# Patient Record
Sex: FEMALE | Race: WHITE | Employment: OTHER
[De-identification: names, ages, dates, MRNs, and addresses within clinical notes are randomized per-mention and may not be internally consistent; named-entity substitution may affect disease eponyms.]

---

## 2017-01-02 ENCOUNTER — HOME CARE VISIT (OUTPATIENT)
Dept: SCHEDULING | Facility: HOME HEALTH | Age: 72
End: 2017-01-02
Payer: MEDICARE

## 2017-01-02 VITALS
HEART RATE: 71 BPM | TEMPERATURE: 97.3 F | RESPIRATION RATE: 17 BRPM | SYSTOLIC BLOOD PRESSURE: 101 MMHG | DIASTOLIC BLOOD PRESSURE: 62 MMHG

## 2017-01-02 PROCEDURE — G0157 HHC PT ASSISTANT EA 15: HCPCS

## 2017-01-03 ENCOUNTER — HOME CARE VISIT (OUTPATIENT)
Dept: SCHEDULING | Facility: HOME HEALTH | Age: 72
End: 2017-01-03
Payer: MEDICARE

## 2017-01-03 VITALS
SYSTOLIC BLOOD PRESSURE: 130 MMHG | DIASTOLIC BLOOD PRESSURE: 58 MMHG | TEMPERATURE: 97.8 F | RESPIRATION RATE: 16 BRPM | OXYGEN SATURATION: 99 % | HEART RATE: 65 BPM

## 2017-01-03 PROCEDURE — G0299 HHS/HOSPICE OF RN EA 15 MIN: HCPCS

## 2017-01-04 ENCOUNTER — HOME CARE VISIT (OUTPATIENT)
Dept: HOME HEALTH SERVICES | Facility: HOME HEALTH | Age: 72
End: 2017-01-04
Payer: MEDICARE

## 2017-01-05 ENCOUNTER — HOME CARE VISIT (OUTPATIENT)
Dept: SCHEDULING | Facility: HOME HEALTH | Age: 72
End: 2017-01-05
Payer: MEDICARE

## 2017-01-05 VITALS
HEART RATE: 57 BPM | RESPIRATION RATE: 16 BRPM | SYSTOLIC BLOOD PRESSURE: 100 MMHG | DIASTOLIC BLOOD PRESSURE: 50 MMHG | OXYGEN SATURATION: 97 % | TEMPERATURE: 97.6 F

## 2017-01-05 PROCEDURE — G0299 HHS/HOSPICE OF RN EA 15 MIN: HCPCS

## 2017-01-06 ENCOUNTER — HOME CARE VISIT (OUTPATIENT)
Dept: SCHEDULING | Facility: HOME HEALTH | Age: 72
End: 2017-01-06
Payer: MEDICARE

## 2017-01-06 VITALS
OXYGEN SATURATION: 96 % | RESPIRATION RATE: 16 BRPM | SYSTOLIC BLOOD PRESSURE: 102 MMHG | HEART RATE: 76 BPM | DIASTOLIC BLOOD PRESSURE: 60 MMHG

## 2017-01-06 VITALS
TEMPERATURE: 97.1 F | HEART RATE: 66 BPM | DIASTOLIC BLOOD PRESSURE: 60 MMHG | RESPIRATION RATE: 16 BRPM | SYSTOLIC BLOOD PRESSURE: 98 MMHG

## 2017-01-06 PROCEDURE — G0157 HHC PT ASSISTANT EA 15: HCPCS

## 2017-01-06 PROCEDURE — G0152 HHCP-SERV OF OT,EA 15 MIN: HCPCS

## 2017-01-09 ENCOUNTER — HOME CARE VISIT (OUTPATIENT)
Dept: SCHEDULING | Facility: HOME HEALTH | Age: 72
End: 2017-01-09
Payer: MEDICARE

## 2017-01-09 PROCEDURE — G0157 HHC PT ASSISTANT EA 15: HCPCS

## 2017-01-10 VITALS
DIASTOLIC BLOOD PRESSURE: 60 MMHG | HEART RATE: 66 BPM | SYSTOLIC BLOOD PRESSURE: 102 MMHG | RESPIRATION RATE: 16 BRPM | TEMPERATURE: 97.2 F

## 2017-01-11 ENCOUNTER — HOME CARE VISIT (OUTPATIENT)
Dept: SCHEDULING | Facility: HOME HEALTH | Age: 72
End: 2017-01-11
Payer: MEDICARE

## 2017-01-11 VITALS
HEART RATE: 66 BPM | TEMPERATURE: 97.6 F | SYSTOLIC BLOOD PRESSURE: 100 MMHG | RESPIRATION RATE: 17 BRPM | DIASTOLIC BLOOD PRESSURE: 58 MMHG

## 2017-01-11 PROCEDURE — G0157 HHC PT ASSISTANT EA 15: HCPCS

## 2017-01-12 ENCOUNTER — HOME CARE VISIT (OUTPATIENT)
Dept: SCHEDULING | Facility: HOME HEALTH | Age: 72
End: 2017-01-12
Payer: MEDICARE

## 2017-01-12 VITALS
TEMPERATURE: 98.1 F | DIASTOLIC BLOOD PRESSURE: 57 MMHG | RESPIRATION RATE: 16 BRPM | SYSTOLIC BLOOD PRESSURE: 115 MMHG | HEART RATE: 59 BPM | HEIGHT: 64 IN

## 2017-01-12 PROCEDURE — G0151 HHCP-SERV OF PT,EA 15 MIN: HCPCS

## 2018-05-15 PROBLEM — I25.5 ISCHEMIC CARDIOMYOPATHY: Status: ACTIVE | Noted: 2018-05-15

## 2019-01-16 ENCOUNTER — HOSPITAL ENCOUNTER (EMERGENCY)
Age: 74
Discharge: HOME | End: 2019-01-16
Attending: EMERGENCY MEDICINE
Payer: MEDICARE

## 2019-01-16 ENCOUNTER — APPOINTMENT (OUTPATIENT)
Dept: CT IMAGING | Age: 74
End: 2019-01-16
Attending: EMERGENCY MEDICINE
Payer: MEDICARE

## 2019-01-16 VITALS
RESPIRATION RATE: 20 BRPM | DIASTOLIC BLOOD PRESSURE: 79 MMHG | SYSTOLIC BLOOD PRESSURE: 178 MMHG | HEART RATE: 79 BPM | OXYGEN SATURATION: 79 % | TEMPERATURE: 98.3 F | WEIGHT: 115 LBS | BODY MASS INDEX: 19.63 KG/M2 | HEIGHT: 64 IN

## 2019-01-16 DIAGNOSIS — R11.2 NON-INTRACTABLE VOMITING WITH NAUSEA, UNSPECIFIED VOMITING TYPE: ICD-10-CM

## 2019-01-16 DIAGNOSIS — S22.000A CLOSED COMPRESSION FRACTURE OF THORACIC VERTEBRA, INITIAL ENCOUNTER (HCC): ICD-10-CM
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
## 2019-01-16 DIAGNOSIS — S20.229A CONTUSION OF BACK, UNSPECIFIED LATERALITY, INITIAL ENCOUNTER: ICD-10-CM

## 2019-01-16 LAB
ALBUMIN SERPL-MCNC: 3.8 G/DL (ref 3.2–4.6)
ALBUMIN/GLOB SERPL: 0.9 {RATIO}
ALP SERPL-CCNC: 88 U/L (ref 50–136)
ALT SERPL-CCNC: 23 U/L (ref 12–65)
ANION GAP SERPL CALC-SCNC: 11 MMOL/L
AST SERPL-CCNC: 25 U/L (ref 15–37)
BASOPHILS # BLD: 0.1 K/UL (ref 0–0.2)
BASOPHILS NFR BLD: 0 % (ref 0–2)
BILIRUB SERPL-MCNC: 0.4 MG/DL (ref 0.2–1.1)
BUN SERPL-MCNC: 16 MG/DL (ref 8–23)
CALCIUM SERPL-MCNC: 8.9 MG/DL (ref 8.3–10.4)
CHLORIDE SERPL-SCNC: 113 MMOL/L (ref 98–107)
CO2 SERPL-SCNC: 19 MMOL/L (ref 21–32)
CREAT SERPL-MCNC: 1.22 MG/DL (ref 0.6–1)
DIFFERENTIAL METHOD BLD: ABNORMAL
EOSINOPHIL # BLD: 0.1 K/UL (ref 0–0.8)
EOSINOPHIL NFR BLD: 0 % (ref 0.5–7.8)
ERYTHROCYTE [DISTWIDTH] IN BLOOD BY AUTOMATED COUNT: 17 % (ref 11.9–14.6)
GLOBULIN SER CALC-MCNC: 4.2 G/DL (ref 2.3–3.5)
GLUCOSE SERPL-MCNC: 146 MG/DL (ref 65–100)
HCT VFR BLD AUTO: 33.4 % (ref 35.8–46.3)
HGB BLD-MCNC: 9.8 G/DL (ref 11.7–15.4)
IMM GRANULOCYTES # BLD AUTO: 0.1 K/UL (ref 0–0.5)
IMM GRANULOCYTES NFR BLD AUTO: 1 % (ref 0–5)
LIPASE SERPL-CCNC: 160 U/L (ref 73–393)
LYMPHOCYTES # BLD: 1.2 K/UL (ref 0.5–4.6)
LYMPHOCYTES NFR BLD: 8 % (ref 13–44)
MCH RBC QN AUTO: 24.6 PG (ref 26.1–32.9)
MCHC RBC AUTO-ENTMCNC: 29.3 G/DL (ref 31.4–35)
MCV RBC AUTO: 83.7 FL (ref 79.6–97.8)
MONOCYTES # BLD: 1.2 K/UL (ref 0.1–1.3)
MONOCYTES NFR BLD: 7 % (ref 4–12)
NEUTS SEG # BLD: 13.3 K/UL (ref 1.7–8.2)
NEUTS SEG NFR BLD: 83 % (ref 43–78)
NRBC # BLD: 0 K/UL (ref 0–0.2)
PLATELET # BLD AUTO: 231 K/UL (ref 150–450)
PMV BLD AUTO: 10.7 FL (ref 9.4–12.3)
POTASSIUM SERPL-SCNC: 3.4 MMOL/L (ref 3.5–5.1)
PROT SERPL-MCNC: 8 G/DL
RBC # BLD AUTO: 3.99 M/UL (ref 4.05–5.2)
SODIUM SERPL-SCNC: 143 MMOL/L (ref 136–145)
WBC # BLD AUTO: 16 K/UL (ref 4.3–11.1)

## 2019-01-16 PROCEDURE — 71250 CT THORAX DX C-: CPT

## 2019-01-16 PROCEDURE — 96375 TX/PRO/DX INJ NEW DRUG ADDON: CPT | Performed by: EMERGENCY MEDICINE

## 2019-01-16 PROCEDURE — 70450 CT HEAD/BRAIN W/O DYE: CPT

## 2019-01-16 PROCEDURE — 96376 TX/PRO/DX INJ SAME DRUG ADON: CPT | Performed by: EMERGENCY MEDICINE

## 2019-01-16 PROCEDURE — 83690 ASSAY OF LIPASE: CPT

## 2019-01-16 PROCEDURE — 96374 THER/PROPH/DIAG INJ IV PUSH: CPT | Performed by: EMERGENCY MEDICINE

## 2019-01-16 PROCEDURE — 99284 EMERGENCY DEPT VISIT MOD MDM: CPT | Performed by: EMERGENCY MEDICINE

## 2019-01-16 PROCEDURE — 80053 COMPREHEN METABOLIC PANEL: CPT

## 2019-01-16 PROCEDURE — 81003 URINALYSIS AUTO W/O SCOPE: CPT | Performed by: EMERGENCY MEDICINE

## 2019-01-16 PROCEDURE — 74011250636 HC RX REV CODE- 250/636: Performed by: EMERGENCY MEDICINE

## 2019-01-16 PROCEDURE — 85025 COMPLETE CBC W/AUTO DIFF WBC: CPT

## 2019-01-16 PROCEDURE — 74011250637 HC RX REV CODE- 250/637: Performed by: EMERGENCY MEDICINE

## 2019-01-16 RX ORDER — ONDANSETRON 2 MG/ML
4 INJECTION INTRAMUSCULAR; INTRAVENOUS
Status: COMPLETED | OUTPATIENT
Start: 2019-01-16 | End: 2019-01-16

## 2019-01-16 RX ORDER — HYDROCODONE BITARTRATE AND ACETAMINOPHEN 5; 325 MG/1; MG/1
1 TABLET ORAL
Status: COMPLETED | OUTPATIENT
Start: 2019-01-16 | End: 2019-01-16

## 2019-01-16 RX ORDER — HYDROCODONE BITARTRATE AND ACETAMINOPHEN 7.5; 325 MG/1; MG/1
1 TABLET ORAL
Qty: 17 TAB | Refills: 0 | Status: ON HOLD | OUTPATIENT
Start: 2019-01-16 | End: 2021-01-01

## 2019-01-16 RX ORDER — ONDANSETRON 8 MG/1
8 TABLET, ORALLY DISINTEGRATING ORAL
Qty: 12 TAB | Refills: 0 | Status: SHIPPED | OUTPATIENT
Start: 2019-01-16

## 2019-01-16 RX ORDER — MORPHINE SULFATE 2 MG/ML
2 INJECTION, SOLUTION INTRAMUSCULAR; INTRAVENOUS
Status: COMPLETED | OUTPATIENT
Start: 2019-01-16 | End: 2019-01-16

## 2019-01-16 RX ORDER — MORPHINE SULFATE 4 MG/ML
4 INJECTION INTRAVENOUS
Status: DISCONTINUED | OUTPATIENT
Start: 2019-01-16 | End: 2019-01-16

## 2019-01-16 RX ADMIN — MORPHINE SULFATE 2 MG: 2 INJECTION, SOLUTION INTRAMUSCULAR; INTRAVENOUS at 12:34

## 2019-01-16 RX ADMIN — HYDROCODONE BITARTRATE AND ACETAMINOPHEN 1 TABLET: 5; 325 TABLET ORAL at 15:57

## 2019-01-16 RX ADMIN — ONDANSETRON 4 MG: 2 INJECTION INTRAMUSCULAR; INTRAVENOUS at 12:33

## 2019-01-16 RX ADMIN — SODIUM CHLORIDE 1000 ML: 900 INJECTION, SOLUTION INTRAVENOUS at 12:32

## 2019-01-16 RX ADMIN — MORPHINE SULFATE 2 MG: 2 INJECTION, SOLUTION INTRAMUSCULAR; INTRAVENOUS at 13:34

## 2019-01-16 NOTE — ED NOTES
I have reviewed discharge instructions with the patient. The patient verbalized understanding. Patient left ED via Discharge Method: wheelchair to Home with family. Opportunity for questions and clarification provided. Patient given 2 scripts. To continue your aftercare when you leave the hospital, you may receive an automated call from our care team to check in on how you are doing. This is a free service and part of our promise to provide the best care and service to meet your aftercare needs.  If you have questions, or wish to unsubscribe from this service please call [phone]. Thank you for Choosing our [facility] Emergency Department.

## 2019-01-16 NOTE — ED PROVIDER NOTES
Patient presents to the ER after having a fall. Reports she fell hitting the edge of a door in the middle part of her back and striking her head. Denies any headache. Reports development of pain in her upper back as well as pain radiating to her upper abdomen. Reports some nausea and vomiting. She has been ambulatory. Denies any numbness, tingling or weakness The history is provided by the patient. Fall The accident occurred 3 to 5 hours ago. The fall occurred while walking. She fell from a height of ground level. The point of impact was the head (upper back). The pain is present in the head. The pain is at a severity of 4/10. The pain is mild. She was ambulatory at the scene. There was no drug use involved in the accident. Associated symptoms include abdominal pain, nausea, vomiting, loss of consciousness and laceration. Pertinent negatives include no numbness, no headaches and no extremity weakness. Past Medical History:  
Diagnosis Date  Abnormal EKG 6/17/2016  Acute systolic (congestive) heart failure ([address] 6/17/2016  Anterior myocardial infarction ([address] 11/20/1997  Avascular necrosis ([address] R hip  CAD (coronary artery disease) 1990  
 s/p CABG  
 Cellulitis of right lower extremity 6/17/2016  Chest pain, precordial 3/28/2016  COPD (chronic obstructive pulmonary disease) ([address]   
 pt denies  Coronary atherosclerosis of native coronary vessel 03/28/2016  
 per heart cath (6/2016)- \"pt with diffusely diseased LAD and small caliber vessels. At present. .. best option would be medical management. .. do not see any obvious good options for redo CABG. ..  Fracture of femoral neck, right ([address] 02/27/2016  
 s/p repair with hardware- Dr Ebony Melton  GERD (gastroesophageal reflux disease) 02/27/2016  
 managed with medication  HLD (hyperlipidemia) 03/28/2016  
 managed with medication  Hx of echocardiogram 02/27/2016 EF 45-50%, hypokinesis of the apical anterior and basal-mid anteroseptal wall. Mild aortic valve regurgitation. Mild tricuspid regurgitation.  Hypertension   
 managed with medication  Hypokalemia 2016  
 hx of- WNL since 2016- pt on K+ supplement  IBS (irritable bowel syndrome)  MI (myocardial infarction) [address]  and spring 2016  
 x2  Osteoarthritis  Osteoporosis  Rectal prolapse  S/P CABG (coronary artery bypass graft)   Scleroderma ([facility] 3/28/2016  Shortness of breath dyspnea 3/28/2016  Tracheal stenosis   
 s/p repair  Unstable angina ([facility] [address] 1997 Past Surgical History:  
Procedure Laterality Date  HX ANKLE FRACTURE TX Right   
 hardware placed  HX CORONARY ARTERY BYPASS GRAFT    
 cabg x 3  
 HX FRACTURE TX    
 cervical spine- C2  
 HX HEART CATHETERIZATION    
 HX HEENT    
 tracheal stricture/stenosis Shantel Baston HIP FRACTURE TX  2016  
 repaired with hardware- Dr Paty Momin  HX HYSTERECTOMY  1970s  HX OTHER SURGICAL Peg tube placement  HX PTCA  HX TRACHEOSTOMY Family History:  
Problem Relation Age of Onset  Breast Cancer Sister Social History Socioeconomic History  Marital status:  Spouse name: Not on file  Number of children: Not on file  Years of education: Not on file  Highest education level: Not on file Social Needs  Financial resource strain: Not on file  Food insecurity - worry: Not on file  Food insecurity - inability: Not on file  Transportation needs - medical: Not on file  Transportation needs - non-medical: Not on file Occupational History  Not on file Tobacco Use  Smoking status: Former Smoker Packs/day: 1.00 Years: 20.00 Pack years: 20.00 Last attempt to quit: 1995 Years since quittin.0  Smokeless tobacco: Never Used Substance and Sexual Activity  Alcohol use:  No  
  Drug use: No  
 Sexual activity: Not on file Other Topics Concern  Not on file Social History Narrative  Not on file ALLERGIES: Corticosteroids (glucocorticoids) and Iodine Review of Systems Constitutional: Positive for fatigue. Negative for unexpected weight change. HENT: Negative for congestion and dental problem. Eyes: Negative for photophobia, redness and visual disturbance. Respiratory: Negative for chest tightness, shortness of breath and stridor. Cardiovascular: Negative for leg swelling. Gastrointestinal: Positive for abdominal pain, nausea and vomiting. Endocrine: Negative for polydipsia, polyphagia and polyuria. Genitourinary: Negative for frequency and urgency. Musculoskeletal: Positive for back pain and myalgias. Negative for extremity weakness. Skin: Negative for pallor and rash. Neurological: Positive for loss of consciousness. Negative for syncope, speech difficulty, numbness and headaches. Hematological: Negative for adenopathy. Does not bruise/bleed easily. Psychiatric/Behavioral: Negative for behavioral problems and confusion. All other systems reviewed and are negative. Vitals:  
 01/16/19 1206 BP: 172/71 Pulse: 62 Resp: 20 Temp: 98.2 °F (36.8 °C) Weight: 52.2 kg (115 lb) Height: 5' 4\" (1.626 m) Physical Exam  
Constitutional: She is oriented to person, place, and time. She appears well-developed and well-nourished. HENT:  
Head: Normocephalic. Eyes: EOM are normal. Pupils are equal, round, and reactive to light. Neck: Normal range of motion. Neck supple. Cardiovascular: Normal rate and regular rhythm. Pulmonary/Chest: Effort normal and breath sounds normal.  
Abdominal: Soft. There is tenderness. Musculoskeletal:  
     Back: 
 
Neurological: She is alert and oriented to person, place, and time. Skin: Skin is warm. Capillary refill takes less than 2 seconds.  Laceration noted. No rash noted. No erythema. No pallor. Nursing note and vitals reviewed. MDM Number of Diagnoses or Management Options Diagnosis management comments: Some minimal upper abdominal tenderness on exam, large bruise and thoracic region. Lungs are clear however patient reports shortness of breath. She is not hypoxic or hypotensive here. Given age, we'll obtain CT scan of head as well as chest abdomen and pelvis. 3:38 PM 
Labs show stable hemoglobin, white blood cell count of 16,000. Chemistry panel is stable CT scan of the head shows no acute abnormalities CT scan of the chest abdomen pelvis shows no gross signs of any serious traumatic injuries. Possible old T11 compression fracture I did go reassess patient, she is sitting up, drinking water without any vomiting. Abdomen is soft and nontender and nondistended. Her vital signs remain stable. We will discharge home, Amount and/or Complexity of Data Reviewed Clinical lab tests: ordered and reviewed Tests in the radiology section of CPT®: ordered and reviewed Risk of Complications, Morbidity, and/or Mortality Presenting problems: moderate Diagnostic procedures: low Management options: moderate Procedures Results Include: 
 
Recent Results (from the past 24 hour(s)) CBC WITH AUTOMATED DIFF Collection Time: 01/16/19 12:28 PM  
Result Value Ref Range WBC 16.0 (H) 4.3 - 11.1 K/uL  
 RBC 3.99 (L) 4.05 - 5.2 M/uL HGB 9.8 (L) 11.7 - 15.4 g/dL HCT 33.4 (L) 35.8 - 46.3 % MCV 83.7 79.6 - 97.8 FL  
 MCH 24.6 (L) 26.1 - 32.9 PG  
 MCHC 29.3 (L) 31.4 - 35.0 g/dL  
 RDW 17.0 (H) 11.9 - 14.6 % PLATELET 015 502 - 480 K/uL MPV 10.7 9.4 - 12.3 FL ABSOLUTE NRBC 0.00 0.0 - 0.2 K/uL  
 DF AUTOMATED NEUTROPHILS 83 (H) 43 - 78 % LYMPHOCYTES 8 (L) 13 - 44 % MONOCYTES 7 4.0 - 12.0 % EOSINOPHILS 0 (L) 0.5 - 7.8 % BASOPHILS 0 0.0 - 2.0 % IMMATURE GRANULOCYTES 1 0.0 - 5.0 % ABS. NEUTROPHILS 13.3 (H) 1.7 - 8.2 K/UL  
 ABS. LYMPHOCYTES 1.2 0.5 - 4.6 K/UL  
 ABS. MONOCYTES 1.2 0.1 - 1.3 K/UL  
 ABS. EOSINOPHILS 0.1 0.0 - 0.8 K/UL  
 ABS. BASOPHILS 0.1 0.0 - 0.2 K/UL  
 ABS. IMM. GRANS. 0.1 0.0 - 0.5 K/UL METABOLIC PANEL, COMPREHENSIVE Collection Time: 01/16/19 12:28 PM  
Result Value Ref Range Sodium 143 136 - 145 mmol/L Potassium 3.4 (L) 3.5 - 5.1 mmol/L Chloride 113 (H) 98 - 107 mmol/L  
 CO2 19 (L) 21 - 32 mmol/L Anion gap 11 mmol/L Glucose 146 (H) 65 - 100 mg/dL BUN 16 8 - 23 MG/DL Creatinine 1.22 (H) 0.6 - 1.0 MG/DL  
 GFR est AA 55 (L) >60 ml/min/1.73m2 GFR est non-AA 46 ml/min/1.73m2 Calcium 8.9 8.3 - 10.4 MG/DL Bilirubin, total 0.4 0.2 - 1.1 MG/DL  
 ALT (SGPT) 23 12 - 65 U/L  
 AST (SGOT) 25 15 - 37 U/L Alk. phosphatase 88 50 - 136 U/L Protein, total 8.0 g/dL Albumin 3.8 3.2 - 4.6 g/dL Globulin 4.2 (H) 2.3 - 3.5 g/dL A-G Ratio 0.9 LIPASE Collection Time: 01/16/19 12:28 PM  
Result Value Ref Range Lipase 160 73 - 393 U/L Voice dictation software was used during the making of this note. This software is not perfect and grammatical and other typographical errors may be present. This note has been proofread, but may still contain errors.  
Anum Bolton MD; 1/16/2019 @3:39 PM  
===================================================================

## 2019-01-16 NOTE — ED TRIAGE NOTES
Pt states she was using the door handle to walk this morning and it moved causing her to fall. States she fell against the wall on the left side and then to the floor. States she is having pain in left side and around to stomach and is feeling SOB since the fall. States the pain has been so bad it has caused her to vomit multiple times since the fall. Pt states she thinks she did hit her head and states she takes ASA 81mg daily.

## 2019-01-16 NOTE — DISCHARGE INSTRUCTIONS
Take medications as prescribed  Follow-up with your primary care physician  Follow-up with ortho spine if back pain worsens  Return to the ER for any new or worsening symptoms    Preventing Falls: Care Instructions  Your Care Instructions    Getting around your home safely can be a challenge if you have injuries or health problems that make it easy for you to fall. Loose rugs and furniture in walkways are among the dangers for many older people who have problems walking or who have poor eyesight. People who have conditions such as arthritis, osteoporosis, or dementia also have to be careful not to fall. You can make your home safer with a few simple measures. Follow-up care is a key part of your treatment and safety. Be sure to make and go to all appointments, and call your doctor if you are having problems. It's also a good idea to know your test results and keep a list of the medicines you take. How can you care for yourself at home? Taking care of yourself  · You may get dizzy if you do not drink enough water. To prevent dehydration, drink plenty of fluids, enough so that your urine is light yellow or clear like water. Choose water and other caffeine-free clear liquids. If you have kidney, heart, or liver disease and have to limit fluids, talk with your doctor before you increase the amount of fluids you drink. · Exercise regularly to improve your strength, muscle tone, and balance. Walk if you can. Swimming may be a good choice if you cannot walk easily. · Have your vision and hearing checked each year or any time you notice a change. If you have trouble seeing and hearing, you might not be able to avoid objects and could lose your balance. · Know the side effects of the medicines you take. Ask your doctor or pharmacist whether the medicines you take can affect your balance. Sleeping pills or sedatives can affect your balance. · Limit the amount of alcohol you drink.  Alcohol can impair your balance and other senses. · Ask your doctor whether calluses or corns on your feet need to be removed. If you wear loose-fitting shoes because of calluses or corns, you can lose your balance and fall. · Talk to your doctor if you have numbness in your feet. Preventing falls at home  · Remove raised doorway thresholds, throw rugs, and clutter. Repair loose carpet or raised areas in the floor. · Move furniture and electrical cords to keep them out of walking paths. · Use nonskid floor wax, and wipe up spills right away, especially on ceramic tile floors. · If you use a walker or cane, put rubber tips on it. If you use crutches, clean the bottoms of them regularly with an abrasive pad, such as steel wool. · Keep your house well lit, especially Ton[address], and outside walkways. Use night-lights in areas such as hallways and bathrooms. Add extra light switches or use remote switches (such as switches that go on or off when you clap your hands) to make it easier to turn lights on if you have to get up during the night. · Install sturdy handrails on stairways. · Move items in your cabinets so that the things you use a lot are on the lower shelves (about waist level). · Keep a cordless phone and a flashlight with new batteries by your bed. If possible, put a phone in each of the main rooms of your house, or carry a cell phone in case you fall and cannot reach a phone. Or, you can wear a device around your neck or wrist. You push a button that sends a signal for help. · Wear low-heeled shoes that fit well and give your feet good support. Use footwear with nonskid soles. Check the heels and soles of your shoes for wear. Repair or replace worn heels or soles. · Do not wear socks without shoes on wood floors. · Walk on the grass when the sidewalks are slippery. If you live in an area that gets snow and ice in the winter, sprinkle salt on slippery steps and sidewalks.   Preventing falls in the bath  · Install grab bars and nonskid mats inside and outside your shower or tub and near the toilet and sinks. · Use shower chairs and bath benches. · Use a hand-held shower head that will allow you to sit while showering. · Get into a tub or shower by putting the weaker leg in first. Get out of a tub or shower with your strong side first.  · Repair loose toilet seats and consider installing a raised toilet seat to make getting on and off the toilet easier. · Keep your bathroom door unlocked while you are in the shower. Where can you learn more? Go to http://barbaraProject Airplanecharu.info/. Enter 0476 [id number] in the search box to learn more about \"Preventing Falls: Care Instructions. \"  Current as of: March 16, 2018  Content Version: 11.8  © 4342-3682 [employer]. Care instructions adapted under license by [employer] (which disclaims liability or warranty for this information). If you have questions about a medical condition or this instruction, always ask your healthcare professional. [place] [address] any warranty or liability for your use of this information. Patient Education        Nausea and Vomiting: Care Instructions  Your Care Instructions    When you are nauseated, you may feel weak and sweaty and notice a lot of saliva in your mouth. Nausea often leads to vomiting. Most of the time you do not need to worry about nausea and vomiting, but they can be signs of other illnesses. Two common causes of nausea and vomiting are stomach flu and food poisoning. Nausea and vomiting from viral stomach flu will usually start to improve within 24 hours. Nausea and vomiting from food poisoning may last from 12 to 48 hours. The doctor has checked you carefully, but problems can develop later. If you notice any problems or new symptoms, get medical treatment right away. Follow-up care is a key part of your treatment and safety.  Be sure to make and go to all appointments, and call your doctor if you are having problems. It's also a good idea to know your test results and keep a list of the medicines you take. How can you care for yourself at home? · To prevent dehydration, drink plenty of fluids, enough so that your urine is light yellow or clear like water. Choose water and other caffeine-free clear liquids until you feel better. If you have kidney, heart, or liver disease and have to limit fluids, talk with your doctor before you increase the amount of fluids you drink. · Rest in bed until you feel better. · When you are able to eat, try clear soups, mild foods, and liquids until all symptoms are gone for 12 to 48 hours. Other good choices include dry toast, crackers, cooked cereal, and gelatin dessert, such as Jell-O. When should you call for help? Call 911 anytime you think you may need emergency care. For example, call if:    · You passed out (lost consciousness).    Call your doctor now or seek immediate medical care if:    · You have symptoms of dehydration, such as:  ? Dry eyes and a dry mouth. ? Passing only a little dark urine. ? Feeling thirstier than usual.     · You have new or worsening belly pain.     · You have a new or higher fever.     · You vomit blood or what looks like coffee grounds.    Watch closely for changes in your health, and be sure to contact your doctor if:    · You have ongoing nausea and vomiting.     · Your vomiting is getting worse.     · Your vomiting lasts longer than 2 days.     · You are not getting better as expected. Where can you learn more? Go to http://barbara-charu.info/. Enter 25 [id number] in the search box to learn more about \"Nausea and Vomiting: Care Instructions. \"  Current as of: November 20, 2017  Content Version: 11.8  © 0164-0425 [employer]. Care instructions adapted under license by [employer] (which disclaims liability or warranty for this information).  If you have questions about a medical condition or this instruction, always ask your healthcare professional. [place] [address] any warranty or liability for your use of this information. Patient Education   Patient Education        Compression Fracture of the Spine: Care Instructions  Your Care Instructions    A compression fracture happens when the front part of a spinal bone breaks and collapses. A fall or other accident can cause it. A minor injury or moving the wrong way can cause a break if you have thin or brittle bones (osteoporosis). These types of breaks will heal in 8 to 10 weeks. You will need rest and pain medicines. Your doctor may recommend physical therapy. Some doctors recommend that certain people with compression fractures wear braces. Your doctor also may treat thin or brittle bones. You may need surgery if you have lasting pain or if the bone presses on the spinal cord or nerves. You heal best when you take good care of yourself. Eat a variety of healthy foods, and don't smoke. Follow-up care is a key part of your treatment and safety. Be sure to make and go to all appointments, and call your doctor if you are having problems. It's also a good idea to know your test results and keep a list of the medicines you take. How can you care for yourself at home? · Be safe with medicines. Read and follow all instructions on the label. ? If the doctor gave you a prescription medicine for pain, take it as prescribed. ? If you are not taking a prescription pain medicine, ask your doctor if you can take an over-the-counter medicine. · Talk to your doctor about how to make your bones stronger. You may need medicines or a change in what you eat. · Be active only as directed by your doctor. When should you call for help? Call 911 anytime you think you may need emergency care.  For example, call if:    · You are unable to move an arm or a leg at all.   [facility] your doctor now or seek immediate medical care if:    · You have new or worse symptoms in your arms, legs, belly, or buttocks. Symptoms may include:  ? Numbness or tingling. ? Weakness. ? Pain.     · You lose bladder or bowel control.     · You have belly pain, bloating, vomiting, or nausea.    Watch closely for changes in your health, and be sure to contact your doctor if:    · You do not get better as expected. Where can you learn more? Go to http://barbara-charu.info/. Enter P445 in the search box to learn more about \"Compression Fracture of the Spine: Care Instructions. \"  Current as of: November 29, 2017  Content Version: 11.8  © 9704-4978 [employer]. Care instructions adapted under license by [employer] (which disclaims liability or warranty for this information). If you have questions about a medical condition or this instruction, always ask your healthcare professional. [place] [address] any warranty or liability for your use of this information. Learning About How to Have a Healthy Back  What causes back pain? Back pain is often caused by overuse, strain, or injury. For example, people often hurt their backs playing sports or working in the yard, being jolted in a car accident, or lifting something too heavy. Aging plays a part too. Your bones and muscles tend to lose strength as you age, which makes injury more likely. The spongy discs between the bones of the spine (vertebrae) may suffer from wear and tear and no longer provide enough cushion between the bones. A disc that bulges or breaks open (herniated disc) can press on nerves, causing back pain. In some people, back pain is the result of arthritis, broken vertebrae caused by bone loss (osteoporosis), illness, or a spine problem. Although most people have back pain at one time or another, there are steps you can take to make it less likely. How can you have a healthy back?   Reduce stress on your back through good posture  Slumping or slouching alone may not cause low back pain. But after the back has been strained or injured, bad posture can make pain worse. · Sleep in a position that maintains your back's normal curves and on a mattress that feels comfortable. Sleep on your side with a pillow between your knees, or sleep on your back with a pillow under your knees. These positions can reduce strain on your back. · Stand and sit up straight. \"Good posture\" generally means your ears, shoulders, and hips are in a straight line. · If you must stand for a long time, put one foot on a stool, ledge, or box. Switch feet every now and then. · Sit in a chair that is low enough to let you place both feet flat on the floor with both knees nearly level with your hips. If your chair or desk is too high, use a footrest to raise your knees. Place a small pillow, a rolled-up towel, or a lumbar roll in the curve of your back if you need extra support. · Try a kneeling chair, which helps tilt your hips forward. This takes pressure off your lower back. · Try sitting on an exercise ball. It can rock from side to side, which helps keep your back loose. · When driving, keep your knees nearly level with your hips. Sit straight, and drive with both hands on the steering wheel. Your arms should be in a slightly bent position. Reduce stress on your back through careful lifting  · Squat down, bending at the hips and knees only. If you need to, put one knee to the floor and extend your other knee in front of you, bent at a right angle (half kneeling). · Press your chest straight forward. This helps keep your upper back straight while keeping a slight arch in your low back. · Hold the load as close to your body as possible, at the level of your belly button (navel). · Use your feet to change direction, taking small steps. · Lead with your hips as you change direction. Keep your shoulders in line with your hips as you move.   · Set down your load carefully, squatting with your knees and hips only. Exercise and stretch your back  · Do some exercise on most days of the week, if your doctor says it is okay. You can walk, run, swim, or cycle. · Stretch your back muscles. Here are a few exercises to try:  ? Lie on your back, and gently pull one bent knee to your chest. Put that foot back on the floor, and then pull the other knee to your chest.  ? Do pelvic tilts. Lie on your back with your knees bent. Tighten your stomach muscles. Pull your belly button (navel) in and up toward your ribs. You should feel like your back is pressing to the floor and your hips and pelvis are slightly lifting off the floor. Hold for 6 seconds while breathing smoothly. ? Sit with your back flat against a wall. · Keep your core muscles strong. The muscles of your back, belly (abdomen), and buttocks support your spine. ? Pull in your belly and imagine pulling your navel toward your spine. Hold this for 6 seconds, then relax. Remember to keep breathing normally as you tense your muscles. ? Do curl-ups. Always do them with your knees bent. Keep your low back on the floor, and curl your shoulders toward your knees using a smooth, slow motion. Keep your arms folded across your chest. If this bothers your neck, try putting your hands behind your neck (not your head), with your elbows spread apart. ? Lie on your back with your knees bent and your feet flat on the floor. Tighten your belly muscles, and then push with your feet and raise your buttocks up a few inches. Hold this position 6 seconds as you continue to breathe normally, then lower yourself slowly to the floor. Repeat 8 to 12 times. ? If you like group exercise, try Pilates or yoga. These classes have poses that strengthen the core muscles. Lead a healthy lifestyle  · Stay at a healthy weight to avoid strain on your back. · Do not smoke. Smoking increases the risk of osteoporosis, which weakens the spine.  If you need help quitting, talk to your doctor about stop-smoking programs and medicines. These can increase your chances of quitting for good. Where can you learn more? Go to http://barbara-charu.info/. Enter L315 in the search box to learn more about \"Learning About How to Have a Healthy Back. \"  Current as of: November 29, 2017  Content Version: 11.8  © 6101-4925 [employer]. Care instructions adapted under license by [employer] (which disclaims liability or warranty for this information). If you have questions about a medical condition or this instruction, always ask your healthcare professional. [place] [address] any warranty or liability for your use of this information.

## 2019-05-14 ENCOUNTER — HOSPITAL ENCOUNTER (EMERGENCY)
Age: 74
Discharge: HOME | DRG: 287 | End: 2019-05-14
Attending: EMERGENCY MEDICINE
Payer: MEDICARE

## 2019-05-14 ENCOUNTER — APPOINTMENT (OUTPATIENT)
Dept: GENERAL RADIOLOGY | Age: 74
DRG: 287 | End: 2019-05-14
Attending: EMERGENCY MEDICINE
Payer: MEDICARE

## 2019-05-14 ENCOUNTER — HOSPITAL ENCOUNTER (INPATIENT)
Age: 74
LOS: 2 days | Discharge: HOME | DRG: 287 | End: 2019-05-16
Attending: INTERNAL MEDICINE | Admitting: INTERNAL MEDICINE
Payer: MEDICARE

## 2019-05-14 VITALS
BODY MASS INDEX: 19.63 KG/M2 | DIASTOLIC BLOOD PRESSURE: 73 MMHG | OXYGEN SATURATION: 100 % | RESPIRATION RATE: 16 BRPM | HEART RATE: 59 BPM | WEIGHT: 115 LBS | SYSTOLIC BLOOD PRESSURE: 163 MMHG | TEMPERATURE: 98.6 F | HEIGHT: 64 IN

## 2019-05-14 DIAGNOSIS — I20.0 UNSTABLE ANGINA (HCC): Primary | ICD-10-CM

## 2019-05-14 PROBLEM — E87.5 HYPERKALEMIA: Status: ACTIVE | Noted: 2019-05-14

## 2019-05-14 PROBLEM — R07.9 CHEST PAIN: Status: ACTIVE | Noted: 2019-05-14

## 2019-05-14 PROBLEM — I50.22 SYSTOLIC CHF, CHRONIC (HCC): Status: ACTIVE | Noted: 2019-05-14

## 2019-05-14 LAB
ALBUMIN SERPL-MCNC: 4 G/DL (ref 3.2–4.6)
ALBUMIN/GLOB SERPL: 0.9 {RATIO} (ref 1.2–3.5)
ALP SERPL-CCNC: 96 U/L (ref 50–130)
ALT SERPL-CCNC: 21 U/L (ref 12–65)
ANION GAP SERPL CALC-SCNC: 10 MMOL/L (ref 7–16)
AST SERPL-CCNC: 23 U/L (ref 15–37)
ATRIAL RATE: 53 BPM
ATRIAL RATE: 66 BPM
BASOPHILS # BLD: 0.1 K/UL (ref 0–0.2)
BASOPHILS NFR BLD: 1 % (ref 0–2)
BILIRUB SERPL-MCNC: 0.3 MG/DL (ref 0.2–1.1)
BUN SERPL-MCNC: 24 MG/DL (ref 8–23)
CALCIUM SERPL-MCNC: 9.1 MG/DL (ref 8.3–10.4)
CALCULATED P AXIS, ECG09: 36 DEGREES
CALCULATED P AXIS, ECG09: 72 DEGREES
CALCULATED R AXIS, ECG10: -56 DEGREES
CALCULATED R AXIS, ECG10: -7 DEGREES
CALCULATED T AXIS, ECG11: 130 DEGREES
CALCULATED T AXIS, ECG11: 67 DEGREES
CHLORIDE SERPL-SCNC: 112 MMOL/L (ref 98–107)
CO2 SERPL-SCNC: 18 MMOL/L (ref 21–32)
CREAT SERPL-MCNC: 1.46 MG/DL (ref 0.6–1)
DIAGNOSIS, 93000: NORMAL
DIAGNOSIS, 93000: NORMAL
DIFFERENTIAL METHOD BLD: ABNORMAL
EOSINOPHIL # BLD: 0.1 K/UL (ref 0–0.8)
EOSINOPHIL NFR BLD: 1 % (ref 0.5–7.8)
ERYTHROCYTE [DISTWIDTH] IN BLOOD BY AUTOMATED COUNT: 18.3 % (ref 11.9–14.6)
GLOBULIN SER CALC-MCNC: 4.7 G/DL (ref 2.3–3.5)
GLUCOSE SERPL-MCNC: 103 MG/DL (ref 65–100)
HCT VFR BLD AUTO: 44.6 % (ref 35.8–46.3)
HGB BLD-MCNC: 13.5 G/DL (ref 11.7–15.4)
IMM GRANULOCYTES # BLD AUTO: 0.1 K/UL (ref 0–0.5)
IMM GRANULOCYTES NFR BLD AUTO: 1 % (ref 0–5)
INR PPP: 1.1
LYMPHOCYTES # BLD: 2.9 K/UL (ref 0.5–4.6)
LYMPHOCYTES NFR BLD: 25 % (ref 13–44)
MCH RBC QN AUTO: 26.1 PG (ref 26.1–32.9)
MCHC RBC AUTO-ENTMCNC: 30.3 G/DL (ref 31.4–35)
MCV RBC AUTO: 86.1 FL (ref 79.6–97.8)
MONOCYTES # BLD: 0.9 K/UL (ref 0.1–1.3)
MONOCYTES NFR BLD: 8 % (ref 4–12)
NEUTS SEG # BLD: 7.4 K/UL (ref 1.7–8.2)
NEUTS SEG NFR BLD: 65 % (ref 43–78)
NRBC # BLD: 0 K/UL (ref 0–0.2)
P-R INTERVAL, ECG05: 206 MS
P-R INTERVAL, ECG05: 218 MS
PLATELET # BLD AUTO: 255 K/UL (ref 150–450)
PMV BLD AUTO: 11 FL (ref 9.4–12.3)
POTASSIUM SERPL-SCNC: 5.4 MMOL/L (ref 3.5–5.1)
PROT SERPL-MCNC: 8.7 G/DL (ref 6.3–8.2)
PROTHROMBIN TIME: 14.1 SEC (ref 11.7–14.5)
Q-T INTERVAL, ECG07: 424 MS
Q-T INTERVAL, ECG07: 460 MS
QRS DURATION, ECG06: 112 MS
QRS DURATION, ECG06: 86 MS
QTC CALCULATION (BEZET), ECG08: 431 MS
QTC CALCULATION (BEZET), ECG08: 444 MS
RBC # BLD AUTO: 5.18 M/UL (ref 4.05–5.2)
SODIUM SERPL-SCNC: 140 MMOL/L (ref 136–145)
TROPONIN I BLD-MCNC: 0.01 NG/ML (ref 0.02–0.05)
TROPONIN I BLD-MCNC: 0.03 NG/ML (ref 0.02–0.05)
TROPONIN I SERPL-MCNC: 0.19 NG/ML (ref 0.02–0.05)
VENTRICULAR RATE, ECG03: 53 BPM
VENTRICULAR RATE, ECG03: 66 BPM
WBC # BLD AUTO: 11.4 K/UL (ref 4.3–11.1)

## 2019-05-14 PROCEDURE — 80053 COMPREHEN METABOLIC PANEL: CPT

## 2019-05-14 PROCEDURE — 74011250637 HC RX REV CODE- 250/637: Performed by: EMERGENCY MEDICINE

## 2019-05-14 PROCEDURE — 85025 COMPLETE CBC W/AUTO DIFF WBC: CPT

## 2019-05-14 PROCEDURE — 84484 ASSAY OF TROPONIN QUANT: CPT

## 2019-05-14 PROCEDURE — 36415 COLL VENOUS BLD VENIPUNCTURE: CPT

## 2019-05-14 PROCEDURE — 85610 PROTHROMBIN TIME: CPT

## 2019-05-14 PROCEDURE — 71045 X-RAY EXAM CHEST 1 VIEW: CPT

## 2019-05-14 PROCEDURE — 99285 EMERGENCY DEPT VISIT HI MDM: CPT | Performed by: EMERGENCY MEDICINE

## 2019-05-14 PROCEDURE — 93005 ELECTROCARDIOGRAM TRACING: CPT | Performed by: EMERGENCY MEDICINE

## 2019-05-14 PROCEDURE — 96365 THER/PROPH/DIAG IV INF INIT: CPT | Performed by: EMERGENCY MEDICINE

## 2019-05-14 PROCEDURE — 74011250636 HC RX REV CODE- 250/636: Performed by: EMERGENCY MEDICINE

## 2019-05-14 PROCEDURE — 65660000000 HC RM CCU STEPDOWN

## 2019-05-14 PROCEDURE — 74011250637 HC RX REV CODE- 250/637: Performed by: PHYSICIAN ASSISTANT

## 2019-05-14 PROCEDURE — 74011250636 HC RX REV CODE- 250/636: Performed by: PHYSICIAN ASSISTANT

## 2019-05-14 PROCEDURE — 96361 HYDRATE IV INFUSION ADD-ON: CPT | Performed by: EMERGENCY MEDICINE

## 2019-05-14 RX ORDER — LISINOPRIL 5 MG/1
5 TABLET ORAL DAILY
Status: DISCONTINUED | OUTPATIENT
Start: 2019-05-15 | End: 2019-05-16 | Stop reason: HOSPADM

## 2019-05-14 RX ORDER — ATORVASTATIN CALCIUM 40 MG/1
40 TABLET, FILM COATED ORAL
Status: DISCONTINUED | OUTPATIENT
Start: 2019-05-14 | End: 2019-05-16 | Stop reason: HOSPADM

## 2019-05-14 RX ORDER — ASPIRIN 81 MG/1
81 TABLET ORAL
Status: DISCONTINUED | OUTPATIENT
Start: 2019-05-14 | End: 2019-05-16 | Stop reason: HOSPADM

## 2019-05-14 RX ORDER — SODIUM CHLORIDE 9 MG/ML
75 INJECTION, SOLUTION INTRAVENOUS CONTINUOUS
Status: DISCONTINUED | OUTPATIENT
Start: 2019-05-15 | End: 2019-05-16 | Stop reason: HOSPADM

## 2019-05-14 RX ORDER — NITROGLYCERIN 0.4 MG/1
0.4 TABLET SUBLINGUAL
Status: DISCONTINUED | OUTPATIENT
Start: 2019-05-14 | End: 2019-05-16 | Stop reason: HOSPADM

## 2019-05-14 RX ORDER — HEPARIN SODIUM 5000 [USP'U]/ML
60 INJECTION, SOLUTION INTRAVENOUS; SUBCUTANEOUS ONCE
Status: COMPLETED | OUTPATIENT
Start: 2019-05-14 | End: 2019-05-14

## 2019-05-14 RX ORDER — LOPERAMIDE HYDROCHLORIDE 2 MG/1
2 CAPSULE ORAL AS NEEDED
Status: DISCONTINUED | OUTPATIENT
Start: 2019-05-14 | End: 2019-05-16 | Stop reason: HOSPADM

## 2019-05-14 RX ORDER — SODIUM CHLORIDE, SODIUM LACTATE, POTASSIUM CHLORIDE, CALCIUM CHLORIDE 600; 310; 30; 20 MG/100ML; MG/100ML; MG/100ML; MG/100ML
100 INJECTION, SOLUTION INTRAVENOUS CONTINUOUS
Status: DISCONTINUED | OUTPATIENT
Start: 2019-05-14 | End: 2019-05-14 | Stop reason: HOSPADM

## 2019-05-14 RX ORDER — LEVOTHYROXINE SODIUM 50 UG/1
50 TABLET ORAL
Status: DISCONTINUED | OUTPATIENT
Start: 2019-05-15 | End: 2019-05-16 | Stop reason: HOSPADM

## 2019-05-14 RX ORDER — HEPARIN SODIUM 5000 [USP'U]/100ML
12-25 INJECTION, SOLUTION INTRAVENOUS
Status: DISCONTINUED | OUTPATIENT
Start: 2019-05-14 | End: 2019-05-16 | Stop reason: HOSPADM

## 2019-05-14 RX ORDER — HEPARIN SODIUM 5000 [USP'U]/100ML
12-25 INJECTION, SOLUTION INTRAVENOUS
Status: DISCONTINUED | OUTPATIENT
Start: 2019-05-14 | End: 2019-05-14 | Stop reason: HOSPADM

## 2019-05-14 RX ORDER — HYDROCODONE BITARTRATE AND ACETAMINOPHEN 7.5; 325 MG/1; MG/1
1 TABLET ORAL
Status: DISCONTINUED | OUTPATIENT
Start: 2019-05-14 | End: 2019-05-16 | Stop reason: HOSPADM

## 2019-05-14 RX ORDER — LANOLIN ALCOHOL/MO/W.PET/CERES
400 CREAM (GRAM) TOPICAL DAILY
Status: DISCONTINUED | OUTPATIENT
Start: 2019-05-15 | End: 2019-05-16 | Stop reason: HOSPADM

## 2019-05-14 RX ORDER — SODIUM CHLORIDE 0.9 % (FLUSH) 0.9 %
5-40 SYRINGE (ML) INJECTION AS NEEDED
Status: DISCONTINUED | OUTPATIENT
Start: 2019-05-14 | End: 2019-05-16 | Stop reason: HOSPADM

## 2019-05-14 RX ORDER — CITALOPRAM 10 MG/1
20 TABLET ORAL
Status: DISCONTINUED | OUTPATIENT
Start: 2019-05-14 | End: 2019-05-16 | Stop reason: HOSPADM

## 2019-05-14 RX ORDER — ACETAMINOPHEN 325 MG/1
650 TABLET ORAL
Status: DISCONTINUED | OUTPATIENT
Start: 2019-05-14 | End: 2019-05-16 | Stop reason: HOSPADM

## 2019-05-14 RX ADMIN — NITROGLYCERIN 0.5 INCH: 20 OINTMENT TOPICAL at 14:11

## 2019-05-14 RX ADMIN — NITROGLYCERIN 1 INCH: 20 OINTMENT TOPICAL at 20:41

## 2019-05-14 RX ADMIN — HEPARIN SODIUM 12 UNITS/KG/HR: 5000 INJECTION, SOLUTION INTRAVENOUS at 16:46

## 2019-05-14 RX ADMIN — SODIUM CHLORIDE, SODIUM LACTATE, POTASSIUM CHLORIDE, AND CALCIUM CHLORIDE 100 ML/HR: 600; 310; 30; 20 INJECTION, SOLUTION INTRAVENOUS at 14:10

## 2019-05-14 RX ADMIN — HEPARIN SODIUM 3150 UNITS: 5000 INJECTION INTRAVENOUS; SUBCUTANEOUS at 16:20

## 2019-05-14 RX ADMIN — Medication 10 ML: at 21:53

## 2019-05-14 RX ADMIN — ATORVASTATIN CALCIUM 40 MG: 40 TABLET, FILM COATED ORAL at 21:51

## 2019-05-14 RX ADMIN — ASPIRIN 81 MG: 81 TABLET, COATED ORAL at 21:51

## 2019-05-14 RX ADMIN — CITALOPRAM HYDROBROMIDE 20 MG: 10 TABLET ORAL at 21:51

## 2019-05-14 RX ADMIN — HEPARIN SODIUM 12 UNITS/KG/HR: 5000 INJECTION, SOLUTION INTRAVENOUS at 19:39

## 2019-05-14 NOTE — PROGRESS NOTES
Bedside and Verbal shift change report given to Jose Iniguez RN (oncoming nurse) by self Abron Abhijit nurse). Report included the following information SBAR, Kardex, MAR and Recent Results.

## 2019-05-14 NOTE — H&P
[facility] CARDIOLOGY History &Physical 
            
 
Primary Cardiologist: Dr Zion Aguilar Primary Care Physician: Dr Shahid Evans Admitting Physician: Dr Pollo Pimentel Subjective:  
 
Patient is a 66 y.o. female who presented to [facility] with chest pain. She has a h/o CAD s/p CABG w [facility] 6-2016 w occluded SVG to LAD and diagonal, patent SVG to RCA, small vessels with extensive collaterals. She has htn, h/o tobacco use 1 ppd x 30 years quit in 1991, no f/h of CAD. Echo 8-2018 w EF 45-50%. She had done well until a week ago when she started feeling fatigued and bad, was noted to have a low K and started on replacement, she went to her PCP today for routine f/u and had sudden onset of 5/10 tight pain to the L of her L breast associated with pain in her L arm with mild nausea but no dyspnea or no diaphoresis. She felt mildly dizzy, no syncope, LE edema. She was given nitro and the pain resolved after about 30 minutes and has not recurred. At [facility] trop .03 and .01, WBC 11.4, hgb 13.5, , K 5.4, cr 1.46, CXR no acute process, EKG SB w rate 53 with NSST/T wave changes. She was transferred to [facility] for further management. Past Medical History:  
Diagnosis Date  Abnormal EKG 6/17/2016  Acute systolic (congestive) heart failure ([address] 6/17/2016  Anterior myocardial infarction ([address] 11/20/1997  Avascular necrosis ([address] R hip  CAD (coronary artery disease) 1990  
 s/p CABG  
 Cellulitis of right lower extremity 6/17/2016  Chest pain, precordial 3/28/2016  COPD (chronic obstructive pulmonary disease) ([address]   
 pt denies  Coronary atherosclerosis of native coronary vessel 03/28/2016  
 per heart cath (6/2016)- \"pt with diffusely diseased LAD and small caliber vessels. At present. .. best option would be medical management. .. do not see any obvious good options for redo CABG. ..  Fracture of femoral neck, right ([address] 02/27/2016 s/p repair with hardware- Dr Becki Landry  GERD (gastroesophageal reflux disease) 2016  
 managed with medication  HLD (hyperlipidemia) 2016  
 managed with medication  Hx of echocardiogram 2016 EF 45-50%, hypokinesis of the apical anterior and basal-mid anteroseptal wall. Mild aortic valve regurgitation. Mild tricuspid regurgitation.  Hypertension   
 managed with medication  Hypokalemia 2016  
 hx of- WNL since 2016- pt on K+ supplement  IBS (irritable bowel syndrome)  MI (myocardial infarction) ([address]  and spring 2016  
 x2  Osteoarthritis  Osteoporosis  Rectal prolapse  S/P CABG (coronary artery bypass graft)   Scleroderma ([facility] 3/28/2016  Shortness of breath dyspnea 3/28/2016  Tracheal stenosis   
 s/p repair  Unstable angina ([address] 1997 Past Surgical History:  
Procedure Laterality Date  HX ANKLE FRACTURE TX Right 1992  
 hardware placed  HX CORONARY ARTERY BYPASS GRAFT    
 cabg x 3  
 HX FRACTURE TX    
 cervical spine- C2  
 HX HEART CATHETERIZATION    
 HX HEENT    
 tracheal stricture/stenosis Hermina Gouge HIP FRACTURE TX  2016  
 repaired with hardware- Dr Becki Landry  HX HYSTERECTOMY  1970s  HX OTHER SURGICAL Peg tube placement  HX PTCA  HX TRACHEOSTOMY Allergies Allergen Reactions  Corticosteroids (Glucocorticoids) Anaphylaxis  Iodine Hives and Other (comments)  
  hypertension Social History Tobacco Use  Smoking status: Former Smoker Packs/day: 1.00 Years: 20.00 Pack years: 20.00 Last attempt to quit: 1995 Years since quittin.3  Smokeless tobacco: Never Used Substance Use Topics  Alcohol use: No  
  
FH:  
Family History Problem Relation Age of Onset  Breast Cancer Sister Review of Systems General: no weight change, + weakness, fever or chills Skin: no rashes, lumps, or other skin changes HEENT: no headache, dizziness, lightheadedness, vision changes, hearing changes, tinnitus, vertigo, sinus pressure/pain, bleeding gums, sore throat, or hoarseness Neck: no swollen glands, goiter, pain or stiffness Respiratory: no cough, sputum, hemoptysis, no dyspnea, no wheezing Cardiovascular: + as per HPI Gastrointestinal: no reflux, constipation, diarrhea, liver problems, GI bleeding Urinary: no frequency, urgency , hematuria, burning/pain with urination, recent flank pain, polyuria, nocturia, or difficulty urinating Peripheral Vascular: no claudication, leg cramps, prior DVTs, swelling of calves, legs, or feet, color change, or swelling with redness or tenderness Musculoskeletal: + DJD Psychiatric: no depression or excessive stress Neurological: no sensory or motor loss, seizures, syncope, tremors, numbness, tingling, no changes in mood, attention, or speech, no changes in orientation, memory, insight, or judgment. Hematologic: no anemia, easy bruising or bleeding Endocrine: + thyroid problems, no heat or cold intolerance, excessive sweating, polyuria, polydipsia, no diabetes. Objective: There were no vitals taken for this visit. No intake/output data recorded. No intake/output data recorded. Physical Exam: 
General: Well Developed, Well Nourished, No Acute Distress HEENT: pupils equal and round, no abnormalities noted Neck: supple, no JVD, no carotid bruits Heart: S1S2 with RRR without murmurs or gallops Lungs: Clear throughout auscultation bilaterally without adventitious sounds Abd: soft, nontender, nondistended, with good bowel sounds Ext: warm, no edema, calves supple/nontender, pulses 2+ bilaterally Skin: warm and dry Psychiatric: Normal mood and affect Neurologic: Alert and oriented X 3 ECG: SB w rate 53 with NSST/T wave changes Data Review:  
  
Recent Results (from the past 24 hour(s)) EKG, 12 LEAD, INITIAL  Collection Time: 05/14/19 12:15 PM  
 Result Value Ref Range Ventricular Rate 66 BPM  
 Atrial Rate 66 BPM  
 P-R Interval 206 ms QRS Duration 112 ms  
 Q-T Interval 424 ms QTC Calculation (Bezet) 444 ms Calculated P Axis 72 degrees Calculated R Axis -56 degrees Calculated T Axis 130 degrees Diagnosis Normal sinus rhythm Incomplete right bundle branch block Left anterior fascicular block Left ventricular hypertrophy with repolarization abnormality Cannot rule out Septal infarct (cited on or before 17-JUN-2016) Abnormal ECG When compared with ECG of 28-NOV-2016 14:18, No significant change was found Confirmed by YANETH DUTTON (), [place] Monday ([id number]) on 5/14/2019 2:25:53 PM 
  
EKG, 12 LEAD, SUBSEQUENT Collection Time: 05/14/19 12:23 PM  
Result Value Ref Range Ventricular Rate 53 BPM  
 Atrial Rate 53 BPM  
 P-R Interval 218 ms QRS Duration 86 ms  
 Q-T Interval 460 ms QTC Calculation (Bezet) 431 ms Calculated P Axis 36 degrees Calculated R Axis -7 degrees Calculated T Axis 67 degrees Diagnosis Sinus bradycardia with sinus arrhythmia with 1st degree A-V block Minimal voltage criteria for LVH, may be normal variant Borderline ECG When compared with ECG of 14-MAY-2019 12:15, Left anterior fascicular block is no longer Present Incomplete right bundle branch block is no longer Present Minimal criteria for Septal infarct are no longer Present Confirmed by YANETH DUTTON (), [place] Monday ([id number]) on 5/14/2019 2:26:43 PM 
  
CBC WITH AUTOMATED DIFF Collection Time: 05/14/19 12:24 PM  
Result Value Ref Range WBC 11.4 (H) 4.3 - 11.1 K/uL  
 RBC 5.18 4.05 - 5.2 M/uL  
 HGB 13.5 11.7 - 15.4 g/dL HCT 44.6 35.8 - 46.3 % MCV 86.1 79.6 - 97.8 FL  
 MCH 26.1 26.1 - 32.9 PG  
 MCHC 30.3 (L) 31.4 - 35.0 g/dL  
 RDW 18.3 (H) 11.9 - 14.6 % PLATELET 532 248 - 276 K/uL MPV 11.0 9.4 - 12.3 FL ABSOLUTE NRBC 0.00 0.0 - 0.2 K/uL  
 DF AUTOMATED NEUTROPHILS 65 43 - 78 % LYMPHOCYTES 25 13 - 44 % MONOCYTES 8 4.0 - 12.0 % EOSINOPHILS 1 0.5 - 7.8 % BASOPHILS 1 0.0 - 2.0 % IMMATURE GRANULOCYTES 1 0.0 - 5.0 %  
 ABS. NEUTROPHILS 7.4 1.7 - 8.2 K/UL  
 ABS. LYMPHOCYTES 2.9 0.5 - 4.6 K/UL  
 ABS. MONOCYTES 0.9 0.1 - 1.3 K/UL  
 ABS. EOSINOPHILS 0.1 0.0 - 0.8 K/UL  
 ABS. BASOPHILS 0.1 0.0 - 0.2 K/UL  
 ABS. IMM. GRANS. 0.1 0.0 - 0.5 K/UL METABOLIC PANEL, COMPREHENSIVE Collection Time: 05/14/19 12:24 PM  
Result Value Ref Range Sodium 140 136 - 145 mmol/L Potassium 5.4 (H) 3.5 - 5.1 mmol/L Chloride 112 (H) 98 - 107 mmol/L  
 CO2 18 (L) 21 - 32 mmol/L Anion gap 10 7 - 16 mmol/L Glucose 103 (H) 65 - 100 mg/dL BUN 24 (H) 8 - 23 MG/DL Creatinine 1.46 (H) 0.6 - 1.0 MG/DL  
 GFR est AA 45 (L) >60 ml/min/1.73m2 GFR est non-AA 37 (L) >60 ml/min/1.73m2 Calcium 9.1 8.3 - 10.4 MG/DL Bilirubin, total 0.3 0.2 - 1.1 MG/DL  
 ALT (SGPT) 21 12 - 65 U/L  
 AST (SGOT) 23 15 - 37 U/L Alk. phosphatase 96 50 - 130 U/L Protein, total 8.7 (H) 6.3 - 8.2 g/dL Albumin 4.0 3.2 - 4.6 g/dL Globulin 4.7 (H) 2.3 - 3.5 g/dL A-G Ratio 0.9 (L) 1.2 - 3.5 PROTHROMBIN TIME + INR Collection Time: 05/14/19 12:24 PM  
Result Value Ref Range Prothrombin time 14.1 11.7 - 14.5 sec INR 1.1 POC TROPONIN-I Collection Time: 05/14/19 12:26 PM  
Result Value Ref Range Troponin-I (POC) 0.03 0.02 - 0.05 ng/ml POC TROPONIN-I Collection Time: 05/14/19  2:17 PM  
Result Value Ref Range Troponin-I (POC) 0.01 (L) 0.02 - 0.05 ng/ml CXR: no acute process Assessment/Plan:  
Chest pain (5/14/2019)- New CP x 30 minutes today w nausea improved with nitro in pt with bad CAD- admit, trend troponin, cont ASA, heparin, ACE-I, statin, no BB due to bradycardia, add nitro paste, check VICKI, NPO for possible LHC in AM  
 
Hypertension (2/27/2016)- ACE-I 
 
HLD (hyperlipidemia) (3/28/2016)- statin CAD (coronary artery disease) (6/16/2016)-  s/p CABG w LHC 6-2016 w occluded SVG to LAD and diagonal, patent SVG to RCA, small vessels with extensive collaterals- tx as above Systolic CHF, chronic ([address] (5/14/2019)- EF 45-50%, check echo, cont ACE-I, no BB due to bradycardia Hyperkalemia (5/14/2019)- stop supplement, recheck in AM  
 
Donna Dodson PA-C 
5/14/2019 
6:12 PM

## 2019-05-14 NOTE — ED PROVIDER NOTES
Patient sent from primary care doctor's office for chest pain relieved with nitroglycerin. She was air in follow-up for a visit for routine follow-up of hypokalemia as well as routine appointment. The history is provided by the patient. Chest Pain This is a new problem. The current episode started less than 1 hour ago. The problem has been resolved. Duration of episode(s) is 15 minutes. Episode frequency: 1. Associated with: sitting while at her doctor's office. The pain is present in the substernal region. The patient is experiencing no pain. The quality of the pain is described as pressure-like. The pain does not radiate. Exacerbated by: denies aggravating or alleviating factors other than alleviated with nitroglycerin. Pertinent negatives include no abdominal pain, no back pain, no cough, no diaphoresis, no fever, no leg pain, no lower extremity edema, no nausea, no numbness, no shortness of breath and no weakness. She has tried aspirin and nitroglycerin for the symptoms. The treatment provided significant relief. Risk factors include cardiac disease, hypertension and dyslipidemia. Her past medical history does not include DVT or PE. Procedural history includes cardiac catheterization and CABG. Pertinent negatives include no cardiac stents. Past Medical History:  
Diagnosis Date  Abnormal EKG 6/17/2016  Acute systolic (congestive) heart failure ([address] 6/17/2016  Anterior myocardial infarction ([address] 11/20/1997  Avascular necrosis ([address] R hip  CAD (coronary artery disease) 1990  
 s/p CABG  
 Cellulitis of right lower extremity 6/17/2016  Chest pain, precordial 3/28/2016  COPD (chronic obstructive pulmonary disease) ([address]   
 pt denies  Coronary atherosclerosis of native coronary vessel 03/28/2016  
 per heart cath (6/2016)- \"pt with diffusely diseased LAD and small caliber vessels. At present. .. best option would be medical management. .. do not see any obvious good options for redo CABG. ..  Fracture of femoral neck, right ([facility] 02/27/2016  
 s/p repair with hardware- Dr Rhoad[facility]  GERD (gastroesophageal reflux disease) 02/27/2016  
 managed with medication  HLD (hyperlipidemia) 03/28/2016  
 managed with medication  Hx of echocardiogram 02/27/2016 EF 45-50%, hypokinesis of the apical anterior and basal-mid anteroseptal wall. Mild aortic valve regurgitation. Mild tricuspid regurgitation.  Hypertension   
 managed with medication  Hypokalemia 02/27/2016  
 hx of- WNL since 6/2016- pt on K+ supplement  IBS (irritable bowel syndrome)  MI (myocardial infarction) ([facility] 1990 and spring 2016  
 x2  Osteoarthritis  Osteoporosis  Rectal prolapse  S/P CABG (coronary artery bypass graft) 1990  Scleroderma ([facility] 3/28/2016  Shortness of breath dyspnea 3/28/2016  Tracheal stenosis 1995  
 s/p repair  Unstable angina ([facility] 11/20/1997 Past Surgical History:  
Procedure Laterality Date  HX ANKLE FRACTURE TX Right 1992  
 hardware placed  HX CORONARY ARTERY BYPASS GRAFT  1990  
 cabg x 3  
 HX FRACTURE TX  1995  
 cervical spine- C2  
 HX HEART CATHETERIZATION    
 HX HEENT  1995  
 tracheal stricture/stenosis Partha Madonna HIP FRACTURE TX  02/2016  
 repaired with hardware- Dr Rhoad[facility]  HX HYSTERECTOMY  1970s  HX OTHER SURGICAL Peg tube placement  HX PTCA  HX TRACHEOSTOMY Family History:  
Problem Relation Age of Onset  Breast Cancer Sister Social History Socioeconomic History  Marital status:  Spouse name: Not on file  Number of children: Not on file  Years of education: Not on file  Highest education level: Not on file Occupational History  Not on file Social Needs  Financial resource strain: Not on file  Food insecurity:  
  Worry: Not on file Inability: Not on file  Transportation needs:  
  Medical: Not on file Non-medical: Not on file Tobacco Use  Smoking status: Former Smoker Packs/day: 1.00 Years: 20.00 Pack years: 20.00 Last attempt to quit: 1995 Years since quittin.3  Smokeless tobacco: Never Used Substance and Sexual Activity  Alcohol use: No  
 Drug use: No  
 Sexual activity: Not on file Lifestyle  Physical activity:  
  Days per week: Not on file Minutes per session: Not on file  Stress: Not on file Relationships  Social connections:  
  Talks on phone: Not on file Gets together: Not on file Attends [employer] service: Not on file Active member of club or organization: Not on file Attends meetings of clubs or organizations: Not on file Relationship status: Not on file  Intimate partner violence:  
  Fear of current or ex partner: Not on file Emotionally abused: Not on file Physically abused: Not on file Forced sexual activity: Not on file Other Topics Concern  Not on file Social History Narrative  Not on file ALLERGIES: Corticosteroids (glucocorticoids) and Iodine Review of Systems Constitutional: Negative for diaphoresis and fever. HENT: Negative for congestion and rhinorrhea. Respiratory: Negative for cough and shortness of breath. Cardiovascular: Positive for chest pain. Gastrointestinal: Negative for abdominal pain and nausea. Endocrine: Negative for polydipsia and polyuria. Genitourinary: Negative for dysuria. Musculoskeletal: Negative for back pain. Neurological: Negative for weakness and numbness. Vitals:  
 19 1229 19 1410 BP: 152/67 144/67 Pulse: (!) 55 61 Resp: 19 Temp: 98.6 °F (37 °C) SpO2: 98% Weight: 52.2 kg (115 lb) Height: 5' 4\" (1.626 m) Physical Exam  
Constitutional: She appears well-developed and well-nourished. No distress. HENT:  
Head: Normocephalic. Eyes: Pupils are equal, round, and reactive to light. Cardiovascular: Normal rate, regular rhythm and normal heart sounds. No murmur heard. Pulses: 
     Carotid pulses are 2+ on the right side, and 2+ on the left side. Radial pulses are 2+ on the right side, and 2+ on the left side. Pulmonary/Chest: Effort normal and breath sounds normal.  
Abdominal: Soft. She exhibits no distension and no mass. There is no tenderness. There is no rebound and no guarding. Musculoskeletal: Normal range of motion. Lymphadenopathy:  
  She has no cervical adenopathy. Neurological: She is alert. Skin: Skin is warm and dry. Nursing note and vitals reviewed. MDM Number of Diagnoses or Management Options Diagnosis management comments: Unstable angina. Pain-free in the emergency department. Nitroglycerin ointment applied. Heparin bolus later started. Multiple EKGs obtained with no significant change other than perhaps a little bit of ST depression laterally compared to old EKGs. Anterior ST elevation less than 1 mm unchanged. Patient has remained chest pain-free. Discussed with Dr. Rochelle García without [facility] cardiology and he has accepted the patient in transfer to the [facility] for further evaluation and probable heart catheterization. Pain was not pleuritic to suggest PE. Pain was not ripping or tearing and did not radiate to the back to suggest aortic dissection and pulses are equal. 
 
  
Amount and/or Complexity of Data Reviewed Clinical lab tests: ordered and reviewed (Results for orders placed or performed during the hospital encounter of 05/14/19 
-CBC WITH AUTOMATED DIFF Result                      Value             Ref Range WBC                         11.4 (H)          4.3 - 11.1 K* 
     RBC                         5.18              4.05 - 5.2 M* HGB                         13.5              11.7 - 15.4 * HCT                         44.6              35.8 - 46.3 % MCV                         86.1              79.6 - 97.8 * MCH                         26.1              26.1 - 32.9 * MCHC                        30.3 (L)          31.4 - 35.0 * RDW                         18.3 (H)          11.9 - 14.6 % PLATELET                    255               150 - 450 K/* MPV                         11.0              9.4 - 12.3 FL ABSOLUTE NRBC               0.00              0.0 - 0.2 K/* DF                          AUTOMATED NEUTROPHILS                 65                43 - 78 % LYMPHOCYTES                 25                13 - 44 % MONOCYTES                   8                 4.0 - 12.0 % EOSINOPHILS                 1                 0.5 - 7.8 % BASOPHILS                   1                 0.0 - 2.0 % IMMATURE GRANULOCYTES       1                 0.0 - 5.0 %   
     ABS. NEUTROPHILS            7.4               1.7 - 8.2 K/* ABS. LYMPHOCYTES            2.9               0.5 - 4.6 K/* ABS. MONOCYTES              0.9               0.1 - 1.3 K/* ABS. EOSINOPHILS            0.1               0.0 - 0.8 K/* ABS. BASOPHILS              0.1               0.0 - 0.2 K/* ABS. IMM. GRANS.            0.1               0.0 - 0.5 K/* 
-METABOLIC PANEL, COMPREHENSIVE Result                      Value             Ref Range Sodium                      140               136 - 145 mm* Potassium                   5.4 (H)           3.5 - 5.1 mm* Chloride                    112 (H)           98 - 107 mmo* CO2                         18 (L)            21 - 32 mmol* Anion gap                   10                7 - 16 mmol/L Glucose                     103 (H)           65 - 100 mg/* BUN                         24 (H)            8 - 23 MG/DL      Creatinine                  1.46 (H)          0.6 - 1.0 MG* 
 GFR est AA                  45 (L)            >60 ml/min/1* GFR est non-AA              37 (L)            >60 ml/min/1* Calcium                     9.1               8.3 - 10.4 M* Bilirubin, total            0.3               0.2 - 1.1 MG* ALT (SGPT)                  21                12 - 65 U/L   
     AST (SGOT)                  23                15 - 37 U/L Alk. phosphatase            96                50 - 130 U/L Protein, total              8.7 (H)           6.3 - 8.2 g/* Albumin                     4.0               3.2 - 4.6 g/* Globulin                    4.7 (H)           2.3 - 3.5 g/* A-G Ratio                   0.9 (L)           1.2 - 3.5     
-PROTHROMBIN TIME + INR Result                      Value             Ref Range Prothrombin time            14.1              11.7 - 14.5 * INR                         1.1 -POC TROPONIN-I Result                      Value             Ref Range Troponin-I (POC)            0.03              0.02 - 0.05 * 
-POC TROPONIN-I Result                      Value             Ref Range Troponin-I (POC)            0.01 (L)          0.02 - 0.05 * 
-EKG, 12 LEAD, INITIAL Result                      Value             Ref Range Ventricular Rate            66                BPM           
     Atrial Rate                 66                BPM           
     P-R Interval                206               ms            
     QRS Duration                112               ms Q-T Interval                424               ms            
     QTC Calculation (Bezet)     444               ms            
     Calculated P Axis           72                degrees Calculated R Axis           -56               degrees Calculated T Axis           130               degrees Diagnosis Normal sinus rhythm Incomplete right bundle branch block Left anterior fascicular block Left ventricular hypertrophy with repolarization abnormality Cannot rule out Septal infarct (cited on or before 17-JUN-2016) Abnormal ECG When compared with ECG of 28-NOV-2016 14:18, No significant change was found Confirmed by YANETH DUTTON ()[place] ([id number]) on 5/14/2019 2:25:53 PM 
  
-EKG, 12 LEAD, SUBSEQUENT Result                      Value             Ref Range Ventricular Rate            53                BPM           
     Atrial Rate                 53                BPM           
     P-R Interval                218               ms            
     QRS Duration                86                ms Q-T Interval                460               ms            
     QTC Calculation (Bezet)     431               ms            
     Calculated P Axis           36                degrees Calculated R Axis           -7                degrees Calculated T Axis           67                degrees Diagnosis Sinus bradycardia with sinus arrhythmia with 1st degree A-V block Minimal voltage criteria for LVH, may be normal variant Borderline ECG When compared with ECG of 14-MAY-2019 12:15, Left anterior fascicular block is no longer Present Incomplete right bundle branch block is no longer Present Minimal criteria for Septal infarct are no longer Present Confirmed by YANETH DUTTON ()[place]a ([id number]) on 5/14/2019 2:26:43 PM 
  
) Tests in the radiology section of CPT®: ordered and reviewed (Xr Chest [facility] Result Date: 5/14/2019 Chest X-ray INDICATION: Left chest pain for several hours A portable AP view of the chest was obtained. FINDINGS: The lungs are clear.  There are no infiltrates or effusions. The heart size is normal.  There are sternotomy changes. IMPRESSION: No acute findings in the chest  
 
) Decide to obtain previous medical records or to obtain history from someone other than the patient: yes Review and summarize past medical records: yes Discuss the patient with other providers: yes Procedures

## 2019-05-14 NOTE — PROGRESS NOTES
TRANSFER - IN REPORT: 
 
Verbal report received from Gregory Gaines RN(name) on Karo Feliciano  being received from ED(unit) for routine progression of care Report consisted of patients Situation, Background, Assessment and  
Recommendations(SBAR). Information from the following report(s) SBAR and ED Summary was reviewed with the receiving nurse. Opportunity for questions and clarification was provided. Assessment completed upon patients arrival to unit and care assumed. Dual skin assessment completed with second RN reveals pink/peeling forehead, sacrum pink but blanchable, heels visualized intact.

## 2019-05-14 NOTE — PROGRESS NOTES
Bedside and Verbal shift change report given to self (oncoming nurse) by Stefan Luke RN (offgoing nurse). Report included the following information SBAR, Kardex, MAR and Recent Results. Heparin gtt verified at bedside

## 2019-05-14 NOTE — ROUTINE PROCESS
TRANSFER - OUT REPORT: 
 
Verbal report given to Christine Savage RN(name) on Kamryn Le  being transferred to 3rd Floor Tele SFD(unit) for routine progression of care Report consisted of patients Situation, Background, Assessment and  
Recommendations(SBAR). Information from the following report(s) SBAR was reviewed with the receiving nurse. Lines:  
Peripheral IV 05/14/19 Left Antecubital (Active) Site Assessment Clean, dry, & intact 5/14/2019 12:31 PM  
Phlebitis Assessment 0 5/14/2019 12:31 PM  
Infiltration Assessment 0 5/14/2019 12:31 PM  
Dressing Status Clean, dry, & intact 5/14/2019 12:31 PM  
Hub Color/Line Status Pink 5/14/2019 12:31 PM  
  
 
Opportunity for questions and clarification was provided. Patient transported with: 
 Monitor O2 @ 2 liters Tech  
EMS transport

## 2019-05-15 LAB
ANION GAP SERPL CALC-SCNC: 13 MMOL/L (ref 7–16)
APTT PPP: 162.4 SEC (ref 24.7–39.8)
APTT PPP: 59 SEC (ref 24.7–39.8)
ATRIAL RATE: 60 BPM
BUN SERPL-MCNC: 25 MG/DL (ref 8–23)
CALCIUM SERPL-MCNC: 9 MG/DL (ref 8.3–10.4)
CALCULATED P AXIS, ECG09: 76 DEGREES
CALCULATED R AXIS, ECG10: -56 DEGREES
CALCULATED T AXIS, ECG11: 159 DEGREES
CHLORIDE SERPL-SCNC: 112 MMOL/L (ref 98–107)
CHOLEST SERPL-MCNC: 94 MG/DL
CO2 SERPL-SCNC: 16 MMOL/L (ref 21–32)
CREAT SERPL-MCNC: 1.26 MG/DL (ref 0.6–1)
DIAGNOSIS, 93000: NORMAL
GLUCOSE SERPL-MCNC: 118 MG/DL (ref 65–100)
HDLC SERPL-MCNC: 38 MG/DL (ref 40–60)
HDLC SERPL: 2.5 {RATIO}
LDLC SERPL CALC-MCNC: 30.6 MG/DL
LIPID PROFILE,FLP: ABNORMAL
MAGNESIUM SERPL-MCNC: 1.3 MG/DL (ref 1.8–2.4)
P-R INTERVAL, ECG05: 214 MS
POTASSIUM SERPL-SCNC: 4.5 MMOL/L (ref 3.5–5.1)
Q-T INTERVAL, ECG07: 438 MS
QRS DURATION, ECG06: 116 MS
QTC CALCULATION (BEZET), ECG08: 438 MS
SODIUM SERPL-SCNC: 141 MMOL/L (ref 136–145)
TRIGL SERPL-MCNC: 127 MG/DL (ref 35–150)
TROPONIN I SERPL-MCNC: 0.18 NG/ML (ref 0.02–0.05)
TROPONIN I SERPL-MCNC: 0.18 NG/ML (ref 0.02–0.05)
VENTRICULAR RATE, ECG03: 60 BPM
VLDLC SERPL CALC-MCNC: 25.4 MG/DL (ref 6–23)

## 2019-05-15 PROCEDURE — 74011000250 HC RX REV CODE- 250: Performed by: INTERNAL MEDICINE

## 2019-05-15 PROCEDURE — 4A023N7 MEASUREMENT OF CARDIAC SAMPLING AND PRESSURE, LEFT HEART, PERCUTANEOUS APPROACH: ICD-10-PCS | Performed by: INTERNAL MEDICINE

## 2019-05-15 PROCEDURE — 74011636320 HC RX REV CODE- 636/320: Performed by: INTERNAL MEDICINE

## 2019-05-15 PROCEDURE — 80061 LIPID PANEL: CPT

## 2019-05-15 PROCEDURE — 74011250636 HC RX REV CODE- 250/636: Performed by: PHYSICIAN ASSISTANT

## 2019-05-15 PROCEDURE — 85730 THROMBOPLASTIN TIME PARTIAL: CPT

## 2019-05-15 PROCEDURE — 74011250636 HC RX REV CODE- 250/636

## 2019-05-15 PROCEDURE — 74011000250 HC RX REV CODE- 250: Performed by: PHYSICIAN ASSISTANT

## 2019-05-15 PROCEDURE — B2121ZZ FLUOROSCOPY OF SINGLE CORONARY ARTERY BYPASS GRAFT USING LOW OSMOLAR CONTRAST: ICD-10-PCS | Performed by: INTERNAL MEDICINE

## 2019-05-15 PROCEDURE — 74011250636 HC RX REV CODE- 250/636: Performed by: NURSE PRACTITIONER

## 2019-05-15 PROCEDURE — 80048 BASIC METABOLIC PNL TOTAL CA: CPT

## 2019-05-15 PROCEDURE — 74011250637 HC RX REV CODE- 250/637: Performed by: PHYSICIAN ASSISTANT

## 2019-05-15 PROCEDURE — C8929 TTE W OR WO FOL WCON,DOPPLER: HCPCS

## 2019-05-15 PROCEDURE — 36415 COLL VENOUS BLD VENIPUNCTURE: CPT

## 2019-05-15 PROCEDURE — 93459 L HRT ART/GRFT ANGIO: CPT

## 2019-05-15 PROCEDURE — 74011250636 HC RX REV CODE- 250/636: Performed by: INTERNAL MEDICINE

## 2019-05-15 PROCEDURE — 99153 MOD SED SAME PHYS/QHP EA: CPT

## 2019-05-15 PROCEDURE — B2111ZZ FLUOROSCOPY OF MULTIPLE CORONARY ARTERIES USING LOW OSMOLAR CONTRAST: ICD-10-PCS | Performed by: INTERNAL MEDICINE

## 2019-05-15 PROCEDURE — 84484 ASSAY OF TROPONIN QUANT: CPT

## 2019-05-15 PROCEDURE — B2151ZZ FLUOROSCOPY OF LEFT HEART USING LOW OSMOLAR CONTRAST: ICD-10-PCS | Performed by: INTERNAL MEDICINE

## 2019-05-15 PROCEDURE — 65660000000 HC RM CCU STEPDOWN

## 2019-05-15 PROCEDURE — 83735 ASSAY OF MAGNESIUM: CPT

## 2019-05-15 PROCEDURE — 99152 MOD SED SAME PHYS/QHP 5/>YRS: CPT

## 2019-05-15 PROCEDURE — 77030020263 HC SOL INJ SOD CL0.9% LFCR 1000ML

## 2019-05-15 RX ORDER — MAGNESIUM SULFATE HEPTAHYDRATE 40 MG/ML
4 INJECTION, SOLUTION INTRAVENOUS ONCE
Status: COMPLETED | OUTPATIENT
Start: 2019-05-15 | End: 2019-05-15

## 2019-05-15 RX ORDER — LIDOCAINE HYDROCHLORIDE 10 MG/ML
6 INJECTION INFILTRATION; PERINEURAL ONCE
Status: COMPLETED | OUTPATIENT
Start: 2019-05-15 | End: 2019-05-15

## 2019-05-15 RX ORDER — DIPHENHYDRAMINE HYDROCHLORIDE 50 MG/ML
50 INJECTION, SOLUTION INTRAMUSCULAR; INTRAVENOUS
Status: COMPLETED | OUTPATIENT
Start: 2019-05-15 | End: 2019-05-15

## 2019-05-15 RX ORDER — FENTANYL CITRATE 50 UG/ML
25-50 INJECTION, SOLUTION INTRAMUSCULAR; INTRAVENOUS
Status: DISCONTINUED | OUTPATIENT
Start: 2019-05-15 | End: 2019-05-16 | Stop reason: ALTCHOICE

## 2019-05-15 RX ORDER — HEPARIN SODIUM 200 [USP'U]/100ML
3 INJECTION, SOLUTION INTRAVENOUS CONTINUOUS
Status: DISCONTINUED | OUTPATIENT
Start: 2019-05-15 | End: 2019-05-16 | Stop reason: HOSPADM

## 2019-05-15 RX ORDER — HEPARIN SODIUM 5000 [USP'U]/ML
35 INJECTION, SOLUTION INTRAVENOUS; SUBCUTANEOUS ONCE
Status: COMPLETED | OUTPATIENT
Start: 2019-05-15 | End: 2019-05-15

## 2019-05-15 RX ORDER — MIDAZOLAM HYDROCHLORIDE 1 MG/ML
.5-2 INJECTION, SOLUTION INTRAMUSCULAR; INTRAVENOUS
Status: DISCONTINUED | OUTPATIENT
Start: 2019-05-15 | End: 2019-05-16 | Stop reason: ALTCHOICE

## 2019-05-15 RX ORDER — HEPARIN SODIUM 5000 [USP'U]/ML
INJECTION, SOLUTION INTRAVENOUS; SUBCUTANEOUS
Status: ACTIVE
Start: 2019-05-15 | End: 2019-05-15

## 2019-05-15 RX ORDER — OMEPRAZOLE 40 MG/1
40 CAPSULE, DELAYED RELEASE ORAL DAILY
Status: ON HOLD | COMMUNITY
End: 2021-01-01

## 2019-05-15 RX ADMIN — HEPARIN SODIUM 2 ML: 10000 INJECTION INTRAVENOUS; SUBCUTANEOUS at 15:26

## 2019-05-15 RX ADMIN — DIPHENHYDRAMINE HYDROCHLORIDE 50 MG: 50 INJECTION, SOLUTION INTRAMUSCULAR; INTRAVENOUS at 15:19

## 2019-05-15 RX ADMIN — MIDAZOLAM HYDROCHLORIDE 1 MG: 1 INJECTION, SOLUTION INTRAMUSCULAR; INTRAVENOUS at 15:19

## 2019-05-15 RX ADMIN — IOPAMIDOL 95 ML: 755 INJECTION, SOLUTION INTRAVENOUS at 15:38

## 2019-05-15 RX ADMIN — ASPIRIN 81 MG: 81 TABLET, COATED ORAL at 14:33

## 2019-05-15 RX ADMIN — NITROGLYCERIN 1 INCH: 20 OINTMENT TOPICAL at 18:07

## 2019-05-15 RX ADMIN — ASPIRIN 81 MG: 81 TABLET, COATED ORAL at 23:07

## 2019-05-15 RX ADMIN — NITROGLYCERIN 1 INCH: 20 OINTMENT TOPICAL at 06:34

## 2019-05-15 RX ADMIN — MAGNESIUM OXIDE TAB 400 MG (241.3 MG ELEMENTAL MG) 400 MG: 400 (241.3 MG) TAB at 08:44

## 2019-05-15 RX ADMIN — ACETAMINOPHEN 650 MG: 325 TABLET, FILM COATED ORAL at 04:02

## 2019-05-15 RX ADMIN — MAGNESIUM SULFATE IN WATER 4 G: 40 INJECTION, SOLUTION INTRAVENOUS at 04:49

## 2019-05-15 RX ADMIN — PERFLUTREN 1 ML: 6.52 INJECTION, SUSPENSION INTRAVENOUS at 10:23

## 2019-05-15 RX ADMIN — NITROGLYCERIN 1 INCH: 20 OINTMENT TOPICAL at 11:43

## 2019-05-15 RX ADMIN — CITALOPRAM HYDROBROMIDE 20 MG: 10 TABLET ORAL at 23:07

## 2019-05-15 RX ADMIN — HEPARIN SODIUM 1650 UNITS: 5000 INJECTION INTRAVENOUS; SUBCUTANEOUS at 04:24

## 2019-05-15 RX ADMIN — PROMETHAZINE HYDROCHLORIDE 12.5 MG: 25 INJECTION INTRAMUSCULAR; INTRAVENOUS at 02:04

## 2019-05-15 RX ADMIN — LEVOTHYROXINE SODIUM 50 MCG: 50 TABLET ORAL at 08:45

## 2019-05-15 RX ADMIN — SODIUM CHLORIDE 75 ML/HR: 900 INJECTION, SOLUTION INTRAVENOUS at 05:50

## 2019-05-15 RX ADMIN — LIDOCAINE HYDROCHLORIDE 6 ML: 10 INJECTION, SOLUTION INFILTRATION; PERINEURAL at 15:19

## 2019-05-15 RX ADMIN — NITROGLYCERIN 1 INCH: 20 OINTMENT TOPICAL at 23:10

## 2019-05-15 RX ADMIN — FENTANYL CITRATE 25 MCG: 50 INJECTION, SOLUTION INTRAMUSCULAR; INTRAVENOUS at 15:19

## 2019-05-15 RX ADMIN — NITROGLYCERIN 1 INCH: 20 OINTMENT TOPICAL at 01:28

## 2019-05-15 RX ADMIN — HEPARIN SODIUM 3 ML/HR: 200 INJECTION, SOLUTION INTRAVENOUS at 15:20

## 2019-05-15 RX ADMIN — ATORVASTATIN CALCIUM 40 MG: 40 TABLET, FILM COATED ORAL at 23:07

## 2019-05-15 RX ADMIN — LISINOPRIL 5 MG: 5 TABLET ORAL at 08:44

## 2019-05-15 NOTE — PROGRESS NOTES
[facility] CARDIOLOGY PROGRESS NOTE 
      
 
5/15/2019 8:07 AM 
 
Admit Date: 5/14/2019 Subjective:  
Patient with mild CP. Troponins are low level abnormal.  Review of [facility] in 2016 with severe diffuse pattern CAD and only patent graft is to RCA. Has intact LCx with moderate disease at bifurcation. ROS: 
Cardiovascular:  As noted above Objective:  
  
Vitals:  
 05/14/19 1826 05/14/19 2040 05/15/19 0116 05/15/19 5503 BP: 155/69 134/68 123/62 118/72 Pulse: 73 71 65 63 Resp: 20 18 18 18 Temp: 97.7 °F (36.5 °C) 98 °F (36.7 °C) 98 °F (36.7 °C) 97.6 °F (36.4 °C) SpO2: 100% 100% 97% 100% Weight:    47.6 kg (104 lb 14.4 oz) Height:      
 
 
Physical Exam: 
General-No Acute Distress Neck- supple, no JVD 
CV- regular rate and rhythm no MRG Lung- clear bilaterally Abd- soft, nontender, nondistended Ext- no edema bilaterally. Skin- warm and dry Data Review:  
Recent Labs 05/15/19 
9983 05/15/19 
0234 05/15/19 
0041  05/14/19 
1224 NA  --  141  --   --  140 K  --  4.5  --   --  5.4* MG  --  1.3*  --   --   --   
BUN  --  25*  --   --  24* CREA  --  1.26*  --   --  1.46* GLU  --  118*  --   --  103* WBC  --   --   --   --  11.4* HGB  --   --   --   --  13.5 HCT  --   --   --   --  44.6 PLT  --   --   --   --  255 INR  --   --   --   --  1.1  
TROIQ 0.18*  --  0.18*   < >  --   
CHOL  --  94  --   --   --   
LDLC  --  30.6  --   --   --   
HDL  --  38*  --   --   --   
 < > = values in this interval not displayed. Assessment/Plan:  
 
Principal Problem: 
  Chest pain (5/14/2019) Complex CAD with abnormal troponins. Plan repeat [facility] today. Active Problems: Hypertension (2/27/2016) This is stable HLD (hyperlipidemia) (3/28/2016) This is well controlled CAD (coronary artery disease) (6/16/2016) As above Systolic CHF, chronic ([address] (5/14/2019) EF 45-50% and echo pending. No volume issues Hyperkalemia (5/14/2019) Resolved Drake Alvarez MD 
5/15/2019 8:07 AM

## 2019-05-15 NOTE — PROGRESS NOTES
Monitor room called. Pt had 9 beats of nonsustained VTACH. Pt asymptomatic. Pt resting, states no chest pain or felt heart racing. Will monitor.

## 2019-05-15 NOTE — PROGRESS NOTES
Bedside and Verbal shift change report given to self (oncoming nurse) by Kathy Guerrero RN (offgoing nurse). Report included the following information SBAR, Kardex, MAR and Recent Results.

## 2019-05-15 NOTE — PROGRESS NOTES
Bedside and Verbal shift change report given to Milan Ahn RN (oncoming nurse) by self Liban Ping nurse). Report included the following information SBAR, Kardex, MAR and Recent Results. Heparin gtt verified at bedside

## 2019-05-15 NOTE — PROGRESS NOTES
TRANSFER - OUT REPORT: 
 
Verbal report given to Gissell(name) on Tu De Oliveira  being transferred to cpru(unit) for routine progression of care Report consisted of patients Situation, Background, Assessment and  
Recommendations(SBAR). Information from the following report(s) SBAR was reviewed with the receiving nurse. Opportunity for questions and clarification was provided. Procedure: [facility]   Finding Summary: no interventions(cath/pci/pacer settings) Location: lwrist    Closure Device: [id number](yes/no/description) Post Site Assessment: no bleeding no hematoma Intra Procedure Meds: 
 
Versed: 1mg Fentanyl: 25mcg Benadry: 50mg Peripheral IV 05/15/19 Anterior;Left;Proximal Forearm (Active) Site Assessment Clean, dry, & intact 5/15/2019  8:48 AM  
Phlebitis Assessment 0 5/15/2019  8:48 AM  
Infiltration Assessment 0 5/15/2019  8:48 AM  
Dressing Status Clean, dry, & intact 5/15/2019  8:48 AM  
Dressing Type Transparent;Tape 5/15/2019  8:48 AM  
Hub Color/Line Status Pink;Flushed; Infusing 5/15/2019  8:48 AM  
   
Peripheral IV 05/15/19 Anterior;Right Forearm (Active) Post-Procedure Site Assessment (1) Wound Type: Catheter entry/exit Location: Wrist 
Orientation : Left Hemostasis : TR Band(13ML) Site Assessment: No bleeding, No hematoma 
  
  
  
  
  
  
  
is allergic to corticosteroids (glucocorticoids) and iodine. Past Medical History:  
Diagnosis Date  Abnormal EKG 6/17/2016  Acute systolic (congestive) heart failure ([address] 6/17/2016  Anterior myocardial infarction ([address] 11/20/1997  Avascular necrosis ([address] R hip  CAD (coronary artery disease) 1990  
 s/p CABG  
 Cellulitis of right lower extremity 6/17/2016  Chest pain, precordial 3/28/2016  COPD (chronic obstructive pulmonary disease) ([address]   
 pt denies  Coronary atherosclerosis of native coronary vessel 03/28/2016 per heart cath (6/2016)- \"pt with diffusely diseased LAD and small caliber vessels. At present. .. best option would be medical management. .. do not see any obvious good options for redo CABG. ..  Fracture of femoral neck, right ([facility] 02/27/2016  
 s/p repair with hardware- Dr Matilde Sanchez  GERD (gastroesophageal reflux disease) 02/27/2016  
 managed with medication  HLD (hyperlipidemia) 03/28/2016  
 managed with medication  Hx of echocardiogram 02/27/2016 EF 45-50%, hypokinesis of the apical anterior and basal-mid anteroseptal wall. Mild aortic valve regurgitation. Mild tricuspid regurgitation.  Hypertension   
 managed with medication  Hypokalemia 02/27/2016  
 hx of- WNL since 6/2016- pt on K+ supplement  IBS (irritable bowel syndrome)  MI (myocardial infarction) ([facility] 1990 and spring 2016  
 x2  Osteoarthritis  Osteoporosis  Rectal prolapse  S/P CABG (coronary artery bypass graft) 1990  Scleroderma ([facility] 3/28/2016  Shortness of breath dyspnea 3/28/2016  Tracheal stenosis 1995  
 s/p repair  Unstable angina ([address] 11/20/1997 Visit Vitals /67 (BP Patient Position: Supine) Pulse 64 Temp 97.9 °F (36.6 °C) Resp 16 Ht 5' 5\" (1.651 m) Wt 47.6 kg (104 lb 14.4 oz) SpO2 100% BMI 17.46 kg/m²

## 2019-05-15 NOTE — PROCEDURES
[facility] 
CARDIAC CATH Name:  Chevy Zhou 
MR#:  [id number] :  1945 ACCOUNT #:  [de-identified] DATE OF SERVICE:  05/15/2019 PROCEDURES PERFORMED: 1. Left heart cath, selective coronary arteriography, and left ventriculogram via the left radial approach. 2.  Saphenous vein graft arteriography. PREOPERATIVE DIAGNOSES:  Chest pain concerning for unstable angina. Patient with known coronary artery disease. Patient admitted by Dr. Nuno Velazco and scheduled for cardiac catheterization. POSTOPERATIVE DIAGNOSIS:  Stable coronary artery disease. SURGEON:  Nallely Peoples. MD Yessenia 
 
ASSISTANT:  None. ESTIMATED BLOOD LOSS:  Less than 5 mL. SPECIMENS REMOVED:  None. COMPLICATIONS:  None. FINDINGS:  As below. IMPLANTS:  None. ANESTHESIA:  Patient received moderate supervised conscious sedation with 1 mg of Versed and 25 mcg of fentanyl. Sedation start time was 1512 with a procedure completion time of 1540. INDICATION:  Chest pain concerning for unstable angina. Patient with known coronary artery disease. Cardiac catheterization arranged by Dr. Wendy Lui. TECHNICAL FACTORS:  After informed consent was obtained, patient was brought to the cardiac catheterization lab. Left radial artery was prepped and draped in the usual sterile fashion. Utilizing modified Seldinger technique and micropuncture needle, left radial artery was entered. A 6-Argentine Terumo slender sheath was placed without difficulty. A radial cocktail consisting of 2000 units of heparin, 2 mg of verapamil, and 200 mcg of nitroglycerin was administered. A 5-Argentine JR4 catheter was used to select and engage the ostium of the right coronary artery and a multipurpose catheter was used to select and engage the ostium of saphenous vein graft to PDA and saphenous vein graft to LAD. Selective injection verification was performed.   A JL3.5 catheter was used to select and engage the ostium of the left main coronary artery. Selective injection verification was performed. A multipurpose catheter was used to cross the aortic valve into the left ventricle. Hemodynamic measurements and left ventriculogram were obtained. Left ventricular aortic pressure gradient was obtained by pullback technique. At the conclusion of diagnostic procedure, radial sheath was removed and a pneumatic band was placed with good hemostasis. No complications were encountered. CONTRAST:  [address] Hemodynamics: 1. Aortic pressure was 122/60 with a mean of 84. 
2.  Left ventricular end-diastolic pressure is 9. 
3.  There was no significant gradient across the aortic valve. ANGIOGRAPHIC RESULTS 1. Left main coronary artery:  Large caliber vessel. There is an eccentric calcified distal stenosis of 20-30%. 2.  LAD:  This is a medium caliber vessel. 20% proximal stenosis. The mid vessel is occluded. There is left-to-left collateralization of a diffusely diseased distal LAD. 3.  First diagonal artery:  Small caliber vessel. 1.5 mm. 60% ostial stenosis. 4.  Second diagonal artery:  Small caliber vessel. Diffusely diseased. 80% proximal stenosis. This is similar to prior catheterization films. The vessel is approximately 1.5-2 mm in size. 5.  Left circumflex is a medium caliber nondominant vessel. 30-40% mid stenosis after the origin of the first obtuse marginal. 
6.  First obtuse marginal artery:  Medium caliber vessel. 40% ostial stenosis. It then bifurcates into superior and inferior branch which both contain 30% ostial stenosis. 7.  Right coronary artery:  100% proximally occluded. BYPASS GRAFT ANATOMY: 
1. Saphenous vein graft to distal PDA. Widely patent with 20% mid stenosis. It appears attached to the distal right coronary artery at the bifurcation of the posterolateral branch and PDA.   The ongoing posterolateral branch is medium caliber and patent. The PDA is a small caliber vessel with 40% ostial stenosis. There appears to be a superior branch which is occluded and fills by right-to-right collateralization. 2.  Left ventriculogram performed in LEDESMA projection shows normal LV systolic function with EF 55%. There is moderate inferior hypokinesis with apical akinesis. No evidence of thrombus is noted. CONCLUSIONS: 
1.  Multivessel coronary artery disease. 2.  Patent saphenous vein graft to RCA. 3.  Known occlusion of saphenous vein graft to LAD. 4.  Occluded LAD and high-grade diagonal stenosis. It is consistent with prior catheterization findings. Medical management advised. PLAN:  Medical management. Patient will be followed closely in the postprocedure setting. MD ANDRESSA [id number] 
D:  05/15/2019 15:56 
T:  05/15/2019 16:03 
JOB #:  [id number]

## 2019-05-15 NOTE — PROGRESS NOTES
TRANSFER - IN REPORT: 
 
Verbal report received from Ct Shoemaker RN (name) on Brooke Grant  being received from Cath lab (unit) for routine post - op Report consisted of patients Situation, Background, Assessment and  
Recommendations(SBAR). Information from the following report(s) SBAR, Kardex, [facility] and Recent Results was reviewed with the receiving nurse. Opportunity for questions and clarification was provided. Assessment completed upon patients arrival to unit and care assumed.

## 2019-05-15 NOTE — PROGRESS NOTES
PTA medication list unable to complete, pt will call daughter to bring medication list in the morning

## 2019-05-15 NOTE — PROGRESS NOTES
Rasheed, , called critical troponin 0.19. Pt states no chest pain. Currently on heparin gtt and nitropaste [id number] Ricardo NP notified. No new orders. Will continue to monitor.

## 2019-05-15 NOTE — PROGRESS NOTES
TRANSFER - OUT REPORT: 
 
Verbal report given to Jada Banegas RN(name) on Iona Willett  being transferred to telemetry(unit) for routine progression of care Report consisted of patients Situation, Background, Assessment and  
Recommendations(SBAR). Information from the following report(s) Procedure Summary was reviewed with the receiving nurse. Lines:  
Peripheral IV 05/15/19 Anterior;Left;Proximal Forearm (Active) Site Assessment Clean, dry, & intact 5/15/2019  8:48 AM  
Phlebitis Assessment 0 5/15/2019  8:48 AM  
Infiltration Assessment 0 5/15/2019  8:48 AM  
Dressing Status Clean, dry, & intact 5/15/2019  8:48 AM  
Dressing Type Transparent;Tape 5/15/2019  8:48 AM  
Hub Color/Line Status Pink;Flushed; Infusing 5/15/2019  8:48 AM  
   
Peripheral IV 05/15/19 Anterior;Right Forearm (Active) Opportunity for questions and clarification was provided. Patient transported with: 
 O2 @ 0 liters Accompanied by patient transport

## 2019-05-15 NOTE — PROGRESS NOTES
Care Management Interventions PCP Verified by CM: Yes(saw 5/14/19) Palliative Care Criteria Met (RRAT>21 & CHF Dx)?: No(RRAT 16 Dx Chest pain ) Transition of Care Consult (CM Consult): Discharge Planning Discharge Durable Medical Equipment: No(Walker, cane and shower chair) Physical Therapy Consult: No 
Occupational Therapy Consult: No 
Speech Therapy Consult: No 
Current Support Network: Lives with Spouse Confirm Follow Up Transport: Family Plan discussed with Pt/Family/Caregiver: Yes Freedom of Choice Offered: Yes Discharge Location Discharge Placement: Home Met with patient for d/c planning. Patient alert and oriented x 3, independent of ADL for the most part states requires some assistance with bathing and cooking. She lives in one story home with 3-4 stairs in back and 10-12 stairs in front. DME includes walker, cane, and shower chair. Her family provides transportation for her. She has [facility] and is able to obtain his medications without difficulty at [facility] in [place] [phone]. Current d/c plan is home with spouse and possible home health if needed. CM following.

## 2019-05-16 VITALS
HEIGHT: 65 IN | OXYGEN SATURATION: 95 % | WEIGHT: 105.2 LBS | RESPIRATION RATE: 18 BRPM | HEART RATE: 78 BPM | BODY MASS INDEX: 17.53 KG/M2 | TEMPERATURE: 98.8 F | DIASTOLIC BLOOD PRESSURE: 62 MMHG | SYSTOLIC BLOOD PRESSURE: 129 MMHG

## 2019-05-16 LAB
ANION GAP SERPL CALC-SCNC: 10 MMOL/L (ref 7–16)
BUN SERPL-MCNC: 23 MG/DL (ref 8–23)
CALCIUM SERPL-MCNC: 8.6 MG/DL (ref 8.3–10.4)
CHLORIDE SERPL-SCNC: 110 MMOL/L (ref 98–107)
CO2 SERPL-SCNC: 18 MMOL/L (ref 21–32)
CREAT SERPL-MCNC: 1.14 MG/DL (ref 0.6–1)
GLUCOSE SERPL-MCNC: 90 MG/DL (ref 65–100)
POTASSIUM SERPL-SCNC: 4.9 MMOL/L (ref 3.5–5.1)
SODIUM SERPL-SCNC: 138 MMOL/L (ref 136–145)

## 2019-05-16 PROCEDURE — 36415 COLL VENOUS BLD VENIPUNCTURE: CPT

## 2019-05-16 PROCEDURE — 74011250637 HC RX REV CODE- 250/637: Performed by: PHYSICIAN ASSISTANT

## 2019-05-16 PROCEDURE — 80048 BASIC METABOLIC PNL TOTAL CA: CPT

## 2019-05-16 RX ORDER — NITROGLYCERIN 0.4 MG/1
0.4 TABLET SUBLINGUAL
Qty: 1 BOTTLE | Refills: 3 | Status: SHIPPED | OUTPATIENT
Start: 2019-05-16

## 2019-05-16 RX ADMIN — LEVOTHYROXINE SODIUM 50 MCG: 50 TABLET ORAL at 08:16

## 2019-05-16 RX ADMIN — LISINOPRIL 5 MG: 5 TABLET ORAL at 08:16

## 2019-05-16 RX ADMIN — MAGNESIUM OXIDE TAB 400 MG (241.3 MG ELEMENTAL MG) 400 MG: 400 (241.3 MG) TAB at 08:16

## 2019-05-16 NOTE — PROGRESS NOTES
Radial compression band removed at 2010 after slowly reducing air from 13 cc to zero as per hospital protocol. No bleeding or hematoma noted. 2 x 2 gauze with tegaderm placed over puncture site. The affected extremity is warm and dry to the touch. Frequent vital signs documented per flowsheet. Patient instructed to call if any bleeding noted on gauze. Patient verbalized understanding the nursing instructions.

## 2019-05-16 NOTE — DISCHARGE INSTRUCTIONS
Cardiac Catheterization/Angiography Discharge Instructions    *Check the puncture site frequently for swelling or bleeding. If you see any bleeding, lie down and apply pressure over the area with a clean town or washcloth. Notify your doctor for any redness, swelling, drainage or oozing from the puncture site. Notify your doctor for any fever or chills. *If the leg or arm with the puncture becomes cold, numb or painful, call [facility] Cardiology at  [phone]. *Activity should be limited for the next 48 hours. Climb stairs as little as possible and avoid any stooping, bending or strenuous activity for 48 hours. No heavy lifting (anything over 10 pounds) for three days. *Do not drive for 48 hours. *You may resume your usual diet. Drink more fluids than usual.    *Have a responsible person drive you home and stay with you for at least 24 hours after your heart catheterization/angiography. *You may remove the bandage from your Right and Arm in 24 hours. You may shower in 24 hours. No tub baths, hot tubs or swimming for one week. Do not place any lotions, creams, powders, ointments over the puncture site for one week. You may place a clean band-aid over the puncture site each day for 5 days. Change this daily. Patient Education        Left Heart Catheterization: About This Test  What is it? Cardiac catheterization is a test to check the left side of your heart. Your doctor might look at the shape of your heart, the motion of your heart, or the blood pressure inside the chambers. Why is this test done? This test gives information about how your heart is working. It can:  · Check blood flow and blood pressure in the chambers of the heart. · Check the pumping action of the heart. · Find out if a heart defect is present and how severe it is. · Find out how well the heart valves work. What happens during the test?  · You will get medicine to help you relax.   · A thin tube called a catheter is put into a blood vessel in the groin or the arm. The doctor moves the catheter through the blood vessel into your heart. · You will get a shot to numb the skin where the catheter goes in. You may feel pressure when the doctor moves the catheter through your blood vessel into your heart. · Dye may be injected into your heart. Your doctor can watch on special monitors as the dye moves in your heart. The dye helps your doctor see blood flow in your heart. · You may feel hot or flushed for several seconds when the dye is put in.  · If a heart defect is found, cardiac catheterization sometimes is used to correct it during the test.  How long does it take? · The test will take about 30 minutes. If a problem is found and the doctor treats it, it can take a few hours longer. What happens after the test?  · You will stay in a room for at least a few hours to make sure the catheter site starts to heal. You may have a bandage or a compression device on your groin or arm to prevent bleeding. · If the catheter was placed in your groin, you may lie in bed for a few hours. If the catheter was put in your arm, you will need to keep your arm still for at least 1 hour. · You may or may not need to stay in the hospital overnight. You will get more instructions for what to do at home. · Drink plenty of fluids for several hours after the test.  Follow-up care is a key part of your treatment and safety. Be sure to make and go to all appointments, and call your doctor if you are having problems. It's also a good idea to know your test results and keep a list of the medicines you take. Where can you learn more? Go to http://barbara-charu.info/. Enter W306 in the search box to learn more about \"Left Heart Catheterization: About This Test.\"  Current as of: July 22, 2018  Content Version: 11.9  © 6965-5193 [employer].  Care instructions adapted under license by [employer] (which disclaims liability or warranty for this information). If you have questions about a medical condition or this instruction, always ask your healthcare professional. [address] any warranty or liability for your use of this information.

## 2019-05-16 NOTE — PROGRESS NOTES
Bedside and Verbal shift change report given to John Healy RN (oncoming nurse) by self Hermelindo alvarado). Report included the following information SBAR, Kardex, MAR and Recent Results.

## 2019-05-16 NOTE — PROGRESS NOTES
Problem: Cath Lab Procedures: Post-Cath Day of Procedure (Initiate SCIP Measures for Post-Op Care) Goal: *Procedure site is without bleeding and signs of infection six hours post sheath removal 
Outcome: Resolved/Met Note: S/p left radial cath site- dressing c/d/I, no bleeding or hematoma noted Goal: *Hemodynamically stable Outcome: Resolved/Met Note:  
VSS Goal: *Optimal pain control at patient's stated goal 
Outcome: Resolved/Met Note: No complaints of pain 
  
Problem: Falls - Risk of 
Goal: *Absence of Falls Description Document Colin Martin Fall Risk and appropriate interventions in the flowsheet. Outcome: Progressing Towards Goal 
Note:  
Fall Risk Interventions: 
Mobility Interventions: Bed/chair exit alarm, Communicate number of staff needed for ambulation/transfer, Patient to call before getting OOB Medication Interventions: Bed/chair exit alarm, Evaluate medications/consider consulting pharmacy, Patient to call before getting OOB, Teach patient to arise slowly Elimination Interventions: Bed/chair exit alarm, Call light in reach, Patient to call for help with toileting needs History of Falls Interventions: Bed/chair exit alarm, Door open when patient unattended, Investigate reason for fall

## 2019-05-16 NOTE — PROGRESS NOTES
Verbal bedside report received from Ermias Ridley, [address] [place] [address]. Patient's situation, background, assessment and recommendations were provided. Kardex, Mar, and recent results also reviewed. Opportunity for questions provided. Assumed care of patient.

## 2019-05-16 NOTE — ADT AUTH CERT NOTES
Per message from nurse: 
 
Nothing to add. Facility Name: [facility]           
  
  
  
  
  
   
Patient Demographics Patient Name Flower Reina Sex Female  
1945 Address [address] [place] [address] Phone [phone] (Home) [phone] (Mobile) Hospital Account Name Acct ID Class Status Primary Coverage Flower Reina [id number] INPATIENT Open HU[employer]NA MEDICARE - [place] HU[employer]NA MEDICARE CHOICE PPO/PFFS  
  
   
Guarantor Account (for Hospital Account [de-identified]) Name Relation to Pt Service Area Active? Acct Type Albino Quick Yes Personal/Family Address Phone [place], [place] [address] [phone](A)    
  
   
Coverage Information (for Hospital Account [de-identified]) F/O Payor/Plan Subscriber  Subscriber Sex Precert # HUMANA MEDICARE/BSHSI HUMANA MEDICARE CHOICE PPO/PFFS 41 [id number] Subscriber Subscriber # Flower Reina [id number] Grp # Group Name [id number] Address Phone [address] Policy Number Status Effective Date Benefits Phone [id number] -  - Auth/Cert TLQ#[id number]  
  
   
Admission Information Arrival Date/Time:  Admit Date/Time: 2019 1802 IP Adm. Date/Time: 2019 5476 Admission Type: Elective Point of Origin: Other  [address] Category:   
Means of Arrival:  Primary Service: Medicine Secondary Service: N/A Transfer Source:  Service Area: [address] [place] [address] Unit: [facility] 3 TELEMETRY Admit Provider: Delroy Castano MD Attending Provider: Sarah Aragon MD Referring Provider:   
Admission Information Attending Provider Admission Dx Admitted On  
 Chest pain 19 Service Isolation Code Status MEDICINE  Prior Allergies Advance Care Planning Corticosteroids (Glucocorticoids), Iodine Jump to the Activity    
Admission Information Unit/Bed: [facility] 3 TELEMETRY/01 Service: MEDICINE  
 Admitting provider: Gayb Macedo MD Phone: [phone] Attending provider:  Phone: PCP: Ashish Staley MD Phone: [phone] Admission dx: chest pain Patient class: I Admission type: EL

## 2019-05-16 NOTE — DISCHARGE SUMMARY
[facility] Cardiology Discharge Summary Patient ID: Lis Boothe 
[id number] 
14 y.o. 
1945 Admit date: 2019 Discharge date:  2019 Admitting Physician: Jin Napier MD  
 
Discharge Physician: Dr. Thais Bragg Admission Diagnoses: Chest pain [R07.9] Discharge Diagnoses:  
 Diagnosis  Chest pain  Systolic CHF, chronic ([address]  S/P CABG (coronary artery bypass graft)  CAD (coronary artery disease)  HLD (hyperlipidemia)  Hypertension Cardiology Procedures this admission:  Diagnostic left heart catheterization, echocardiogram 
Consults: None Hospital Course: Patient presented to the ER at [facility] with complaints of chest pain. Serial cardiac enzymes were negative. Patient was transferred to downw facility and admitted to telemetry for continued care. Patient underwent cardiac catheterization by Dr. Kp Healy. [facility] showed: 1. Multivessel coronary artery disease. 2.  Patent saphenous vein graft to RCA. 3.  Known occlusion of saphenous vein graft to LAD. 4.  Occluded LAD and high-grade diagonal stenosis. It is consistent with prior catheterization findings. Medical management advised. 
  
Echocardiogram was done and showed:  
-  Left ventricle: Systolic function was mildly reduced. Ejection fraction was estimated in the range of 45 % to 50 %. There was akinesis of the apical inferior, apical septal, and apical wall(s). Wall thickness was mildly increased. Doppler parameters were consistent with grade 1 diastolic dysfunction. E/e' av.82. 
-  Right ventricle: Systolic function was reduced. -  Tricuspid valve: There was mild regurgitation. Patient tolerated the procedure well and was taken to the telemetry floor for recovery. The morning of discharge, patient was up feeling well without any complaints of chest pain or shortness of breath. Patient's left radial cath site was clean, dry and intact without hematoma or bruit. Patient's labs were WNL. Patient was seen and examined by Dr. Vaishnavi Obando and determined stable and ready for discharge. Patient was instructed on the importance of medication compliance including taking aspirin everyday without missing a dose. After receiving drug eluting stents, the patient will remain on dual anti-platelet therapy for 1 year. For maximized medical therapy for CAD, patient will continue ACE-I and statin as well. No BB due to documented bradycardia. The patient will follow up with [facility] Cardiology -- Dr. Ronit Lino in 2-4 weeks. Patient has been referred to cardiac rehab. DISPOSITION: The patient is being discharged home in stable condition on a low saturated fat, low cholesterol and low salt diet. The patient is instructed to advance activities as tolerated to the limit of fatigue or shortness of breath. The patient is instructed to avoid all heavy lifting for 3-5 days. The patient is instructed to watch the cath site for bleeding/oozing; if seen, the patient is instructed to apply firm pressure with a clean cloth and call [facility] Cardiology at [phone]. The patient is instructed to watch for signs of infection which include: increasing area of redness, fever/hot to touch or purulent drainage at the catheterization site. The patient is instructed not to soak in a bathtub for 7-10 days, but is cleared to shower. The patient is instructed to call the office or return to the ER for immediate evaluation for any shortness of breath or chest pain not relieved by NTG. Discharge Exam: Patient has been seen by Dr. Vaishnavi Obando: see his progress note for exam details. Visit Vitals /65 Pulse 60 Temp 98.4 °F (36.9 °C) Resp 20 Ht 5' 5\" (1.651 m) Wt 47.6 kg (104 lb 14.4 oz) SpO2 97% BMI 17.46 kg/m² Recent Results (from the past 24 hour(s)) TROPONIN I Collection Time: 05/15/19 12:41 AM  
Result Value Ref Range  Troponin-I, Qt. 0.18 (HH) 0.02 - 0.05 NG/ML  
 LIPID PANEL Collection Time: 05/15/19  2:34 AM  
Result Value Ref Range LIPID PROFILE Cholesterol, total 94 <200 MG/DL Triglyceride 127 35 - 150 MG/DL  
 HDL Cholesterol 38 (L) 40 - 60 MG/DL  
 LDL, calculated 30.6 <100 MG/DL VLDL, calculated 25.4 (H) 6.0 - 23.0 MG/DL  
 CHOL/HDL Ratio 2.5 METABOLIC PANEL, BASIC Collection Time: 05/15/19  2:34 AM  
Result Value Ref Range Sodium 141 136 - 145 mmol/L Potassium 4.5 3.5 - 5.1 mmol/L Chloride 112 (H) 98 - 107 mmol/L  
 CO2 16 (L) 21 - 32 mmol/L Anion gap 13 7 - 16 mmol/L Glucose 118 (H) 65 - 100 mg/dL BUN 25 (H) 8 - 23 MG/DL Creatinine 1.26 (H) 0.6 - 1.0 MG/DL  
 GFR est AA 53 (L) >60 ml/min/1.73m2 GFR est non-AA 44 (L) >60 ml/min/1.73m2 Calcium 9.0 8.3 - 10.4 MG/DL MAGNESIUM Collection Time: 05/15/19  2:34 AM  
Result Value Ref Range Magnesium 1.3 (LL) 1.8 - 2.4 mg/dL PTT Collection Time: 05/15/19  2:34 AM  
Result Value Ref Range aPTT 59.0 (H) 24.7 - 39.8 SEC  
TROPONIN I Collection Time: 05/15/19  6:51 AM  
Result Value Ref Range Troponin-I, Qt. 0.18 (HH) 0.02 - 0.05 NG/ML  
PTT Collection Time: 05/15/19 10:42 AM  
Result Value Ref Range aPTT 162.4 (HH) 24.7 - 39.8 SEC Patient Instructions:  
 
Current Discharge Medication List  
  
START taking these medications Details  
nitroglycerin (NITROSTAT) 0.4 mg SL tablet 1 Tab by SubLINGual route every five (5) minutes as needed for Chest Pain. Up to 3 doses. Qty: 1 Bottle, Refills: 3 CONTINUE these medications which have NOT CHANGED Details  
omeprazole (PRILOSEC) 40 mg capsule Take 40 mg by mouth daily. Indications: gastroesophageal reflux disease, heartburn HYDROcodone-acetaminophen (NORCO) 7.5-325 mg per tablet Take 1 Tab by mouth every six (6) hours as needed for Pain. Max Daily Amount: 4 Tabs. Qty: 17 Tab, Refills: 0  Associated Diagnoses: Closed compression fracture of thoracic vertebra, initial encounter ([facility]  
  
ondansetron (ZOFRAN ODT) 8 mg disintegrating tablet Take 1 Tab by mouth every eight (8) hours as needed for Nausea. Qty: 12 Tab, Refills: 0 Associated Diagnoses: Non-intractable vomiting with nausea, unspecified vomiting type  
  
cholecalciferol, vitamin D3, (VITAMIN D3) 2,000 unit tab Take  by mouth.  
  
levothyroxine (SYNTHROID) 50 mcg tablet Take  by mouth Daily (before breakfast). meloxicam (MOBIC) 7.5 mg tablet Take  by mouth daily. lisinopril (PRINIVIL, ZESTRIL) 5 mg tablet Take 1 Tab by mouth daily. Qty: 30 Tab, Refills: 5 OTHER 1 Dose by Apply Externally route as needed. Pennsaid - apply to feet as needed for pain  
  
acetaminophen (TYLENOL) 325 mg tablet Take 500 mg by mouth every six (6) hours as needed for Pain.  
  
magnesium oxide (MAG-OX) 400 mg tablet Take 400 mg by mouth daily. loperamide (IMODIUM) 2 mg capsule Take 2 mg by mouth as needed for Diarrhea. citalopram (CELEXA) 20 mg tablet Take 20 mg by mouth nightly. aspirin delayed-release 81 mg tablet Take 81 mg by mouth nightly. atorvastatin (LIPITOR) 40 mg tablet Take 1 Tab by mouth daily. Indications: HYPERCHOLESTEROLEMIA Qty: 90 Tab, Refills: 3 Associated Diagnoses: Coronary artery disease involving native heart without angina pectoris, unspecified vessel or lesion type  
  
calcium-vitamin D (OYSTER SHELL) 500 mg(1,250mg) -200 unit per tablet Take 1 Tab by mouth three (3) times daily (with meals). Qty: 90 Tab, Refills: 0  
  
  
 
 
ATTENDING ADDENDUM: 
 
Patient seen and examined by me. Agree with above note by physician extender. Key findings are:  No CP or ANTONIO, labs and access site CDI. Ready to go home with meds and follow up as noted above. CV- RRR without murmur Lungs- Clear bilaterally Abd- soft, nontender, nondistended Ext- no edema, left wrist CDI Plan: As above. Patricia Nassar MD 
[address] Cardiology Pager [phone]

## 2019-05-16 NOTE — PROGRESS NOTES
Care Management Interventions PCP Verified by CM: Yes(saw 5/14/19) Palliative Care Criteria Met (RRAT>21 & CHF Dx)?: No(RRAT 16 Dx Chest pain ) Mode of Transport at Discharge: Other (see comment)(family ) Transition of Care Consult (CM Consult): Discharge Planning Discharge Durable Medical Equipment: No(Walker, cane and shower chair) Physical Therapy Consult: No 
Occupational Therapy Consult: No 
Speech Therapy Consult: No 
Current Support Network: Lives with Spouse Confirm Follow Up Transport: Family Plan discussed with Pt/Family/Caregiver: Yes Freedom of Choice Offered: Yes Discharge Location Discharge Placement: Home Follow up prior to d/c with patient and daughter for d/c. Patient declined home health needs and voices no concerns or needs for d/c. Patient d/c home with family.

## 2019-05-16 NOTE — PROGRESS NOTES
[facility] CARDIOLOGY PROGRESS NOTE 
      
 
5/16/2019 7:09 AM 
 
Admit Date: 5/14/2019 Subjective: No CP overnight. ROS: 
Cardiovascular:  As noted above Objective:  
  
Vitals:  
 05/15/19 2109 05/15/19 2310 05/16/19 0121 05/16/19 2381 BP: 135/61 137/65 139/57 141/60 Pulse: 64 60 (!) 58 60 Resp: 20  18 18 Temp: 98.4 °F (36.9 °C)  97.4 °F (36.3 °C) 98.6 °F (37 °C) SpO2: 97%  97% 99% Weight:    47.7 kg (105 lb 3.2 oz) Height:      
 
 
Physical Exam: 
General-No Acute Distress Neck- supple, no JVD 
CV- regular rate and rhythm no MRG Lung- clear bilaterally Abd- soft, nontender, nondistended Ext- no edema bilaterally. Skin- warm and dry Data Review:  
Recent Labs 05/16/19 
9723 05/15/19 
0629 05/15/19 
0234 05/15/19 
0041  05/14/19 
1224   --  141  --   --  140  
K 4.9  --  4.5  --   --  5.4* MG  --   --  1.3*  --   --   --   
BUN 23  --  25*  --   --  24* CREA 1.14*  --  1.26*  --   --  1.46* GLU 90  --  118*  --   --  103* WBC  --   --   --   --   --  11.4* HGB  --   --   --   --   --  13.5 HCT  --   --   --   --   --  44.6 PLT  --   --   --   --   --  255 INR  --   --   --   --   --  1.1  
TROIQ  --  0.18*  --  0.18*   < >  --   
CHOL  --   --  94  --   --   --   
LDLC  --   --  30.6  --   --   --   
HDL  --   --  38*  --   --   --   
 < > = values in this interval not displayed. Assessment/Plan:  
 
Principal Problem: 
  Chest pain (5/14/2019) 
 cardiac cath with significant disease but no area amenable to PCI. Continue medical management Active Problems: Hypertension (2/27/2016) --stable on current therapy HLD (hyperlipidemia) (3/28/2016) Statin CAD (coronary artery disease) (6/16/2016) Will continue current meds, no BB due to relative bradycardia Systolic CHF, chronic ([facility] (5/14/2019) Stable on current therapy Hyperkalemia (5/14/2019) Stable, monitor Mere Whittaker NP 
 5/16/2019 7:09 AM 
 
ATTENDING ADDENDUM: 
 
Patient seen and examined by me. Agree with above note by physician extender. Key findings are:  No CP or ANTONIO 
CV- RRR without murmur Lungs- Clear bilaterally Abd- soft, nontender, nondistended Ext- no edema, wrist CDI 2+ Plan: As above. Ariana Callaway MD 
[facility] Cardiology Pager [phone]

## 2019-08-26 ENCOUNTER — APPOINTMENT (OUTPATIENT)
Dept: CT IMAGING | Age: 74
DRG: 287 | End: 2019-08-26
Attending: EMERGENCY MEDICINE
Payer: MEDICARE

## 2019-08-26 ENCOUNTER — APPOINTMENT (OUTPATIENT)
Dept: GENERAL RADIOLOGY | Age: 74
DRG: 287 | End: 2019-08-26
Attending: EMERGENCY MEDICINE
Payer: MEDICARE

## 2019-08-26 ENCOUNTER — HOSPITAL ENCOUNTER (INPATIENT)
Age: 74
LOS: 5 days | Discharge: HOME | DRG: 287 | End: 2019-08-31
Attending: EMERGENCY MEDICINE | Admitting: FAMILY MEDICINE
Payer: MEDICARE

## 2019-08-26 DIAGNOSIS — K56.600 PARTIAL SMALL BOWEL OBSTRUCTION (HCC): Primary | ICD-10-CM

## 2019-08-26 DIAGNOSIS — I48.91 NEW ONSET ATRIAL FIBRILLATION (HCC): ICD-10-CM

## 2019-08-26 DIAGNOSIS — E83.51 HYPOCALCEMIA: ICD-10-CM

## 2019-08-26 DIAGNOSIS — E87.6 HYPOKALEMIA: ICD-10-CM

## 2019-08-26 DIAGNOSIS — E83.42 HYPOMAGNESEMIA: ICD-10-CM

## 2019-08-26 PROBLEM — I25.5 ISCHEMIC CARDIOMYOPATHY: Status: RESOLVED | Noted: 2018-05-15 | Resolved: 2019-08-26

## 2019-08-26 PROBLEM — E87.8 ELECTROLYTE ABNORMALITY: Status: ACTIVE | Noted: 2019-08-26

## 2019-08-26 PROBLEM — E87.5 HYPERKALEMIA: Status: RESOLVED | Noted: 2019-05-14 | Resolved: 2019-08-26

## 2019-08-26 PROBLEM — R07.9 CHEST PAIN: Status: RESOLVED | Noted: 2019-05-14 | Resolved: 2019-08-26

## 2019-08-26 PROBLEM — I50.22 SYSTOLIC CHF, CHRONIC (HCC): Chronic | Status: ACTIVE | Noted: 2019-05-14

## 2019-08-26 LAB
ALBUMIN SERPL-MCNC: 3.4 G/DL (ref 3.2–4.6)
ALBUMIN/GLOB SERPL: 0.8 {RATIO} (ref 1.2–3.5)
ALP SERPL-CCNC: 84 U/L (ref 50–136)
ALT SERPL-CCNC: 20 U/L (ref 12–65)
ANION GAP SERPL CALC-SCNC: 10 MMOL/L (ref 7–16)
APTT PPP: 30.8 SEC (ref 24.7–39.8)
AST SERPL-CCNC: 24 U/L (ref 15–37)
BASOPHILS # BLD: 0.1 K/UL (ref 0–0.2)
BASOPHILS NFR BLD: 1 % (ref 0–2)
BILIRUB SERPL-MCNC: 0.4 MG/DL (ref 0.2–1.1)
BNP SERPL-MCNC: 625 PG/ML
BUN SERPL-MCNC: 17 MG/DL (ref 8–23)
CALCIUM SERPL-MCNC: 7.9 MG/DL (ref 8.3–10.4)
CHLORIDE SERPL-SCNC: 104 MMOL/L (ref 98–107)
CO2 SERPL-SCNC: 28 MMOL/L (ref 21–32)
CREAT SERPL-MCNC: 1.18 MG/DL (ref 0.6–1)
DIFFERENTIAL METHOD BLD: ABNORMAL
EOSINOPHIL # BLD: 0.1 K/UL (ref 0–0.8)
EOSINOPHIL NFR BLD: 1 % (ref 0.5–7.8)
ERYTHROCYTE [DISTWIDTH] IN BLOOD BY AUTOMATED COUNT: 15.6 % (ref 11.9–14.6)
GLOBULIN SER CALC-MCNC: 4.1 G/DL (ref 2.3–3.5)
GLUCOSE SERPL-MCNC: 109 MG/DL (ref 65–100)
HCT VFR BLD AUTO: 40.7 % (ref 35.8–46.3)
HGB BLD-MCNC: 12.9 G/DL (ref 11.7–15.4)
IMM GRANULOCYTES # BLD AUTO: 0 K/UL (ref 0–0.5)
IMM GRANULOCYTES NFR BLD AUTO: 0 % (ref 0–5)
INR PPP: 1
LIPASE SERPL-CCNC: 339 U/L (ref 73–393)
LYMPHOCYTES # BLD: 3 K/UL (ref 0.5–4.6)
LYMPHOCYTES NFR BLD: 22 % (ref 13–44)
MAGNESIUM SERPL-MCNC: 1.1 MG/DL (ref 1.8–2.4)
MCH RBC QN AUTO: 28.4 PG (ref 26.1–32.9)
MCHC RBC AUTO-ENTMCNC: 31.7 G/DL (ref 31.4–35)
MCV RBC AUTO: 89.5 FL (ref 79.6–97.8)
MONOCYTES # BLD: 1.2 K/UL (ref 0.1–1.3)
MONOCYTES NFR BLD: 9 % (ref 4–12)
NEUTS SEG # BLD: 9.4 K/UL (ref 1.7–8.2)
NEUTS SEG NFR BLD: 68 % (ref 43–78)
NRBC # BLD: 0 K/UL (ref 0–0.2)
PLATELET # BLD AUTO: 242 K/UL (ref 150–450)
PMV BLD AUTO: 11.4 FL (ref 9.4–12.3)
POTASSIUM SERPL-SCNC: 2.4 MMOL/L (ref 3.5–5.1)
PROT SERPL-MCNC: 7.5 G/DL (ref 6.3–8.2)
PROTHROMBIN TIME: 13.8 SEC (ref 11.7–14.5)
RBC # BLD AUTO: 4.55 M/UL (ref 4.05–5.2)
SODIUM SERPL-SCNC: 142 MMOL/L (ref 136–145)
TROPONIN I BLD-MCNC: 0.03 NG/ML (ref 0.02–0.05)
WBC # BLD AUTO: 13.8 K/UL (ref 4.3–11.1)

## 2019-08-26 PROCEDURE — 85610 PROTHROMBIN TIME: CPT

## 2019-08-26 PROCEDURE — 99284 EMERGENCY DEPT VISIT MOD MDM: CPT | Performed by: EMERGENCY MEDICINE

## 2019-08-26 PROCEDURE — 85025 COMPLETE CBC W/AUTO DIFF WBC: CPT

## 2019-08-26 PROCEDURE — 80053 COMPREHEN METABOLIC PANEL: CPT

## 2019-08-26 PROCEDURE — 84484 ASSAY OF TROPONIN QUANT: CPT

## 2019-08-26 PROCEDURE — 71046 X-RAY EXAM CHEST 2 VIEWS: CPT

## 2019-08-26 PROCEDURE — 83690 ASSAY OF LIPASE: CPT

## 2019-08-26 PROCEDURE — 83735 ASSAY OF MAGNESIUM: CPT

## 2019-08-26 PROCEDURE — 65270000029 HC RM PRIVATE

## 2019-08-26 PROCEDURE — 93005 ELECTROCARDIOGRAM TRACING: CPT | Performed by: EMERGENCY MEDICINE

## 2019-08-26 PROCEDURE — 74011250636 HC RX REV CODE- 250/636: Performed by: EMERGENCY MEDICINE

## 2019-08-26 PROCEDURE — 85730 THROMBOPLASTIN TIME PARTIAL: CPT

## 2019-08-26 PROCEDURE — 74011000258 HC RX REV CODE- 258: Performed by: EMERGENCY MEDICINE

## 2019-08-26 PROCEDURE — 74011000250 HC RX REV CODE- 250: Performed by: EMERGENCY MEDICINE

## 2019-08-26 PROCEDURE — 83880 ASSAY OF NATRIURETIC PEPTIDE: CPT

## 2019-08-26 PROCEDURE — 74176 CT ABD & PELVIS W/O CONTRAST: CPT

## 2019-08-26 RX ORDER — MAGNESIUM SULFATE 1 G/100ML
1 INJECTION INTRAVENOUS
Status: COMPLETED | OUTPATIENT
Start: 2019-08-26 | End: 2019-08-26

## 2019-08-26 RX ORDER — CALCIUM GLUCONATE 94 MG/ML
1 INJECTION, SOLUTION INTRAVENOUS
Status: DISCONTINUED | OUTPATIENT
Start: 2019-08-26 | End: 2019-08-26 | Stop reason: SDUPTHER

## 2019-08-26 RX ORDER — ONDANSETRON 2 MG/ML
4 INJECTION INTRAMUSCULAR; INTRAVENOUS
Status: COMPLETED | OUTPATIENT
Start: 2019-08-26 | End: 2019-08-26

## 2019-08-26 RX ORDER — POTASSIUM CHLORIDE 14.9 MG/ML
20 INJECTION INTRAVENOUS
Status: COMPLETED | OUTPATIENT
Start: 2019-08-26 | End: 2019-08-27

## 2019-08-26 RX ORDER — LIDOCAINE HYDROCHLORIDE 20 MG/ML
15 SOLUTION OROPHARYNGEAL
Status: COMPLETED | OUTPATIENT
Start: 2019-08-26 | End: 2019-08-26

## 2019-08-26 RX ADMIN — ONDANSETRON 4 MG: 2 INJECTION INTRAMUSCULAR; INTRAVENOUS at 23:06

## 2019-08-26 RX ADMIN — LIDOCAINE HYDROCHLORIDE 15 ML: 20 SOLUTION ORAL; TOPICAL at 22:53

## 2019-08-26 RX ADMIN — MAGNESIUM SULFATE HEPTAHYDRATE 1 G: 1 INJECTION, SOLUTION INTRAVENOUS at 22:49

## 2019-08-26 RX ADMIN — CALCIUM GLUCONATE 1 G: 98 INJECTION, SOLUTION INTRAVENOUS at 23:11

## 2019-08-26 NOTE — ED PROVIDER NOTES
Shunt with heart disease and previous CABG. 4 weeks ago had a prolapsed rectum with surgery. For the past 2 weeks has had diffuse abdominal aching with nausea. Has had diarrhea since the surgery. With the abdominal pain went to her primary care doctor for evaluation today. Found to have new onset atrial fibrillation with potassium 2.5 so sent here for further treatment and evaluation. The history is provided by the patient. No  was used. Abdominal Pain    The current episode started more than 1 week ago. The problem occurs constantly. The problem has been gradually worsening. The pain is associated with an unknown factor. The pain is located in the generalized abdominal region. The quality of the pain is aching. The pain is mild. Associated symptoms include diarrhea and nausea. Pertinent negatives include no fever, no melena, no vomiting, no constipation, no dysuria, no hematuria, no headaches, no chest pain and no back pain. Nothing worsens the pain. The pain is relieved by nothing. Her past medical history is significant for irritable bowel syndrome. The patient's surgical history includes hysterectomy. Abnormal Lab Results   Associated symptoms include abdominal pain. Pertinent negatives include no chest pain, no headaches and no shortness of breath. Past Medical History:   Diagnosis Date    Abnormal EKG 6/70/7579    Acute systolic (congestive) heart failure (HCC) 6/17/2016    Anterior myocardial infarction ([address] 11/20/1997    Avascular necrosis (HCC)     R hip    CAD (coronary artery disease) 1990    s/p CABG    Cellulitis of right lower extremity 6/17/2016    Chest pain, precordial 3/28/2016    COPD (chronic obstructive pulmonary disease) ([address]     pt denies    Coronary atherosclerosis of native coronary vessel 03/28/2016    per heart cath (6/2016)- \"pt with diffusely diseased LAD and small caliber vessels. At present. .. best option would be medical management. .. do not see any obvious good options for redo CABG. ..    Fracture of femoral neck, right ([facility] 02/27/2016    s/p repair with hardware- Dr Taran Armenta GERD (gastroesophageal reflux disease) 02/27/2016    managed with medication    HLD (hyperlipidemia) 03/28/2016    managed with medication    Hx of echocardiogram 02/27/2016    EF 45-50%, hypokinesis of the apical anterior and basal-mid anteroseptal wall. Mild aortic valve regurgitation. Mild tricuspid regurgitation.     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/p repair    Unstable angina ([facility] 11/20/1997       Past Surgical History:   Procedure Laterality Date    HX ANKLE FRACTURE TX Right 1992    hardware placed    HX CORONARY ARTERY BYPASS GRAFT  1990    cabg x 3    HX FRACTURE TX  1995    cervical spine- C2    HX HEART CATHETERIZATION      HX HEENT  1995    tracheal stricture/stenosis    HX HIP FRACTURE TX  02/2016    repaired with hardware- Dr Randy Duval    HX OTHER SURGICAL      Peg tube placement    HX PTCA      HX TRACHEOSTOMY           Family History:   Problem Relation Age of Onset    Breast Cancer Sister        Social History     Socioeconomic History    Marital status:      Spouse name: Not on file    Number of children: Not on file    Years of education: Not on file    Highest education level: Not on file   Occupational History    Not on file   Social Needs    Financial resource strain: Not on file    Food insecurity:     Worry: Not on file     Inability: Not on file    Transportation needs:     Medical: Not on file Non-medical: Not on file   Tobacco Use    Smoking status: Former Smoker     Packs/day: 1.00     Years: 20.00     Pack years: 20.00     Last attempt to quit: 1995     Years since quittin.6    Smokeless tobacco: Never Used   Substance and Sexual Activity    Alcohol use: No    Drug use: No    Sexual activity: Not on file   Lifestyle    Physical activity:     Days per week: Not on file     Minutes per session: Not on file    Stress: Not on file   Relationships    Social connections:     Talks on phone: Not on file     Gets together: Not on file     Attends Alevism service: Not on file     Active member of club or organization: Not on file     Attends meetings of clubs or organizations: Not on file     Relationship status: Not on file    Intimate partner violence:     Fear of current or ex partner: Not on file     Emotionally abused: Not on file     Physically abused: Not on file     Forced sexual activity: Not on file   Other Topics Concern    Not on file   Social History Narrative    Not on file         ALLERGIES: Corticosteroids (glucocorticoids) and Iodine    Review of Systems   Constitutional: Negative for chills and fever. HENT: Negative for rhinorrhea and sore throat. Eyes: Negative for pain and redness. Respiratory: Negative for chest tightness, shortness of breath and wheezing. Cardiovascular: Negative for chest pain and leg swelling. Gastrointestinal: Positive for abdominal pain, diarrhea and nausea. Negative for constipation, melena and vomiting. Genitourinary: Negative for dysuria and hematuria. Musculoskeletal: Negative for back pain, gait problem, neck pain and neck stiffness. Skin: Negative for color change and rash. Neurological: Negative for weakness, numbness and headaches.        Vitals:    19 1904   BP: 145/75   Pulse: 78   Resp: 18   Temp: 98 °F (36.7 °C)   Weight: 49.9 kg (110 lb)   Height: 5' 5\" (1.651 m)            Physical Exam   Constitutional: She is oriented to person, place, and time. She appears well-developed and well-nourished. HENT:   Head: Normocephalic and atraumatic. Neck: Normal range of motion. Neck supple. Cardiovascular: Normal rate. An irregularly irregular rhythm present. Pulmonary/Chest: Effort normal and breath sounds normal.   Abdominal: Soft. Bowel sounds are normal. There is tenderness (diffuse and epigastric). Musculoskeletal: Normal range of motion. She exhibits no edema. Neurological: She is alert and oriented to person, place, and time. Skin: Skin is warm and dry. MDM  Number of Diagnoses or Management Options  Diagnosis management comments: Partial small bowel obstruction after rectal prolapse surgery 7/11/19. Has had diarrhea since then with electrolyte abnormalities now. Some NV. New onset atrial fibrillation today. Will admit to the hospital. Discussed with surgery and recommends admit to the hospitalist. NG tube placed for continued nausea. Amount and/or Complexity of Data Reviewed  Clinical lab tests: ordered and reviewed  Tests in the radiology section of CPT®: ordered and reviewed  Tests in the medicine section of CPT®: ordered and reviewed    Patient Progress  Patient progress: stable         Procedures      EKG: nonspecific ST and T waves changes, atrial fibrillation, rate 116.         CT ABD PELV WO CONT (Final result)   Result time 08/26/19 20:56:29   Final result by Martha Fitzpatrick MD (08/26/19 20:56:29)                Impression:    IMPRESSION: Multiple dilated small bowel loops with transition in the pelvis,  likely partial small bowel obstruction due to adhesions.                   Narrative:    CT ABDOMEN AND PELVIS    INDICATION:  Abdominal pain    Multiple axial images were obtained through the abdomen and pelvis without  intravenous or oral contrast.  Radiation dose reduction techniques were used for  this study:  All CT scans performed at this facility use one or all of the  following: Automated exposure control, adjustment of the mA and/or kVp according  to patient's size, iterative reconstruction. COMPARISON: 01/16/2019    FINDINGS:  -KIDNEYS/URETERS: Calcification in the kidneys is probably vascular.  There is  no significant hydronephrosis.  No definite renal mass. -BLADDER: Partially obscured by artifact from a right hip replacement.  No  definite bladder stone. -LUNG BASES: No infiltrates or masses.  Moderate size hiatal hernia. -LIVER: Normal in size and appearance.    -GALLBLADDER/BILE DUCTS: Postcholecystectomy.  No bile duct dilatation. -PANCREAS: Normal.  -SPLEEN: Normal.  -ADRENALS: Normal.    -REPRODUCTIVE ORGANS: Post hysterectomy. -BOWEL: There are multiple dilated small bowel loops with transition in the  pelvis, probably due to adhesions. -LYMPH NODES: No significant retroperitoneal, mesenteric, or pelvic adenopathy. -BONES: Old T11 compression fracture.  No acute fractures or bone lesions. -VASCULATURE: Moderate vascular calcification. -OTHER: There is presacral scarring/edema.                    XR CHEST PA LAT (Final result)   Result time 08/26/19 19:46:18   Final result by Rosmery Aguilar MD (08/26/19 19:46:18)                Impression:    IMPRESSION: .  1.  Good aeration of the lungs. 2.  Increased T11 compression. 3.  Dilated small bowel loops, ileus versus obstruction            Narrative:    Chest X-ray    INDICATION: Atrial fibrillation    PA and lateral views of the chest were obtained. FINDINGS: The lungs are clear. There are no infiltrates or effusions.  The heart  size is normal.  There are sternotomy changes.  There is increased compression  of T11.  Multiple dilated small bowel loops are present. .                    Results Include:    Recent Results (from the past 24 hour(s))   EKG, 12 LEAD, INITIAL    Collection Time: 08/26/19  7:11 PM   Result Value Ref Range    Ventricular Rate 116 BPM    Atrial Rate 136 BPM    QRS Duration 116 ms    Q-T Interval 396 ms    QTC Calculation (Bezet) 550 ms    Calculated R Axis -46 degrees    Calculated T Axis 138 degrees    Diagnosis       Atrial fibrillation with rapid ventricular response with premature   ventricular or aberrantly conducted complexes  Left anterior fascicular block  Left ventricular hypertrophy with QRS widening  Cannot rule out Septal infarct (cited on or before 17-JUN-2016)  Marked ST abnormality, possible lateral subendocardial injury  Prolonged QT  Abnormal ECG  When compared with ECG of 14-MAY-2019 15:25,  Significant changes have occurred     CBC WITH AUTOMATED DIFF    Collection Time: 08/26/19  7:16 PM   Result Value Ref Range    WBC 13.8 (H) 4.3 - 11.1 K/uL    RBC 4.55 4.05 - 5.2 M/uL    HGB 12.9 11.7 - 15.4 g/dL    HCT 40.7 35.8 - 46.3 %    MCV 89.5 79.6 - 97.8 FL    MCH 28.4 26.1 - 32.9 PG    MCHC 31.7 31.4 - 35.0 g/dL    RDW 15.6 (H) 11.9 - 14.6 %    PLATELET 874 249 - 667 K/uL    MPV 11.4 9.4 - 12.3 FL    ABSOLUTE NRBC 0.00 0.0 - 0.2 K/uL    DF AUTOMATED      NEUTROPHILS 68 43 - 78 %    LYMPHOCYTES 22 13 - 44 %    MONOCYTES 9 4.0 - 12.0 %    EOSINOPHILS 1 0.5 - 7.8 %    BASOPHILS 1 0.0 - 2.0 %    IMMATURE GRANULOCYTES 0 0.0 - 5.0 %    ABS. NEUTROPHILS 9.4 (H) 1.7 - 8.2 K/UL    ABS. LYMPHOCYTES 3.0 0.5 - 4.6 K/UL    ABS. MONOCYTES 1.2 0.1 - 1.3 K/UL    ABS. EOSINOPHILS 0.1 0.0 - 0.8 K/UL    ABS. BASOPHILS 0.1 0.0 - 0.2 K/UL    ABS. IMM.  GRANS. 0.0 0.0 - 0.5 K/UL   METABOLIC PANEL, COMPREHENSIVE    Collection Time: 08/26/19  7:16 PM   Result Value Ref Range    Sodium 142 136 - 145 mmol/L    Potassium 2.4 (LL) 3.5 - 5.1 mmol/L    Chloride 104 98 - 107 mmol/L    CO2 28 21 - 32 mmol/L    Anion gap 10 7 - 16 mmol/L    Glucose 109 (H) 65 - 100 mg/dL    BUN 17 8 - 23 MG/DL    Creatinine 1.18 (H) 0.6 - 1.0 MG/DL    GFR est AA 57 (L) >60 ml/min/1.73m2    GFR est non-AA 47 (L) >60 ml/min/1.73m2    Calcium 7.9 (L) 8.3 - 10.4 MG/DL    Bilirubin, total 0.4 0.2 - 1.1 MG/DL    ALT (SGPT) 20 12 - 65 U/L AST (SGOT) 24 15 - 37 U/L    Alk.  phosphatase 84 50 - 136 U/L    Protein, total 7.5 6.3 - 8.2 g/dL    Albumin 3.4 3.2 - 4.6 g/dL    Globulin 4.1 (H) 2.3 - 3.5 g/dL    A-G Ratio 0.8 (L) 1.2 - 3.5     PTT    Collection Time: 08/26/19  7:16 PM   Result Value Ref Range    aPTT 30.8 24.7 - 39.8 SEC   PROTHROMBIN TIME + INR    Collection Time: 08/26/19  7:16 PM   Result Value Ref Range    Prothrombin time 13.8 11.7 - 14.5 sec    INR 1.0     LIPASE    Collection Time: 08/26/19  7:16 PM   Result Value Ref Range    Lipase 339 73 - 393 U/L   MAGNESIUM    Collection Time: 08/26/19  7:16 PM   Result Value Ref Range    Magnesium 1.1 (LL) 1.8 - 2.4 mg/dL   BNP    Collection Time: 08/26/19  7:16 PM   Result Value Ref Range     (H) 0 pg/mL   POC TROPONIN-I    Collection Time: 08/26/19  7:18 PM   Result Value Ref Range    Troponin-I (POC) 0.03 0.02 - 0.05 ng/ml

## 2019-08-26 NOTE — ED TRIAGE NOTES
Went to the PCP with abdominal pain and was found to have new onset of afib and potassium of 2.5. States PCP attempted to direct admit but was unable to.

## 2019-08-27 LAB
ANION GAP SERPL CALC-SCNC: 10 MMOL/L (ref 7–16)
APPEARANCE UR: CLEAR
APTT PPP: 34.4 SEC (ref 24.7–39.8)
APTT PPP: 55.6 SEC (ref 24.7–39.8)
APTT PPP: 89.3 SEC (ref 24.7–39.8)
ATRIAL RATE: 136 BPM
BACTERIA URNS QL MICRO: 0 /HPF
BASOPHILS # BLD: 0.1 K/UL (ref 0–0.2)
BASOPHILS NFR BLD: 1 % (ref 0–2)
BILIRUB UR QL: NEGATIVE
BUN SERPL-MCNC: 15 MG/DL (ref 8–23)
CALCIUM SERPL-MCNC: 7.4 MG/DL (ref 8.3–10.4)
CALCULATED R AXIS, ECG10: -46 DEGREES
CALCULATED T AXIS, ECG11: 138 DEGREES
CASTS URNS QL MICRO: ABNORMAL /LPF
CHLORIDE SERPL-SCNC: 108 MMOL/L (ref 98–107)
CO2 SERPL-SCNC: 26 MMOL/L (ref 21–32)
COLOR UR: YELLOW
CREAT SERPL-MCNC: 0.9 MG/DL (ref 0.6–1)
DIAGNOSIS, 93000: NORMAL
DIFFERENTIAL METHOD BLD: ABNORMAL
EOSINOPHIL # BLD: 0.1 K/UL (ref 0–0.8)
EOSINOPHIL NFR BLD: 1 % (ref 0.5–7.8)
EPI CELLS #/AREA URNS HPF: ABNORMAL /HPF
ERYTHROCYTE [DISTWIDTH] IN BLOOD BY AUTOMATED COUNT: 15.5 % (ref 11.9–14.6)
GLUCOSE SERPL-MCNC: 98 MG/DL (ref 65–100)
GLUCOSE UR STRIP.AUTO-MCNC: NEGATIVE MG/DL
HCT VFR BLD AUTO: 34.2 % (ref 35.8–46.3)
HGB BLD-MCNC: 11 G/DL (ref 11.7–15.4)
HGB UR QL STRIP: NEGATIVE
IMM GRANULOCYTES # BLD AUTO: 0.1 K/UL (ref 0–0.5)
IMM GRANULOCYTES NFR BLD AUTO: 0 % (ref 0–5)
KETONES UR QL STRIP.AUTO: NEGATIVE MG/DL
LEUKOCYTE ESTERASE UR QL STRIP.AUTO: ABNORMAL
LYMPHOCYTES # BLD: 1.7 K/UL (ref 0.5–4.6)
LYMPHOCYTES NFR BLD: 15 % (ref 13–44)
MCH RBC QN AUTO: 28.5 PG (ref 26.1–32.9)
MCHC RBC AUTO-ENTMCNC: 32.2 G/DL (ref 31.4–35)
MCV RBC AUTO: 88.6 FL (ref 79.6–97.8)
MONOCYTES # BLD: 1 K/UL (ref 0.1–1.3)
MONOCYTES NFR BLD: 9 % (ref 4–12)
NEUTS SEG # BLD: 8.3 K/UL (ref 1.7–8.2)
NEUTS SEG NFR BLD: 74 % (ref 43–78)
NITRITE UR QL STRIP.AUTO: NEGATIVE
NRBC # BLD: 0 K/UL (ref 0–0.2)
PH UR STRIP: 6 [PH] (ref 5–9)
PLATELET # BLD AUTO: 180 K/UL (ref 150–450)
PMV BLD AUTO: 11.4 FL (ref 9.4–12.3)
POTASSIUM SERPL-SCNC: 2.6 MMOL/L (ref 3.5–5.1)
PROT UR STRIP-MCNC: NEGATIVE MG/DL
Q-T INTERVAL, ECG07: 396 MS
QRS DURATION, ECG06: 116 MS
QTC CALCULATION (BEZET), ECG08: 550 MS
RBC # BLD AUTO: 3.86 M/UL (ref 4.05–5.2)
RBC #/AREA URNS HPF: ABNORMAL /HPF
SODIUM SERPL-SCNC: 144 MMOL/L (ref 136–145)
SP GR UR REFRACTOMETRY: 1.01 (ref 1–1.02)
UROBILINOGEN UR QL STRIP.AUTO: 1 EU/DL (ref 0.2–1)
VENTRICULAR RATE, ECG03: 116 BPM
WBC # BLD AUTO: 11.1 K/UL (ref 4.3–11.1)
WBC URNS QL MICRO: ABNORMAL /HPF

## 2019-08-27 PROCEDURE — 74011000250 HC RX REV CODE- 250: Performed by: FAMILY MEDICINE

## 2019-08-27 PROCEDURE — 74011250636 HC RX REV CODE- 250/636: Performed by: NURSE PRACTITIONER

## 2019-08-27 PROCEDURE — C8929 TTE W OR WO FOL WCON,DOPPLER: HCPCS

## 2019-08-27 PROCEDURE — 85730 THROMBOPLASTIN TIME PARTIAL: CPT

## 2019-08-27 PROCEDURE — 80048 BASIC METABOLIC PNL TOTAL CA: CPT

## 2019-08-27 PROCEDURE — 74011250636 HC RX REV CODE- 250/636: Performed by: EMERGENCY MEDICINE

## 2019-08-27 PROCEDURE — 85025 COMPLETE CBC W/AUTO DIFF WBC: CPT

## 2019-08-27 PROCEDURE — 36415 COLL VENOUS BLD VENIPUNCTURE: CPT

## 2019-08-27 PROCEDURE — 74011250637 HC RX REV CODE- 250/637: Performed by: FAMILY MEDICINE

## 2019-08-27 PROCEDURE — 81001 URINALYSIS AUTO W/SCOPE: CPT

## 2019-08-27 PROCEDURE — 74011250636 HC RX REV CODE- 250/636: Performed by: FAMILY MEDICINE

## 2019-08-27 PROCEDURE — 77030020263 HC SOL INJ SOD CL0.9% LFCR 1000ML

## 2019-08-27 PROCEDURE — 65660000000 HC RM CCU STEPDOWN

## 2019-08-27 RX ORDER — BISACODYL 5 MG
5 TABLET, DELAYED RELEASE (ENTERIC COATED) ORAL DAILY PRN
Status: DISCONTINUED | OUTPATIENT
Start: 2019-08-27 | End: 2019-08-31 | Stop reason: HOSPADM

## 2019-08-27 RX ORDER — SODIUM CHLORIDE 0.9 % (FLUSH) 0.9 %
5-40 SYRINGE (ML) INJECTION AS NEEDED
Status: DISCONTINUED | OUTPATIENT
Start: 2019-08-27 | End: 2019-08-31 | Stop reason: HOSPADM

## 2019-08-27 RX ORDER — ASPIRIN 81 MG/1
81 TABLET ORAL
Status: DISCONTINUED | OUTPATIENT
Start: 2019-08-27 | End: 2019-08-31 | Stop reason: HOSPADM

## 2019-08-27 RX ORDER — POTASSIUM CHLORIDE 14.9 MG/ML
20 INJECTION INTRAVENOUS
Status: DISCONTINUED | OUTPATIENT
Start: 2019-08-27 | End: 2019-08-27

## 2019-08-27 RX ORDER — HEPARIN SODIUM 5000 [USP'U]/ML
35 INJECTION, SOLUTION INTRAVENOUS; SUBCUTANEOUS ONCE
Status: COMPLETED | OUTPATIENT
Start: 2019-08-27 | End: 2019-08-27

## 2019-08-27 RX ORDER — MAGNESIUM SULFATE HEPTAHYDRATE 40 MG/ML
4 INJECTION, SOLUTION INTRAVENOUS ONCE
Status: COMPLETED | OUTPATIENT
Start: 2019-08-27 | End: 2019-08-27

## 2019-08-27 RX ORDER — SODIUM CHLORIDE 0.9 % (FLUSH) 0.9 %
5-40 SYRINGE (ML) INJECTION EVERY 8 HOURS
Status: DISCONTINUED | OUTPATIENT
Start: 2019-08-27 | End: 2019-08-31 | Stop reason: HOSPADM

## 2019-08-27 RX ORDER — SODIUM CHLORIDE 9 MG/ML
1000 INJECTION, SOLUTION INTRAVENOUS CONTINUOUS
Status: DISCONTINUED | OUTPATIENT
Start: 2019-08-27 | End: 2019-08-28

## 2019-08-27 RX ORDER — POTASSIUM CHLORIDE 20 MEQ/1
40 TABLET, EXTENDED RELEASE ORAL 2 TIMES DAILY
Status: DISCONTINUED | OUTPATIENT
Start: 2019-08-27 | End: 2019-08-28

## 2019-08-27 RX ORDER — NALOXONE HYDROCHLORIDE 0.4 MG/ML
0.4 INJECTION, SOLUTION INTRAMUSCULAR; INTRAVENOUS; SUBCUTANEOUS AS NEEDED
Status: DISCONTINUED | OUTPATIENT
Start: 2019-08-27 | End: 2019-08-31 | Stop reason: HOSPADM

## 2019-08-27 RX ORDER — LEVOTHYROXINE SODIUM 50 UG/1
50 TABLET ORAL
Status: DISCONTINUED | OUTPATIENT
Start: 2019-08-27 | End: 2019-08-31 | Stop reason: HOSPADM

## 2019-08-27 RX ORDER — HEPARIN SODIUM 5000 [USP'U]/100ML
12-25 INJECTION, SOLUTION INTRAVENOUS
Status: DISCONTINUED | OUTPATIENT
Start: 2019-08-27 | End: 2019-08-28

## 2019-08-27 RX ORDER — POTASSIUM CHLORIDE 14.9 MG/ML
20 INJECTION INTRAVENOUS
Status: COMPLETED | OUTPATIENT
Start: 2019-08-27 | End: 2019-08-27

## 2019-08-27 RX ORDER — MORPHINE SULFATE 2 MG/ML
2 INJECTION, SOLUTION INTRAMUSCULAR; INTRAVENOUS
Status: DISCONTINUED | OUTPATIENT
Start: 2019-08-27 | End: 2019-08-31 | Stop reason: HOSPADM

## 2019-08-27 RX ORDER — LANOLIN ALCOHOL/MO/W.PET/CERES
400 CREAM (GRAM) TOPICAL 2 TIMES DAILY
Status: DISCONTINUED | OUTPATIENT
Start: 2019-08-27 | End: 2019-08-31 | Stop reason: HOSPADM

## 2019-08-27 RX ORDER — HYDROCODONE BITARTRATE AND ACETAMINOPHEN 10; 325 MG/1; MG/1
1 TABLET ORAL
Status: DISCONTINUED | OUTPATIENT
Start: 2019-08-27 | End: 2019-08-31 | Stop reason: HOSPADM

## 2019-08-27 RX ORDER — MAGNESIUM SULFATE HEPTAHYDRATE 40 MG/ML
2 INJECTION, SOLUTION INTRAVENOUS ONCE
Status: COMPLETED | OUTPATIENT
Start: 2019-08-27 | End: 2019-08-27

## 2019-08-27 RX ORDER — RANOLAZINE 500 MG/1
500 TABLET, EXTENDED RELEASE ORAL 2 TIMES DAILY
Status: DISCONTINUED | OUTPATIENT
Start: 2019-08-27 | End: 2019-08-31 | Stop reason: HOSPADM

## 2019-08-27 RX ORDER — POTASSIUM CHLORIDE 14.9 MG/ML
20 INJECTION INTRAVENOUS
Status: DISPENSED | OUTPATIENT
Start: 2019-08-27 | End: 2019-08-27

## 2019-08-27 RX ORDER — LORAZEPAM 0.5 MG/1
0.5 TABLET ORAL
Status: DISCONTINUED | OUTPATIENT
Start: 2019-08-27 | End: 2019-08-31 | Stop reason: HOSPADM

## 2019-08-27 RX ORDER — ACETAMINOPHEN 325 MG/1
650 TABLET ORAL
Status: DISCONTINUED | OUTPATIENT
Start: 2019-08-27 | End: 2019-08-31 | Stop reason: HOSPADM

## 2019-08-27 RX ORDER — DIPHENHYDRAMINE HCL 25 MG
25 CAPSULE ORAL
Status: DISCONTINUED | OUTPATIENT
Start: 2019-08-27 | End: 2019-08-31 | Stop reason: HOSPADM

## 2019-08-27 RX ORDER — PANTOPRAZOLE SODIUM 40 MG/1
40 TABLET, DELAYED RELEASE ORAL
Status: DISCONTINUED | OUTPATIENT
Start: 2019-08-27 | End: 2019-08-31 | Stop reason: HOSPADM

## 2019-08-27 RX ORDER — LISINOPRIL 5 MG/1
10 TABLET ORAL DAILY
Status: DISCONTINUED | OUTPATIENT
Start: 2019-08-27 | End: 2019-08-31 | Stop reason: HOSPADM

## 2019-08-27 RX ORDER — ONDANSETRON 2 MG/ML
4 INJECTION INTRAMUSCULAR; INTRAVENOUS
Status: DISCONTINUED | OUTPATIENT
Start: 2019-08-27 | End: 2019-08-31 | Stop reason: HOSPADM

## 2019-08-27 RX ADMIN — HEPARIN SODIUM 1750 UNITS: 5000 INJECTION INTRAVENOUS; SUBCUTANEOUS at 19:59

## 2019-08-27 RX ADMIN — Medication 10 ML: at 22:00

## 2019-08-27 RX ADMIN — LISINOPRIL 10 MG: 5 TABLET ORAL at 09:46

## 2019-08-27 RX ADMIN — POTASSIUM CHLORIDE 20 MEQ: 200 INJECTION, SOLUTION INTRAVENOUS at 14:09

## 2019-08-27 RX ADMIN — SODIUM CHLORIDE 1000 ML: 900 INJECTION, SOLUTION INTRAVENOUS at 11:50

## 2019-08-27 RX ADMIN — SODIUM CHLORIDE 1000 ML: 900 INJECTION, SOLUTION INTRAVENOUS at 23:59

## 2019-08-27 RX ADMIN — MAGNESIUM SULFATE HEPTAHYDRATE 4 G: 40 INJECTION, SOLUTION INTRAVENOUS at 09:00

## 2019-08-27 RX ADMIN — PANTOPRAZOLE SODIUM 40 MG: 40 TABLET, DELAYED RELEASE ORAL at 06:00

## 2019-08-27 RX ADMIN — POTASSIUM CHLORIDE 20 MEQ: 200 INJECTION, SOLUTION INTRAVENOUS at 00:10

## 2019-08-27 RX ADMIN — POTASSIUM CHLORIDE 20 MEQ: 200 INJECTION, SOLUTION INTRAVENOUS at 04:46

## 2019-08-27 RX ADMIN — Medication 10 ML: at 05:19

## 2019-08-27 RX ADMIN — RANOLAZINE 500 MG: 500 TABLET, FILM COATED, EXTENDED RELEASE ORAL at 09:47

## 2019-08-27 RX ADMIN — ASPIRIN 81 MG: 81 TABLET ORAL at 21:34

## 2019-08-27 RX ADMIN — POTASSIUM CHLORIDE 20 MEQ: 200 INJECTION, SOLUTION INTRAVENOUS at 09:44

## 2019-08-27 RX ADMIN — SODIUM CHLORIDE 1000 ML: 900 INJECTION, SOLUTION INTRAVENOUS at 03:29

## 2019-08-27 RX ADMIN — MAGNESIUM SULFATE HEPTAHYDRATE 2 G: 40 INJECTION, SOLUTION INTRAVENOUS at 03:29

## 2019-08-27 RX ADMIN — LEVOTHYROXINE SODIUM 50 MCG: 50 TABLET ORAL at 06:00

## 2019-08-27 RX ADMIN — POTASSIUM CHLORIDE 20 MEQ: 200 INJECTION, SOLUTION INTRAVENOUS at 11:50

## 2019-08-27 RX ADMIN — HEPARIN SODIUM AND DEXTROSE 12 UNITS/KG/HR: 5000; 5 INJECTION INTRAVENOUS at 05:12

## 2019-08-27 RX ADMIN — RANOLAZINE 500 MG: 500 TABLET, FILM COATED, EXTENDED RELEASE ORAL at 17:32

## 2019-08-27 RX ADMIN — PERFLUTREN 1 ML: 6.52 INJECTION, SUSPENSION INTRAVENOUS at 08:00

## 2019-08-27 NOTE — PROGRESS NOTES
Hourly rounds completed this shift. Patient resting in bed. Will continue to monitor and give report to oncoming RN.

## 2019-08-27 NOTE — PROGRESS NOTES
Monitor called to notify this primary RN of 6 beat run of Vtach. Patient converted to NSR at 60bpm. MD notified. No orders received. Waiting on PTT to result to start heparin gtt.

## 2019-08-27 NOTE — PROGRESS NOTES
Notified by monitor room that the patient had 13 beats of vtach, notified Pepe Zimmerman NP, no further orders at this time, will continue to monitor.

## 2019-08-27 NOTE — PROGRESS NOTES
Initial visit by  to convey care and concern and encourage patient that  services are available if desired. Offered spiritual interventions, including empathic listening, affirmation of emotions & jihan, exploration of coping skills, and prayer as requested. Mrs. Briones's spouse is a patient at [facility] and Mrs. Arlene Camacho was tearful during the visit. Patient was anticipating the arrival of her daughter, Russell Meneses. Provided 's business card for future reference. Chaplains remain available for support.      Renny Nunez  Board Certified

## 2019-08-27 NOTE — PROGRESS NOTES
Nutrition  Reason for assessment: Referral received from nursing admission Malnutrition Screening Tool for recently lost 2-13# without trying and eating poorly due to decreased appetite. Assessment:   Diet order(s): 8-27: NPO, then clear liquid  Food/Nutrition History:  PMH: CAD/CABG, CHF, GERD, IBS. S/P abdominal rectopexy on 7/11/19   Presented with 2 week hx of abdominal pain and N/D. Seen by PCP with finding of new onset afib and sent to ER. Admitted with finding of PBSO. Pt asleep at time of RD visit, opted not to awakwn  Anthropometrics: Height: 5' 5\" (165.1 cm), Weight: 49.9 kg (110 lb)-unknown source, Body mass index is 18.3 kg/m². BMI class of underweight. Weight hx per EMR ( Based on [facility] care functionality, RD cannot know if these weight are actual versus stated):   WT / BMI WEIGHT   7/2/2019 110 lb 8 oz   5/16/2019 105 lb 3.2 oz   5/14/2019 115 lb   1/16/2019 115 lb   12/17/2018 124 lb   8/17/2018 125 lb 6 oz    If factual, pt with a net change in weight of 11% in the past 8-9 months which does not meet ASPEN criteria for malnutrition. Macronutrient needs:   EER:  8294-0859 kcal /day (25-30 kcal/kg listed BW)elderly/underweight   EPR:  60-75 grams protein/day (1.2-1.5 grams/kg listed BW)-underweight/wt loss. anabolism/recent surgery   Intake/Comparative Standards: Current clear liquid does not meet kcal or protein needs     Nutrition Diagnosis: Inadequate oral intake r/t inability to consume sufficient oral intake and altered GI function as evidenced by diet limited to nutritionally inadequate clear liquid diet or NPO status d/t PSBO, recent GI surgery    Intervention:  Meals and snacks:  Await initiation progression of p.o. diet as GI status allows. Nutrition supplement therapy: Ensure clear tid with clear liquids, then Ensure Enlive tid once diet is progressed to at least full liquids.   PN: If it is expected that patient will be unable to tolerate p.o. diet greater than 50% of full liquid diet or greater within 3-5 days, consider TPN. If TPN is pursued, please order nutrition consult for TPN management. Discharge nutrition plan: Too soon to determine.     Christy Rossi, [address] [place] [address], [address] [place] [address]
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
## 2019-08-27 NOTE — ED NOTES
TRANSFER - OUT REPORT:    Verbal report given to Clara Carroll RN(name) on Rio Busch  being transferred to [facility](unit) for routine progression of care       Report consisted of patients Situation, Background, Assessment and   Recommendations(SBAR). Information from the following report(s) ED Summary, MAR and Recent Results was reviewed with the receiving nurse. Lines:   Peripheral IV 08/26/19 Left Antecubital (Active)   Site Assessment Clean, dry, & intact 8/26/2019 11:03 PM   Phlebitis Assessment 0 8/26/2019 11:03 PM   Infiltration Assessment 0 8/26/2019 11:03 PM   Dressing Status Clean, dry, & intact 8/26/2019 11:03 PM   Dressing Type Tape;Transparent 8/26/2019 11:03 PM       Peripheral IV 08/26/19 Right Antecubital (Active)   Site Assessment Clean, dry, & intact 8/26/2019 11:03 PM   Phlebitis Assessment 0 8/26/2019 11:03 PM   Infiltration Assessment 0 8/26/2019 11:03 PM   Dressing Status Clean, dry, & intact 8/26/2019 11:03 PM   Dressing Type Tape;Transparent 8/26/2019 11:03 PM        Opportunity for questions and clarification was provided.       Patient transported with:   [employer]

## 2019-08-27 NOTE — H&P
HOSPITALIST H&P  NAME:  Shanice López   Age:  66 y.o.  :   1945   MRN:   [id number]  PCP: Jsoeph Galvan MD  Treatment Team: Attending Provider: Lola Johnson MD; Primary Nurse: Benjamin Feliciano Primary Nurse: Lou Pepe RN    Prior     CC: Reason for admission is: partial SBO; new Afib    HPI:   Patient history was obtained from the ER provider prior to seeing the patient. Patient is a 66 y.o. female who presents to the ER due to new onset Afib. She went to see her PCP today due to ongoing abdominal pain with diarrhea and was found to be in Afib with controlled rate, and a K+ of 2.5. She was referred to ER for further evaluation. She reports that the pain has been ongoing for more than a week, generally diffuse and achy, not severe. Has had nausea, but no vomiting until today. No further vomiting in ER. Denies fever/chills, melena, brbpr, chest pain, palpitations, SOB. Nothing seems to make the pain better or worse. She id have surgery for a prolapsed rectum about 4 weeks ago. She reports diarrhea since the surgery. Denies prior h/o Afib    ROS:  All systems have been reviewed and are negative except as stated in HPI or elsewhere. Past Medical History:   Diagnosis Date    Abnormal EKG     Acute systolic (congestive) heart failure (HCC) 2016    Anterior myocardial infarction ([address] 1997    Avascular necrosis (HCC)     R hip    CAD (coronary artery disease)     s/p CABG    Cellulitis of right lower extremity 2016    Chest pain, precordial 3/28/2016    COPD (chronic obstructive pulmonary disease) ([address]     pt denies    Coronary atherosclerosis of native coronary vessel 2016    per heart cath (2016)- \"pt with diffusely diseased LAD and small caliber vessels. At present. .. best option would be medical management. .. do not see any obvious good options for redo CABG. ..    Fracture of femoral neck, right ([address] 2016 s/p repair with hardware- Dr Phillips Side GERD (gastroesophageal reflux disease) 2016    managed with medication    HLD (hyperlipidemia) 2016    managed with medication    Hx of echocardiogram 2016    EF 45-50%, hypokinesis of the apical anterior and basal-mid anteroseptal wall. Mild aortic valve regurgitation. Mild tricuspid regurgitation.      Hypertension     managed with medication    Hypokalemia 2016    hx of- WNL since 2016- pt on K+ supplement    IBS (irritable bowel syndrome)     Ischemic cardiomyopathy 5/15/2018    MI [facility]ocardial infarction[facility])  and spring 2016    x2    Osteoarthritis     Osteoporosis     Rectal prolapse     S/P CABG (coronary artery bypass graft)     S/P total hip arthroplasty 2016    Scleroderma ([address] 3/28/2016    Shortness of breath dyspnea 3/28/2016    Tracheal stenosis     s/p repair    Unstable angina ([address] 1997      Past Surgical History:   Procedure Laterality Date    HX ANKLE FRACTURE TX Right     hardware placed    HX CORONARY ARTERY BYPASS GRAFT      cabg x 3    HX FRACTURE TX      cervical spine- C2    HX HEART CATHETERIZATION      HX HEENT      tracheal stricture/stenosis    HX HIP FRACTURE TX  2016    repaired with hardware- Dr Prasad Knee    HX OTHER SURGICAL      Peg tube placement    HX PTCA      HX TRACHEOSTOMY        Social History     Tobacco Use    Smoking status: Former Smoker     Packs/day: 1.00     Years: 20.00     Pack years: 20.00     Last attempt to quit: 1995     Years since quittin.6    Smokeless tobacco: Never Used   Substance Use Topics    Alcohol use: No      Family History   Problem Relation Age of Onset    Breast Cancer Sister        FH Reviewed and non-contributory to admitting diagnosis    Allergies   Allergen Reactions    Corticosteroids (Glucocorticoids) Anaphylaxis    Iodine Hives and Other (comments)     hypertension Prior to Admission Medications   Prescriptions Last Dose Informant Patient Reported? Taking? HYDROcodone-acetaminophen (NORCO) 7.5-325 mg per tablet   No No   Sig: Take 1 Tab by mouth every six (6) hours as needed for Pain. Max Daily Amount: 4 Tabs. acetaminophen (TYLENOL) 325 mg tablet   Yes No   Sig: Take 500 mg by mouth every six (6) hours as needed for Pain. aspirin delayed-release 81 mg tablet   Yes No   Sig: Take 81 mg by mouth nightly. atorvastatin (LIPITOR) 40 mg tablet   No No   Sig: Take 1 Tab by mouth daily. Indications: HYPERCHOLESTEROLEMIA   Patient taking differently: Take 40 mg by mouth nightly. Indications: hypercholesterolemia   ferrous sulfate (IRON) 325 mg (65 mg iron) tablet   Yes No   Sig: Take  by mouth Daily (before breakfast). levothyroxine (SYNTHROID) 50 mcg tablet   Yes No   Sig: Take  by mouth Daily (before breakfast). lisinopril (PRINIVIL, ZESTRIL) 5 mg tablet   No No   Sig: Take 1 Tab by mouth daily. Patient taking differently: Take 10 mg by mouth daily. loperamide (IMODIUM) 2 mg capsule   Yes No   Sig: Take 2 mg by mouth as needed for Diarrhea. nitroglycerin (NITROSTAT) 0.4 mg SL tablet   No No   Si Tab by SubLINGual route every five (5) minutes as needed for Chest Pain. Up to 3 doses. omeprazole (PRILOSEC) 40 mg capsule   Yes No   Sig: Take 40 mg by mouth daily. Indications: gastroesophageal reflux disease, heartburn   ondansetron (ZOFRAN ODT) 8 mg disintegrating tablet   No No   Sig: Take 1 Tab by mouth every eight (8) hours as needed for Nausea. ranolazine ER (RANEXA) 500 mg SR tablet   No No   Sig: Take 1 Tab by mouth two (2) times a day.       Facility-Administered Medications: None         Objective:     No intake or output data in the 24 hours ending 19 2245   Temp (24hrs), Av °F (36.7 °C), Min:98 °F (36.7 °C), Max:98 °F (36.7 °C)    Oxygen Therapy  Pulse via Oximetry: 98 beats per minute (19 1904)  O2 Device: Room air (19 1904)   Body mass index is 18.3 kg/m². Patient Vitals for the past 24 hrs:   Temp Pulse Resp BP   08/26/19 1904 98 °F (36.7 °C) 78 18 145/75     Physical Exam:    General:    WD and WN, No apparent distress. Appears uncomfortable  Head:   Normocephalic, without obvious abnormality, atraumatic. Eyes:  PERRL; EOMI; sclera normal/non-icteric  ENT:  Hearing is normal.  Oropharynx is clear with tacky mucous membranes   Resp:    Clear to auscultation bilaterally. No Wheezing or Rhonchi. Resp are even and unlabored  Heart[de-identified]  Regular rate and rhythm,  no murmur,   No LE edema  Abdomen:   Soft, mildly-tender. Not distended. Bowel sounds normal.  hepato-splenomegaly -none  Musc/SK: Muscle strength is good and tone normal; No cyanosis. No clubbing  Skin:     Texture, turgor normal. No significant rashes or lesions.    Capillary refill < 2 sec  Neurologic: CN II - XII are grossly intact - Eye exam as noted above  Psych: Alert and oriented x 4;  Judgement and insight are normal     Data Review:   Recent Results (from the past 24 hour(s))   EKG, 12 LEAD, INITIAL    Collection Time: 08/26/19  7:11 PM   Result Value Ref Range    Ventricular Rate 116 BPM    Atrial Rate 136 BPM    QRS Duration 116 ms    Q-T Interval 396 ms    QTC Calculation (Bezet) 550 ms    Calculated R Axis -46 degrees    Calculated T Axis 138 degrees    Diagnosis       Atrial fibrillation with rapid ventricular response with premature   ventricular or aberrantly conducted complexes  Left anterior fascicular block  Left ventricular hypertrophy with QRS widening  Cannot rule out Septal infarct (cited on or before 17-JUN-2016)  Marked ST abnormality, possible lateral subendocardial injury  Prolonged QT  Abnormal ECG  When compared with ECG of 14-MAY-2019 15:25,  Significant changes have occurred     CBC WITH AUTOMATED DIFF    Collection Time: 08/26/19  7:16 PM   Result Value Ref Range    WBC 13.8 (H) 4.3 - 11.1 K/uL    RBC 4.55 4.05 - 5.2 M/uL    HGB 12.9 11.7 - 15.4 g/dL    HCT 40.7 35.8 - 46.3 %    MCV 89.5 79.6 - 97.8 FL    MCH 28.4 26.1 - 32.9 PG    MCHC 31.7 31.4 - 35.0 g/dL    RDW 15.6 (H) 11.9 - 14.6 %    PLATELET 698 983 - 640 K/uL    MPV 11.4 9.4 - 12.3 FL    ABSOLUTE NRBC 0.00 0.0 - 0.2 K/uL    DF AUTOMATED      NEUTROPHILS 68 43 - 78 %    LYMPHOCYTES 22 13 - 44 %    MONOCYTES 9 4.0 - 12.0 %    EOSINOPHILS 1 0.5 - 7.8 %    BASOPHILS 1 0.0 - 2.0 %    IMMATURE GRANULOCYTES 0 0.0 - 5.0 %    ABS. NEUTROPHILS 9.4 (H) 1.7 - 8.2 K/UL    ABS. LYMPHOCYTES 3.0 0.5 - 4.6 K/UL    ABS. MONOCYTES 1.2 0.1 - 1.3 K/UL    ABS. EOSINOPHILS 0.1 0.0 - 0.8 K/UL    ABS. BASOPHILS 0.1 0.0 - 0.2 K/UL    ABS. IMM. GRANS. 0.0 0.0 - 0.5 K/UL   METABOLIC PANEL, COMPREHENSIVE    Collection Time: 08/26/19  7:16 PM   Result Value Ref Range    Sodium 142 136 - 145 mmol/L    Potassium 2.4 (LL) 3.5 - 5.1 mmol/L    Chloride 104 98 - 107 mmol/L    CO2 28 21 - 32 mmol/L    Anion gap 10 7 - 16 mmol/L    Glucose 109 (H) 65 - 100 mg/dL    BUN 17 8 - 23 MG/DL    Creatinine 1.18 (H) 0.6 - 1.0 MG/DL    GFR est AA 57 (L) >60 ml/min/1.73m2    GFR est non-AA 47 (L) >60 ml/min/1.73m2    Calcium 7.9 (L) 8.3 - 10.4 MG/DL    Bilirubin, total 0.4 0.2 - 1.1 MG/DL    ALT (SGPT) 20 12 - 65 U/L    AST (SGOT) 24 15 - 37 U/L    Alk.  phosphatase 84 50 - 136 U/L    Protein, total 7.5 6.3 - 8.2 g/dL    Albumin 3.4 3.2 - 4.6 g/dL    Globulin 4.1 (H) 2.3 - 3.5 g/dL    A-G Ratio 0.8 (L) 1.2 - 3.5     PTT    Collection Time: 08/26/19  7:16 PM   Result Value Ref Range    aPTT 30.8 24.7 - 39.8 SEC   PROTHROMBIN TIME + INR    Collection Time: 08/26/19  7:16 PM   Result Value Ref Range    Prothrombin time 13.8 11.7 - 14.5 sec    INR 1.0     LIPASE    Collection Time: 08/26/19  7:16 PM   Result Value Ref Range    Lipase 339 73 - 393 U/L   MAGNESIUM    Collection Time: 08/26/19  7:16 PM   Result Value Ref Range    Magnesium 1.1 (LL) 1.8 - 2.4 mg/dL   BNP    Collection Time: 08/26/19  7:16 PM   Result Value Ref Range     (H) 0 pg/mL   POC TROPONIN-I    Collection Time: 08/26/19  7:18 PM   Result Value Ref Range    Troponin-I (POC) 0.03 0.02 - 0.05 ng/ml     CXR Results  (Last 48 hours)               08/26/19 1936  XR CHEST PA LAT Final result    Impression:  IMPRESSION: .   1. Good aeration of the lungs. 2.  Increased T11 compression. 3.  Dilated small bowel loops, ileus versus obstruction           Narrative:  Chest X-ray       INDICATION: Atrial fibrillation       PA and lateral views of the chest were obtained. FINDINGS: The lungs are clear. There are no infiltrates or effusions. The heart   size is normal.  There are sternotomy changes. There is increased compression   of T11. Multiple dilated small bowel loops are present. .                 CT Results  (Last 48 hours)               08/26/19 2045  CT ABD PELV WO CONT Final result    Impression:  IMPRESSION: Multiple dilated small bowel loops with transition in the pelvis,   likely partial small bowel obstruction due to adhesions. Narrative:  CT ABDOMEN AND PELVIS       INDICATION:  Abdominal pain       Multiple axial images were obtained through the abdomen and pelvis without   intravenous or oral contrast.  Radiation dose reduction techniques were used for   this study: All CT scans performed at this facility use one or all of the   following: Automated exposure control, adjustment of the mA and/or kVp according   to patient's size, iterative reconstruction. COMPARISON: 01/16/2019       FINDINGS:   -KIDNEYS/URETERS: Calcification in the kidneys is probably vascular. There is   no significant hydronephrosis. No definite renal mass. -BLADDER: Partially obscured by artifact from a right hip replacement. No   definite bladder stone. -LUNG BASES: No infiltrates or masses. Moderate size hiatal hernia. -LIVER: Normal in size and appearance. -GALLBLADDER/BILE DUCTS: Postcholecystectomy. No bile duct dilatation.    -PANCREAS: Normal.   -SPLEEN: Normal.   -ADRENALS: Normal.       -REPRODUCTIVE ORGANS: Post hysterectomy. -BOWEL: There are multiple dilated small bowel loops with transition in the   pelvis, probably due to adhesions. -LYMPH NODES: No significant retroperitoneal, mesenteric, or pelvic adenopathy. -BONES: Old T11 compression fracture. No acute fractures or bone lesions. -VASCULATURE: Moderate vascular calcification. -OTHER: There is presacral scarring/edema. Assessment and Plan: Active Hospital Problems    Diagnosis Date Noted    [facility]-fib [facility]) 08/26/2019    Partial small bowel obstruction (HCC) 08/26/2019    Electrolyte abnormality 08/26/2019     Low Mg, K, Ca      Systolic CHF, chronic ([address] 05/14/2019    Hypertension 02/27/2016    Hypokalemia 02/27/2016     Principal Problem:    Partial small bowel obstruction ([address] (8/26/2019)    Bowel rest; NGT attempted in ER but unsuccessful, since she is not vomiting will d/c further attempts and re-assess in the am; IVF; pain control prn    Active Problems:    Hypokalemia (2/27/2016)    Replace and monitor      Hypertension (2/27/2016)    Continue home meds and add prn hydralazine, if needed. Systolic CHF, chronic ([address] (5/14/2019)    Chronic condition is stable, but may affect hospital stay; continue home medications with the following changes, if any:    Will continue to monitor and adjust treatment as needed. A-fib ([address] (8/26/2019)    Rate is controlled and she was regular on my exam; ECHO in am; start heparin drip; change to oral anticoagulation once sure no surgery will be needed      Electrolyte abnormality (8/26/2019)    Replace Mg and K and monitor    Diarrhea: re-assess after SBO resolved, can be done as outpt.       · PLAN General  ·   · Cont appropriate home meds (see MAR)  · Control symptoms (pain, n/v, fever, etc)  · Monitor appropriate labs   · DVT prophylaxis:  Heparin drip  · Code status: Full;  HCPOA:   · Risk: high- IV narcotics; may need surgery; needs further work up of 2 new acute problems  · Anticipated DC needs:  · Estimated LOS:  Greater than 2 midnights  · Plans discussed with patient and/or caregiver; questions answered.       Med records reviewed if applicable; findings:     Critical care time if applicable:      Signed By: Trever Moore MD     August 26, 2019

## 2019-08-27 NOTE — PROGRESS NOTES
IV heparin rate has been adjusted based on the most recent PTT results. Lab Results   Component Value Date/Time    aPTT 89.3 (H) 08/27/2019 11:18 AM   No Change to Heparin drip, still running at 12 units/kg/hr. Next PTT scheduled for 1815.

## 2019-08-27 NOTE — PROGRESS NOTES
Hospitalist Progress Note     Admit Date:  2019  7:36 PM   Name:  Jsohua Covert   Age:  66 y.o.  :  1945   MRN:  [id number]   PCP:  Jonelle Hoffmann MD  Treatment Team: Attending Provider: Elisabet Aguilar MD; Consulting Provider: Lynette Lees MD; Care Manager: Mina Rose RN; Utilization Review: Eloy Medrano RN    Subjective:   CC:  Partial SBO, new onset a-fib    From Admission HPI:  \"Patient is a 66 y.o. female who presents to the ER due to new onset Afib. She went to see her PCP today due to ongoing abdominal pain with diarrhea and was found to be in Afib with controlled rate, and a K+ of 2.5. She was referred to ER for further evaluation. She reports that the pain has been ongoing for more than a week, generally diffuse and achy, not severe. Has had nausea, but no vomiting until today. No further vomiting in ER. Denies fever/chills, melena, brbpr, chest pain, palpitations, SOB. Nothing seems to make the pain better or worse. She id have surgery for a prolapsed rectum about 4 weeks ago. She reports diarrhea since the surgery. Denies prior h/o Afib\"    Subjective:  Per above pt admitted with new onset Afib and abd pian with diarrhea. Pt has had one stool per nursing, pt states she is passing flatus. Pt ate breakfast and tolerated with out any nausea or pain. Surgery consulted, did not see any surgical needs, signed off. Pain likely related to adhesions from prior surgeries. Pt on remote tele, had 2 runs of afib and 6 beats of vtach while in the bathroom on the toilet having BM. Spontaneously returned to NSR. Pt asymptomatic. Cont to monitor.       Objective:     Patient Vitals for the past 24 hrs:   Temp Pulse Resp BP SpO2   19 1526 97.6 °F (36.4 °C) 81 16 119/72 98 %   19 1208 97.7 °F (36.5 °C) 99 16 119/72 99 %   19 0813 97.4 °F (36.3 °C) (!) 53 16 137/71 98 %   19 0213 98.4 °F (36.9 °C) 70 20 144/65 94 %   / 0033  68 20 139/70 96 %   08/27/19 0029  78 30 (!) 131/102 (!) 81 %   08/26/19 2329  100 18 125/58 90 %   08/26/19 2259  (!) 104  136/84    08/26/19 2242  92 18 136/90    08/26/19 1904 98 °F (36.7 °C) 78 18 145/75      Oxygen Therapy  O2 Sat (%): 98 % (08/27/19 1526)  Pulse via Oximetry: 70 beats per minute (08/27/19 0033)  O2 Device: Room air (08/26/19 1904)    Intake/Output Summary (Last 24 hours) at 8/27/2019 1618  Last data filed at 8/27/2019 1526  Gross per 24 hour   Intake 1922 ml   Output 600 ml   Net 1322 ml         REVIEW OF SYSTEMS: Comprehensive ROS performed and negative except as stated in HPI. Physical Examination:  General:    Elderly and frail Awake and alert. Head:  Normocephalic, atraumatic  Eyes:  Extraocular movements intact, normal sclera  CV:   RRR. No  Murmurs, clicks, or gallops  Lungs:   Unlabored, no cyanosis  Abdomen:   Soft, nondistended, nontender. Extremities: Warm and dry. No cyanosis or edema. Skin:     No rashes or jaundice. Neuro:  No gross focal deficits  Psych:  Mood and affect appropriate    Data Review:  I have reviewed all labs, meds, telemetry events, and studies from the last 24 hours.     Recent Results (from the past 24 hour(s))   EKG, 12 LEAD, INITIAL    Collection Time: 08/26/19  7:11 PM   Result Value Ref Range    Ventricular Rate 116 BPM    Atrial Rate 136 BPM    QRS Duration 116 ms    Q-T Interval 396 ms    QTC Calculation (Bezet) 550 ms    Calculated R Axis -46 degrees    Calculated T Axis 138 degrees    Diagnosis       Atrial fibrillation with rapid ventricular response with premature   ventricular or aberrantly conducted complexes  Left anterior fascicular block  Left ventricular hypertrophy with QRS widening  Cannot rule out Septal infarct (cited on or before 17-JUN-2016)  Marked ST abnormality, possible lateral subendocardial injury  Prolonged QT  Abnormal ECG  When compared with ECG of 14-MAY-2019 15:25,  Significant changes have occurred  Confirmed by YANETH DUTTON (), Padmini Esqueda ([id number]) on 8/27/2019 9:06:29 AM     CBC WITH AUTOMATED DIFF    Collection Time: 08/26/19  7:16 PM   Result Value Ref Range    WBC 13.8 (H) 4.3 - 11.1 K/uL    RBC 4.55 4.05 - 5.2 M/uL    HGB 12.9 11.7 - 15.4 g/dL    HCT 40.7 35.8 - 46.3 %    MCV 89.5 79.6 - 97.8 FL    MCH 28.4 26.1 - 32.9 PG    MCHC 31.7 31.4 - 35.0 g/dL    RDW 15.6 (H) 11.9 - 14.6 %    PLATELET 347 768 - 360 K/uL    MPV 11.4 9.4 - 12.3 FL    ABSOLUTE NRBC 0.00 0.0 - 0.2 K/uL    DF AUTOMATED      NEUTROPHILS 68 43 - 78 %    LYMPHOCYTES 22 13 - 44 %    MONOCYTES 9 4.0 - 12.0 %    EOSINOPHILS 1 0.5 - 7.8 %    BASOPHILS 1 0.0 - 2.0 %    IMMATURE GRANULOCYTES 0 0.0 - 5.0 %    ABS. NEUTROPHILS 9.4 (H) 1.7 - 8.2 K/UL    ABS. LYMPHOCYTES 3.0 0.5 - 4.6 K/UL    ABS. MONOCYTES 1.2 0.1 - 1.3 K/UL    ABS. EOSINOPHILS 0.1 0.0 - 0.8 K/UL    ABS. BASOPHILS 0.1 0.0 - 0.2 K/UL    ABS. IMM. GRANS. 0.0 0.0 - 0.5 K/UL   METABOLIC PANEL, COMPREHENSIVE    Collection Time: 08/26/19  7:16 PM   Result Value Ref Range    Sodium 142 136 - 145 mmol/L    Potassium 2.4 (LL) 3.5 - 5.1 mmol/L    Chloride 104 98 - 107 mmol/L    CO2 28 21 - 32 mmol/L    Anion gap 10 7 - 16 mmol/L    Glucose 109 (H) 65 - 100 mg/dL    BUN 17 8 - 23 MG/DL    Creatinine 1.18 (H) 0.6 - 1.0 MG/DL    GFR est AA 57 (L) >60 ml/min/1.73m2    GFR est non-AA 47 (L) >60 ml/min/1.73m2    Calcium 7.9 (L) 8.3 - 10.4 MG/DL    Bilirubin, total 0.4 0.2 - 1.1 MG/DL    ALT (SGPT) 20 12 - 65 U/L    AST (SGOT) 24 15 - 37 U/L    Alk.  phosphatase 84 50 - 136 U/L    Protein, total 7.5 6.3 - 8.2 g/dL    Albumin 3.4 3.2 - 4.6 g/dL    Globulin 4.1 (H) 2.3 - 3.5 g/dL    A-G Ratio 0.8 (L) 1.2 - 3.5     PTT    Collection Time: 08/26/19  7:16 PM   Result Value Ref Range    aPTT 30.8 24.7 - 39.8 SEC   PROTHROMBIN TIME + INR    Collection Time: 08/26/19  7:16 PM   Result Value Ref Range    Prothrombin time 13.8 11.7 - 14.5 sec    INR 1.0     LIPASE    Collection Time: 08/26/19  7:16 PM Result Value Ref Range    Lipase 339 73 - 393 U/L   MAGNESIUM    Collection Time: 08/26/19  7:16 PM   Result Value Ref Range    Magnesium 1.1 (LL) 1.8 - 2.4 mg/dL   BNP    Collection Time: 08/26/19  7:16 PM   Result Value Ref Range     (H) 0 pg/mL   POC TROPONIN-I    Collection Time: 08/26/19  7:18 PM   Result Value Ref Range    Troponin-I (POC) 0.03 0.02 - 4.42 ng/ml   METABOLIC PANEL, BASIC    Collection Time: 08/27/19  4:01 AM   Result Value Ref Range    Sodium 144 136 - 145 mmol/L    Potassium 2.6 (LL) 3.5 - 5.1 mmol/L    Chloride 108 (H) 98 - 107 mmol/L    CO2 26 21 - 32 mmol/L    Anion gap 10 7 - 16 mmol/L    Glucose 98 65 - 100 mg/dL    BUN 15 8 - 23 MG/DL    Creatinine 0.90 0.6 - 1.0 MG/DL    GFR est AA >60 >60 ml/min/1.73m2    GFR est non-AA >60 >60 ml/min/1.73m2    Calcium 7.4 (L) 8.3 - 10.4 MG/DL   CBC WITH AUTOMATED DIFF    Collection Time: 08/27/19  4:01 AM   Result Value Ref Range    WBC 11.1 4.3 - 11.1 K/uL    RBC 3.86 (L) 4.05 - 5.2 M/uL    HGB 11.0 (L) 11.7 - 15.4 g/dL    HCT 34.2 (L) 35.8 - 46.3 %    MCV 88.6 79.6 - 97.8 FL    MCH 28.5 26.1 - 32.9 PG    MCHC 32.2 31.4 - 35.0 g/dL    RDW 15.5 (H) 11.9 - 14.6 %    PLATELET 911 811 - 364 K/uL    MPV 11.4 9.4 - 12.3 FL    ABSOLUTE NRBC 0.00 0.0 - 0.2 K/uL    DF AUTOMATED      NEUTROPHILS 74 43 - 78 %    LYMPHOCYTES 15 13 - 44 %    MONOCYTES 9 4.0 - 12.0 %    EOSINOPHILS 1 0.5 - 7.8 %    BASOPHILS 1 0.0 - 2.0 %    IMMATURE GRANULOCYTES 0 0.0 - 5.0 %    ABS. NEUTROPHILS 8.3 (H) 1.7 - 8.2 K/UL    ABS. LYMPHOCYTES 1.7 0.5 - 4.6 K/UL    ABS. MONOCYTES 1.0 0.1 - 1.3 K/UL    ABS. EOSINOPHILS 0.1 0.0 - 0.8 K/UL    ABS. BASOPHILS 0.1 0.0 - 0.2 K/UL    ABS. IMM.  GRANS. 0.1 0.0 - 0.5 K/UL   PTT    Collection Time: 08/27/19  4:01 AM   Result Value Ref Range    aPTT 34.4 24.7 - 39.8 SEC   URINALYSIS W/ RFLX MICROSCOPIC    Collection Time: 08/27/19  9:43 AM   Result Value Ref Range    Color YELLOW      Appearance CLEAR      Specific gravity 1.011 1.001 - 1.023      pH (UA) 6.0 5.0 - 9.0      Protein NEGATIVE  NEG mg/dL    Glucose NEGATIVE  mg/dL    Ketone NEGATIVE  NEG mg/dL    Bilirubin NEGATIVE  NEG      Blood NEGATIVE  NEG      Urobilinogen 1.0 0.2 - 1.0 EU/dL    Nitrites NEGATIVE  NEG      Leukocyte Esterase TRACE (A) NEG      WBC 0-3 0 /hpf    RBC 0-3 0 /hpf    Epithelial cells 0-3 0 /hpf    Bacteria 0 0 /hpf    Casts 0-3 0 /lpf   PTT    Collection Time: 08/27/19 11:18 AM   Result Value Ref Range    aPTT 89.3 (H) 24.7 - 39.8 SEC        All Micro Results     None          Current Meds:  Current Facility-Administered Medications   Medication Dose Route Frequency    aspirin delayed-release tablet 81 mg  81 mg Oral QHS    levothyroxine (SYNTHROID) tablet 50 mcg  50 mcg Oral ACB    lisinopril (PRINIVIL, ZESTRIL) tablet 10 mg  10 mg Oral DAILY    pantoprazole (PROTONIX) tablet 40 mg  40 mg Oral ACB    ranolazine ER (RANEXA) tablet 500 mg  500 mg Oral BID    0.9% sodium chloride infusion 1,000 mL  1,000 mL IntraVENous CONTINUOUS    sodium chloride (NS) flush 5-40 mL  5-40 mL IntraVENous Q8H    sodium chloride (NS) flush 5-40 mL  5-40 mL IntraVENous PRN    acetaminophen (TYLENOL) tablet 650 mg  650 mg Oral Q4H PRN    HYDROcodone-acetaminophen (NORCO)  mg tablet 1 Tab  1 Tab Oral Q4H PRN    naloxone (NARCAN) injection 0.4 mg  0.4 mg IntraVENous PRN    diphenhydrAMINE (BENADRYL) capsule 25 mg  25 mg Oral Q6H PRN    ondansetron (ZOFRAN) injection 4 mg  4 mg IntraVENous Q4H PRN    bisacodyl (DULCOLAX) tablet 5 mg  5 mg Oral DAILY PRN    LORazepam (ATIVAN) tablet 0.5 mg  0.5 mg Oral BID PRN    morphine injection 2 mg  2 mg IntraVENous Q4H PRN    heparin 25,000 units in dextrose 500 mL infusion  12-25 Units/kg/hr IntraVENous TITRATE    [Held by provider] potassium chloride (K-DUR, KLOR-CON) SR tablet 40 mEq  40 mEq Oral BID    [Held by provider] magnesium oxide (MAG-OX) tablet 400 mg  400 mg Oral BID       Diet:  DIET CLEAR LIQUID    Other Studies (last 24 hours):  Xr Chest Pa Lat    Result Date: 8/26/2019  Chest X-ray INDICATION: Atrial fibrillation PA and lateral views of the chest were obtained. FINDINGS: The lungs are clear. There are no infiltrates or effusions. The heart size is normal.  There are sternotomy changes. There is increased compression of T11. Multiple dilated small bowel loops are present. .      IMPRESSION: . 1. Good aeration of the lungs. 2.  Increased T11 compression. 3.  Dilated small bowel loops, ileus versus obstruction     Ct Abd Pelv Wo Cont    Result Date: 8/26/2019  CT ABDOMEN AND PELVIS INDICATION:  Abdominal pain Multiple axial images were obtained through the abdomen and pelvis without intravenous or oral contrast.  Radiation dose reduction techniques were used for this study: All CT scans performed at this facility use one or all of the following: Automated exposure control, adjustment of the mA and/or kVp according to patient's size, iterative reconstruction. COMPARISON: 01/16/2019 FINDINGS: -KIDNEYS/URETERS: Calcification in the kidneys is probably vascular. There is no significant hydronephrosis. No definite renal mass. -BLADDER: Partially obscured by artifact from a right hip replacement. No definite bladder stone. -LUNG BASES: No infiltrates or masses. Moderate size hiatal hernia. -LIVER: Normal in size and appearance. -GALLBLADDER/BILE DUCTS: Postcholecystectomy. No bile duct dilatation. -PANCREAS: Normal. -SPLEEN: Normal. -ADRENALS: Normal. -REPRODUCTIVE ORGANS: Post hysterectomy. -BOWEL: There are multiple dilated small bowel loops with transition in the pelvis, probably due to adhesions. -LYMPH NODES: No significant retroperitoneal, mesenteric, or pelvic adenopathy. -BONES: Old T11 compression fracture. No acute fractures or bone lesions. -VASCULATURE: Moderate vascular calcification. -OTHER: There is presacral scarring/edema.      IMPRESSION: Multiple dilated small bowel loops with transition in the pelvis, likely partial small bowel obstruction due to adhesions. Assessment and Plan:     Hospital Problems as of 8/27/2019 Date Reviewed: 7/2/2019          Codes Class Noted - Resolved POA    [facility]fib ([facility] ICD-10-CM: I48.91  ICD-9-CM: 427.31  8/26/2019 - Present Yes        * (Principal) Partial small bowel obstruction ([facility] ICD-10-CM: K56.600  ICD-9-CM: 560.9  8/26/2019 - Present Yes        Electrolyte abnormality ICD-10-CM: E87.8  ICD-9-CM: 276.9  8/26/2019 - Present Yes    Overview Signed 8/26/2019  9:26 PM by Miguel A Cintron MD     Low Mg, K, Ca             Systolic CHF, chronic (HCC) (Chronic) ICD-10-CM: I50.22  ICD-9-CM: 428.22, 428.0  5/14/2019 - Present Yes        Hypokalemia ICD-10-CM: E87.6  ICD-9-CM: 276.8  2/27/2016 - Present Yes        Hypertension (Chronic) ICD-10-CM: I10  ICD-9-CM: 401.9  2/27/2016 - Present Yes              A/P:    Principal Problem:  Partial small bowel obstruction ([facility] (8/26/2019)  -Bowel rest;   -Attempted NGT in ED but unsuccessful, no nausea now so will d/c   -IVF  -pain control prn  -Surgery consulted, no intervention at this time.     Active Problems:  Hypokalemia (2/27/2016)  -Replace and monitor     Hypertension (2/27/2016)  -Continue home meds  -prn hydralazine     Systolic CHF, chronic ([facility] (5/14/2019)  -Chronic condition is stable,   -continue home medications   -Will continue to monitor and adjust treatment as needed.     A-fib ([facility] (8/26/2019)  -Rate is controlled and rate regular   -ECHO in am;   -heparin drip;   -Will need oral anticoagulation prior to d/c     Electrolyte abnormality (8/26/2019)  -Replace Mg and K and monitor    DC planning/Dispo:  Home  DVT ppx:  Heparin gtt    Code status:  Full  Medical decision maker:  No MPOA        Case reviewed with supervising physician - CLIF Gomez MD    Signed:  LIZZIE Enamorado

## 2019-08-27 NOTE — ED NOTES
NG tube administration attempted, patient did not tolerate.  MD Shelton aware, NG order cancelled per MD.

## 2019-08-27 NOTE — PROGRESS NOTES
08/27/19 0200   Dual Skin Pressure Injury Assessment   Dual Skin Pressure Injury Assessment WDL   Second Care Provider (Based on [address] [place] [address]) JACQUELINE Lerma   Skin Integumentary   Skin Integumentary (WDL) X   Skin Color Appropriate for ethnicity   Skin Condition/Temp Dry; Warm   Skin Integrity Scars (comment)  (from previous surgeries, periumbilical hernia)   Turgor Epidermis thin w/ loss of subcut tissue   Hair Growth Present   Varicosities Absent     Primary Nurse Ramon Hamm RN and Tyrel Vaz RN performed a dual skin assessment on this patient No impairment noted. 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 Patient has scars on BLE from stent placement. Patient has no breakdown, skin is C/D/I. Patient oriented to room and call light. Will continue to monitor. Eliel score is 17.

## 2019-08-27 NOTE — PROGRESS NOTES
TRANSFER - IN REPORT:    Verbal report received from [facility] RN on Feroz Santos  being received from ED for routine progression of care      Report consisted of patients Situation, Background, Assessment and   Recommendations(SBAR). Information from the following report(s) SBAR was reviewed with the receiving nurse. Opportunity for questions and clarification was provided. Assessment completed upon patients arrival to unit and care assumed.

## 2019-08-27 NOTE — PROGRESS NOTES
Notified by monitor room the patient had 2 runs of Afib and 6 beats of V tach, went to check on patient and she was in the bathroom attempting to have a bowel movement, vital signs are stable and CHRISTI Murdock was notified. Patient is now back in NSR, will continue to monitor.

## 2019-08-28 LAB
ANION GAP SERPL CALC-SCNC: 7 MMOL/L (ref 7–16)
APTT PPP: 109.6 SEC (ref 24.7–39.8)
APTT PPP: >200 SEC (ref 24.7–39.8)
BASOPHILS # BLD: 0.1 K/UL (ref 0–0.2)
BASOPHILS NFR BLD: 1 % (ref 0–2)
BUN SERPL-MCNC: 9 MG/DL (ref 8–23)
CALCIUM SERPL-MCNC: 6.8 MG/DL (ref 8.3–10.4)
CHLORIDE SERPL-SCNC: 113 MMOL/L (ref 98–107)
CO2 SERPL-SCNC: 25 MMOL/L (ref 21–32)
CREAT SERPL-MCNC: 0.79 MG/DL (ref 0.6–1)
DIFFERENTIAL METHOD BLD: ABNORMAL
EOSINOPHIL # BLD: 0.2 K/UL (ref 0–0.8)
EOSINOPHIL NFR BLD: 2 % (ref 0.5–7.8)
ERYTHROCYTE [DISTWIDTH] IN BLOOD BY AUTOMATED COUNT: 15.6 % (ref 11.9–14.6)
GLUCOSE SERPL-MCNC: 79 MG/DL (ref 65–100)
HCT VFR BLD AUTO: 33.7 % (ref 35.8–46.3)
HGB BLD-MCNC: 10.7 G/DL (ref 11.7–15.4)
IMM GRANULOCYTES # BLD AUTO: 0 K/UL (ref 0–0.5)
IMM GRANULOCYTES NFR BLD AUTO: 0 % (ref 0–5)
LYMPHOCYTES # BLD: 2.3 K/UL (ref 0.5–4.6)
LYMPHOCYTES NFR BLD: 27 % (ref 13–44)
MAGNESIUM SERPL-MCNC: 2 MG/DL (ref 1.8–2.4)
MCH RBC QN AUTO: 29.4 PG (ref 26.1–32.9)
MCHC RBC AUTO-ENTMCNC: 31.8 G/DL (ref 31.4–35)
MCV RBC AUTO: 92.6 FL (ref 79.6–97.8)
MONOCYTES # BLD: 0.8 K/UL (ref 0.1–1.3)
MONOCYTES NFR BLD: 9 % (ref 4–12)
NEUTS SEG # BLD: 5.3 K/UL (ref 1.7–8.2)
NEUTS SEG NFR BLD: 62 % (ref 43–78)
NRBC # BLD: 0 K/UL (ref 0–0.2)
PHOSPHATE SERPL-MCNC: 2.4 MG/DL (ref 2.3–3.7)
PLATELET # BLD AUTO: 124 K/UL (ref 150–450)
PMV BLD AUTO: 11.9 FL (ref 9.4–12.3)
POTASSIUM SERPL-SCNC: 3.7 MMOL/L (ref 3.5–5.1)
RBC # BLD AUTO: 3.64 M/UL (ref 4.05–5.2)
SODIUM SERPL-SCNC: 145 MMOL/L (ref 136–145)
WBC # BLD AUTO: 8.6 K/UL (ref 4.3–11.1)

## 2019-08-28 PROCEDURE — 74011250636 HC RX REV CODE- 250/636: Performed by: FAMILY MEDICINE

## 2019-08-28 PROCEDURE — 80048 BASIC METABOLIC PNL TOTAL CA: CPT

## 2019-08-28 PROCEDURE — 85730 THROMBOPLASTIN TIME PARTIAL: CPT

## 2019-08-28 PROCEDURE — 84100 ASSAY OF PHOSPHORUS: CPT

## 2019-08-28 PROCEDURE — 83735 ASSAY OF MAGNESIUM: CPT

## 2019-08-28 PROCEDURE — 74011250637 HC RX REV CODE- 250/637: Performed by: NURSE PRACTITIONER

## 2019-08-28 PROCEDURE — 65660000000 HC RM CCU STEPDOWN

## 2019-08-28 PROCEDURE — 85025 COMPLETE CBC W/AUTO DIFF WBC: CPT

## 2019-08-28 PROCEDURE — 74011250637 HC RX REV CODE- 250/637: Performed by: FAMILY MEDICINE

## 2019-08-28 PROCEDURE — 36415 COLL VENOUS BLD VENIPUNCTURE: CPT

## 2019-08-28 RX ORDER — POTASSIUM CHLORIDE 20 MEQ/1
20 TABLET, EXTENDED RELEASE ORAL 2 TIMES DAILY
Status: DISCONTINUED | OUTPATIENT
Start: 2019-08-28 | End: 2019-08-29

## 2019-08-28 RX ADMIN — RANOLAZINE 500 MG: 500 TABLET, FILM COATED, EXTENDED RELEASE ORAL at 10:08

## 2019-08-28 RX ADMIN — Medication 10 ML: at 14:00

## 2019-08-28 RX ADMIN — ONDANSETRON 4 MG: 2 INJECTION INTRAMUSCULAR; INTRAVENOUS at 17:35

## 2019-08-28 RX ADMIN — APIXABAN 5 MG: 5 TABLET, FILM COATED ORAL at 10:08

## 2019-08-28 RX ADMIN — LEVOTHYROXINE SODIUM 50 MCG: 50 TABLET ORAL at 05:45

## 2019-08-28 RX ADMIN — PANTOPRAZOLE SODIUM 40 MG: 40 TABLET, DELAYED RELEASE ORAL at 05:45

## 2019-08-28 RX ADMIN — ASPIRIN 81 MG: 81 TABLET ORAL at 22:37

## 2019-08-28 RX ADMIN — Medication 10 ML: at 22:39

## 2019-08-28 RX ADMIN — Medication 400 MG: at 17:38

## 2019-08-28 RX ADMIN — LISINOPRIL 10 MG: 5 TABLET ORAL at 10:08

## 2019-08-28 RX ADMIN — APIXABAN 5 MG: 5 TABLET, FILM COATED ORAL at 22:37

## 2019-08-28 RX ADMIN — POTASSIUM CHLORIDE 20 MEQ: 20 TABLET, EXTENDED RELEASE ORAL at 17:38

## 2019-08-28 NOTE — PROGRESS NOTES
IV heparin rate has been adjusted based on the most recent PTT results.     Lab Results   Component Value Date/Time    aPTT 55.6 (H) 08/27/2019 06:47 PM

## 2019-08-28 NOTE — PROGRESS NOTES
Hospitalist Progress Note     Admit Date:  2019  7:36 PM   Name:  Max Gilmore   Age:  66 y.o.  :  1945   MRN:  [id number]   PCP:  Ancelmo Nina MD  Treatment Team: Attending Provider: Mina Mccabe MD; Care Manager: Liliam Lackey, JACQUELINE; Utilization Review: Erinn Colon RN; Primary Nurse: Halina Holm RN    Subjective:   CC:  Partial SBO, new onset a-fib    From Admission HPI:  \"Patient is a 66 y.o. female who presents to the ER due to new onset Afib. She went to see her PCP today due to ongoing abdominal pain with diarrhea and was found to be in Afib with controlled rate, and a K+ of 2.5. She was referred to ER for further evaluation. She reports that the pain has been ongoing for more than a week, generally diffuse and achy, not severe. Has had nausea, but no vomiting until today. No further vomiting in ER. Denies fever/chills, melena, brbpr, chest pain, palpitations, SOB. Nothing seems to make the pain better or worse. She id have surgery for a prolapsed rectum about 4 weeks ago. She reports diarrhea since the surgery. Denies prior h/o Afib\"    Subjective:  Per above pt admitted with new onset Afib and abd pian with diarrhea. Pt has had one stool per nursing, pt states she is passing flatus. Pt ate breakfast and tolerated with out any nausea or pain. Surgery consulted, did not see any surgical needs, signed off. Pain likely related to adhesions from prior surgeries. On  pt had 2 runs of afib and 6 beats of vtach while in the bathroom on the toilet having BM. She spontaneously returned to NSR. Since that event pt has been in NSR with occ. PAC.       Pt tolerated cl liq so will advance diet. Will alsotransition her to oral anticoagulation - stop heparin and start eliquis.   If tolerates diet  anticipate d/c in am.    Objective:     Patient Vitals for the past 24 hrs:   Temp Pulse Resp BP SpO2   19 1229 98.1 °F (36.7 °C) 65 18 127/70 97 %   08/28/19 0754 97.9 °F (36.6 °C) 65 18 110/63 94 %   08/28/19 0321 98.6 °F (37 °C) 95 16 131/76 98 %   08/27/19 2253 98.1 °F (36.7 °C) 93 16 128/71 98 %   08/27/19 1945 97.9 °F (36.6 °C) 60 16 124/62 98 %     Oxygen Therapy  O2 Sat (%): 97 % (08/28/19 1229)  Pulse via Oximetry: 70 beats per minute (08/27/19 0033)  O2 Device: Room air (08/26/19 1904)    Intake/Output Summary (Last 24 hours) at 8/28/2019 1543  Last data filed at 8/28/2019 0552  Gross per 24 hour   Intake 834 ml   Output 600 ml   Net 234 ml         REVIEW OF SYSTEMS: Comprehensive ROS performed and negative except as stated in HPI. Physical Examination:  General:    Elderly and frail Awake and alert. Head:  Normocephalic, atraumatic  Eyes:  Extraocular movements intact, normal sclera  CV:   RRR. No  Murmurs, clicks, or gallops  Lungs:   Unlabored, no cyanosis  Abdomen:   Soft, nondistended, nontender. Extremities: Warm and dry. No cyanosis or edema. Skin:     No rashes or jaundice. Neuro:  No gross focal deficits  Psych:  Mood and affect appropriate    Data Review:  I have reviewed all labs, meds, telemetry events, and studies from the last 24 hours.     Recent Results (from the past 24 hour(s))   PTT    Collection Time: 08/27/19  6:47 PM   Result Value Ref Range    aPTT 55.6 (H) 24.7 - 48.8 SEC   METABOLIC PANEL, BASIC    Collection Time: 08/28/19  1:10 AM   Result Value Ref Range    Sodium 145 136 - 145 mmol/L    Potassium 3.7 3.5 - 5.1 mmol/L    Chloride 113 (H) 98 - 107 mmol/L    CO2 25 21 - 32 mmol/L    Anion gap 7 7 - 16 mmol/L    Glucose 79 65 - 100 mg/dL    BUN 9 8 - 23 MG/DL    Creatinine 0.79 0.6 - 1.0 MG/DL    GFR est AA >60 >60 ml/min/1.73m2    GFR est non-AA >60 >60 ml/min/1.73m2    Calcium 6.8 (L) 8.3 - 10.4 MG/DL   CBC WITH AUTOMATED DIFF    Collection Time: 08/28/19  1:10 AM   Result Value Ref Range    WBC 8.6 4.3 - 11.1 K/uL    RBC 3.64 (L) 4.05 - 5.2 M/uL    HGB 10.7 (L) 11.7 - 15.4 g/dL    HCT 33.7 (L) 35.8 - 46.3 % MCV 92.6 79.6 - 97.8 FL    MCH 29.4 26.1 - 32.9 PG    MCHC 31.8 31.4 - 35.0 g/dL    RDW 15.6 (H) 11.9 - 14.6 %    PLATELET 853 (L) 716 - 450 K/uL    MPV 11.9 9.4 - 12.3 FL    ABSOLUTE NRBC 0.00 0.0 - 0.2 K/uL    DF AUTOMATED      NEUTROPHILS 62 43 - 78 %    LYMPHOCYTES 27 13 - 44 %    MONOCYTES 9 4.0 - 12.0 %    EOSINOPHILS 2 0.5 - 7.8 %    BASOPHILS 1 0.0 - 2.0 %    IMMATURE GRANULOCYTES 0 0.0 - 5.0 %    ABS. NEUTROPHILS 5.3 1.7 - 8.2 K/UL    ABS. LYMPHOCYTES 2.3 0.5 - 4.6 K/UL    ABS. MONOCYTES 0.8 0.1 - 1.3 K/UL    ABS. EOSINOPHILS 0.2 0.0 - 0.8 K/UL    ABS. BASOPHILS 0.1 0.0 - 0.2 K/UL    ABS. IMM.  GRANS. 0.0 0.0 - 0.5 K/UL   MAGNESIUM    Collection Time: 08/28/19  1:10 AM   Result Value Ref Range    Magnesium 2.0 1.8 - 2.4 mg/dL   PHOSPHORUS    Collection Time: 08/28/19  1:10 AM   Result Value Ref Range    Phosphorus 2.4 2.3 - 3.7 MG/DL   PTT    Collection Time: 08/28/19  1:10 AM   Result Value Ref Range    aPTT >200.0 (HH) 24.7 - 39.8 SEC   PTT    Collection Time: 08/28/19  8:21 AM   Result Value Ref Range    aPTT 109.6 (H) 24.7 - 39.8 SEC        All Micro Results     None          Current Meds:  Current Facility-Administered Medications   Medication Dose Route Frequency    apixaban (ELIQUIS) tablet 5 mg  5 mg Oral Q12H    aspirin delayed-release tablet 81 mg  81 mg Oral QHS    levothyroxine (SYNTHROID) tablet 50 mcg  50 mcg Oral ACB    lisinopril (PRINIVIL, ZESTRIL) tablet 10 mg  10 mg Oral DAILY    pantoprazole (PROTONIX) tablet 40 mg  40 mg Oral ACB    ranolazine ER (RANEXA) tablet 500 mg  500 mg Oral BID    sodium chloride (NS) flush 5-40 mL  5-40 mL IntraVENous Q8H    sodium chloride (NS) flush 5-40 mL  5-40 mL IntraVENous PRN    acetaminophen (TYLENOL) tablet 650 mg  650 mg Oral Q4H PRN    HYDROcodone-acetaminophen (NORCO)  mg tablet 1 Tab  1 Tab Oral Q4H PRN    naloxone (NARCAN) injection 0.4 mg  0.4 mg IntraVENous PRN    diphenhydrAMINE (BENADRYL) capsule 25 mg  25 mg Oral Q6H PRN  ondansetron (ZOFRAN) injection 4 mg  4 mg IntraVENous Q4H PRN    bisacodyl (DULCOLAX) tablet 5 mg  5 mg Oral DAILY PRN    LORazepam (ATIVAN) tablet 0.5 mg  0.5 mg Oral BID PRN    morphine injection 2 mg  2 mg IntraVENous Q4H PRN    [Held by provider] potassium chloride (K-DUR, KLOR-CON) SR tablet 40 mEq  40 mEq Oral BID    [Held by provider] magnesium oxide (MAG-OX) tablet 400 mg  400 mg Oral BID       Diet:  DIET NUTRITIONAL SUPPLEMENTS  DIET NUTRITIONAL SUPPLEMENTS  DIET GI SOFT    Other Studies (last 24 hours):  No results found. Assessment and Plan:     Hospital Problems as of 8/28/2019 Date Reviewed: 7/2/2019          Codes Class Noted - Resolved POA    A-fib ([facility] ICD-10-CM: I48.91  ICD-9-CM: 427.31  8/26/2019 - Present Yes        * (Principal) Partial small bowel obstruction ([facility] ICD-10-CM: K56.600  ICD-9-CM: 560.9  8/26/2019 - Present Yes        Electrolyte abnormality ICD-10-CM: E87.8  ICD-9-CM: 276.9  8/26/2019 - Present Yes    Overview Signed 8/26/2019  9:26 PM by Severiano Michael, MD     Low Mg, K, Ca             Systolic CHF, chronic (HCC) (Chronic) ICD-10-CM: I50.22  ICD-9-CM: 428.22, 428.0  5/14/2019 - Present Yes        Hypokalemia ICD-10-CM: E87.6  ICD-9-CM: 276.8  2/27/2016 - Present Yes        Hypertension (Chronic) ICD-10-CM: I10  ICD-9-CM: 401.9  2/27/2016 - Present Yes              A/P:    Principal Problem:  Partial small bowel obstruction ([facility] (8/26/2019)  -Bowel rest;   -Attempted NGT in ED but unsuccessful, no nausea now so will d/c   -IVF  -pain control prn  -Surgery consulted, no intervention at this time.   -Advancing diet as tolerated     Active Problems:  Hypokalemia (2/27/2016)  -Replace and monitor     Hypertension (2/27/2016)  -Continue home meds  -prn hydralazine     Systolic CHF, chronic ([facility] (5/14/2019)  -Chronic condition is stable,   -continue home medications   -Will continue to monitor and adjust treatment as needed.     A-fib ([facility] (8/26/2019)  -Rate is controlled and rate regular   -ECHO in am;   -heparin drip stopped   -eliquis 5mg BID started    Electrolyte abnormality (8/26/2019)  -K+ and Mag WNL  -Cont to monitor  -Cont PO supplement    DC planning/Dispo:  Home  DVT ppx:  Heparin gtt    Code status:  Full  Medical decision maker:  No MPOA    Case reviewed with supervising physician - CLIF Oglesby MD    Signed:  VINICIO CraftC

## 2019-08-28 NOTE — ADVANCED PRACTICE NURSE
IV heparin rate has been adjusted based on the most recent PTT results. Lab Results   Component Value Date/Time    aPTT >200.0 () 08/28/2019 01:10 AM     Will restart in 1hr and follow protocol.

## 2019-08-28 NOTE — PROGRESS NOTES
Hourly rounds completed this shift. Patient resting in bed. Heparin gtt running. No needs stated at this time. Will continue to monitor and give report to oncoming RN.

## 2019-08-28 NOTE — PROGRESS NOTES
END OF SHIFT NOTE:    INTAKE/OUTPUT  08/27 0701 - 08/28 0700  In: 9902 [I.V.:2556]  Out: 1200 [Urine:1200]  Voiding: YES  Catheter: NO  Drain:              Flatus: Patient does have flatus present. Stool:  1 occurrences. Characteristics:  Stool Assessment  Stool Color: Brown  Stool Appearance: Loose  Stool Amount: Smear  Stool Source/Status: Rectum    Emesis: 0 occurrences. Characteristics:        VITAL SIGNS  Patient Vitals for the past 12 hrs:   Temp Pulse Resp BP SpO2   08/28/19 1704 97.7 °F (36.5 °C) 63 18 155/65 94 %   08/28/19 1229 98.1 °F (36.7 °C) 65 18 127/70 97 %   08/28/19 0754 97.9 °F (36.6 °C) 65 18 110/63 94 %       Pain Assessment  Pain Intensity 1: 0 (08/27/19 1900)        Patient Stated Pain Goal: 0    Ambulating  Yes    Shift report given to oncoming nurse at the bedside.     Liz Causey RN

## 2019-08-29 ENCOUNTER — HOSPITAL ENCOUNTER (OUTPATIENT)
Dept: NUCLEAR MEDICINE | Age: 74
Discharge: HOME | DRG: 287 | End: 2019-08-29
Attending: NURSE PRACTITIONER
Payer: MEDICARE

## 2019-08-29 ENCOUNTER — APPOINTMENT (OUTPATIENT)
Dept: CT IMAGING | Age: 74
DRG: 287 | End: 2019-08-29
Attending: NURSE PRACTITIONER
Payer: MEDICARE

## 2019-08-29 ENCOUNTER — APPOINTMENT (OUTPATIENT)
Dept: GENERAL RADIOLOGY | Age: 74
DRG: 287 | End: 2019-08-29
Attending: NURSE PRACTITIONER
Payer: MEDICARE

## 2019-08-29 ENCOUNTER — APPOINTMENT (OUTPATIENT)
Dept: ULTRASOUND IMAGING | Age: 74
DRG: 287 | End: 2019-08-29
Attending: INTERNAL MEDICINE
Payer: MEDICARE

## 2019-08-29 LAB
ANION GAP SERPL CALC-SCNC: 10 MMOL/L (ref 7–16)
ATRIAL RATE: 65 BPM
ATRIAL RATE: 75 BPM
BASOPHILS # BLD: 0.1 K/UL (ref 0–0.2)
BASOPHILS NFR BLD: 1 % (ref 0–2)
BUN SERPL-MCNC: 10 MG/DL (ref 8–23)
CALCIUM SERPL-MCNC: 8.4 MG/DL (ref 8.3–10.4)
CALCULATED P AXIS, ECG09: 73 DEGREES
CALCULATED R AXIS, ECG10: -48 DEGREES
CALCULATED R AXIS, ECG10: -52 DEGREES
CALCULATED T AXIS, ECG11: 121 DEGREES
CALCULATED T AXIS, ECG11: 150 DEGREES
CHLORIDE SERPL-SCNC: 111 MMOL/L (ref 98–107)
CO2 SERPL-SCNC: 23 MMOL/L (ref 21–32)
CREAT SERPL-MCNC: 0.91 MG/DL (ref 0.6–1)
D DIMER PPP FEU-MCNC: 1.28 UG/ML(FEU)
DIAGNOSIS, 93000: NORMAL
DIAGNOSIS, 93000: NORMAL
DIFFERENTIAL METHOD BLD: ABNORMAL
EOSINOPHIL # BLD: 0.1 K/UL (ref 0–0.8)
EOSINOPHIL NFR BLD: 1 % (ref 0.5–7.8)
ERYTHROCYTE [DISTWIDTH] IN BLOOD BY AUTOMATED COUNT: 15.7 % (ref 11.9–14.6)
GLUCOSE SERPL-MCNC: 91 MG/DL (ref 65–100)
HCT VFR BLD AUTO: 35.1 % (ref 35.8–46.3)
HGB BLD-MCNC: 10.9 G/DL (ref 11.7–15.4)
IMM GRANULOCYTES # BLD AUTO: 0 K/UL (ref 0–0.5)
IMM GRANULOCYTES NFR BLD AUTO: 0 % (ref 0–5)
LYMPHOCYTES # BLD: 1.5 K/UL (ref 0.5–4.6)
LYMPHOCYTES NFR BLD: 16 % (ref 13–44)
MAGNESIUM SERPL-MCNC: 1.6 MG/DL (ref 1.8–2.4)
MCH RBC QN AUTO: 28.4 PG (ref 26.1–32.9)
MCHC RBC AUTO-ENTMCNC: 31.1 G/DL (ref 31.4–35)
MCV RBC AUTO: 91.4 FL (ref 79.6–97.8)
MONOCYTES # BLD: 0.7 K/UL (ref 0.1–1.3)
MONOCYTES NFR BLD: 7 % (ref 4–12)
NEUTS SEG # BLD: 7.2 K/UL (ref 1.7–8.2)
NEUTS SEG NFR BLD: 75 % (ref 43–78)
NRBC # BLD: 0 K/UL (ref 0–0.2)
P-R INTERVAL, ECG05: 182 MS
PLATELET # BLD AUTO: 176 K/UL (ref 150–450)
PMV BLD AUTO: 11.6 FL (ref 9.4–12.3)
POTASSIUM SERPL-SCNC: 3.4 MMOL/L (ref 3.5–5.1)
Q-T INTERVAL, ECG07: 454 MS
Q-T INTERVAL, ECG07: 494 MS
QRS DURATION, ECG06: 118 MS
QRS DURATION, ECG06: 130 MS
QTC CALCULATION (BEZET), ECG08: 503 MS
QTC CALCULATION (BEZET), ECG08: 513 MS
RBC # BLD AUTO: 3.84 M/UL (ref 4.05–5.2)
SODIUM SERPL-SCNC: 144 MMOL/L (ref 136–145)
TROPONIN I SERPL-MCNC: 0.31 NG/ML (ref 0.02–0.05)
TROPONIN I SERPL-MCNC: 0.31 NG/ML (ref 0.02–0.05)
TROPONIN I SERPL-MCNC: 0.36 NG/ML (ref 0.02–0.05)
VENTRICULAR RATE, ECG03: 65 BPM
VENTRICULAR RATE, ECG03: 74 BPM
WBC # BLD AUTO: 9.6 K/UL (ref 4.3–11.1)

## 2019-08-29 PROCEDURE — 74011250637 HC RX REV CODE- 250/637: Performed by: HOSPITALIST

## 2019-08-29 PROCEDURE — 71045 X-RAY EXAM CHEST 1 VIEW: CPT

## 2019-08-29 PROCEDURE — 74011250637 HC RX REV CODE- 250/637: Performed by: PHYSICIAN ASSISTANT

## 2019-08-29 PROCEDURE — 83735 ASSAY OF MAGNESIUM: CPT

## 2019-08-29 PROCEDURE — 74011250637 HC RX REV CODE- 250/637: Performed by: NURSE PRACTITIONER

## 2019-08-29 PROCEDURE — 80048 BASIC METABOLIC PNL TOTAL CA: CPT

## 2019-08-29 PROCEDURE — 74011250637 HC RX REV CODE- 250/637: Performed by: FAMILY MEDICINE

## 2019-08-29 PROCEDURE — 93005 ELECTROCARDIOGRAM TRACING: CPT | Performed by: PHYSICIAN ASSISTANT

## 2019-08-29 PROCEDURE — 78582 LUNG VENTILAT&PERFUS IMAGING: CPT

## 2019-08-29 PROCEDURE — 36415 COLL VENOUS BLD VENIPUNCTURE: CPT

## 2019-08-29 PROCEDURE — 65660000000 HC RM CCU STEPDOWN

## 2019-08-29 PROCEDURE — 85025 COMPLETE CBC W/AUTO DIFF WBC: CPT

## 2019-08-29 PROCEDURE — 74011250636 HC RX REV CODE- 250/636: Performed by: PHYSICIAN ASSISTANT

## 2019-08-29 PROCEDURE — 84484 ASSAY OF TROPONIN QUANT: CPT

## 2019-08-29 PROCEDURE — 74011250636 HC RX REV CODE- 250/636: Performed by: NURSE PRACTITIONER

## 2019-08-29 PROCEDURE — 93005 ELECTROCARDIOGRAM TRACING: CPT | Performed by: NURSE PRACTITIONER

## 2019-08-29 PROCEDURE — 93970 EXTREMITY STUDY: CPT

## 2019-08-29 PROCEDURE — 85379 FIBRIN DEGRADATION QUANT: CPT

## 2019-08-29 RX ORDER — HYOSCYAMINE SULFATE 0.12 MG/5ML
0.12 LIQUID ORAL
Status: DISCONTINUED | OUTPATIENT
Start: 2019-08-29 | End: 2019-08-31 | Stop reason: HOSPADM

## 2019-08-29 RX ORDER — HYOSCYAMINE SULFATE 0.12 MG/1
0.12 TABLET SUBLINGUAL
Status: DISCONTINUED | OUTPATIENT
Start: 2019-08-29 | End: 2019-08-29 | Stop reason: CLARIF

## 2019-08-29 RX ORDER — DIPHENHYDRAMINE HCL 12.5MG/5ML
12.5 ELIXIR ORAL
Status: DISPENSED | OUTPATIENT
Start: 2019-08-29 | End: 2019-08-30

## 2019-08-29 RX ORDER — MAGNESIUM SULFATE HEPTAHYDRATE 40 MG/ML
2 INJECTION, SOLUTION INTRAVENOUS
Status: COMPLETED | OUTPATIENT
Start: 2019-08-29 | End: 2019-08-29

## 2019-08-29 RX ORDER — HEPARIN SODIUM 5000 [USP'U]/100ML
12-25 INJECTION, SOLUTION INTRAVENOUS
Status: DISCONTINUED | OUTPATIENT
Start: 2019-08-29 | End: 2019-08-30

## 2019-08-29 RX ORDER — SODIUM CHLORIDE 9 MG/ML
100 INJECTION, SOLUTION INTRAVENOUS CONTINUOUS
Status: DISCONTINUED | OUTPATIENT
Start: 2019-08-29 | End: 2019-08-31

## 2019-08-29 RX ORDER — POTASSIUM CHLORIDE 20 MEQ/1
20 TABLET, EXTENDED RELEASE ORAL
Status: COMPLETED | OUTPATIENT
Start: 2019-08-29 | End: 2019-08-29

## 2019-08-29 RX ORDER — AMIODARONE HYDROCHLORIDE 200 MG/1
400 TABLET ORAL 2 TIMES DAILY
Status: DISCONTINUED | OUTPATIENT
Start: 2019-08-29 | End: 2019-08-31 | Stop reason: HOSPADM

## 2019-08-29 RX ORDER — DIPHENHYDRAMINE HCL 12.5MG/5ML
12.5 ELIXIR ORAL
Status: COMPLETED | OUTPATIENT
Start: 2019-08-29 | End: 2019-08-29

## 2019-08-29 RX ORDER — MAG HYDROX/ALUMINUM HYD/SIMETH 200-200-20
30 SUSPENSION, ORAL (FINAL DOSE FORM) ORAL
Status: DISCONTINUED | OUTPATIENT
Start: 2019-08-29 | End: 2019-08-31 | Stop reason: HOSPADM

## 2019-08-29 RX ORDER — HYOSCYAMINE SULFATE 0.12 MG/5ML
0.12 LIQUID ORAL
Status: COMPLETED | OUTPATIENT
Start: 2019-08-29 | End: 2019-08-29

## 2019-08-29 RX ORDER — LOPERAMIDE HYDROCHLORIDE 2 MG/1
2 CAPSULE ORAL AS NEEDED
Status: DISCONTINUED | OUTPATIENT
Start: 2019-08-29 | End: 2019-08-31 | Stop reason: HOSPADM

## 2019-08-29 RX ORDER — POTASSIUM CHLORIDE 20 MEQ/1
20 TABLET, EXTENDED RELEASE ORAL 3 TIMES DAILY
Status: DISCONTINUED | OUTPATIENT
Start: 2019-08-29 | End: 2019-08-31 | Stop reason: HOSPADM

## 2019-08-29 RX ORDER — DIPHENHYDRAMINE HCL 12.5MG/5ML
12.5 LIQUID (ML) ORAL
Status: DISCONTINUED | OUTPATIENT
Start: 2019-08-29 | End: 2019-08-29 | Stop reason: SDUPTHER

## 2019-08-29 RX ORDER — NITROGLYCERIN 0.4 MG/1
0.4 TABLET SUBLINGUAL AS NEEDED
Status: DISCONTINUED | OUTPATIENT
Start: 2019-08-29 | End: 2019-08-31 | Stop reason: HOSPADM

## 2019-08-29 RX ORDER — HEPARIN SODIUM 5000 [USP'U]/ML
4000 INJECTION, SOLUTION INTRAVENOUS; SUBCUTANEOUS ONCE
Status: COMPLETED | OUTPATIENT
Start: 2019-08-29 | End: 2019-08-29

## 2019-08-29 RX ORDER — MAG HYDROX/ALUMINUM HYD/SIMETH 200-200-20
30 SUSPENSION, ORAL (FINAL DOSE FORM) ORAL
Status: COMPLETED | OUTPATIENT
Start: 2019-08-29 | End: 2019-08-29

## 2019-08-29 RX ADMIN — NITROGLYCERIN 1 INCH: 20 OINTMENT TOPICAL at 14:37

## 2019-08-29 RX ADMIN — HEPARIN SODIUM 4000 UNITS: 5000 INJECTION INTRAVENOUS; SUBCUTANEOUS at 20:28

## 2019-08-29 RX ADMIN — APIXABAN 5 MG: 5 TABLET, FILM COATED ORAL at 08:51

## 2019-08-29 RX ADMIN — HYDROCODONE BITARTRATE AND ACETAMINOPHEN 1 TABLET: 10; 325 TABLET ORAL at 13:14

## 2019-08-29 RX ADMIN — HEPARIN SODIUM AND DEXTROSE 12 UNITS/KG/HR: 5000; 5 INJECTION INTRAVENOUS at 21:35

## 2019-08-29 RX ADMIN — Medication 10 ML: at 06:00

## 2019-08-29 RX ADMIN — MAGNESIUM SULFATE HEPTAHYDRATE 2 G: 40 INJECTION, SOLUTION INTRAVENOUS at 13:22

## 2019-08-29 RX ADMIN — POTASSIUM CHLORIDE 20 MEQ: 20 TABLET, EXTENDED RELEASE ORAL at 08:51

## 2019-08-29 RX ADMIN — POTASSIUM CHLORIDE 20 MEQ: 20 TABLET, EXTENDED RELEASE ORAL at 17:29

## 2019-08-29 RX ADMIN — LOPERAMIDE HYDROCHLORIDE 2 MG: 2 CAPSULE ORAL at 00:25

## 2019-08-29 RX ADMIN — SODIUM CHLORIDE 100 ML/HR: 900 INJECTION, SOLUTION INTRAVENOUS at 17:31

## 2019-08-29 RX ADMIN — HYOSCYAMINE SULFATE 0.12 MG: 0.12 ELIXIR ORAL at 11:23

## 2019-08-29 RX ADMIN — Medication 400 MG: at 08:51

## 2019-08-29 RX ADMIN — POTASSIUM CHLORIDE 20 MEQ: 20 TABLET, EXTENDED RELEASE ORAL at 13:00

## 2019-08-29 RX ADMIN — NITROGLYCERIN 0.4 MG: 0.4 TABLET, ORALLY DISINTEGRATING SUBLINGUAL at 10:02

## 2019-08-29 RX ADMIN — NITROGLYCERIN 0.4 MG: 0.4 TABLET, ORALLY DISINTEGRATING SUBLINGUAL at 17:28

## 2019-08-29 RX ADMIN — AMIODARONE HYDROCHLORIDE 400 MG: 200 TABLET ORAL at 17:29

## 2019-08-29 RX ADMIN — LOPERAMIDE HYDROCHLORIDE 2 MG: 2 CAPSULE ORAL at 05:18

## 2019-08-29 RX ADMIN — Medication 10 ML: at 21:59

## 2019-08-29 RX ADMIN — ALUMINUM HYDROXIDE, MAGNESIUM HYDROXIDE, AND SIMETHICONE 30 ML: 200; 200; 20 SUSPENSION ORAL at 11:23

## 2019-08-29 RX ADMIN — POTASSIUM CHLORIDE 20 MEQ: 20 TABLET, EXTENDED RELEASE ORAL at 21:59

## 2019-08-29 RX ADMIN — PANTOPRAZOLE SODIUM 40 MG: 40 TABLET, DELAYED RELEASE ORAL at 05:18

## 2019-08-29 RX ADMIN — LEVOTHYROXINE SODIUM 50 MCG: 50 TABLET ORAL at 05:18

## 2019-08-29 RX ADMIN — NITROGLYCERIN 0.4 MG: 0.4 TABLET, ORALLY DISINTEGRATING SUBLINGUAL at 14:40

## 2019-08-29 RX ADMIN — NITROGLYCERIN 0.4 MG: 0.4 TABLET, ORALLY DISINTEGRATING SUBLINGUAL at 20:26

## 2019-08-29 RX ADMIN — LOPERAMIDE HYDROCHLORIDE 2 MG: 2 CAPSULE ORAL at 09:12

## 2019-08-29 RX ADMIN — DIPHENHYDRAMINE HYDROCHLORIDE 12.5 MG: 12.5 SOLUTION ORAL at 11:23

## 2019-08-29 RX ADMIN — Medication 400 MG: at 17:29

## 2019-08-29 RX ADMIN — NITROGLYCERIN 0.4 MG: 0.4 TABLET, ORALLY DISINTEGRATING SUBLINGUAL at 10:25

## 2019-08-29 RX ADMIN — LISINOPRIL 10 MG: 5 TABLET ORAL at 08:51

## 2019-08-29 RX ADMIN — ASPIRIN 81 MG: 81 TABLET ORAL at 21:59

## 2019-08-29 NOTE — PROGRESS NOTES
08/29/19 1021   Vital Signs   Pulse (Heart Rate) 78   Resp Rate 18   O2 Sat (%) 98 %   /87   MAP (Calculated) 109   Pain 1   Pain Scale 1 Numeric (0 - 10)   Pain Intensity 1 5   Patient Stated Pain Goal 0   Pain Location 1 Chest       Pt continues chest pain now 5/10 with occasional sharp stabbing pain to middle of chest.  Per tele, pt rhythm shows afib. Second dose of nitro given per MD.  O2 at 2L NC per orders.  MD at bedside

## 2019-08-29 NOTE — PROGRESS NOTES
Hourly rounds completed this shift. Patient resting in bed. C/o BM throughout the night. PRN imodium ordered for patient. Will continue to monitor and give report to oncoming RN.

## 2019-08-29 NOTE — CONSULTS
[address] Cardiology Consult                Date of  Admission: 8/26/2019  7:36 PM     Primary Care Physician: Dr Mayra Gould  Primary Cardiologist: Dr Kt Maldonado  Referring Physician: Dr Ruiz [facility] Physician: Dr Christina Mccarthy    CC/Reason for consult: chest pain      Feroz Santos is a 66 y.o. female admitted for Partial small bowel obstruction ([facility] [address] [K56.600]  A-fib ([facility] [address] [I48.91]. She has a h/o CAD s/p CABG w [facility] 6-2016 w occluded SVG to LAD and diagonal, patent SVG to RCA, small vessels with extensive collaterals. [address] [facility] 5-2019 w same findings. Echo 8-2019 w EF 45-50% with akinesis apical walls, grade 2 DD, marked LAE, mild MR/TR. She has htn, h/o tobacco use 1 ppd x 30 years quit in 1991, no f/h of CAD. She was admitted 8-26 w abdominal pain, dx with p SBO, made NPO. Initial EKG showed a fib w rate 116. She has a h/o bradycardia and had not been on a BB. She was admitted, placed on a liquid diet. She had not had any cp until this morning when she began having pain under her L axilla radiating to her epigastric region, 5/10 achy pain that at times became sharp and 10/10, not worse w exertion, without sob, nausea or diaphoresis. She was started on heparin on admission, remained in a fib w aberrancy and converted back to NSR yesterday. She was given nitro with some relief of chest pain but pain continued and second nitro did not help. Pain eased after several hours with GI cocktail until she drank some broth for lunch and is now having severe upper abdominal burning pain like reflux. She has some difficulty recalling pain from earlier. D-dimer 1.28, trop . 31, EKG today a tach w PACs with NSST/T wave changes. She had a few runs of wide complex tachycardia yesterday. Pt is pending to go down for VQ scan.  K 3.4 and mag 1.6 have been replaced.       Patient Active Problem List   Diagnosis Code    Hypokalemia E87.6    Hypertension I10    Unstable angina ([facility] [address] Q46.8    Systolic CHF, chronic (HCC) I50.22    A-fib ([address] I48.91    Partial small bowel obstruction ([address] K56.600    Electrolyte abnormality E87.8       Past Medical History:   Diagnosis Date    Abnormal EKG 0/34/2109    Acute systolic (congestive) heart failure (HCC) 6/17/2016    Anterior myocardial infarction ([address] 11/20/1997    Avascular necrosis (HCC)     R hip    CAD (coronary artery disease) 1990    s/p CABG    Cellulitis of right lower extremity 6/17/2016    Chest pain, precordial 3/28/2016    COPD (chronic obstructive pulmonary disease) ([address]     pt denies    Coronary atherosclerosis of native coronary vessel 03/28/2016    per heart cath (6/2016)- \"pt with diffusely diseased LAD and small caliber vessels. At present. .. best option would be medical management. .. do not see any obvious good options for redo CABG. ..    Fracture of femoral neck, right ([address] 02/27/2016    s/p repair with hardware- Dr Nisa Carrillo GERD (gastroesophageal reflux disease) 02/27/2016    managed with medication    HLD (hyperlipidemia) 03/28/2016    managed with medication    Hx of echocardiogram 02/27/2016    EF 45-50%, hypokinesis of the apical anterior and basal-mid anteroseptal wall. Mild aortic valve regurgitation. Mild tricuspid regurgitation.      Hypertension     managed with medication    Hypokalemia 02/27/2016    hx of- WNL since 6/2016- pt on K+ supplement    IBS (irritable bowel syndrome)     Ischemic cardiomyopathy 5/15/2018    MI (myocardial infarction) [facility]) 1990 and spring 2016    x2    Osteoarthritis     Osteoporosis     Rectal prolapse     S/P CABG (coronary artery bypass graft) 1990    S/P total hip arthroplasty 12/14/2016    Scleroderma ([address] 3/28/2016    Shortness of breath dyspnea 3/28/2016    Tracheal stenosis 1995    s/p repair    Unstable angina ([address] 11/20/1997      Past Surgical History:   Procedure Laterality Date    HX ANKLE FRACTURE TX Right 1992    hardware placed    HX CORONARY ARTERY BYPASS GRAFT  1990    cabg x 3    HX FRACTURE TX      cervical spine- C2    HX HEART CATHETERIZATION      HX HEENT      tracheal stricture/stenosis    HX HIP FRACTURE TX  2016    repaired with hardware- Dr Apryl Gar  1970s    HX OTHER SURGICAL      Peg tube placement    HX PTCA      HX TRACHEOSTOMY       Allergies   Allergen Reactions    Corticosteroids (Glucocorticoids) Anaphylaxis    Iodine Hives and Other (comments)     hypertension      Family History   Problem Relation Age of Onset    Breast Cancer Sister       Social History     Tobacco Use    Smoking status: Former Smoker     Packs/day: 1.00     Years: 20.00     Pack years: 20.00     Last attempt to quit: 1995     Years since quittin.6    Smokeless tobacco: Never Used   Substance Use Topics    Alcohol use: No        Current Facility-Administered Medications   Medication Dose Route Frequency    loperamide (IMODIUM) capsule 2 mg  2 mg Oral PRN    potassium chloride (K-DUR, KLOR-CON) SR tablet 20 mEq  20 mEq Oral TID    nitroglycerin (NITROSTAT) tablet 0.4 mg  0.4 mg SubLINGual PRN    apixaban (ELIQUIS) tablet 5 mg  5 mg Oral Q12H    aspirin delayed-release tablet 81 mg  81 mg Oral QHS    levothyroxine (SYNTHROID) tablet 50 mcg  50 mcg Oral ACB    lisinopril (PRINIVIL, ZESTRIL) tablet 10 mg  10 mg Oral DAILY    pantoprazole (PROTONIX) tablet 40 mg  40 mg Oral ACB    ranolazine ER (RANEXA) tablet 500 mg  500 mg Oral BID    sodium chloride (NS) flush 5-40 mL  5-40 mL IntraVENous Q8H    sodium chloride (NS) flush 5-40 mL  5-40 mL IntraVENous PRN    acetaminophen (TYLENOL) tablet 650 mg  650 mg Oral Q4H PRN    HYDROcodone-acetaminophen (NORCO)  mg tablet 1 Tab  1 Tab Oral Q4H PRN    naloxone (NARCAN) injection 0.4 mg  0.4 mg IntraVENous PRN    diphenhydrAMINE (BENADRYL) capsule 25 mg  25 mg Oral Q6H PRN    ondansetron (ZOFRAN) injection 4 mg  4 mg IntraVENous Q4H PRN    bisacodyl (DULCOLAX) tablet 5 mg  5 mg Oral DAILY PRN    LORazepam (ATIVAN) tablet 0.5 mg  0.5 mg Oral BID PRN    morphine injection 2 mg  2 mg IntraVENous Q4H PRN    magnesium oxide (MAG-OX) tablet 400 mg  400 mg Oral BID       Review of Symptoms:  General: no weight change,  + weakness, no fever or chills  Skin: no rashes, lumps, or other skin changes  HEENT: no headache, dizziness, lightheadedness, vision changes, hearing changes, tinnitus, vertigo, sinus pressure/pain, bleeding gums, sore throat, or hoarseness  Neck: no swollen glands, goiter, pain or stiffness  Respiratory: no cough, sputum, hemoptysis, no dyspnea, wheezing  Cardiovascular: + as per HPI  Gastrointestinal: + abdominal pain, reflux, diarrhea   Urinary: no frequency, urgency , hematuria, burning/pain with urination, recent flank pain, polyuria, nocturia, or difficulty urinating  Peripheral Vascular: no claudication, leg cramps, prior DVTs, swelling of calves, legs, or feet, color change, or swelling with redness or tenderness  Musculoskeletal: no muscle or joint pain/stiffness, joint swelling, erythema of joints, or back pain  Psychiatric: no depression or excessive stress  Neurological: no sensory or motor loss, seizures, syncope, tremors, numbness, no dementia  Hematologic: + h/o anemia  Endocrine: no thyroid problems, heat or cold intolerance, excessive sweating, polyuria, polydipsia, no  diabetes.        Physical Exam  Vitals:    08/29/19 0814 08/29/19 0942 08/29/19 1021 08/29/19 1154   BP: 133/64 154/69 152/87 153/77   Pulse: 79 76 78 67   Resp: 18 18 18 18   Temp: 98.8 °F (37.1 °C) 98.3 °F (36.8 °C)  98.4 °F (36.9 °C)   SpO2: 99% 96% 98% 97%   Weight:       Height:           Physical Exam:  General: Frail, appears stated age, no distress   HEENT: pupils equal and round, no abnormalities noted  Neck: supple, no JVD, no carotid bruits  Heart: S1S2 with RRR with 2/6 murmur   Lungs: Clear throughout auscultation bilaterally without adventitious sounds  Abd: mildly distended, tympanic, decreased BS, epigastric guarding with palpation no rebound  Ext: warm, no edema  Skin: warm and dry  Psychiatric: Normal mood and affect  Neurologic: Alert and oriented X 3      Cardiographics    Telemetry: sinus w pac's       Labs:   Recent Labs     08/29/19  0435 08/28/19  0110  08/26/19  1916    145   < > 142   K 3.4* 3.7   < > 2.4*   MG 1.6* 2.0  --  1.1*   BUN 10 9   < > 17   CREA 0.91 0.79   < > 1.18*   GLU 91 79   < > 109*   WBC 9.6 8.6   < > 13.8*   HGB 10.9* 10.7*   < > 12.9   HCT 35.1* 33.7*   < > 40.7    124*   < > 242   INR  --   --   --  1.0    < > = values in this interval not displayed. Assessment/Plan:     Assessment:   Partial small bowel obstruction (HCC) (8/26/2019)- GI soft diet    Chest pain- elevated troponin in pt with h/o known severe CAD w continued CP, elevated d-dimer pending VQ, on eliquis, will start nitro paste, check repeat EKG, cont ASA, ACE-I, no BB due to h/o bradycardia, trend troponin     Elevated troponin- as above    Hypertension (7/75/0241)- ACE    Systolic CHF, chronic ([facility] (5/14/2019)- EF 45-50%, cont ACE, no Bb due to bradycardia     A-fib ([facility] (8/26/2019)- pt converted back to NSR, having bouts of aberrancy, will start amiodarone, cont eliquis     Electrolyte abnormality - replaced, recheck in AM     Thank you very much for this referral. We appreciate the opportunity to participate in this patient's care. We will follow along with above stated plan.     Ingrid Young PA-C  Consulting MD: Imelda Mckeon

## 2019-08-29 NOTE — PROGRESS NOTES
08/29/19 0942   Vital Signs   Temp 98.3 °F (36.8 °C)   Temp Source Oral   Pulse (Heart Rate) 76   Resp Rate 18   O2 Sat (%) 96 %   /69   MAP (Calculated) 97       Pt complaining of chest pain, left side of chest across to sternum, 5/10. MD notified. Orders received for telemetry monitoring, stat EKG, stat CXR. Will continue to monitor.

## 2019-08-29 NOTE — PROGRESS NOTES
Pt complaining of chest pain  6/10. Nitro subling. Given per orders. Pain to 3/10n then down to 1/10 and pt resting quietly. Will continue to monitor.

## 2019-08-29 NOTE — PROGRESS NOTES
Pt has allergy to iodine so not able to do the CT with contrast.  Pt had VQ scan which showed low probability for PE,    LIZZIE Valerio

## 2019-08-29 NOTE — PROGRESS NOTES
Pt states pain 6-7/10, sharp pains continue and are increased Benadryl, mylanta and levsin given per orders. Will monitor.

## 2019-08-29 NOTE — PROGRESS NOTES
Nitro sublingual given per orders. After approx 5 min chest  pain down from 6/10 to 4/10. Continuing to monitor. MD paged.

## 2019-08-29 NOTE — PROGRESS NOTES
Hospitalist Progress Note     Admit Date:  2019  7:36 PM   Name:  Praful Oropeza   Age:  66 y.o.  :  1945   MRN:  [id number]   PCP:  Jaswant Armstrong MD  Treatment Team: Attending Provider: Severiano Michael, MD; Care Manager: Rabia Ferreira RN; Utilization Review: Irene Montana RN; Primary Nurse: Zion Lozano RN    Subjective:   CC:  Partial SBO, new onset a-fib    From Admission HPI:  \"Patient is a 66 y.o. female who presents to the ER due to new onset Afib. She went to see her PCP today due to ongoing abdominal pain with diarrhea and was found to be in Afib with controlled rate, and a K+ of 2.5. She was referred to ER for further evaluation. She reports that the pain has been ongoing for more than a week, generally diffuse and achy, not severe. Has had nausea, but no vomiting until today. No further vomiting in ER. Denies fever/chills, melena, brbpr, chest pain, palpitations, SOB. Nothing seems to make the pain better or worse. She id have surgery for a prolapsed rectum about 4 weeks ago. She reports diarrhea since the surgery. Denies prior h/o Afib\"    Subjective:  Per above pt admitted with new onset Afib and abd pian with diarrhea. Pt has had one stool per nursing, pt states she is passing flatus. Pt ate breakfast and tolerated with out any nausea or pain. Surgery consulted, did not see any surgical needs, signed off. Pain likely related to adhesions from prior surgeries. On  pt had 2 runs of afib and 6 beats of vtach while in the bathroom on the toilet having BM. She spontaneously returned to NSR. Since that event pt has been in NSR with occ. PAC.       Pt tolerating her breakfast but mid morning developed right sided chest pain. .  Pt now on eliquis, heparin d.c. Pt back on oxygen, EKG with Afib. Will check serial trop, CXR, d-dimer. Nsg gave ntg x 2 with partial relief. Will also add GI cocktail.      Objective:     Patient Vitals for the past 24 hrs:   Temp Pulse Resp BP SpO2   08/29/19 1021  78 18 152/87 98 %   08/29/19 0942 98.3 °F (36.8 °C) 76 18 154/69 96 %   08/29/19 0814 98.8 °F (37.1 °C) 79 18 133/64 99 %   08/29/19 0315 98.4 °F (36.9 °C) 76 18 145/71 97 %   08/28/19 2322 98.1 °F (36.7 °C) 71 18 155/69 96 %   08/28/19 1934 97.9 °F (36.6 °C) 68 18 161/80 94 %   08/28/19 1704 97.7 °F (36.5 °C) 63 18 155/65 94 %   08/28/19 1229 98.1 °F (36.7 °C) 65 18 127/70 97 %     Oxygen Therapy  O2 Sat (%): 98 % (08/29/19 1021)  Pulse via Oximetry: 70 beats per minute (08/27/19 0033)  O2 Device: Room air (08/26/19 1904)    Intake/Output Summary (Last 24 hours) at 8/29/2019 1030  Last data filed at 8/28/2019 1833  Gross per 24 hour   Intake 480 ml   Output    Net 480 ml         REVIEW OF SYSTEMS: Comprehensive ROS performed and negative except as stated in HPI. Physical Examination:  General:    Elderly and frail Awake and alert. Head:  Normocephalic, atraumatic  Eyes:  Extraocular movements intact, normal sclera  CV:   RRR. No  Murmurs, clicks, or gallops  Lungs:   Unlabored, no cyanosis  Abdomen:   Soft, nondistended, nontender. Extremities: Warm and dry. No cyanosis or edema. Skin:     No rashes or jaundice. Neuro:  No gross focal deficits  Psych:  Mood and affect appropriate    Data Review:  I have reviewed all labs, meds, telemetry events, and studies from the last 24 hours.     Recent Results (from the past 24 hour(s))   MAGNESIUM    Collection Time: 08/29/19  4:35 AM   Result Value Ref Range    Magnesium 1.6 (L) 1.8 - 2.4 mg/dL   CBC WITH AUTOMATED DIFF    Collection Time: 08/29/19  4:35 AM   Result Value Ref Range    WBC 9.6 4.3 - 11.1 K/uL    RBC 3.84 (L) 4.05 - 5.2 M/uL    HGB 10.9 (L) 11.7 - 15.4 g/dL    HCT 35.1 (L) 35.8 - 46.3 %    MCV 91.4 79.6 - 97.8 FL    MCH 28.4 26.1 - 32.9 PG    MCHC 31.1 (L) 31.4 - 35.0 g/dL    RDW 15.7 (H) 11.9 - 14.6 %    PLATELET 535 409 - 433 K/uL    MPV 11.6 9.4 - 12.3 FL    ABSOLUTE NRBC 0.00 0.0 - 0.2 K/uL    DF AUTOMATED      NEUTROPHILS 75 43 - 78 %    LYMPHOCYTES 16 13 - 44 %    MONOCYTES 7 4.0 - 12.0 %    EOSINOPHILS 1 0.5 - 7.8 %    BASOPHILS 1 0.0 - 2.0 %    IMMATURE GRANULOCYTES 0 0.0 - 5.0 %    ABS. NEUTROPHILS 7.2 1.7 - 8.2 K/UL    ABS. LYMPHOCYTES 1.5 0.5 - 4.6 K/UL    ABS. MONOCYTES 0.7 0.1 - 1.3 K/UL    ABS. EOSINOPHILS 0.1 0.0 - 0.8 K/UL    ABS. BASOPHILS 0.1 0.0 - 0.2 K/UL    ABS. IMM. GRANS. 0.0 0.0 - 0.5 K/UL   METABOLIC PANEL, BASIC    Collection Time: 08/29/19  4:35 AM   Result Value Ref Range    Sodium 144 136 - 145 mmol/L    Potassium 3.4 (L) 3.5 - 5.1 mmol/L    Chloride 111 (H) 98 - 107 mmol/L    CO2 23 21 - 32 mmol/L    Anion gap 10 7 - 16 mmol/L    Glucose 91 65 - 100 mg/dL    BUN 10 8 - 23 MG/DL    Creatinine 0.91 0.6 - 1.0 MG/DL    GFR est AA >60 >60 ml/min/1.73m2    GFR est non-AA >60 >60 ml/min/1.73m2    Calcium 8.4 8.3 - 10.4 MG/DL   EKG, 12 LEAD, INITIAL    Collection Time: 08/29/19  9:50 AM   Result Value Ref Range    Ventricular Rate 74 BPM    Atrial Rate 75 BPM    QRS Duration 130 ms    Q-T Interval 454 ms    QTC Calculation (Bezet) 503 ms    Calculated R Axis -52 degrees    Calculated T Axis 121 degrees    Diagnosis       Atrial fibrillation with premature ventricular or aberrantly conducted   complexes  Left axis deviation  Left ventricular hypertrophy with QRS widening  Cannot rule out Septal infarct (cited on or before 17-JUN-2016)  Possible Lateral infarct , age undetermined  Abnormal ECG  When compared with ECG of 26-AUG-2019 19:11,  Vent.  rate has decreased BY  42 BPM  Questionable change in initial forces of Septal leads          All Micro Results     None          Current Meds:  Current Facility-Administered Medications   Medication Dose Route Frequency    loperamide (IMODIUM) capsule 2 mg  2 mg Oral PRN    potassium chloride (K-DUR, KLOR-CON) SR tablet 20 mEq  20 mEq Oral TID    nitroglycerin (NITROSTAT) tablet 0.4 mg  0.4 mg SubLINGual PRN    apixaban (ELIQUIS) tablet 5 mg  5 mg Oral Q12H    aspirin delayed-release tablet 81 mg  81 mg Oral QHS    levothyroxine (SYNTHROID) tablet 50 mcg  50 mcg Oral ACB    lisinopril (PRINIVIL, ZESTRIL) tablet 10 mg  10 mg Oral DAILY    pantoprazole (PROTONIX) tablet 40 mg  40 mg Oral ACB    ranolazine ER (RANEXA) tablet 500 mg  500 mg Oral BID    sodium chloride (NS) flush 5-40 mL  5-40 mL IntraVENous Q8H    sodium chloride (NS) flush 5-40 mL  5-40 mL IntraVENous PRN    acetaminophen (TYLENOL) tablet 650 mg  650 mg Oral Q4H PRN    HYDROcodone-acetaminophen (NORCO)  mg tablet 1 Tab  1 Tab Oral Q4H PRN    naloxone (NARCAN) injection 0.4 mg  0.4 mg IntraVENous PRN    diphenhydrAMINE (BENADRYL) capsule 25 mg  25 mg Oral Q6H PRN    ondansetron (ZOFRAN) injection 4 mg  4 mg IntraVENous Q4H PRN    bisacodyl (DULCOLAX) tablet 5 mg  5 mg Oral DAILY PRN    LORazepam (ATIVAN) tablet 0.5 mg  0.5 mg Oral BID PRN    morphine injection 2 mg  2 mg IntraVENous Q4H PRN    magnesium oxide (MAG-OX) tablet 400 mg  400 mg Oral BID       Diet:  DIET NUTRITIONAL SUPPLEMENTS  DIET NUTRITIONAL SUPPLEMENTS  DIET GI SOFT    Other Studies (last 24 hours):  No results found.     Assessment and Plan:     Hospital Problems as of 8/29/2019 Date Reviewed: 7/2/2019          Codes Class Noted - Resolved POA    A-fib [facility]) ICD-10-CM: I48.91  ICD-9-CM: 427.31  8/26/2019 - Present Yes        * (Principal) Partial small bowel obstruction ([facility] [facility] ICD-10-CM: K56.600  ICD-9-CM: 560.9  8/26/2019 - Present Yes        Electrolyte abnormality ICD-10-CM: E87.8  ICD-9-CM: 276.9  8/26/2019 - Present Yes    Overview Signed 8/26/2019  9:26 PM by Elisabet Aguilar MD     Low Mg, K, Ca             Systolic CHF, chronic (HCC) (Chronic) ICD-10-CM: I50.22  ICD-9-CM: 428.22, 428.0  5/14/2019 - Present Yes        Hypokalemia ICD-10-CM: E87.6  ICD-9-CM: 276.8  2/27/2016 - Present Yes        Hypertension (Chronic) ICD-10-CM: I10  ICD-9-CM: 401.9  2/27/2016 - Present Yes A/P:    Principal Problem:  Partial small bowel obstruction ([facility] [facility] (8/26/2019)  -Bowel rest;   -Attempted NGT in ED but unsuccessful, no nausea now so will d/c   -IVF  -pain control prn - decrease dosing of constipating meds  -Surgery consulted, no intervention at this time.  -Chrisney to cl liq      Active Problems:  Hypokalemia (2/27/2016)  -Replace and monitor     Hypertension (2/27/2016)  -Continue home meds  -prn hydralazine     Systolic CHF, chronic ([facility] (5/14/2019)  -Chronic condition is stable,   -continue home medications   -Will continue to monitor and adjust treatment as needed.     A-fib ([facility] (8/26/2019)  -Pt in Afib on tele  -eliquis 5mg BID started    Electrolyte abnormality (8/26/2019)  -K+ 3.4  -Cont to monitor  -Cont PO supplement    DC planning/Dispo:  Home  DVT ppx:  Eliquis    Code status:  Full  Medical decision maker:  No MPOA    Case reviewed with supervising physician - CLIF Clayton MD    Signed:  Dakotah Mays NP-C

## 2019-08-29 NOTE — PROGRESS NOTES
END OF SHIFT NOTE:    INTAKE/OUTPUT  08/28 0701 - 08/29 0700  In: 720 [P.O.:720]  Out: -   Voiding: YES  Catheter: NO  Drain:              Flatus: Patient does have flatus present. Stool:  3 occurrences. Characteristics:  Stool Assessment  Stool Color: Brown  Stool Appearance: Loose  Stool Amount: Smear  Stool Source/Status: Rectum    Emesis: 0 occurrences. Characteristics:        VITAL SIGNS  Patient Vitals for the past 12 hrs:   Temp Pulse Resp BP SpO2   08/29/19 1737 98.6 °F (37 °C) 71 18 146/75 95 %   08/29/19 1154 98.4 °F (36.9 °C) 67 18 153/77 97 %   08/29/19 1021  78 18 152/87 98 %   08/29/19 0942 98.3 °F (36.8 °C) 76 18 154/69 96 %   08/29/19 0814 98.8 °F (37.1 °C) 79 18 133/64 99 %       Pain Assessment  Pain Intensity 1: 1 (08/29/19 1828)  Pain Location 1: Chest  Pain Intervention(s) 1: Medication (see MAR)  Patient Stated Pain Goal: 0    Ambulating  Yes    Shift report given to oncoming nurse at the bedside.     Valerie Cam RN

## 2019-08-29 NOTE — PROGRESS NOTES
Pt states \"not having those sharp pains anymore, still hurting but not as bad\"  Rates pain  4/10\"  Will monitor

## 2019-08-29 NOTE — PROGRESS NOTES
Problem: Pressure Injury - Risk of  Goal: *Prevention of pressure injury  Description  Document Eliel Scale and appropriate interventions in the flowsheet. Outcome: Progressing Towards Goal  Note:   Pressure Injury Interventions:  Sensory Interventions: Assess changes in LOC, Assess need for specialty bed, Discuss PT/OT consult with provider, Float heels    Moisture Interventions: Absorbent underpads, Apply protective barrier, creams and emollients    Activity Interventions: Increase time out of bed, Pressure redistribution bed/mattress(bed type), PT/OT evaluation    Mobility Interventions: HOB 30 degrees or less, Pressure redistribution bed/mattress (bed type), PT/OT evaluation    Nutrition Interventions: Document food/fluid/supplement intake                     Problem: Patient Education: Go to Patient Education Activity  Goal: Patient/Family Education  Outcome: Progressing Towards Goal     Problem: Falls - Risk of  Goal: *Absence of Falls  Description  Document Mag Cousin Fall Risk and appropriate interventions in the flowsheet.   Outcome: Progressing Towards Goal  Note:   Fall Risk Interventions:  Mobility Interventions: Communicate number of staff needed for ambulation/transfer, Patient to call before getting OOB, PT Consult for mobility concerns         Medication Interventions: Assess postural VS orthostatic hypotension, Evaluate medications/consider consulting pharmacy, Patient to call before getting OOB, Teach patient to arise slowly    Elimination Interventions: Call light in reach, Patient to call for help with toileting needs, Toilet paper/wipes in reach, Toileting schedule/hourly rounds    History of Falls Interventions: Consult care management for discharge planning, Evaluate medications/consider consulting pharmacy         Problem: Patient Education: Go to Patient Education Activity  Goal: Patient/Family Education  Outcome: Progressing Towards Goal     Problem: Nutrition Deficit  Goal: *Optimize nutritional status  Outcome: Progressing Towards Goal

## 2019-08-30 LAB
ANION GAP SERPL CALC-SCNC: 8 MMOL/L (ref 7–16)
APTT PPP: 78.3 SEC (ref 24.7–39.8)
APTT PPP: 88.1 SEC (ref 24.7–39.8)
BASOPHILS # BLD: 0 K/UL (ref 0–0.2)
BASOPHILS NFR BLD: 1 % (ref 0–2)
BUN SERPL-MCNC: 10 MG/DL (ref 8–23)
CALCIUM SERPL-MCNC: 8.1 MG/DL (ref 8.3–10.4)
CHLORIDE SERPL-SCNC: 113 MMOL/L (ref 98–107)
CO2 SERPL-SCNC: 24 MMOL/L (ref 21–32)
CREAT SERPL-MCNC: 0.8 MG/DL (ref 0.6–1)
DIFFERENTIAL METHOD BLD: ABNORMAL
EOSINOPHIL # BLD: 0.1 K/UL (ref 0–0.8)
EOSINOPHIL NFR BLD: 1 % (ref 0.5–7.8)
ERYTHROCYTE [DISTWIDTH] IN BLOOD BY AUTOMATED COUNT: 15.9 % (ref 11.9–14.6)
GLUCOSE SERPL-MCNC: 76 MG/DL (ref 65–100)
HCT VFR BLD AUTO: 33.5 % (ref 35.8–46.3)
HGB BLD-MCNC: 10.5 G/DL (ref 11.7–15.4)
IMM GRANULOCYTES # BLD AUTO: 0 K/UL (ref 0–0.5)
IMM GRANULOCYTES NFR BLD AUTO: 1 % (ref 0–5)
LYMPHOCYTES # BLD: 1.9 K/UL (ref 0.5–4.6)
LYMPHOCYTES NFR BLD: 23 % (ref 13–44)
MAGNESIUM SERPL-MCNC: 1.5 MG/DL (ref 1.8–2.4)
MCH RBC QN AUTO: 29 PG (ref 26.1–32.9)
MCHC RBC AUTO-ENTMCNC: 31.3 G/DL (ref 31.4–35)
MCV RBC AUTO: 92.5 FL (ref 79.6–97.8)
MONOCYTES # BLD: 0.7 K/UL (ref 0.1–1.3)
MONOCYTES NFR BLD: 8 % (ref 4–12)
NEUTS SEG # BLD: 5.6 K/UL (ref 1.7–8.2)
NEUTS SEG NFR BLD: 67 % (ref 43–78)
NRBC # BLD: 0 K/UL (ref 0–0.2)
PLATELET # BLD AUTO: 160 K/UL (ref 150–450)
PMV BLD AUTO: 12 FL (ref 9.4–12.3)
POTASSIUM SERPL-SCNC: 4.1 MMOL/L (ref 3.5–5.1)
RBC # BLD AUTO: 3.62 M/UL (ref 4.05–5.2)
SODIUM SERPL-SCNC: 145 MMOL/L (ref 136–145)
WBC # BLD AUTO: 8.4 K/UL (ref 4.3–11.1)

## 2019-08-30 PROCEDURE — C1760 CLOSURE DEV, VASC: HCPCS

## 2019-08-30 PROCEDURE — 74011250636 HC RX REV CODE- 250/636: Performed by: INTERNAL MEDICINE

## 2019-08-30 PROCEDURE — 83735 ASSAY OF MAGNESIUM: CPT

## 2019-08-30 PROCEDURE — 74011250636 HC RX REV CODE- 250/636: Performed by: NURSE PRACTITIONER

## 2019-08-30 PROCEDURE — B2111ZZ FLUOROSCOPY OF MULTIPLE CORONARY ARTERIES USING LOW OSMOLAR CONTRAST: ICD-10-PCS | Performed by: INTERNAL MEDICINE

## 2019-08-30 PROCEDURE — 85025 COMPLETE CBC W/AUTO DIFF WBC: CPT

## 2019-08-30 PROCEDURE — C1894 INTRO/SHEATH, NON-LASER: HCPCS

## 2019-08-30 PROCEDURE — 77030013687 HC GD NDL BARD -B

## 2019-08-30 PROCEDURE — 4A023N7 MEASUREMENT OF CARDIAC SAMPLING AND PRESSURE, LEFT HEART, PERCUTANEOUS APPROACH: ICD-10-PCS | Performed by: INTERNAL MEDICINE

## 2019-08-30 PROCEDURE — 80048 BASIC METABOLIC PNL TOTAL CA: CPT

## 2019-08-30 PROCEDURE — 74011250637 HC RX REV CODE- 250/637: Performed by: PHYSICIAN ASSISTANT

## 2019-08-30 PROCEDURE — 36415 COLL VENOUS BLD VENIPUNCTURE: CPT

## 2019-08-30 PROCEDURE — 74011250636 HC RX REV CODE- 250/636

## 2019-08-30 PROCEDURE — 93454 CORONARY ARTERY ANGIO S&I: CPT

## 2019-08-30 PROCEDURE — 99152 MOD SED SAME PHYS/QHP 5/>YRS: CPT

## 2019-08-30 PROCEDURE — C1769 GUIDE WIRE: HCPCS

## 2019-08-30 PROCEDURE — 93455 CORONARY ART/GRFT ANGIO S&I: CPT

## 2019-08-30 PROCEDURE — 85730 THROMBOPLASTIN TIME PARTIAL: CPT

## 2019-08-30 PROCEDURE — 94760 N-INVAS EAR/PLS OXIMETRY 1: CPT

## 2019-08-30 PROCEDURE — 74011000250 HC RX REV CODE- 250: Performed by: INTERNAL MEDICINE

## 2019-08-30 PROCEDURE — 74011250637 HC RX REV CODE- 250/637: Performed by: NURSE PRACTITIONER

## 2019-08-30 PROCEDURE — 99153 MOD SED SAME PHYS/QHP EA: CPT

## 2019-08-30 PROCEDURE — 74011250637 HC RX REV CODE- 250/637: Performed by: FAMILY MEDICINE

## 2019-08-30 PROCEDURE — 77030004534 HC CATH ANGI DX INFN CARD -A

## 2019-08-30 PROCEDURE — 65660000000 HC RM CCU STEPDOWN

## 2019-08-30 PROCEDURE — 74011636320 HC RX REV CODE- 636/320: Performed by: INTERNAL MEDICINE

## 2019-08-30 RX ORDER — LIDOCAINE HYDROCHLORIDE 10 MG/ML
1-5 INJECTION INFILTRATION; PERINEURAL ONCE
Status: COMPLETED | OUTPATIENT
Start: 2019-08-30 | End: 2019-08-30

## 2019-08-30 RX ORDER — MAGNESIUM SULFATE HEPTAHYDRATE 40 MG/ML
4 INJECTION, SOLUTION INTRAVENOUS
Status: COMPLETED | OUTPATIENT
Start: 2019-08-30 | End: 2019-08-30

## 2019-08-30 RX ORDER — DIPHENHYDRAMINE HYDROCHLORIDE 50 MG/ML
25 INJECTION, SOLUTION INTRAMUSCULAR; INTRAVENOUS AS NEEDED
Status: DISCONTINUED | OUTPATIENT
Start: 2019-08-30 | End: 2019-08-31 | Stop reason: HOSPADM

## 2019-08-30 RX ORDER — FAMOTIDINE 10 MG/ML
20 INJECTION INTRAVENOUS AS NEEDED
Status: DISCONTINUED | OUTPATIENT
Start: 2019-08-30 | End: 2019-08-31 | Stop reason: HOSPADM

## 2019-08-30 RX ORDER — MIDAZOLAM HYDROCHLORIDE 1 MG/ML
.5-2 INJECTION, SOLUTION INTRAMUSCULAR; INTRAVENOUS
Status: DISCONTINUED | OUTPATIENT
Start: 2019-08-30 | End: 2019-08-31 | Stop reason: HOSPADM

## 2019-08-30 RX ORDER — HEPARIN SODIUM 200 [USP'U]/100ML
2 INJECTION, SOLUTION INTRAVENOUS CONTINUOUS
Status: DISCONTINUED | OUTPATIENT
Start: 2019-08-30 | End: 2019-08-31

## 2019-08-30 RX ADMIN — ASPIRIN 81 MG: 81 TABLET ORAL at 21:04

## 2019-08-30 RX ADMIN — Medication 400 MG: at 18:38

## 2019-08-30 RX ADMIN — MAGNESIUM SULFATE HEPTAHYDRATE 4 G: 40 INJECTION, SOLUTION INTRAVENOUS at 10:12

## 2019-08-30 RX ADMIN — AMIODARONE HYDROCHLORIDE 400 MG: 200 TABLET ORAL at 18:38

## 2019-08-30 RX ADMIN — LEVOTHYROXINE SODIUM 50 MCG: 50 TABLET ORAL at 06:09

## 2019-08-30 RX ADMIN — APIXABAN 5 MG: 5 TABLET, FILM COATED ORAL at 20:49

## 2019-08-30 RX ADMIN — Medication 400 MG: at 10:10

## 2019-08-30 RX ADMIN — MIDAZOLAM HYDROCHLORIDE 1 MG: 1 INJECTION, SOLUTION INTRAMUSCULAR; INTRAVENOUS at 16:01

## 2019-08-30 RX ADMIN — NITROGLYCERIN 1 INCH: 20 OINTMENT TOPICAL at 10:10

## 2019-08-30 RX ADMIN — IOPAMIDOL 40 ML: 755 INJECTION, SOLUTION INTRAVENOUS at 16:29

## 2019-08-30 RX ADMIN — NITROGLYCERIN 0.4 MG: 0.4 TABLET, ORALLY DISINTEGRATING SUBLINGUAL at 00:19

## 2019-08-30 RX ADMIN — PANTOPRAZOLE SODIUM 40 MG: 40 TABLET, DELAYED RELEASE ORAL at 06:09

## 2019-08-30 RX ADMIN — LIDOCAINE HYDROCHLORIDE 5 ML: 10 INJECTION, SOLUTION INFILTRATION; PERINEURAL at 16:16

## 2019-08-30 RX ADMIN — Medication 10 ML: at 06:08

## 2019-08-30 RX ADMIN — POTASSIUM CHLORIDE 20 MEQ: 20 TABLET, EXTENDED RELEASE ORAL at 10:11

## 2019-08-30 RX ADMIN — Medication 10 ML: at 21:05

## 2019-08-30 RX ADMIN — POTASSIUM CHLORIDE 20 MEQ: 20 TABLET, EXTENDED RELEASE ORAL at 21:04

## 2019-08-30 RX ADMIN — AMIODARONE HYDROCHLORIDE 400 MG: 200 TABLET ORAL at 10:11

## 2019-08-30 RX ADMIN — LISINOPRIL 10 MG: 5 TABLET ORAL at 10:11

## 2019-08-30 RX ADMIN — DIPHENHYDRAMINE HYDROCHLORIDE 25 MG: 50 INJECTION, SOLUTION INTRAMUSCULAR; INTRAVENOUS at 15:58

## 2019-08-30 RX ADMIN — NITROGLYCERIN 0.4 MG: 0.4 TABLET, ORALLY DISINTEGRATING SUBLINGUAL at 00:28

## 2019-08-30 RX ADMIN — FAMOTIDINE 20 MG: 10 INJECTION, SOLUTION INTRAVENOUS at 15:58

## 2019-08-30 RX ADMIN — HEPARIN SODIUM 2 UNITS/HR: 5000 INJECTION, SOLUTION INTRAVENOUS; SUBCUTANEOUS at 15:57

## 2019-08-30 NOTE — PROGRESS NOTES
TRANSFER - OUT REPORT:    R femoral [facility] with Dr Lila Soto  Left radial attempted but unable to pass wire up wrist  Versed 2 mg  Medical Manage  Mynx to site  Site covered with tegaderm   No bleeding or hematoma noted at site. Site soft    Verbal report given to Gissell(name) miriam Thomas  being transferred to CPRU(unit) for routine progression of care       Report consisted of patients Situation, Background, Assessment and   Recommendations(SBAR). Information from the following report(s) Procedure Summary was reviewed with the receiving nurse. Lines:   Peripheral IV 08/26/19 Left Antecubital (Active)   Site Assessment Dry; Intact 8/30/2019  3:41 AM   Phlebitis Assessment 0 8/30/2019  3:41 AM   Infiltration Assessment 0 8/30/2019  3:41 AM   Dressing Status Dry; Intact 8/30/2019  3:41 AM   Dressing Type Transparent;Tape 8/29/2019  3:15 AM   Hub Color/Line Status Flushed 8/29/2019  3:15 AM   Alcohol Cap Used No 8/29/2019  3:15 AM       Peripheral IV 08/30/19 Anterior;Proximal;Right Forearm (Active)   Site Assessment Clean, dry, & intact 8/30/2019  3:41 AM   Phlebitis Assessment 0 8/30/2019  3:41 AM   Infiltration Assessment 0 8/30/2019  3:41 AM   Dressing Status Clean, dry, & intact 8/30/2019  3:41 AM   Dressing Type Transparent;Tape 8/30/2019  3:41 AM   Hub Color/Line Status Infusing 8/30/2019  3:41 AM        Opportunity for questions and clarification was provided.       Patient transported with:   Registered Nurse

## 2019-08-30 NOTE — PROGRESS NOTES
Nitro sublingual given per orders. After approx 5 min chest  pain down from 10/10 to 7 /10.  Continuing to monitor.

## 2019-08-30 NOTE — PROGRESS NOTES
Nitro sublingual given per orders. After approx 5 min chest  pain down from 10/10 to 3/10. Continuing to monitor.

## 2019-08-30 NOTE — PROGRESS NOTES
TRANSFER - IN REPORT:    Verbal report received from Haylee(name) on [facility]  being received from Cath Lab(unit) for routine progression of care      Report consisted of patients Situation, Background, Assessment and   Recommendations(SBAR). Information from the following report(s) Procedure Summary was reviewed with the receiving nurse. Opportunity for questions and clarification was provided. Assessment completed upon patients arrival to unit at 1800 and care assumed.

## 2019-08-30 NOTE — PROGRESS NOTES
Nitro sublingual given per orders. After approx 5 min chest  pain down from 7/10 to 3/10.  Continuing to monitor.

## 2019-08-30 NOTE — PROCEDURES
Brief Cardiac Procedure Note    Patient: Joshua Covert MRN: [id number]  SSN: [id number]    YOB: 1945  Age: 66 y.o. Sex: female      Date of Procedure: 8/30/2019     Pre-procedure Diagnosis: Atypical Angina    Post-procedure Diagnosis: Coronary Artery Disease    Reason for Procedure: Worsening Angina    Procedure: Left Heart Catheterization    Brief Description of Procedure: via rfa    Performed By: Lizzeth Hernandez MD     Assistants:     Anesthesia: Moderate Sedation    Estimated Blood Loss: Less than 10 mL      Specimens: None    Implants: None    Findings:   Lm ok  Lad 100% distal  lcx moderate  rca 100%  Svg rca ok    +Mynx     There has been no change in her coronary anatomy since last cath 6 months ago    Complications: None    Recommendations: Continue medical therapy.     Signed By: Lizzeth Hernandez MD     August 30, 2019

## 2019-08-30 NOTE — PROGRESS NOTES
Hospitalist Progress Note     Admit Date:  2019  7:36 PM   Name:  Corbin Biggs   Age:  66 y.o.  :  1945   MRN:  [id number]   PCP:  Teja Ross MD  Treatment Team: Attending Provider: Miguel A Cintron MD; Care Manager: Hilton Gaucher, JACQUELINE; Utilization Review: Britt Briseno, JACQUELINE; Consulting Provider: Rambo Urbina MD; Primary Nurse: Annetta Alcantara RN    Subjective:   CC:  Partial SBO, new onset a-fib    From Admission HPI:  \"Patient is a 66 y.o. female who presents to the ER due to new onset Afib. She went to see her PCP today due to ongoing abdominal pain with diarrhea and was found to be in Afib with controlled rate, and a K+ of 2.5. She was referred to ER for further evaluation. She reports that the pain has been ongoing for more than a week, generally diffuse and achy, not severe. Has had nausea, but no vomiting until today. No further vomiting in ER. Denies fever/chills, melena, brbpr, chest pain, palpitations, SOB. Nothing seems to make the pain better or worse. She id have surgery for a prolapsed rectum about 4 weeks ago. She reports diarrhea since the surgery. Denies prior h/o Afib\"    Subjective:  Per above pt admitted with new onset Afib and abd pian with diarrhea. Pt has had one stool per nursing, pt states she is passing flatus. Pt ate breakfast and tolerated with out any nausea or pain. Surgery consulted, did not see any surgical needs, signed off. Pain likely related to adhesions from prior surgeries. On  pt had 2 runs of afib and 6 beats of vtach while in the bathroom on the toilet having BM. She spontaneously returned to NSR. Since that event pt has been in NSR with occ. PAC.       Pt on eliquis. Cardiology saw pt yesterday and started amiodarone. EKG showing NSR today. Went for [facility], no change since last cath. Anticipate the patient can d/c home in am if stable.       Objective:     Patient Vitals for the past 24 hrs: Temp Pulse Resp BP SpO2   08/30/19 1658  69  151/67 100 %   08/30/19 1647  65  156/69 100 %   08/30/19 1629  68  153/74 100 %   08/30/19 1450  76 18  96 %   08/30/19 1120 98.1 °F (36.7 °C) 63 18 146/75 96 %   08/30/19 0747 98.1 °F (36.7 °C) 70 18 151/71 96 %   08/30/19 0341 98.7 °F (37.1 °C) 72 18 144/61 96 %   08/30/19 0019  90  132/70    08/29/19 2340 98.7 °F (37.1 °C) 92 18 146/76 92 %   08/29/19 2026  74  156/80    08/29/19 1920 98.6 °F (37 °C) 97 18 155/79 100 %   08/29/19 1737 98.6 °F (37 °C) 71 18 146/75 95 %     Oxygen Therapy  O2 Sat (%): 100 % (08/30/19 1658)  Pulse via Oximetry: 69 beats per minute (08/30/19 1658)  O2 Device: Room air (08/30/19 1629)    Intake/Output Summary (Last 24 hours) at 8/30/2019 1714  Last data filed at 8/30/2019 0537  Gross per 24 hour   Intake 1310.2 ml   Output 0 ml   Net 1310.2 ml         REVIEW OF SYSTEMS: Comprehensive ROS performed and negative except as stated in HPI. Physical Examination:  General:    Elderly and frail Awake and alert. Head:  Normocephalic, atraumatic  Eyes:  Extraocular movements intact, normal sclera  CV:   RRR. No  Murmurs, clicks, or gallops  Lungs:   Unlabored, no cyanosis  Abdomen:   Soft, nondistended, nontender. Extremities: Warm and dry. No cyanosis or edema. Skin:     No rashes or jaundice. Neuro:  No gross focal deficits  Psych:  Mood and affect appropriate    Data Review:  I have reviewed all labs, meds, telemetry events, and studies from the last 24 hours.     Recent Results (from the past 24 hour(s))   TROPONIN I    Collection Time: 08/29/19  5:31 PM   Result Value Ref Range    Troponin-I, Qt. 0.36 (HH) 0.02 - 0.05 NG/ML   TROPONIN I    Collection Time: 08/29/19  9:41 PM   Result Value Ref Range    Troponin-I, Qt. 0.31 (HH) 0.02 - 0.05 NG/ML   MAGNESIUM    Collection Time: 08/30/19  4:25 AM   Result Value Ref Range    Magnesium 1.5 (L) 1.8 - 2.4 mg/dL   CBC WITH AUTOMATED DIFF    Collection Time: 08/30/19  4:25 AM Result Value Ref Range    WBC 8.4 4.3 - 11.1 K/uL    RBC 3.62 (L) 4.05 - 5.2 M/uL    HGB 10.5 (L) 11.7 - 15.4 g/dL    HCT 33.5 (L) 35.8 - 46.3 %    MCV 92.5 79.6 - 97.8 FL    MCH 29.0 26.1 - 32.9 PG    MCHC 31.3 (L) 31.4 - 35.0 g/dL    RDW 15.9 (H) 11.9 - 14.6 %    PLATELET 983 481 - 655 K/uL    MPV 12.0 9.4 - 12.3 FL    ABSOLUTE NRBC 0.00 0.0 - 0.2 K/uL    DF AUTOMATED      NEUTROPHILS 67 43 - 78 %    LYMPHOCYTES 23 13 - 44 %    MONOCYTES 8 4.0 - 12.0 %    EOSINOPHILS 1 0.5 - 7.8 %    BASOPHILS 1 0.0 - 2.0 %    IMMATURE GRANULOCYTES 1 0.0 - 5.0 %    ABS. NEUTROPHILS 5.6 1.7 - 8.2 K/UL    ABS. LYMPHOCYTES 1.9 0.5 - 4.6 K/UL    ABS. MONOCYTES 0.7 0.1 - 1.3 K/UL    ABS. EOSINOPHILS 0.1 0.0 - 0.8 K/UL    ABS. BASOPHILS 0.0 0.0 - 0.2 K/UL    ABS. IMM.  GRANS. 0.0 0.0 - 0.5 K/UL   METABOLIC PANEL, BASIC    Collection Time: 08/30/19  4:25 AM   Result Value Ref Range    Sodium 145 136 - 145 mmol/L    Potassium 4.1 3.5 - 5.1 mmol/L    Chloride 113 (H) 98 - 107 mmol/L    CO2 24 21 - 32 mmol/L    Anion gap 8 7 - 16 mmol/L    Glucose 76 65 - 100 mg/dL    BUN 10 8 - 23 MG/DL    Creatinine 0.80 0.6 - 1.0 MG/DL    GFR est AA >60 >60 ml/min/1.73m2    GFR est non-AA >60 >60 ml/min/1.73m2    Calcium 8.1 (L) 8.3 - 10.4 MG/DL   PTT    Collection Time: 08/30/19  8:10 AM   Result Value Ref Range    aPTT 88.1 (H) 24.7 - 39.8 SEC   PTT    Collection Time: 08/30/19  2:05 PM   Result Value Ref Range    aPTT 78.3 (H) 24.7 - 39.8 SEC        All Micro Results     None          Current Meds:  Current Facility-Administered Medications   Medication Dose Route Frequency    NITROGLYCERIN 0.2MG/ML SYRINGE 200 mcg, verapamil 2 mg, heparin (porcine) 2,000 Units injection  2 mL IntraarTERial ONCE    midazolam (VERSED) injection 0.5-2 mg  0.5-2 mg IntraVENous Multiple    heparin (PF) 2 units/ml in NS infusion  2 Units/hr IntraVENous CONTINUOUS    famotidine (PF) (PEPCID) injection 20 mg  20 mg IntraVENous PRN    diphenhydrAMINE (BENADRYL) injection 25 mg  25 mg IntraVENous PRN    loperamide (IMODIUM) capsule 2 mg  2 mg Oral PRN    potassium chloride (K-DUR, KLOR-CON) SR tablet 20 mEq  20 mEq Oral TID    nitroglycerin (NITROSTAT) tablet 0.4 mg  0.4 mg SubLINGual PRN    nitroglycerin (NITROBID) 2 % ointment 1 Inch  1 Inch Topical BID    amiodarone (CORDARONE) tablet 400 mg  400 mg Oral BID    hyoscyamine (LEVSIN) elixir 0.125 mg  0.125 mg Oral Q4H PRN    alum-mag hydroxide-simeth (MYLANTA) oral suspension 30 mL  30 mL Oral Q4H PRN    0.9% sodium chloride infusion  100 mL/hr IntraVENous CONTINUOUS    heparin 25,000 units in dextrose 500 mL infusion  12-25 Units/kg/hr IntraVENous TITRATE    aspirin delayed-release tablet 81 mg  81 mg Oral QHS    levothyroxine (SYNTHROID) tablet 50 mcg  50 mcg Oral ACB    lisinopril (PRINIVIL, ZESTRIL) tablet 10 mg  10 mg Oral DAILY    pantoprazole (PROTONIX) tablet 40 mg  40 mg Oral ACB    ranolazine ER (RANEXA) tablet 500 mg  500 mg Oral BID    sodium chloride (NS) flush 5-40 mL  5-40 mL IntraVENous Q8H    sodium chloride (NS) flush 5-40 mL  5-40 mL IntraVENous PRN    acetaminophen (TYLENOL) tablet 650 mg  650 mg Oral Q4H PRN    HYDROcodone-acetaminophen (NORCO)  mg tablet 1 Tab  1 Tab Oral Q4H PRN    naloxone (NARCAN) injection 0.4 mg  0.4 mg IntraVENous PRN    diphenhydrAMINE (BENADRYL) capsule 25 mg  25 mg Oral Q6H PRN    ondansetron (ZOFRAN) injection 4 mg  4 mg IntraVENous Q4H PRN    bisacodyl (DULCOLAX) tablet 5 mg  5 mg Oral DAILY PRN    LORazepam (ATIVAN) tablet 0.5 mg  0.5 mg Oral BID PRN    morphine injection 2 mg  2 mg IntraVENous Q4H PRN    magnesium oxide (MAG-OX) tablet 400 mg  400 mg Oral BID       Diet:  DIET NUTRITIONAL SUPPLEMENTS  DIET NUTRITIONAL SUPPLEMENTS  DIET NPO    Other Studies (last 24 hours):  Duplex Lower Ext Venous Bilat    Result Date: 8/29/2019  Examination:  Grayscale and color doppler ultrasound of bilateral lower extremity.  History: Bilateral lower extremity swelling. Comparison: None available Findings: The bilateral common femoral, femoral, popliteal, peroneal, and posterior tibial veins demonstrate normal compressibility and color-flow. No evidence of deep venous thrombosis. No abnormal fluid collection or Baker's cyst.     Impression: No evidence of bilateral lower extremity deep venous thrombosis. Assessment and Plan:     Hospital Problems as of 8/30/2019 Date Reviewed: 7/2/2019          Codes Class Noted - Resolved POA    A-fib ([facility] [address] ICD-10-CM: I48.91  ICD-9-CM: 427.31  8/26/2019 - Present Yes        * (Principal) Partial small bowel obstruction ([facility] [address] ICD-10-CM: K56.600  ICD-9-CM: 560.9  8/26/2019 - Present Yes        Electrolyte abnormality ICD-10-CM: E87.8  ICD-9-CM: 276.9  8/26/2019 - Present Yes    Overview Signed 8/26/2019  9:26 PM by Natalie Mata MD     Low Mg, K, Ca             Systolic CHF, chronic (HCC) (Chronic) ICD-10-CM: I50.22  ICD-9-CM: 428.22, 428.0  5/14/2019 - Present Yes        Hypokalemia ICD-10-CM: E87.6  ICD-9-CM: 276.8  2/27/2016 - Present Yes        Hypertension (Chronic) ICD-10-CM: I10  ICD-9-CM: 401.9  2/27/2016 - Present Yes              A/P:    Principal Problem:  Partial small bowel obstruction ([facility] [address] (8/26/2019)  -Resolved     Active Problems:  Hypokalemia (2/27/2016)  -Replace and monitor     Hypertension (2/27/2016)  -Continue home meds  -prn hydralazine     Systolic CHF, chronic ([facility] [address] (5/14/2019)  -Chronic condition is stable,   -Continue home medications   -Will continue to monitor and adjust treatment as needed.     A-fib ([facility] [address] (8/26/2019)  -Pt in Afib on tele  -Eliquis 5mg BID started    Electrolyte abnormality (8/26/2019)  -K+ 3.4  -Cont to monitor  -Cont PO supplement    DC planning/Dispo:  Home in am if stable  DVT ppx:  Eliquis    Code status:  Full  Medical decision maker:  No MPOA    Case reviewed with supervising physician - CLIF Bridges MD    Signed:  LIZZIE Gillis

## 2019-08-30 NOTE — PROGRESS NOTES
TRANSFER - OUT REPORT:    Verbal report given to ***(name) on Liseth Franklin  being transferred to ***(unit) for {TRANSFER CARE:61076}       Report consisted of patients Situation, Background, Assessment and   Recommendations(SBAR). Information from the following report(s) {[facility] SBAR OUT [facility]:35455} was reviewed with the receiving nurse. Opportunity for questions and clarification was provided.       Patient transported with:   {TRANSPORTDETAILS:26348}

## 2019-08-30 NOTE — PROGRESS NOTES
[facility] CARDIOLOGY PROGRESS NOTE           8/30/2019 9:50 AM    Admit Date: 8/26/2019      Subjective:   Chest pain persists  No abdominal pain        Objective:      Vitals:    08/29/19 2340 08/30/19 0019 08/30/19 0341 08/30/19 0747   BP: 146/76 132/70 144/61 151/71   Pulse: 92 90 72 70   Resp: 18  18 18   Temp: 98.7 °F (37.1 °C)  98.7 °F (37.1 °C) 98.1 °F (36.7 °C)   SpO2: 92%  96% 96%   Weight:       Height:           Physical Exam:  General-No Acute Distress  Neck- supple, no JVD  CV- regular rate and rhythm no MRG  Lung- clear bilaterally  Abd- soft, nontender, nondistended  Ext- no edema bilaterally. Skin- warm and dry    Data Review:   Recent Labs     08/30/19  0425 08/29/19  2141 08/29/19  1731  08/29/19  0435     --   --   --  144   K 4.1  --   --   --  3.4*   MG 1.5*  --   --   --  1.6*   BUN 10  --   --   --  10   CREA 0.80  --   --   --  0.91   GLU 76  --   --   --  91   WBC 8.4  --   --   --  9.6   HGB 10.5*  --   --   --  10.9*   HCT 33.5*  --   --   --  35.1*     --   --   --  176   TROIQ  --  0.31* 0.36*   < >  --     < > = values in this interval not displayed. Assessment/Plan:     Principal Problem:    Partial small bowel obstruction ([facility] [address] (8/26/2019)        Active Problems:    Hypokalemia (2/27/2016)          Hypertension (9/42/2260)          Systolic CHF, chronic ([facility] [address] (5/14/2019)          A-fib ([facility] [address] (8/26/2019)          Electrolyte abnormality (8/26/2019)      ////    Willl proceed to cath for refractory chest pain.           Zahira Mcleod MD  8/30/2019 9:50 AM

## 2019-08-30 NOTE — PROGRESS NOTES
PTT 88.1. No changes made to heparin gtt per protocol. Redraw scheduled for 1400  Per orders.   Will continue to monitor

## 2019-08-30 NOTE — PROGRESS NOTES
Pt back to floor from Cath lab procedure,  Dressing to right groin c/d/i, site soft, No hematoma noted.

## 2019-08-31 VITALS
HEART RATE: 63 BPM | OXYGEN SATURATION: 90 % | HEIGHT: 65 IN | TEMPERATURE: 98.1 F | BODY MASS INDEX: 18.11 KG/M2 | RESPIRATION RATE: 18 BRPM | WEIGHT: 108.7 LBS | DIASTOLIC BLOOD PRESSURE: 83 MMHG | SYSTOLIC BLOOD PRESSURE: 158 MMHG

## 2019-08-31 LAB
ANION GAP SERPL CALC-SCNC: 7 MMOL/L (ref 7–16)
BASOPHILS # BLD: 0 K/UL (ref 0–0.2)
BASOPHILS NFR BLD: 0 % (ref 0–2)
BUN SERPL-MCNC: 13 MG/DL (ref 8–23)
CALCIUM SERPL-MCNC: 8.5 MG/DL (ref 8.3–10.4)
CHLORIDE SERPL-SCNC: 112 MMOL/L (ref 98–107)
CO2 SERPL-SCNC: 24 MMOL/L (ref 21–32)
CREAT SERPL-MCNC: 0.88 MG/DL (ref 0.6–1)
DIFFERENTIAL METHOD BLD: ABNORMAL
EOSINOPHIL # BLD: 0 K/UL (ref 0–0.8)
EOSINOPHIL NFR BLD: 0 % (ref 0.5–7.8)
ERYTHROCYTE [DISTWIDTH] IN BLOOD BY AUTOMATED COUNT: 15.9 % (ref 11.9–14.6)
GLUCOSE SERPL-MCNC: 89 MG/DL (ref 65–100)
HCT VFR BLD AUTO: 33.2 % (ref 35.8–46.3)
HGB BLD-MCNC: 10.3 G/DL (ref 11.7–15.4)
IMM GRANULOCYTES # BLD AUTO: 0 K/UL (ref 0–0.5)
IMM GRANULOCYTES NFR BLD AUTO: 1 % (ref 0–5)
LYMPHOCYTES # BLD: 1.1 K/UL (ref 0.5–4.6)
LYMPHOCYTES NFR BLD: 14 % (ref 13–44)
MAGNESIUM SERPL-MCNC: 2.4 MG/DL (ref 1.8–2.4)
MCH RBC QN AUTO: 28.4 PG (ref 26.1–32.9)
MCHC RBC AUTO-ENTMCNC: 31 G/DL (ref 31.4–35)
MCV RBC AUTO: 91.5 FL (ref 79.6–97.8)
MONOCYTES # BLD: 0.4 K/UL (ref 0.1–1.3)
MONOCYTES NFR BLD: 6 % (ref 4–12)
NEUTS SEG # BLD: 6.3 K/UL (ref 1.7–8.2)
NEUTS SEG NFR BLD: 80 % (ref 43–78)
NRBC # BLD: 0 K/UL (ref 0–0.2)
PLATELET # BLD AUTO: 170 K/UL (ref 150–450)
PMV BLD AUTO: 11.4 FL (ref 9.4–12.3)
POTASSIUM SERPL-SCNC: 4.9 MMOL/L (ref 3.5–5.1)
RBC # BLD AUTO: 3.63 M/UL (ref 4.05–5.2)
SODIUM SERPL-SCNC: 143 MMOL/L (ref 136–145)
WBC # BLD AUTO: 7.9 K/UL (ref 4.3–11.1)

## 2019-08-31 PROCEDURE — 85025 COMPLETE CBC W/AUTO DIFF WBC: CPT

## 2019-08-31 PROCEDURE — 36415 COLL VENOUS BLD VENIPUNCTURE: CPT

## 2019-08-31 PROCEDURE — 83735 ASSAY OF MAGNESIUM: CPT

## 2019-08-31 PROCEDURE — 80048 BASIC METABOLIC PNL TOTAL CA: CPT

## 2019-08-31 PROCEDURE — 74011250637 HC RX REV CODE- 250/637: Performed by: FAMILY MEDICINE

## 2019-08-31 PROCEDURE — 74011250637 HC RX REV CODE- 250/637: Performed by: NURSE PRACTITIONER

## 2019-08-31 PROCEDURE — 74011250636 HC RX REV CODE- 250/636: Performed by: PHYSICIAN ASSISTANT

## 2019-08-31 PROCEDURE — 74011250637 HC RX REV CODE- 250/637: Performed by: INTERNAL MEDICINE

## 2019-08-31 PROCEDURE — 74011250637 HC RX REV CODE- 250/637: Performed by: PHYSICIAN ASSISTANT

## 2019-08-31 RX ORDER — ISOSORBIDE MONONITRATE 30 MG/1
30 TABLET, EXTENDED RELEASE ORAL DAILY
Qty: 30 TAB | Refills: 2 | Status: SHIPPED | OUTPATIENT
Start: 2019-08-31 | End: 2019-10-21 | Stop reason: SDUPTHER

## 2019-08-31 RX ORDER — LISINOPRIL 10 MG/1
10 TABLET ORAL DAILY
Qty: 30 TAB | Refills: 2 | Status: SHIPPED | OUTPATIENT
Start: 2019-08-31 | End: 2019-09-05 | Stop reason: SINTOL

## 2019-08-31 RX ORDER — LANOLIN ALCOHOL/MO/W.PET/CERES
400 CREAM (GRAM) TOPICAL DAILY
Qty: 30 TAB | Refills: 2 | Status: ON HOLD | OUTPATIENT
Start: 2019-08-31 | End: 2021-01-01

## 2019-08-31 RX ORDER — AMIODARONE HYDROCHLORIDE 400 MG/1
400 TABLET ORAL 2 TIMES DAILY
Qty: 60 TAB | Refills: 2 | Status: SHIPPED | OUTPATIENT
Start: 2019-08-31 | End: 2019-10-10

## 2019-08-31 RX ORDER — RANOLAZINE 500 MG/1
500 TABLET, EXTENDED RELEASE ORAL 2 TIMES DAILY
Qty: 60 TAB | Refills: 2 | Status: SHIPPED | OUTPATIENT
Start: 2019-08-31 | End: 2019-10-10

## 2019-08-31 RX ORDER — ISOSORBIDE MONONITRATE 30 MG/1
30 TABLET, EXTENDED RELEASE ORAL DAILY
Status: DISCONTINUED | OUTPATIENT
Start: 2019-08-31 | End: 2019-08-31 | Stop reason: HOSPADM

## 2019-08-31 RX ORDER — POTASSIUM CHLORIDE 20 MEQ/1
20 TABLET, EXTENDED RELEASE ORAL 2 TIMES DAILY
Qty: 60 TAB | Refills: 1 | Status: ON HOLD | OUTPATIENT
Start: 2019-08-31 | End: 2021-01-01

## 2019-08-31 RX ADMIN — Medication 400 MG: at 09:51

## 2019-08-31 RX ADMIN — PANTOPRAZOLE SODIUM 40 MG: 40 TABLET, DELAYED RELEASE ORAL at 05:29

## 2019-08-31 RX ADMIN — ISOSORBIDE MONONITRATE 30 MG: 30 TABLET, EXTENDED RELEASE ORAL at 09:51

## 2019-08-31 RX ADMIN — LISINOPRIL 10 MG: 5 TABLET ORAL at 09:52

## 2019-08-31 RX ADMIN — RANOLAZINE 500 MG: 500 TABLET, FILM COATED, EXTENDED RELEASE ORAL at 09:52

## 2019-08-31 RX ADMIN — SODIUM CHLORIDE 100 ML/HR: 900 INJECTION, SOLUTION INTRAVENOUS at 05:29

## 2019-08-31 RX ADMIN — APIXABAN 5 MG: 5 TABLET, FILM COATED ORAL at 09:54

## 2019-08-31 RX ADMIN — POTASSIUM CHLORIDE 20 MEQ: 20 TABLET, EXTENDED RELEASE ORAL at 09:52

## 2019-08-31 RX ADMIN — AMIODARONE HYDROCHLORIDE 400 MG: 200 TABLET ORAL at 09:51

## 2019-08-31 RX ADMIN — LEVOTHYROXINE SODIUM 50 MCG: 50 TABLET ORAL at 05:29

## 2019-08-31 NOTE — DISCHARGE SUMMARY
Hospitalist Discharge Summary     Admit Date:  2019  7:36 PM   Name:  Jesus Pena   Age:  66 y.o.  :  1945   MRN:  [id number]   PCP:  Talib Natarajan MD  Treatment Team: Attending Provider: Radha Blackwood MD; Care Manager: Tian Guy, RN; Utilization Review: Basil Feliciano RN; Consulting Provider: Tamika Owens MD    Problem List for this Hospitalization:  Hospital Problems as of 2019 Date Reviewed: 2019          Codes Class Noted - Resolved POA    A-fib ([facility] ICD-10-CM: I48.91  ICD-9-CM: 427.31  2019 - Present Yes        * (Principal) Partial small bowel obstruction ([facility] ICD-10-CM: K56.600  ICD-9-CM: 560.9  2019 - Present Yes        Electrolyte abnormality ICD-10-CM: E87.8  ICD-9-CM: 276.9  2019 - Present Yes    Overview Signed 2019  9:26 PM by Radha Blackwood MD     Low Mg, K, Ca             Systolic CHF, chronic ([facility] (Chronic) ICD-10-CM: I50.22  ICD-9-CM: 428.22, 428.0  2019 - Present Yes        Hypokalemia ICD-10-CM: E87.6  ICD-9-CM: 276.8  2016 - Present Yes        Hypertension (Chronic) ICD-10-CM: I10  ICD-9-CM: 401.9  2016 - Present Yes            Admission HPI from 2019:    \"Patient is a 66 y.o. female who presents to the ER due to new onset Afib. She went to see her PCP today due to ongoing abdominal pain with diarrhea and was found to be in Afib with controlled rate, and a K+ of 2.5. She was referred to ER for further evaluation. She reports that the pain has been ongoing for more than a week, generally diffuse and achy, not severe. Has had nausea, but no vomiting until today. No further vomiting in ER. Denies fever/chills, melena, brbpr, chest pain, palpitations, SOB. Nothing seems to make the pain better or worse. She id have surgery for a prolapsed rectum about 4 weeks ago. She reports diarrhea since the surgery. Denies prior h/o Afib. VANTAGE POINT OF [facility] Course:  Surgery consulted, did not see any surgical needs, signed off. Pain likely related to adhesions from prior surgeries.       On 8/27 pt had 2 runs of afib and 6 beats of vtach while in the bathroom on the toilet having BM. She spontaneously returned to NSR. Pt was on heparin gtt but this was d/c and she was started on eliquis. on 8/29 Pt began having some right sided chest pain that was only partially relieved by NTG. She had a CXR that showed only mild interstitial prominence. Troponins x 3 ordered, d-dimer elevated. Not able to do to CT PE protocol as she is allergic to iodine. A VQ scan done that was low probability for PE.   EKG showing NSR today. Because of refractory chest pain pt went for [facility]. Per cardio there has been no significant change since last cath. Pt reports feeling better, no chest pain or shortness of breath. Follow up instructions and discharge meds at bottom of this note. Plan was discussed with patient and care team.  All questions answered. Patient was stable at time of discharge. 10 systems reviewed and negative except as noted in HPI. Diagnostic Imaging/Tests:   Xr Chest Sngl V    Result Date: 8/29/2019  CHEST X-RAY, one view. HISTORY:  Chest pain and upper abdominal pain. TECHNIQUE:  AP upright portable view COMPARISON: Exam 3 days prior. FINDINGS:   Heart appears enlarged. Mild interstitial prominence. No alveolar edema. Costophrenic angles are sharp. There are sternal wires. Aortic arch is calcified. IMPRESSION: Mild interstitial prominence, possibly due to technique. Mild cardiomegaly. Duplex Lower Ext Venous Bilat    Result Date: 8/29/2019  Examination:  Grayscale and color doppler ultrasound of bilateral lower extremity. History: Bilateral lower extremity swelling. Comparison: None available Findings: The bilateral common femoral, femoral, popliteal, peroneal, and posterior tibial veins demonstrate normal compressibility and color-flow. No evidence of deep venous thrombosis.  No abnormal fluid collection or Baker's cyst.     Impression: No evidence of bilateral lower extremity deep venous thrombosis. Nm Lung Perfusion W Vent    Result Date: 2019  NUCLEAR MEDICINE LUNG VENTILATION/PERFUSION SCAN. INDICATION: Chest pain. COMPARISON: Recent chest X-ray reviewed. RADIOPHARMACEUTICAL: 35.4 mCi DTPA aerosol inhaled, followed by 6.4 mCi Tc99-m MAA IV. FINDINGS: Ventilation images: Demonstrates some central airways deposition suggesting air trapping. No significant focal ventilatory defects. Perfusion images: No lobar or large segmental perfusion defects. There is some nonsegmental mildly heterogeneous patchy distribution. Chest X-ray: Demonstrates mild cardiomegaly. IMPRESSION: Low probability for pulmonary embolism. Echocardiogram results:  Results for orders placed or performed during the hospital encounter of 19   2D ECHO COMPLETE ADULT (TTE) W OR [address]  [address] [facility], [address] [facility]  [phone]    Transthoracic Echocardiogram  2D, M-mode, Doppler, and Color Doppler    Patient: Gayle Anderson  MR #: [id number]  : 1945  Age: 66 years  Gender: Female  Study date: 27-Aug-2019  Account #: [de-identified]  Height: 65 in  Weight: 109.8 lb  BSA: 1.53 mï¾²  Status:Routine  Location: 205  BP: 137/ 71    Allergies: CORTICOSTEROIDS (GLUCOCORTICOIDS), IODINE    Sonographer:  Shantelle Tse RDCS  Group:  [facility]  Referring Physician:  Terrance Puente. Stephanie Diaz MD  Reading Physician:  Emiliano Swenson. MD [facility]    INDICATIONS: Assess left ventricular function. PROCEDURE: This was a routine study. A transthoracic echocardiogram was  performed. The study included complete 2D imaging, M-mode, complete spectral  Doppler, and color Doppler. Intravenous contrast (Definity) was administered. Image quality was adequate. LEFT VENTRICLE: Size was normal. Systolic function was mildly reduced. Ejection  fraction was estimated in the range of 45 % to 50 %. This study was   inadequate  for the evaluation of regional wall motion. There was akinesis of the apical  wall(s). Wall thickness was normal. Avg. E/e'= 12.8. Features were consistent  with (grade 2 diastolic dysfunction). RIGHT VENTRICLE: The size was normal. Systolic function was normal. Estimated  peak pressure was in the range of 25-30 mmHg. LEFT ATRIUM: The atrium was moderately to markedly dilated. RIGHT ATRIUM: The atrium was moderately dilated. SYSTEMIC VEINS: IVC: The inferior vena cava was normal in size and course. The  respirophasic change in diameter was more than 50%. AORTIC VALVE: The valve was trileaflet. Leaflets exhibited mild sclerosis. There was no evidence for stenosis. There was mild regurgitation. MITRAL VALVE: Valve structure was normal. There was no evidence for stenosis. There was mild regurgitation. TRICUSPID VALVE: The valve structure was normal. There was no evidence for  stenosis. There was mild regurgitation. PULMONIC VALVE: The valve structure was normal. There was no evidence for  stenosis. There was trivial regurgitation. PERICARDIUM: There was no pericardial effusion. AORTA: The root exhibited normal size. SUMMARY:    -  Left ventricle: Systolic function was mildly reduced. Ejection fraction   was  estimated in the range of 45 % to 50 %. This study was inadequate for the  evaluation of regional wall motion. There was akinesis of the apical wall(s). Avg. E/e'= 12.8. Features were consistent with (grade 2 diastolic   dysfunction). -  Left atrium: The atrium was moderately to markedly dilated. -  Right atrium: The atrium was moderately dilated. -  Inferior vena cava, hepatic veins: The respirophasic change in diameter   was  more than 50%. -  Mitral valve: There was mild regurgitation.    -  Tricuspid valve: There was mild regurgitation.     SYSTEM MEASUREMENT TABLES    2D mode  AoR Diam (2D): 3.1 cm  LA Dimension (2D): 4 cm  Left Atrium Systolic Volume Index; Method of Disks, Biplane; 2D mode;: 63.4  ml/m2  IVS/LVPW (2D): 0.9  IVSd (2D): 1 cm  LVIDd (2D): 4.3 cm  LVIDs (2D): 3.3 cm  LVOT Area (2D): 3.1 cm2  LVPWd (2D): 1.1 cm  RVIDd (2D): 2.3 cm    Tissue Doppler Imaging  LV Peak Early Schultz Tissue Pedro; Lateral MA (TDI): 8.2 cm/s  LV Peak Early Schultz Tissue Pedro; Medial MA (TDI): 3.9 cm/s    Unspecified Scan Mode  Peak Grad; Mean; Antegrade Flow: 5 mm[Hg]  Vmax; Antegrade Flow: 115 cm/s  LVOT Diam: 2 cm  MV Peak Pedro/LV Peak Tissue Pedro E-Wave; Lateral MA: 8.2  MV Peak Pedro/LV Peak Tissue Pedro E-Wave; Medial MA: 17.4  MV E/A: 1.5  Peak Grad; Mean; Regurgitant Flow: 25 mm[Hg]    Prepared and signed by    Ember Hsu. MD [facility]  Signed 27-Aug-2019 12:19:30           All Micro Results     None          Labs: Results:       BMP, Mg, Phos Recent Labs     08/31/19 0516 08/30/19 0425 08/29/19  0435    145 144   K 4.9 4.1 3.4*   * 113* 111*   CO2 24 24 23   AGAP 7 8 10   BUN 13 10 10   CREA 0.88 0.80 0.91   CA 8.5 8.1* 8.4   GLU 89 76 91   MG 2.4 1.5* 1.6*      CBC Recent Labs     08/31/19  0516 08/30/19  0425 08/29/19  0435   WBC 7.9 8.4 9.6   RBC 3.63* 3.62* 3.84*   HGB 10.3* 10.5* 10.9*   HCT 33.2* 33.5* 35.1*    160 176   GRANS 80* 67 75   LYMPH 14 23 16   EOS 0* 1 1   MONOS 6 8 7   BASOS 0 1 1   IG 1 1 0   ANEU 6.3 5.6 7.2   ABL 1.1 1.9 1.5   BERNA 0.0 0.1 0.1   ABM 0.4 0.7 0.7   ABB 0.0 0.0 0.1   AIG 0.0 0.0 0.0      LFT No results for input(s): SGOT, ALT, TBIL, AP, TP, ALB, GLOB, AGRAT, GPT in the last 72 hours.    Cardiac Testing Lab Results   Component Value Date/Time     (H) 08/26/2019 07:16 PM     06/16/2016 11:50 AM     08/19/2013 08:18 AM    Troponin-I, Qt. 0.31 () 08/29/2019 09:41 PM    Troponin-I, Qt. 0.36 () 08/29/2019 05:31 PM    Troponin-I, Qt. 0.31 () 08/29/2019 10:44 AM      Coagulation Tests Lab Results   Component Value Date/Time    Prothrombin time 13.8 08/26/2019 07:16 PM    Prothrombin time 14.1 05/14/2019 12:24 PM    Prothrombin time 11.2 11/28/2016 12:05 PM    INR 1.0 08/26/2019 07:16 PM    INR 1.1 05/14/2019 12:24 PM    INR 1.1 11/28/2016 12:05 PM    aPTT 78.3 (H) 08/30/2019 02:05 PM    aPTT 88.1 (H) 08/30/2019 08:10 AM    aPTT 109.6 (H) 08/28/2019 08:21 AM      A1c No results found for: HBA1C, HGBE8, FIM7YGZS   Lipid Panel Lab Results   Component Value Date/Time    Cholesterol, total 94 05/15/2019 02:34 AM    HDL Cholesterol 38 (L) 05/15/2019 02:34 AM    LDL, calculated 30.6 05/15/2019 02:34 AM    VLDL, calculated 25.4 (H) 05/15/2019 02:34 AM    Triglyceride 127 05/15/2019 02:34 AM    CHOL/HDL Ratio 2.5 05/15/2019 02:34 AM      Thyroid Panel No results found for: TSH, T4, FT4, TT3, T3U, TSHEXT     Most Recent UA Lab Results   Component Value Date/Time    Color YELLOW 08/27/2019 09:43 AM    Appearance CLEAR 08/27/2019 09:43 AM    Specific gravity 1.011 08/27/2019 09:43 AM    pH (UA) 6.0 08/27/2019 09:43 AM    Protein NEGATIVE  08/27/2019 09:43 AM    Glucose NEGATIVE  08/27/2019 09:43 AM    Ketone NEGATIVE  08/27/2019 09:43 AM    Bilirubin NEGATIVE  08/27/2019 09:43 AM    Blood NEGATIVE  08/27/2019 09:43 AM    Urobilinogen 1.0 08/27/2019 09:43 AM    Nitrites NEGATIVE  08/27/2019 09:43 AM    Leukocyte Esterase TRACE (A) 08/27/2019 09:43 AM        Allergies   Allergen Reactions    Corticosteroids (Glucocorticoids) Anaphylaxis    Iodine Hives and Other (comments)     hypertension     Immunization History   Administered Date(s) Administered    Influenza Vaccine (Quad) PF 03/01/2016    Pneumococcal Polysaccharide (PPSV-23) 03/01/2016    TB Skin Test (PPD) Intradermal 02/27/2016, 12/13/2016       All Labs from Last 24 Hrs:  Recent Results (from the past 24 hour(s))   PTT    Collection Time: 08/30/19  2:05 PM   Result Value Ref Range    aPTT 78.3 (H) 24.7 - 39.8 SEC   MAGNESIUM    Collection Time: 08/31/19  5:16 AM Result Value Ref Range    Magnesium 2.4 1.8 - 2.4 mg/dL   CBC WITH AUTOMATED DIFF    Collection Time: 08/31/19  5:16 AM   Result Value Ref Range    WBC 7.9 4.3 - 11.1 K/uL    RBC 3.63 (L) 4.05 - 5.2 M/uL    HGB 10.3 (L) 11.7 - 15.4 g/dL    HCT 33.2 (L) 35.8 - 46.3 %    MCV 91.5 79.6 - 97.8 FL    MCH 28.4 26.1 - 32.9 PG    MCHC 31.0 (L) 31.4 - 35.0 g/dL    RDW 15.9 (H) 11.9 - 14.6 %    PLATELET 443 801 - 605 K/uL    MPV 11.4 9.4 - 12.3 FL    ABSOLUTE NRBC 0.00 0.0 - 0.2 K/uL    DF AUTOMATED      NEUTROPHILS 80 (H) 43 - 78 %    LYMPHOCYTES 14 13 - 44 %    MONOCYTES 6 4.0 - 12.0 %    EOSINOPHILS 0 (L) 0.5 - 7.8 %    BASOPHILS 0 0.0 - 2.0 %    IMMATURE GRANULOCYTES 1 0.0 - 5.0 %    ABS. NEUTROPHILS 6.3 1.7 - 8.2 K/UL    ABS. LYMPHOCYTES 1.1 0.5 - 4.6 K/UL    ABS. MONOCYTES 0.4 0.1 - 1.3 K/UL    ABS. EOSINOPHILS 0.0 0.0 - 0.8 K/UL    ABS. BASOPHILS 0.0 0.0 - 0.2 K/UL    ABS. IMM.  GRANS. 0.0 0.0 - 0.5 K/UL   METABOLIC PANEL, BASIC    Collection Time: 08/31/19  5:16 AM   Result Value Ref Range    Sodium 143 136 - 145 mmol/L    Potassium 4.9 3.5 - 5.1 mmol/L    Chloride 112 (H) 98 - 107 mmol/L    CO2 24 21 - 32 mmol/L    Anion gap 7 7 - 16 mmol/L    Glucose 89 65 - 100 mg/dL    BUN 13 8 - 23 MG/DL    Creatinine 0.88 0.6 - 1.0 MG/DL    GFR est AA >60 >60 ml/min/1.73m2    GFR est non-AA >60 >60 ml/min/1.73m2    Calcium 8.5 8.3 - 10.4 MG/DL       Discharge Exam:  Patient Vitals for the past 24 hrs:   Temp Pulse Resp BP SpO2   08/31/19 0805 98.1 °F (36.7 °C) 63 18 158/83 90 %   08/30/19 1838  68      08/30/19 1752 97.4 °F (36.3 °C) (!) 59 18 148/72 100 %   08/30/19 1729  62  152/70 100 %   08/30/19 1714  62  150/69 100 %   08/30/19 1658  69  151/67 100 %   08/30/19 1647  65  156/69 100 %   08/30/19 1629  68  153/74 100 %   08/30/19 1450  76 18  96 %   08/30/19 1120 98.1 °F (36.7 °C) 63 18 146/75 96 %     Oxygen Therapy  O2 Sat (%): 90 % (08/31/19 0805)  Pulse via Oximetry: 62 beats per minute (08/30/19 1723)  O2 Device: Room air (08/30/19 1629)    Intake/Output Summary (Last 24 hours) at 8/31/2019 0937  Last data filed at 8/31/2019 0606  Gross per 24 hour   Intake 1364 ml   Output 150 ml   Net 1214 ml         Physical Examination:  General:          Elderly and frail Awake and alert. Head:               Normocephalic, atraumatic  Eyes:               Extraocular movements intact, normal sclera  CV:                  RRR. No  Murmurs, clicks, or gallops  Lungs:             Unlabored, no cyanosis  Abdomen:        Soft, nondistended, nontender. Extremities:     Warm and dry. No cyanosis or edema. Skin:                No rashes or jaundice. Neuro:             No gross focal deficits  Psych:             Mood and affect appropriate    Discharge Info:   Current Discharge Medication List      START taking these medications    Details   potassium chloride (K-DUR, KLOR-CON) 20 mEq tablet Take 1 Tab by mouth two (2) times a day. Qty: 60 Tab, Refills: 1      magnesium oxide (MAG-OX) 400 mg tablet Take 1 Tab by mouth daily. Qty: 30 Tab, Refills: 2      isosorbide mononitrate ER (IMDUR) 30 mg tablet Take 1 Tab by mouth daily. Qty: 30 Tab, Refills: 2      amiodarone (PACERONE) 400 mg tablet Take 1 Tab by mouth two (2) times a day. Qty: 60 Tab, Refills: 2      apixaban (ELIQUIS) 5 mg tablet Take 1 Tab by mouth every twelve (12) hours. Indications: Treatment to Prevent Blood Clots in Chronic Atrial Fibrillation  Qty: 60 Tab, Refills: 2         CONTINUE these medications which have CHANGED    Details   lisinopril (PRINIVIL, ZESTRIL) 10 mg tablet Take 1 Tab by mouth daily. Qty: 30 Tab, Refills: 2      ranolazine ER (RANEXA) 500 mg SR tablet Take 1 Tab by mouth two (2) times a day. Qty: 60 Tab, Refills: 2         CONTINUE these medications which have NOT CHANGED    Details   ferrous sulfate (IRON) 325 mg (65 mg iron) tablet Take  by mouth Daily (before breakfast).       omeprazole (PRILOSEC) 40 mg capsule Take 40 mg by mouth daily. Indications: gastroesophageal reflux disease, heartburn      HYDROcodone-acetaminophen (NORCO) 7.5-325 mg per tablet Take 1 Tab by mouth every six (6) hours as needed for Pain. Max Daily Amount: 4 Tabs. Qty: 17 Tab, Refills: 0    Associated Diagnoses: Closed compression fracture of thoracic vertebra, initial encounter ([facility]      levothyroxine (SYNTHROID) 50 mcg tablet Take  by mouth Daily (before breakfast). acetaminophen (TYLENOL) 325 mg tablet Take 500 mg by mouth every six (6) hours as needed for Pain. loperamide (IMODIUM) 2 mg capsule Take 2 mg by mouth as needed for Diarrhea. aspirin delayed-release 81 mg tablet Take 81 mg by mouth nightly. atorvastatin (LIPITOR) 40 mg tablet Take 1 Tab by mouth daily. Indications: HYPERCHOLESTEROLEMIA  Qty: 90 Tab, Refills: 3    Associated Diagnoses: Coronary artery disease involving native heart without angina pectoris, unspecified vessel or lesion type      nitroglycerin (NITROSTAT) 0.4 mg SL tablet 1 Tab by SubLINGual route every five (5) minutes as needed for Chest Pain. Up to 3 doses. Qty: 1 Bottle, Refills: 3      ondansetron (ZOFRAN ODT) 8 mg disintegrating tablet Take 1 Tab by mouth every eight (8) hours as needed for Nausea. Qty: 12 Tab, Refills: 0    Associated Diagnoses: Non-intractable vomiting with nausea, unspecified vomiting type               Disposition: home    Activity: Activity as tolerated  Diet: DIET NUTRITIONAL SUPPLEMENTS All Meals; Ensure Clear  DIET NUTRITIONAL SUPPLEMENTS All Meals; Ensure Enlive  DIET FULL LIQUID    Follow-up Appointments   Procedures    FOLLOW UP VISIT Appointment in: Other (Kelle Marshall) As instructed     As instructed     Standing Status:   Standing     Number of Occurrences:   1     Order Specific Question:   Appointment in     Answer:    Other (Specify)         Follow-up Information     Follow up With Specialties Details Why Contact Info    Prabhu Navarro MD Internal Medicine Schedule an appointment as soon as possible for a visit in 1 week For a hospital follow up appointment [address]  [phone]            Case reviewed with supervising physician - KELLY Harrison MD    Time spent in patient discharge planning and coordination 35 minutes.     Signed:  LIZZIE Guerra

## 2019-08-31 NOTE — PROGRESS NOTES
Pt is for discharge home today with no needs/supportive care orders recieved for CM at this time.   Care Management Interventions  Plan discussed with Pt/Family/Caregiver: No  Discharge Location  Discharge Placement: Home

## 2019-08-31 NOTE — ROUTINE PROCESS
END OF SHIFT NOTE:    INTAKE/OUTPUT  08/30 0701 - 08/31 0700  In: 3640 [I.V.:1364]  Out: 150 [Urine:150]  Voiding: YES  Catheter: NO  Drain:              Flatus: Patient does have flatus present. Stool:  0 occurrences. Characteristics:  Stool Assessment  Stool Color: Brown  Stool Appearance: Loose  Stool Amount: Smear  Stool Source/Status: Rectum    Emesis: 0 occurrences. Characteristics:        VITAL SIGNS  No data found. Pain Assessment  Pain Intensity 1: 0 (08/30/19 1942)  Pain Location 1: Chest  Pain Intervention(s) 1: Medication (see MAR)  Patient Stated Pain Goal: 0    Ambulating  Yes    Shift report given to oncoming nurse at the bedside.     Janessa Garcia

## 2019-08-31 NOTE — PROGRESS NOTES
Discharge instructions reviewed with patient and daugher, both verbalized understanding. Instructions included diet, exercise, medications, side effect of medications S&S to report to MD, wound care, and follow-up. Strongly encouraged to use walker all the time at home until she is stronger because of added risk of injury due to blood thinner. Stated she has walker at home and will use it for extra stability.

## 2019-08-31 NOTE — DISCHARGE INSTRUCTIONS

## 2019-08-31 NOTE — PROCEDURES
[facility]  CARDIAC CATH    Name:  Jai Browne  MR#:  [id number]  :  1945  ACCOUNT #:  [de-identified]  DATE OF SERVICE:  2019    PROCEDURES PERFORMED:  Cardiac catheterization. PREOPERATIVE DIAGNOSES:  Coronary artery disease with refractory angina. POSTOPERATIVE DIAGNOSIS:  Stable coronary anatomy. SURGEON:  Moon Pereyra MD    ASSISTANT:  None. ESTIMATED BLOOD LOSS:  None. SPECIMENS REMOVED:  None. COMPLICATIONS:  None. IMPLANTS:  A 5-Tongan Mynx closure device, right common femoral artery. ANESTHESIA:  Conscious sedation, 1 mg Versed administered by Nurse Cleatus Guard. HISTORY:  This is a 68-year-old lady with coronary artery disease. She has had prior CABG. Six months ago, she had cardiac catheterization and treated medically. She is admitted on this occasion with abdominal problems. She has been having problems with intermittent chest pain suspicious for worsening angina. A repeat cath is recommended. PROCEDURE AND FINDINGS:  A left distal radial and left radial approach was attempted. Good blood flow was obtained but the wire would not easily advance. This case was therefore done from a 5-Tongan sheath in the right common femoral artery. The left coronary artery is injected with a 5-Tongan JL4. This reveals a normal-looking left main. It divides into LAD and left circumflex. The LAD is calcified in its proximal segment but with a good lumen. It divides into a major diagonal system that is tortuous and small caliber, moderate-sized. After this diagonal, the LAD is occluded and fills late by antegrade flow and by collateral flow from the right. It appears to be a diffusely diseased vessel with multiple areas of occlusion. The left circumflex is a good-sized vessel. There is moderate smooth disease at the bifurcation of the obtuse marginal branch with the AV left circumflex. This appears to be no more than 60%, however.   The vein graft to the right coronary artery is patent and normal.  Its distal insertion is at the crux which fills the posterior descending branch and fills the posterolateral branch which demonstrates distal occlusion with recanalization. There is good retrograde filling up to the proximal right coronary artery. At the end of the case, a 5-Tuvaluan Mynx closure device was used with good hemostasis. IMPRESSION:  Severe coronary artery disease as described. The coronary anatomy is stable in comparison to her last cardiac catheterization six months ago. Her distal left anterior descending is occluded with late filling by collateral flow. It appears to be a severely diseased vessel and not a particularly good chronic total occlusion target. The major diagonal branch of the left anterior descending which comes off proximal to the chronic total occlusion is a relatively large distribution and important. It is severely diseased and not well suited for small vessel angioplasty. The left circumflex has nonobstructive-looking midportion disease. The right coronary artery is occluded. The vein graft to the right coronary artery remains patent with good runoff into the distal vessel which also has distal branch occlusion. RECOMMENDATIONS:  Medical therapy.       Pineda Clark MD      [id number]  D:  08/30/2019 16:57  T:  08/30/2019 17:07  JOB #:  [id number]

## 2019-09-01 NOTE — PROGRESS NOTES
Discharged to home via wheelchair accompanied by daughter and CNA. In no distress at time of discharge.

## 2019-09-03 ENCOUNTER — PATIENT OUTREACH (OUTPATIENT)
Dept: CASE MANAGEMENT | Age: 74
End: 2019-09-03

## 2019-09-03 NOTE — PROGRESS NOTES
This note will not be viewable in [address] Transition of Care Discharge Follow-up Questionnaire   Date/Time of Call:   9/3/19   1205pm   What was the patient hospitalized for? Partial small bowel obstruction     Does the patient understand his/her diagnosis and/or treatment and what happened during the hospitalization? Yes, spoke with patient, she states understanding of diagnosis and treatment; and is agreeable to call. Patient states she is doing well   Did the patient receive discharge instructions? Yes    CM Assessed Risk for Readmission:       Patient stated Risk for Readmission:      Low r/t diagnosis and/or comorbidities      None stated   Review any discharge instructions (see discharge instructions/AVS in [facility]). Ask patient if they understand these. Do they have any questions? Reviewed, understanding is stated, no questions at this time       Were home services ordered (nursing, PT, OT, ST, etc.)? No       If so, has the first visit occurred? If not, why? (Assist with coordination of services if necessary.)   N/A   Was any DME ordered? No     If so, has it been received? If not, why?  (Assist patient in obtaining DME orders &/or equipment if necessary.) N/A   Complete a review of all medications (new, continued and discontinued meds per the D/C instructions and medication tab in [facility]). Completed  START taking:  amiodarone 400 mg tablet (PACERONE)  apixaban 5 mg tablet (ELIQUIS)  isosorbide mononitrate ER 30 mg tablet (IMDUR)  magnesium oxide 400 mg tablet (MAG-OX)  potassium chloride 20 mEq tablet (K-DUR, KLOR-CON)  CHANGE how you take:  lisinopril 10 mg tablet (PRINIVIL, ZESTRIL)   Were all new prescriptions filled? If not, why?  (Assist patient in obtaining medications if necessary  escalate for CCM &/or SW if ongoing issues are verbalized by pt or anticipated)   Yes    Does the patient understand the purpose and dosing instructions for all medications?   (If patient has questions, provide explanation and education.)   Yes     Does the patient have any problems in performing ADLs? (If patient is unable to perform ADLs  what is the limiting factor(s)? Do they have a support system that can assist? If no support system is present, discuss possible assistance that they may be able to obtain. Escalate for CCM/SW if ongoing issues are verbalized by pt or anticipated)   Independent with ADLs   Does the patient have all follow-up appointments scheduled? 7 day f/up with PCP?   (f/up with PCP may be w/in 14 days if patient has a f/up with their specialist w/in 7 days)    7-14 day f/up with specialist?   (or per discharge instructions)    If f/up has not been made  what actions has the care coordinator made to accomplish this? Has transportation been arranged? Yes        Dr. Blair Egan ([facility]) Thursday (9/5/19) per patient                    Yes, no transportation needs identified. Any other questions or concerns expressed by the patient? No other needs or concerns identified. Patient states her gratitude for follow up. Contact information for Care Coordinator was given, instructed to call with new questions or concerns. Schedule next appointment with ERICA Henderson or refer to RN Case Manager/ per the workflow guidelines. When is care coordinators next follow-up call scheduled? If referred for CCM  what RN care manager was the referral assigned? Care Coordinator will follow per workflow guidelines.           Within 14days     RHODA Call Completed By: Padmaja Lam LPN  Care Coordinator

## 2019-09-05 ENCOUNTER — HOSPITAL ENCOUNTER (OUTPATIENT)
Dept: LAB | Age: 74
Discharge: HOME | End: 2019-09-05
Payer: MEDICARE

## 2019-09-05 DIAGNOSIS — E87.6 HYPOKALEMIA: ICD-10-CM

## 2019-09-05 LAB
ANION GAP SERPL CALC-SCNC: 11 MMOL/L (ref 7–16)
BUN SERPL-MCNC: 17 MG/DL (ref 8–23)
CALCIUM SERPL-MCNC: 9.1 MG/DL (ref 8.3–10.4)
CHLORIDE SERPL-SCNC: 105 MMOL/L (ref 98–107)
CO2 SERPL-SCNC: 24 MMOL/L (ref 21–32)
CREAT SERPL-MCNC: 1.5 MG/DL (ref 0.6–1)
GLUCOSE SERPL-MCNC: 152 MG/DL (ref 65–100)
POTASSIUM SERPL-SCNC: 4 MMOL/L (ref 3.5–5.1)
SODIUM SERPL-SCNC: 140 MMOL/L (ref 136–145)

## 2019-09-05 PROCEDURE — 80048 BASIC METABOLIC PNL TOTAL CA: CPT

## 2019-09-05 PROCEDURE — 36415 COLL VENOUS BLD VENIPUNCTURE: CPT

## 2019-09-18 ENCOUNTER — PATIENT OUTREACH (OUTPATIENT)
Dept: CASE MANAGEMENT | Age: 74
End: 2019-09-18

## 2019-09-18 NOTE — PROGRESS NOTES
This note will not be viewable in [address] Transitions of Care  Follow up Outreach Note Outreach type Phone call: spoke with patient Home visit:  
Date/Time of Outreach: 9/18/19 136pm  
 
Has patient attended PCP or specialist follow-up appointments since last contact? What was outcome of appointment? When is next follow-up scheduled? Patient states she is doing ok, just not feeling well today, patient would not elaborate. Patient states she attended follow up with pcp with next visit 11/15/19 Review medications. Any medication changes since last outreach? Does patient have any questions or issues related to their medications? None stated No   
 
Home health active? If yes  any issue? Progress? No  
 
Referrals needed? 
(CM, SW, HH, etc. ) No   
Other issues/Miscellaneous? (Transportation, access to meals, ability to perform ADLs, adequate caregiver support, etc.) No other needs or concerns at this time. Patient states her gratitude for follow up. Next Outreach Scheduled? Graduation from program? 
 N/A Yes Next Steps/Goals (if applicable): N/A Outreach completed by: 
 Ian Kramer LPN Care Coordinator

## 2019-12-12 ENCOUNTER — APPOINTMENT (OUTPATIENT)
Dept: GENERAL RADIOLOGY | Age: 74
DRG: 303 | End: 2019-12-12
Attending: EMERGENCY MEDICINE
Payer: MEDICARE

## 2019-12-12 ENCOUNTER — APPOINTMENT (OUTPATIENT)
Dept: GENERAL RADIOLOGY | Age: 74
DRG: 303 | End: 2019-12-12
Attending: NURSE PRACTITIONER
Payer: MEDICARE

## 2019-12-12 ENCOUNTER — HOSPITAL ENCOUNTER (INPATIENT)
Age: 74
LOS: 1 days | Discharge: HOME HEALTH | DRG: 303 | End: 2019-12-14
Attending: EMERGENCY MEDICINE | Admitting: INTERNAL MEDICINE
Payer: MEDICARE

## 2019-12-12 DIAGNOSIS — I21.4 NSTEMI (NON-ST ELEVATED MYOCARDIAL INFARCTION) (HCC): ICD-10-CM

## 2019-12-12 DIAGNOSIS — Z91.14 NONCOMPLIANCE WITH MEDICATION REGIMEN: Primary | ICD-10-CM

## 2019-12-12 PROBLEM — I48.91 A-FIB (HCC): Status: RESOLVED | Noted: 2019-08-26 | Resolved: 2019-12-12

## 2019-12-12 PROBLEM — I48.0 PAF (PAROXYSMAL ATRIAL FIBRILLATION) (HCC): Chronic | Status: ACTIVE | Noted: 2019-12-12

## 2019-12-12 PROBLEM — E83.42 HYPOMAGNESEMIA: Status: ACTIVE | Noted: 2019-12-12

## 2019-12-12 LAB
ALBUMIN SERPL-MCNC: 2.6 G/DL (ref 3.2–4.6)
ALBUMIN/GLOB SERPL: 0.7 {RATIO} (ref 1.2–3.5)
ALP SERPL-CCNC: 74 U/L (ref 50–136)
ALT SERPL-CCNC: 16 U/L (ref 12–65)
ANION GAP SERPL CALC-SCNC: 6 MMOL/L (ref 7–16)
APTT PPP: 31 SEC (ref 24.7–39.8)
APTT PPP: 39.2 SEC (ref 24.7–39.8)
APTT PPP: 93.7 SEC (ref 24.7–39.8)
AST SERPL-CCNC: 21 U/L (ref 15–37)
ATRIAL RATE: 80 BPM
BASOPHILS # BLD: 0.1 K/UL (ref 0–0.2)
BASOPHILS NFR BLD: 1 % (ref 0–2)
BILIRUB SERPL-MCNC: 0.3 MG/DL (ref 0.2–1.1)
BNP SERPL-MCNC: 3972 PG/ML
BUN SERPL-MCNC: 20 MG/DL (ref 8–23)
CALCIUM SERPL-MCNC: 9.1 MG/DL (ref 8.3–10.4)
CALCULATED R AXIS, ECG10: -45 DEGREES
CALCULATED T AXIS, ECG11: 120 DEGREES
CHLORIDE SERPL-SCNC: 109 MMOL/L (ref 98–107)
CO2 SERPL-SCNC: 30 MMOL/L (ref 21–32)
CREAT SERPL-MCNC: 1.17 MG/DL (ref 0.6–1)
DIAGNOSIS, 93000: NORMAL
DIFFERENTIAL METHOD BLD: ABNORMAL
EOSINOPHIL # BLD: 0 K/UL (ref 0–0.8)
EOSINOPHIL NFR BLD: 0 % (ref 0.5–7.8)
ERYTHROCYTE [DISTWIDTH] IN BLOOD BY AUTOMATED COUNT: 16.7 % (ref 11.9–14.6)
GLOBULIN SER CALC-MCNC: 3.7 G/DL (ref 2.3–3.5)
GLUCOSE SERPL-MCNC: 128 MG/DL (ref 65–100)
HCT VFR BLD AUTO: 38.8 % (ref 35.8–46.3)
HGB BLD-MCNC: 12.7 G/DL (ref 11.7–15.4)
IMM GRANULOCYTES # BLD AUTO: 0.1 K/UL (ref 0–0.5)
IMM GRANULOCYTES NFR BLD AUTO: 0 % (ref 0–5)
LYMPHOCYTES # BLD: 1.6 K/UL (ref 0.5–4.6)
LYMPHOCYTES NFR BLD: 12 % (ref 13–44)
MAGNESIUM SERPL-MCNC: 1.2 MG/DL (ref 1.8–2.4)
MAGNESIUM SERPL-MCNC: 3.2 MG/DL (ref 1.8–2.4)
MCH RBC QN AUTO: 30.3 PG (ref 26.1–32.9)
MCHC RBC AUTO-ENTMCNC: 32.7 G/DL (ref 31.4–35)
MCV RBC AUTO: 92.6 FL (ref 79.6–97.8)
MONOCYTES # BLD: 1.2 K/UL (ref 0.1–1.3)
MONOCYTES NFR BLD: 9 % (ref 4–12)
NEUTS SEG # BLD: 10.9 K/UL (ref 1.7–8.2)
NEUTS SEG NFR BLD: 79 % (ref 43–78)
NRBC # BLD: 0 K/UL (ref 0–0.2)
PLATELET # BLD AUTO: 236 K/UL (ref 150–450)
PMV BLD AUTO: 11.1 FL (ref 9.4–12.3)
POTASSIUM SERPL-SCNC: 3.1 MMOL/L (ref 3.5–5.1)
POTASSIUM SERPL-SCNC: 4.1 MMOL/L (ref 3.5–5.1)
PROT SERPL-MCNC: 6.3 G/DL (ref 6.3–8.2)
Q-T INTERVAL, ECG07: 432 MS
QRS DURATION, ECG06: 116 MS
QTC CALCULATION (BEZET), ECG08: 495 MS
RBC # BLD AUTO: 4.19 M/UL (ref 4.05–5.2)
SODIUM SERPL-SCNC: 145 MMOL/L (ref 136–145)
TROPONIN I SERPL-MCNC: 0.27 NG/ML (ref 0.02–0.05)
TROPONIN I SERPL-MCNC: 0.28 NG/ML (ref 0.02–0.05)
TROPONIN I SERPL-MCNC: 0.39 NG/ML (ref 0.02–0.05)
VENTRICULAR RATE, ECG03: 79 BPM
WBC # BLD AUTO: 13.9 K/UL (ref 4.3–11.1)

## 2019-12-12 PROCEDURE — 84484 ASSAY OF TROPONIN QUANT: CPT

## 2019-12-12 PROCEDURE — 99284 EMERGENCY DEPT VISIT MOD MDM: CPT | Performed by: EMERGENCY MEDICINE

## 2019-12-12 PROCEDURE — C8929 TTE W OR WO FOL WCON,DOPPLER: HCPCS

## 2019-12-12 PROCEDURE — 85025 COMPLETE CBC W/AUTO DIFF WBC: CPT

## 2019-12-12 PROCEDURE — 77030019605

## 2019-12-12 PROCEDURE — 93005 ELECTROCARDIOGRAM TRACING: CPT | Performed by: EMERGENCY MEDICINE

## 2019-12-12 PROCEDURE — 74011000250 HC RX REV CODE- 250: Performed by: INTERNAL MEDICINE

## 2019-12-12 PROCEDURE — 96376 TX/PRO/DX INJ SAME DRUG ADON: CPT | Performed by: EMERGENCY MEDICINE

## 2019-12-12 PROCEDURE — 96375 TX/PRO/DX INJ NEW DRUG ADDON: CPT | Performed by: EMERGENCY MEDICINE

## 2019-12-12 PROCEDURE — 96367 TX/PROPH/DG ADDL SEQ IV INF: CPT | Performed by: EMERGENCY MEDICINE

## 2019-12-12 PROCEDURE — 74011250637 HC RX REV CODE- 250/637: Performed by: NURSE PRACTITIONER

## 2019-12-12 PROCEDURE — 74011250637 HC RX REV CODE- 250/637: Performed by: INTERNAL MEDICINE

## 2019-12-12 PROCEDURE — 99218 HC RM OBSERVATION: CPT

## 2019-12-12 PROCEDURE — 36415 COLL VENOUS BLD VENIPUNCTURE: CPT

## 2019-12-12 PROCEDURE — 83735 ASSAY OF MAGNESIUM: CPT

## 2019-12-12 PROCEDURE — 96366 THER/PROPH/DIAG IV INF ADDON: CPT

## 2019-12-12 PROCEDURE — 80053 COMPREHEN METABOLIC PANEL: CPT

## 2019-12-12 PROCEDURE — 96366 THER/PROPH/DIAG IV INF ADDON: CPT | Performed by: EMERGENCY MEDICINE

## 2019-12-12 PROCEDURE — 74011250637 HC RX REV CODE- 250/637: Performed by: EMERGENCY MEDICINE

## 2019-12-12 PROCEDURE — 74011250636 HC RX REV CODE- 250/636: Performed by: INTERNAL MEDICINE

## 2019-12-12 PROCEDURE — 85730 THROMBOPLASTIN TIME PARTIAL: CPT

## 2019-12-12 PROCEDURE — 96365 THER/PROPH/DIAG IV INF INIT: CPT | Performed by: EMERGENCY MEDICINE

## 2019-12-12 PROCEDURE — 84132 ASSAY OF SERUM POTASSIUM: CPT

## 2019-12-12 PROCEDURE — 74011000250 HC RX REV CODE- 250: Performed by: EMERGENCY MEDICINE

## 2019-12-12 PROCEDURE — 83880 ASSAY OF NATRIURETIC PEPTIDE: CPT

## 2019-12-12 PROCEDURE — 74011250636 HC RX REV CODE- 250/636: Performed by: NURSE PRACTITIONER

## 2019-12-12 PROCEDURE — 71046 X-RAY EXAM CHEST 2 VIEWS: CPT

## 2019-12-12 PROCEDURE — 94640 AIRWAY INHALATION TREATMENT: CPT

## 2019-12-12 PROCEDURE — 96365 THER/PROPH/DIAG IV INF INIT: CPT

## 2019-12-12 PROCEDURE — 74011250636 HC RX REV CODE- 250/636: Performed by: EMERGENCY MEDICINE

## 2019-12-12 PROCEDURE — 74019 RADEX ABDOMEN 2 VIEWS: CPT

## 2019-12-12 RX ORDER — NITROGLYCERIN 0.4 MG/1
0.4 TABLET SUBLINGUAL
Status: DISCONTINUED | OUTPATIENT
Start: 2019-12-12 | End: 2019-12-14 | Stop reason: HOSPADM

## 2019-12-12 RX ORDER — POTASSIUM CHLORIDE 20 MEQ/1
40 TABLET, EXTENDED RELEASE ORAL
Status: DISCONTINUED | OUTPATIENT
Start: 2019-12-12 | End: 2019-12-12 | Stop reason: SDUPTHER

## 2019-12-12 RX ORDER — ISOSORBIDE MONONITRATE 60 MG/1
60 TABLET, EXTENDED RELEASE ORAL DAILY
Status: DISCONTINUED | OUTPATIENT
Start: 2019-12-13 | End: 2019-12-14 | Stop reason: HOSPADM

## 2019-12-12 RX ORDER — METOPROLOL TARTRATE 25 MG/1
25 TABLET, FILM COATED ORAL 3 TIMES DAILY
Status: DISCONTINUED | OUTPATIENT
Start: 2019-12-12 | End: 2019-12-13

## 2019-12-12 RX ORDER — ONDANSETRON 2 MG/ML
4 INJECTION INTRAMUSCULAR; INTRAVENOUS
Status: DISCONTINUED | OUTPATIENT
Start: 2019-12-12 | End: 2019-12-14 | Stop reason: HOSPADM

## 2019-12-12 RX ORDER — NITROGLYCERIN 20 MG/100ML
0-20 INJECTION INTRAVENOUS
Status: DISCONTINUED | OUTPATIENT
Start: 2019-12-12 | End: 2019-12-14 | Stop reason: HOSPADM

## 2019-12-12 RX ORDER — OXYMETAZOLINE HCL 0.05 %
2 SPRAY, NON-AEROSOL (ML) NASAL
Status: COMPLETED | OUTPATIENT
Start: 2019-12-12 | End: 2019-12-12

## 2019-12-12 RX ORDER — MAGNESIUM SULFATE HEPTAHYDRATE 40 MG/ML
4 INJECTION, SOLUTION INTRAVENOUS ONCE
Status: COMPLETED | OUTPATIENT
Start: 2019-12-12 | End: 2019-12-12

## 2019-12-12 RX ORDER — MAGNESIUM SULFATE 1 G/100ML
1 INJECTION INTRAVENOUS
Status: DISCONTINUED | OUTPATIENT
Start: 2019-12-12 | End: 2019-12-12

## 2019-12-12 RX ORDER — SODIUM CHLORIDE 0.9 % (FLUSH) 0.9 %
5-40 SYRINGE (ML) INJECTION AS NEEDED
Status: DISCONTINUED | OUTPATIENT
Start: 2019-12-12 | End: 2019-12-14 | Stop reason: HOSPADM

## 2019-12-12 RX ORDER — HEPARIN SODIUM 5000 [USP'U]/ML
4000 INJECTION, SOLUTION INTRAVENOUS; SUBCUTANEOUS ONCE
Status: COMPLETED | OUTPATIENT
Start: 2019-12-12 | End: 2019-12-12

## 2019-12-12 RX ORDER — HEPARIN SODIUM 5000 [USP'U]/100ML
12-25 INJECTION, SOLUTION INTRAVENOUS
Status: DISCONTINUED | OUTPATIENT
Start: 2019-12-12 | End: 2019-12-13

## 2019-12-12 RX ORDER — HYDROCODONE BITARTRATE AND ACETAMINOPHEN 5; 325 MG/1; MG/1
1 TABLET ORAL
Status: DISCONTINUED | OUTPATIENT
Start: 2019-12-12 | End: 2019-12-14 | Stop reason: HOSPADM

## 2019-12-12 RX ORDER — ONDANSETRON 2 MG/ML
4 INJECTION INTRAMUSCULAR; INTRAVENOUS
Status: COMPLETED | OUTPATIENT
Start: 2019-12-12 | End: 2019-12-12

## 2019-12-12 RX ORDER — MORPHINE SULFATE 2 MG/ML
2 INJECTION, SOLUTION INTRAMUSCULAR; INTRAVENOUS
Status: DISCONTINUED | OUTPATIENT
Start: 2019-12-12 | End: 2019-12-14 | Stop reason: HOSPADM

## 2019-12-12 RX ORDER — PANTOPRAZOLE SODIUM 40 MG/1
40 TABLET, DELAYED RELEASE ORAL
Status: DISCONTINUED | OUTPATIENT
Start: 2019-12-12 | End: 2019-12-14 | Stop reason: HOSPADM

## 2019-12-12 RX ORDER — POTASSIUM CHLORIDE 20 MEQ/1
40 TABLET, EXTENDED RELEASE ORAL
Status: COMPLETED | OUTPATIENT
Start: 2019-12-12 | End: 2019-12-12

## 2019-12-12 RX ORDER — METOCLOPRAMIDE HYDROCHLORIDE 5 MG/ML
5 INJECTION INTRAMUSCULAR; INTRAVENOUS
Status: COMPLETED | OUTPATIENT
Start: 2019-12-12 | End: 2019-12-12

## 2019-12-12 RX ORDER — LEVOTHYROXINE SODIUM 50 UG/1
50 TABLET ORAL
Status: DISCONTINUED | OUTPATIENT
Start: 2019-12-12 | End: 2019-12-14 | Stop reason: HOSPADM

## 2019-12-12 RX ORDER — RANOLAZINE 500 MG/1
500 TABLET, EXTENDED RELEASE ORAL 2 TIMES DAILY
Status: DISCONTINUED | OUTPATIENT
Start: 2019-12-12 | End: 2019-12-14 | Stop reason: HOSPADM

## 2019-12-12 RX ORDER — ACETAMINOPHEN 325 MG/1
650 TABLET ORAL
Status: DISCONTINUED | OUTPATIENT
Start: 2019-12-12 | End: 2019-12-14 | Stop reason: HOSPADM

## 2019-12-12 RX ORDER — ATORVASTATIN CALCIUM 40 MG/1
40 TABLET, FILM COATED ORAL
Status: DISCONTINUED | OUTPATIENT
Start: 2019-12-12 | End: 2019-12-14 | Stop reason: HOSPADM

## 2019-12-12 RX ORDER — LANOLIN ALCOHOL/MO/W.PET/CERES
400 CREAM (GRAM) TOPICAL DAILY
Status: DISCONTINUED | OUTPATIENT
Start: 2019-12-13 | End: 2019-12-14 | Stop reason: HOSPADM

## 2019-12-12 RX ORDER — ISOSORBIDE MONONITRATE 60 MG/1
60 TABLET, EXTENDED RELEASE ORAL
Status: COMPLETED | OUTPATIENT
Start: 2019-12-12 | End: 2019-12-12

## 2019-12-12 RX ORDER — SODIUM CHLORIDE 0.9 % (FLUSH) 0.9 %
5-40 SYRINGE (ML) INJECTION EVERY 8 HOURS
Status: DISCONTINUED | OUTPATIENT
Start: 2019-12-12 | End: 2019-12-14 | Stop reason: HOSPADM

## 2019-12-12 RX ORDER — ASPIRIN 81 MG/1
81 TABLET ORAL
Status: DISCONTINUED | OUTPATIENT
Start: 2019-12-12 | End: 2019-12-14 | Stop reason: HOSPADM

## 2019-12-12 RX ORDER — LIDOCAINE HYDROCHLORIDE 40 MG/ML
SOLUTION TOPICAL ONCE
Status: COMPLETED | OUTPATIENT
Start: 2019-12-12 | End: 2019-12-12

## 2019-12-12 RX ORDER — HEPARIN SODIUM 5000 [USP'U]/ML
35 INJECTION, SOLUTION INTRAVENOUS; SUBCUTANEOUS ONCE
Status: COMPLETED | OUTPATIENT
Start: 2019-12-12 | End: 2019-12-12

## 2019-12-12 RX ORDER — POTASSIUM CHLORIDE 20 MEQ/1
20 TABLET, EXTENDED RELEASE ORAL 2 TIMES DAILY
Status: DISCONTINUED | OUTPATIENT
Start: 2019-12-12 | End: 2019-12-14 | Stop reason: HOSPADM

## 2019-12-12 RX ADMIN — POTASSIUM CHLORIDE 20 MEQ: 20 TABLET, EXTENDED RELEASE ORAL at 17:49

## 2019-12-12 RX ADMIN — METOPROLOL TARTRATE 25 MG: 25 TABLET ORAL at 16:42

## 2019-12-12 RX ADMIN — HEPARIN SODIUM 12 UNITS/KG/HR: 5000 INJECTION, SOLUTION INTRAVENOUS at 03:15

## 2019-12-12 RX ADMIN — HYDROCODONE BITARTRATE AND ACETAMINOPHEN 1 TABLET: 5; 325 TABLET ORAL at 12:29

## 2019-12-12 RX ADMIN — POTASSIUM CHLORIDE 40 MEQ: 20 TABLET, EXTENDED RELEASE ORAL at 04:51

## 2019-12-12 RX ADMIN — ASPIRIN 81 MG: 81 TABLET ORAL at 03:12

## 2019-12-12 RX ADMIN — MAGNESIUM SULFATE HEPTAHYDRATE 4 G: 40 INJECTION, SOLUTION INTRAVENOUS at 02:49

## 2019-12-12 RX ADMIN — NITROGLYCERIN 10 MCG/MIN: 20 INJECTION INTRAVENOUS at 08:21

## 2019-12-12 RX ADMIN — PERFLUTREN 1 ML: 6.52 INJECTION, SUSPENSION INTRAVENOUS at 14:00

## 2019-12-12 RX ADMIN — NITROGLYCERIN 15 MCG/MIN: 20 INJECTION INTRAVENOUS at 12:26

## 2019-12-12 RX ADMIN — ATORVASTATIN CALCIUM 40 MG: 40 TABLET, FILM COATED ORAL at 21:56

## 2019-12-12 RX ADMIN — METOCLOPRAMIDE 5 MG: 5 INJECTION, SOLUTION INTRAMUSCULAR; INTRAVENOUS at 06:24

## 2019-12-12 RX ADMIN — RANOLAZINE 500 MG: 500 TABLET, FILM COATED, EXTENDED RELEASE ORAL at 17:49

## 2019-12-12 RX ADMIN — ONDANSETRON 4 MG: 2 INJECTION INTRAMUSCULAR; INTRAVENOUS at 05:07

## 2019-12-12 RX ADMIN — ISOSORBIDE MONONITRATE 60 MG: 60 TABLET, EXTENDED RELEASE ORAL at 03:32

## 2019-12-12 RX ADMIN — HEPARIN SODIUM 4000 UNITS: 5000 INJECTION INTRAVENOUS; SUBCUTANEOUS at 03:10

## 2019-12-12 RX ADMIN — LIDOCAINE HYDROCHLORIDE 1 ML: 40 SOLUTION TOPICAL at 08:51

## 2019-12-12 RX ADMIN — ASPIRIN 81 MG: 81 TABLET ORAL at 21:56

## 2019-12-12 RX ADMIN — HEPARIN SODIUM 1500 UNITS: 5000 INJECTION INTRAVENOUS; SUBCUTANEOUS at 21:53

## 2019-12-12 RX ADMIN — MORPHINE SULFATE 2 MG: 2 INJECTION, SOLUTION INTRAMUSCULAR; INTRAVENOUS at 10:50

## 2019-12-12 RX ADMIN — MORPHINE SULFATE 2 MG: 2 INJECTION, SOLUTION INTRAMUSCULAR; INTRAVENOUS at 15:33

## 2019-12-12 RX ADMIN — ONDANSETRON 4 MG: 2 INJECTION INTRAMUSCULAR; INTRAVENOUS at 10:50

## 2019-12-12 RX ADMIN — ATORVASTATIN CALCIUM 40 MG: 40 TABLET, FILM COATED ORAL at 03:12

## 2019-12-12 RX ADMIN — Medication 10 ML: at 23:06

## 2019-12-12 RX ADMIN — METOPROLOL TARTRATE 25 MG: 25 TABLET ORAL at 23:05

## 2019-12-12 RX ADMIN — ONDANSETRON 4 MG: 2 INJECTION INTRAMUSCULAR; INTRAVENOUS at 02:39

## 2019-12-12 RX ADMIN — OXYMETAZOLINE HCL 2 SPRAY: 0.05 SPRAY NASAL at 08:42

## 2019-12-12 RX ADMIN — NITROGLYCERIN 10 MCG/MIN: 20 INJECTION INTRAVENOUS at 22:15

## 2019-12-12 NOTE — PROGRESS NOTES
CM chart review. PCP listed as Dr. Reny Arita. Last admit and discharge from [facility] noted on 8-26-19 to 8-31-19.

## 2019-12-12 NOTE — PROGRESS NOTES
TRANSFER - IN REPORT: 
 
Verbal report received from [facility] RN(name) on Irene Garcia  being received from ED (unit) for routine progression of care Report consisted of patients Situation, Background, Assessment and  
Recommendations(SBAR). Information from the following report(s) SBAR, Kardex and MAR was reviewed with the receiving nurse. Opportunity for questions and clarification was provided. Assessment completed upon patients arrival to unit and care assumed. Dual skin assessment performed with Janneth RN. Dry flaky BLE noted. Heels and sacrum intact.

## 2019-12-12 NOTE — PROGRESS NOTES
Assisting primary RN. Spoke with Laura Schwarz NP, per NP patient to be NPO but can still take PO medications. Will notify [address] primary RN.

## 2019-12-12 NOTE — ED NOTES
This RN attempt to insert NG tube in both nostrils, pt unable to appropriately swallow the water. Unable to get NG tube placement after 3 attempts.

## 2019-12-12 NOTE — ED NOTES
Report received from Addie, [facility]. Pt awake in bed. No distress at this time. Denies pain at current. On cardiac monitor. Heparin and nitroglycerin drips infusing. Addie states lab coming to draw PTT for heparin titration.

## 2019-12-12 NOTE — ED NOTES
Patient continues to be nausea after Zofran. Verbal order from Keith Chaudhari MD for Reglan. Medication given.

## 2019-12-12 NOTE — ED NOTES
TRANSFER - OUT REPORT: 
 
Verbal report given to [address] RN(name) on Luisana Perrin  being transferred to [facility](unit) for routine progression of care Report consisted of patients Situation, Background, Assessment and  
Recommendations(SBAR). Information from the following report(s) SBAR, ED Summary, MAR, Recent Results and Cardiac Rhythm a fib was reviewed with the receiving nurse. Lines:  
Peripheral IV 12/12/19 Right Antecubital (Active) Site Assessment Clean, dry, & intact 12/12/2019  5:31 AM  
Phlebitis Assessment 0 12/12/2019  5:31 AM  
Infiltration Assessment 0 12/12/2019  5:31 AM  
Dressing Status Clean, dry, & intact 12/12/2019  5:31 AM  
Dressing Type 4 X 4 12/12/2019  5:31 AM  
Hub Color/Line Status Green 12/12/2019  5:31 AM  
Alcohol Cap Used Yes 12/12/2019  5:31 AM  
   
Peripheral IV 12/12/19 Left Antecubital (Active) Site Assessment Clean, dry, & intact 12/12/2019  5:32 AM  
Phlebitis Assessment 0 12/12/2019  5:32 AM  
Infiltration Assessment 0 12/12/2019  5:32 AM  
Dressing Status Clean, dry, & intact 12/12/2019  5:32 AM  
Dressing Type 4 X 4 12/12/2019  5:32 AM  
Hub Color/Line Status Pink 12/12/2019  5:32 AM  
Alcohol Cap Used Yes 12/12/2019  5:32 AM  
  
 
Opportunity for questions and clarification was provided. Patient transported with: 
 Monitor Registered Nurse

## 2019-12-12 NOTE — ED NOTES
Report given to [facility], RN to assume care at this time.

## 2019-12-12 NOTE — H&P
[facility] Cardiology History & Physical  
  
Date of  Admission: 12/12/2019  1:47 AM  
 
Primary Care Physician: Dr. Katarzyna Duran Primary Cardiologist: Dr. Clarence Grider Admitting Physician: Dr. Martine Tobias CC: chest pain HPI:  Karo Feliciano is a 66 y.o. female with past medical of CAD with remote CABG and small vessel disease, chronic systolic heart failure, GERD, hypothyroidism, HLD, and HTN with low BPs on medications who presented to the ER last night via EMS with complaints of chest pain. Patient describes a dull aching pain located in her left chest that has been present for the past 2-3 days. Last night she developed intermittent throbbing pain that was relieved with SL nitro at home and EMS was called. Associated symptoms include shortness of breath and nausea with vomiting x 3 episodes. Reports that her  has been sick this past week and she has forgotten to take all of her medications including Eliquis, Ranexa, ASA and Imdur, but then admits that she never forgets to take her Prilosec everyday. Upon arrival to the ER, EKG shows what appears to possibly be SR with PACs. Initial troponin was found to be elevated as well at 0.39. She currently continues to have the dull chest pain but denies throbbing pain at this time. Home Imdur dose has been ordered by ER MD. She was given SL nitro x 1, 324mg ASA and 4mg IV zofran by EMS. Social history -- stopped smoking 15-20 years ago (reports smoking 1 ppd at the time of cessation) Past Medical History:  
Diagnosis Date  Abnormal EKG 6/17/2016  Acute systolic (congestive) heart failure ([address] 6/17/2016  Anterior myocardial infarction ([address] 11/20/1997  Avascular necrosis ([address] R hip  CAD (coronary artery disease) 1990  
 s/p CABG  
 Cellulitis of right lower extremity 6/17/2016  Chest pain, precordial 3/28/2016  COPD (chronic obstructive pulmonary disease) ([address]   
 pt denies  Coronary atherosclerosis of native coronary vessel 03/28/2016  
 per heart cath (6/2016)- \"pt with diffusely diseased LAD and small caliber vessels. At present. .. best option would be medical management. .. do not see any obvious good options for redo CABG. ..  Fracture of femoral neck, right ([address] 02/27/2016  
 s/p repair with hardware- Dr Radha Garcia  GERD (gastroesophageal reflux disease) 02/27/2016  
 managed with medication  HLD (hyperlipidemia) 03/28/2016  
 managed with medication  Hx of echocardiogram 02/27/2016 EF 45-50%, hypokinesis of the apical anterior and basal-mid anteroseptal wall. Mild aortic valve regurgitation. Mild tricuspid regurgitation.  Hypertension   
 managed with medication  Hypokalemia 02/27/2016  
 hx of- WNL since 6/2016- pt on K+ supplement  IBS (irritable bowel syndrome)  Ischemic cardiomyopathy 5/15/2018  MI (myocardial infarction) ([address] 1990 and spring 2016  
 x2  Osteoarthritis  Osteoporosis  Rectal prolapse  S/P CABG (coronary artery bypass graft) 1990  
 S/P total hip arthroplasty 12/14/2016  Scleroderma ([address] 3/28/2016  Shortness of breath dyspnea 3/28/2016  Tracheal stenosis 1995  
 s/p repair  Unstable angina ([address] 11/20/1997 Past Surgical History:  
Procedure Laterality Date  HX ANKLE FRACTURE TX Right 1992  
 hardware placed  HX CORONARY ARTERY BYPASS GRAFT  1990  
 cabg x 3  
 HX FRACTURE TX  1995  
 cervical spine- C2  
 HX HEART CATHETERIZATION    
 HX HEENT  1995  
 tracheal stricture/stenosis Katarzyna Border HIP FRACTURE TX  02/2016  
 repaired with hardware- Dr Radha Garcia  HX HYSTERECTOMY  1970s  HX OTHER SURGICAL Peg tube placement  HX PTCA  HX TRACHEOSTOMY Allergies Allergen Reactions  Corticosteroids (Glucocorticoids) Anaphylaxis  Iodine Hives and Other (comments)  
  hypertension Social History Socioeconomic History  Marital status:   
 Spouse name: Not on file  Number of children: Not on file  Years of education: Not on file  Highest education level: Not on file Occupational History  Not on file Social Needs  Financial resource strain: Not on file  Food insecurity:  
  Worry: Not on file Inability: Not on file  Transportation needs:  
  Medical: Not on file Non-medical: Not on file Tobacco Use  Smoking status: Former Smoker Packs/day: 1.00 Years: 20.00 Pack years: 20.00 Last attempt to quit: 1995 Years since quittin.9  Smokeless tobacco: Never Used Substance and Sexual Activity  Alcohol use: No  
 Drug use: No  
 Sexual activity: Not on file Lifestyle  Physical activity:  
  Days per week: Not on file Minutes per session: Not on file  Stress: Not on file Relationships  Social connections:  
  Talks on phone: Not on file Gets together: Not on file Attends Voodoo service: Not on file Active member of club or organization: Not on file Attends meetings of clubs or organizations: Not on file Relationship status: Not on file  Intimate partner violence:  
  Fear of current or ex partner: Not on file Emotionally abused: Not on file Physically abused: Not on file Forced sexual activity: Not on file Other Topics Concern  Not on file Social History Narrative  Not on file Family History Problem Relation Age of Onset  Breast Cancer Sister Current Facility-Administered Medications Medication Dose Route Frequency  isosorbide mononitrate ER (IMDUR) tablet 60 mg  60 mg Oral NOW  magnesium sulfate 4 g/100 mL IVPB  4 g IntraVENous ONCE  potassium chloride (K-DUR, KLOR-CON) SR tablet 40 mEq  40 mEq Oral NOW  
 heparin (porcine) injection 4,000 Units  4,000 Units IntraVENous ONCE  
 heparin 25,000 units in dextrose 500 mL infusion  12-25 Units/kg/hr IntraVENous TITRATE Current Outpatient Medications Medication Sig  
 isosorbide mononitrate ER (IMDUR) 60 mg CR tablet Take 1 Tab by mouth daily.  GABAPENTIN PO Take  by mouth.  ranolazine ER (RANEXA) 500 mg SR tablet Take 1 Tab by mouth two (2) times a day.  potassium chloride (K-DUR, KLOR-CON) 20 mEq tablet Take 1 Tab by mouth two (2) times a day. (Patient taking differently: Take 10 mEq by mouth two (2) times a day.)  magnesium oxide (MAG-OX) 400 mg tablet Take 1 Tab by mouth daily.  apixaban (ELIQUIS) 5 mg tablet Take 1 Tab by mouth every twelve (12) hours. Indications: Treatment to Prevent Blood Clots in Chronic Atrial Fibrillation  ferrous sulfate (IRON) 325 mg (65 mg iron) tablet Take  by mouth Daily (before breakfast).  nitroglycerin (NITROSTAT) 0.4 mg SL tablet 1 Tab by SubLINGual route every five (5) minutes as needed for Chest Pain. Up to 3 doses.  omeprazole (PRILOSEC) 40 mg capsule Take 40 mg by mouth daily. Indications: gastroesophageal reflux disease, heartburn  HYDROcodone-acetaminophen (NORCO) 7.5-325 mg per tablet Take 1 Tab by mouth every six (6) hours as needed for Pain. Max Daily Amount: 4 Tabs.  ondansetron (ZOFRAN ODT) 8 mg disintegrating tablet Take 1 Tab by mouth every eight (8) hours as needed for Nausea.  levothyroxine (SYNTHROID) 50 mcg tablet Take  by mouth Daily (before breakfast).  acetaminophen (TYLENOL) 325 mg tablet Take 500 mg by mouth every six (6) hours as needed for Pain.  loperamide (IMODIUM) 2 mg capsule Take 2 mg by mouth as needed for Diarrhea.  aspirin delayed-release 81 mg tablet Take 81 mg by mouth nightly.  atorvastatin (LIPITOR) 40 mg tablet Take 1 Tab by mouth daily. Indications: HYPERCHOLESTEROLEMIA (Patient taking differently: Take 40 mg by mouth nightly. Indications: hypercholesterolemia) Review of Systems Review of Systems Constitutional: Negative. HENT: Negative. Eyes: Negative. Respiratory: Positive for shortness of breath. Cardiovascular: Positive for chest pain. Gastrointestinal: Positive for nausea and vomiting. Negative for diarrhea. Genitourinary: Negative. Musculoskeletal: Negative. Skin: Negative. Neurological: Negative. Endo/Heme/Allergies: Negative. Psychiatric/Behavioral: Negative. Subjective:  
 
Visit Vitals /67 Pulse 73 Temp 98 °F (36.7 °C) Resp 16 Ht 5' 5\" (1.651 m) Wt 44.9 kg (99 lb) SpO2 97% BMI 16.47 kg/m² Physical Exam 
Constitutional:   
   Comments: Very fraile appearing HENT:  
   Head: Normocephalic. Nose: Nose normal.  
Eyes:  
   Pupils: Pupils are equal, round, and reactive to light. Cardiovascular:  
   Rate and Rhythm: Normal rate. Rhythm irregular. Heart sounds: Gallop present. Pulmonary:  
   Effort: Pulmonary effort is normal.  
   Breath sounds: Normal breath sounds. Abdominal:  
   General: Bowel sounds are normal.  
Musculoskeletal:     
   General: No swelling. Skin: 
   General: Skin is warm and dry. Neurological:  
   General: No focal deficit present. Mental Status: She is oriented to person, place, and time. Psychiatric:     
   Mood and Affect: Mood normal.  
 
 
 
Cardiographics Telemetry: normal sinus rhythm ECG: normal EKG, normal sinus rhythm, PAC's noted Echocardiogram: from 8/27/19 
-  Left ventricle: Systolic function was mildly reduced. Ejection fraction was estimated in the range of 45 % to 50 %. This study was inadequate for the evaluation of regional wall motion. There was akinesis of the apical wall(s). Avg. E/e'= 12.8. Features were consistent with (grade 2 diastolic  
dysfunction). -  Left atrium: The atrium was moderately to markedly dilated. -  Right atrium: The atrium was moderately dilated. -  Inferior vena cava, hepatic veins: The respirophasic change in diameter was more than 50%.   
-  Mitral valve:  There was mild regurgitation.  
 -  Tricuspid valve: There was mild regurgitation. 
  
 
Labs:  
Recent Results (from the past 24 hour(s)) CBC WITH AUTOMATED DIFF Collection Time: 12/12/19 12:36 AM  
Result Value Ref Range WBC 13.9 (H) 4.3 - 11.1 K/uL  
 RBC 4.19 4.05 - 5.2 M/uL  
 HGB 12.7 11.7 - 15.4 g/dL HCT 38.8 35.8 - 46.3 % MCV 92.6 79.6 - 97.8 FL  
 MCH 30.3 26.1 - 32.9 PG  
 MCHC 32.7 31.4 - 35.0 g/dL  
 RDW 16.7 (H) 11.9 - 14.6 % PLATELET 948 645 - 859 K/uL MPV 11.1 9.4 - 12.3 FL ABSOLUTE NRBC 0.00 0.0 - 0.2 K/uL  
 DF AUTOMATED NEUTROPHILS 79 (H) 43 - 78 % LYMPHOCYTES 12 (L) 13 - 44 % MONOCYTES 9 4.0 - 12.0 % EOSINOPHILS 0 (L) 0.5 - 7.8 % BASOPHILS 1 0.0 - 2.0 % IMMATURE GRANULOCYTES 0 0.0 - 5.0 %  
 ABS. NEUTROPHILS 10.9 (H) 1.7 - 8.2 K/UL  
 ABS. LYMPHOCYTES 1.6 0.5 - 4.6 K/UL  
 ABS. MONOCYTES 1.2 0.1 - 1.3 K/UL  
 ABS. EOSINOPHILS 0.0 0.0 - 0.8 K/UL  
 ABS. BASOPHILS 0.1 0.0 - 0.2 K/UL  
 ABS. IMM. GRANS. 0.1 0.0 - 0.5 K/UL METABOLIC PANEL, COMPREHENSIVE Collection Time: 12/12/19 12:36 AM  
Result Value Ref Range Sodium 145 136 - 145 mmol/L Potassium 3.1 (L) 3.5 - 5.1 mmol/L Chloride 109 (H) 98 - 107 mmol/L  
 CO2 30 21 - 32 mmol/L Anion gap 6 (L) 7 - 16 mmol/L Glucose 128 (H) 65 - 100 mg/dL BUN 20 8 - 23 MG/DL Creatinine 1.17 (H) 0.6 - 1.0 MG/DL  
 GFR est AA 58 (L) >60 ml/min/1.73m2 GFR est non-AA 48 (L) >60 ml/min/1.73m2 Calcium 9.1 8.3 - 10.4 MG/DL Bilirubin, total 0.3 0.2 - 1.1 MG/DL  
 ALT (SGPT) 16 12 - 65 U/L  
 AST (SGOT) 21 15 - 37 U/L Alk. phosphatase 74 50 - 136 U/L Protein, total 6.3 6.3 - 8.2 g/dL Albumin 2.6 (L) 3.2 - 4.6 g/dL Globulin 3.7 (H) 2.3 - 3.5 g/dL A-G Ratio 0.7 (L) 1.2 - 3.5    
TROPONIN I Collection Time: 12/12/19 12:36 AM  
Result Value Ref Range Troponin-I, Qt. 0.39 (HH) 0.02 - 0.05 NG/ML  
MAGNESIUM Collection Time: 12/12/19 12:36 AM  
Result Value Ref Range Magnesium 1.2 (LL) 1.8 - 2.4 mg/dL NT-PRO BNP Collection Time: 12/12/19 12:36 AM  
Result Value Ref Range NT pro-BNP 3,972 (H) <450 PG/ML Patient has been seen and examined by Dr. Leland Tyler and he agrees with the following assessment and plan: 
 
 Assessment/Plan:  
  
  Unstable angina -- admit to telemetry. Follow serial cardiac enzymes. Continue ASA, Ranexa, Imdur and statin therapy. No BB and/or ACEi/ArB due to history of low BPs while taking these medications. Start IV heparin drip with 4000 unit bolus. NPO for now. Check echo in the AM to r/o WMA. Will discuss the need for further testing with attending. Hypokalemia -- give 40mEQ now and continue home dose. Recheck potassium level at 8am. Replace if needed. Hypertension -- not on antihypertensives other than Imdur due to history of hypotension. Monitor closely. Titrate medications as needed. CAD (coronary artery disease) -- last LHC was done in 8/2019 that showed:  
Severe coronary artery disease as described. The coronary anatomy is stable in comparison to her last cardiac catheterization six months ago. Her distal left anterior descending is occluded with late filling by collateral flow. It appears to be a severely diseased vessel and not a particularly good chronic total occlusion target. The major diagonal branch of the left anterior descending which comes off proximal to the chronic total occlusion is a relatively large distribution and important. It is severely diseased and not well suited for small vessel angioplasty. The left circumflex has nonobstructive-looking midportion disease. The right coronary artery is occluded. The vein graft to the right coronary artery remains patent with good runoff into the distal vessel which also has distal branch occlusion.  ---- Medical management was recommended. See above Hypomagnesemia -- give 4g IV magnesium now. Recheck mag level at 8am. Replace if needed. PAF (paroxysmal atrial fibrillation) -- appears to SR with PACs tonight on EKG. Has not had Eliquis in the past 2-3 days. On IV heparin drip for now. Restart Eliquis if LHC is not needed. Appears to be a nstemi that will be medically treated. Add lopressor and tridil gtt Amber Franklin, CHRISTI 
12/12/2019 3:09 AM

## 2019-12-12 NOTE — ED TRIAGE NOTES
Arrives from home via [facility] with reports midsternal chest pain radiating up into neck, shortness of breath and n/v. States constant pain all day, describes as \"dull\". Denies cough or fever and chills. Attempted nitroglycerin x2 prior to ems arrival with relief of pain. Nitroglycerin x1, asa 324mg and zofran 4mg iv given by ems.

## 2019-12-12 NOTE — ED PROVIDER NOTES
The patient is a 66-year-old female presenting to the emerge department today complaining of chest pain which she described as a sharp pain in the substernal left side of her chest.  The patient says this is very similar to what she is experienced previous with her coronary artery disease. Patient said that her  has been sick recently and that she has forgotten to take all of her medicines for the last 2 to 3 days. Patient has a history of chronic nausea with taking her medications and says she does not like taking her pills either way. She did take 2 sublingual nitroglycerin this evening and after the second dose had relief of her chest pain. The patient denied any productive cough fevers or chills. The patient does have Zofran at home and did take a dose this evening but then did not take the rest of her medicine. Past Medical History:  
Diagnosis Date  Abnormal EKG 6/17/2016  Acute systolic (congestive) heart failure ([address] 6/17/2016  Anterior myocardial infarction ([address] 11/20/1997  Avascular necrosis ([address] R hip  CAD (coronary artery disease) 1990  
 s/p CABG  
 Cellulitis of right lower extremity 6/17/2016  Chest pain, precordial 3/28/2016  COPD (chronic obstructive pulmonary disease) ([address]   
 pt denies  Coronary atherosclerosis of native coronary vessel 03/28/2016  
 per heart cath (6/2016)- \"pt with diffusely diseased LAD and small caliber vessels. At present. .. best option would be medical management. .. do not see any obvious good options for redo CABG. ..  Fracture of femoral neck, right ([address] 02/27/2016  
 s/p repair with hardware- Dr Shirley Franco  GERD (gastroesophageal reflux disease) 02/27/2016  
 managed with medication  HLD (hyperlipidemia) 03/28/2016  
 managed with medication  Hx of echocardiogram 02/27/2016  EF 45-50%, hypokinesis of the apical anterior and basal-mid anteroseptal wall. Mild aortic valve regurgitation. Mild tricuspid regurgitation.  Hypertension   
 managed with medication  Hypokalemia 2016  
 hx of- WNL since 2016- pt on K+ supplement  IBS (irritable bowel syndrome)  Ischemic cardiomyopathy 5/15/2018  MI (myocardial infarction) [facility]  and spring 2016  
 x2  Osteoarthritis  Osteoporosis  Rectal prolapse  S/P CABG (coronary artery bypass graft)   
 S/P total hip arthroplasty 2016  Scleroderma ([facility] 3/28/2016  Shortness of breath dyspnea 3/28/2016  Tracheal stenosis   
 s/p repair  Unstable angina ([facility] 1997 Past Surgical History:  
Procedure Laterality Date  HX ANKLE FRACTURE TX Right 1992  
 hardware placed  HX CORONARY ARTERY BYPASS GRAFT    
 cabg x 3  
 HX FRACTURE TX    
 cervical spine- C2  
 HX HEART CATHETERIZATION    
 HX HEENT    
 tracheal stricture/stenosis Xavi Sanchezut HIP FRACTURE TX  2016  
 repaired with hardware- Dr Cindy Walden  HX HYSTERECTOMY  1970s  HX OTHER SURGICAL Peg tube placement  HX PTCA  HX TRACHEOSTOMY Family History:  
Problem Relation Age of Onset  Breast Cancer Sister Social History Socioeconomic History  Marital status:  Spouse name: Not on file  Number of children: Not on file  Years of education: Not on file  Highest education level: Not on file Occupational History  Not on file Social Needs  Financial resource strain: Not on file  Food insecurity:  
  Worry: Not on file Inability: Not on file  Transportation needs:  
  Medical: Not on file Non-medical: Not on file Tobacco Use  Smoking status: Former Smoker Packs/day: 1.00 Years: 20.00 Pack years: 20.00 Last attempt to quit: 1995 Years since quittin.9  Smokeless tobacco: Never Used Substance and Sexual Activity  Alcohol use: No  
 Drug use:  No  
  Sexual activity: Not on file Lifestyle  Physical activity:  
  Days per week: Not on file Minutes per session: Not on file  Stress: Not on file Relationships  Social connections:  
  Talks on phone: Not on file Gets together: Not on file Attends Sikh service: Not on file Active member of club or organization: Not on file Attends meetings of clubs or organizations: Not on file Relationship status: Not on file  Intimate partner violence:  
  Fear of current or ex partner: Not on file Emotionally abused: Not on file Physically abused: Not on file Forced sexual activity: Not on file Other Topics Concern  Not on file Social History Narrative  Not on file ALLERGIES: Corticosteroids (glucocorticoids) and Iodine Review of Systems Cardiovascular: Positive for chest pain. All other systems reviewed and are negative. Vitals:  
 12/12/19 0025 BP: 143/70 Pulse: 79 Resp: 16 Temp: 98 °F (36.7 °C) SpO2: 97% Weight: 44.9 kg (99 lb) Height: 5' 5\" (1.651 m) Physical Exam  
 
GENERAL:The patient is thin, and well-hydrated. No acute distress VITAL SIGNS: Heart rate, blood pressure, respiratory rate reviewed as recorded in 
nurse's notes EYES: Pupils reactive. Extraocular motion intact. No conjunctival redness or drainage. EARS: No external masses or lesions. NOSE: No nasal drainage or epistaxis. MOUTH/THROAT: Pharynx clear; airway patent. NECK: Supple, no meningeal signs. Trachea midline. No masses or thyromegaly. LUNGS: Breath sounds clear and equal bilaterally no accessory muscle use CHEST: No deformity CARDIOVASCULAR: Regular rate and rhythm ABDOMEN: Soft without tenderness. No palpable masses or organomegaly. No 
peritoneal signs. No rigidity. EXTREMITIES: No clubbing or cyanosis. No joint swelling. Normal muscle tone. No 
restricted range of motion appreciated. NEUROLOGIC: Sensation is grossly intact. Cranial nerve exam reveals face is 
symmetrical, tongue is midline speech is clear. SKIN: No rash or petechiae. Good skin turgor palpated. PSYCHIATRIC: Alert and oriented. Appropriate behavior and judgment. MDM Number of Diagnoses or Management Options Diagnosis management comments: CHF, COPD, pneumonia, PE, 
 
MI, coronary artery disease, unstable angina, coronary artery disease, Atrial fibrillation, cardiac arrhythmia, PVC, medication induced palpitations, heart block, 
electrolyte induced palpitations, Aortic dissection, aortic aneurysm, GERD, musculoskeletal pain, costochondritis, rib fracture, pleurisy, Amount and/or Complexity of Data Reviewed Clinical lab tests: reviewed and ordered Tests in the radiology section of CPT®: reviewed and ordered Tests in the medicine section of CPT®: ordered and reviewed Decide to obtain previous medical records or to obtain history from someone other than the patient: yes Obtain history from someone other than the patient: yes Independent visualization of images, tracings, or specimens: yes ED Course as of Dec 12 0229 Thu Dec 12, 2019  
0204 Troponin-I, Qt.(!!): 0.39 [KH] 0204 Magnesium(!!): 1.2 [KH] 0204 NT pro-BNP(!): 3,972 [KH] 0204 IMPRESSION: No acute cardiac pulmonary disease. XR CHEST PA LAT [KH] 0208 Cardiac catheterization 8/26/2019 IMPRESSION:  Severe coronary artery disease as described. The coronary anatomy is stable in comparison to her last cardiac catheterization six months ago. Her distal left anterior descending is occluded with late filling by collateral flow. It appears to be a severely diseased vessel and not a particularly good chronic total occlusion target.   The major diagonal branch of the left anterior descending which comes off proximal to the chronic total occlusion is a relatively large distribution and important. It is severely diseased and not well suited for small vessel angioplasty. The left circumflex has nonobstructive-looking midportion disease. The right coronary artery is occluded. The vein graft to the right coronary artery remains patent with good runoff into the distal vessel which also has distal branch occlusion. Hermelinda Prescott 1040 I talked to the cardiologist on-call Dr. Otilio Nair and he feels like secondary to the patient's elevated troponin she should be admitted to the hospital and they will come and see her.  
 [KH] ED Course User Index [KH] Conrad Ashby,   
 
 
Procedures

## 2019-12-12 NOTE — CONSULTS
H&P/Consult Note/Progress Note/Office Note: Brenda Yoo  MRN: [id number]  JAO:[birth year]  Age:74 y.o. 
 
HPI: Brenda Yoo is a 66 y.o. female who we are asked by Dr. Michele John  to see for SBO. The patient has an extensive PMHx as highlighted below. She presented to the ER 12/12/2019 with complaints of chest pain. She has a history of chronic nausea and also reports a few episodes of vomiting today. Incidentally on chest xray, there was a finding of some dilated loops of bowel. An abdominal xray was then obtained that showed concern for small bowel obstruction. The patient has a surgical history of hysterectomy. She denies prior SBOs. Her abdomen is soft and non tender. She is not a great historian. WBC is 13,900, K+3.1, Mag 1.2. Past Medical History:  
Diagnosis Date  Abnormal EKG 6/17/2016  Acute systolic (congestive) heart failure ([address] 6/17/2016  Anterior myocardial infarction ([address] 11/20/1997  Avascular necrosis ([address] R hip  CAD (coronary artery disease) 1990  
 s/p CABG  
 Cellulitis of right lower extremity 6/17/2016  Chest pain, precordial 3/28/2016  COPD (chronic obstructive pulmonary disease) ([address]   
 pt denies  Coronary atherosclerosis of native coronary vessel 03/28/2016  
 per heart cath (6/2016)- \"pt with diffusely diseased LAD and small caliber vessels. At present. .. best option would be medical management. .. do not see any obvious good options for redo CABG. ..  Fracture of femoral neck, right ([address] 02/27/2016  
 s/p repair with hardware- Dr Verona Lema  GERD (gastroesophageal reflux disease) 02/27/2016  
 managed with medication  HLD (hyperlipidemia) 03/28/2016  
 managed with medication  Hx of echocardiogram 02/27/2016 EF 45-50%, hypokinesis of the apical anterior and basal-mid anteroseptal wall. Mild aortic valve regurgitation. Mild tricuspid regurgitation.  Hypertension   
 managed with medication  Hypokalemia 02/27/2016 hx of- WNL since 6/2016- pt on K+ supplement  IBS (irritable bowel syndrome)  Ischemic cardiomyopathy 5/15/2018  MI (myocardial infarction) ([facility] 1990 and spring 2016  
 x2  Osteoarthritis  Osteoporosis  Rectal prolapse  S/P CABG (coronary artery bypass graft) 1990  
 S/P total hip arthroplasty 12/14/2016  Scleroderma ([facility] 3/28/2016  Shortness of breath dyspnea 3/28/2016  Tracheal stenosis 1995  
 s/p repair  Unstable angina ([facility] 11/20/1997 Past Surgical History:  
Procedure Laterality Date  HX ANKLE FRACTURE TX Right 1992  
 hardware placed  HX CORONARY ARTERY BYPASS GRAFT  1990  
 cabg x 3  
 HX FRACTURE TX  1995  
 cervical spine- C2  
 HX HEART CATHETERIZATION    
 HX HEENT  1995  
 tracheal stricture/stenosis Arsalan Burks HIP FRACTURE TX  02/2016  
 repaired with hardware- Dr Eliseo Dockery  HX HYSTERECTOMY  1970s  HX OTHER SURGICAL Peg tube placement  HX PTCA  HX TRACHEOSTOMY Current Facility-Administered Medications Medication Dose Route Frequency  heparin 25,000 units in dextrose 500 mL infusion  12-25 Units/kg/hr IntraVENous TITRATE  aspirin delayed-release tablet 81 mg  81 mg Oral QHS  atorvastatin (LIPITOR) tablet 40 mg  40 mg Oral QHS  [START ON 12/13/2019] isosorbide mononitrate ER (IMDUR) tablet 60 mg  60 mg Oral DAILY  levothyroxine (SYNTHROID) tablet 50 mcg  50 mcg Oral ACB  [START ON 12/13/2019] magnesium oxide (MAG-OX) tablet 400 mg  400 mg Oral DAILY  pantoprazole (PROTONIX) tablet 40 mg  40 mg Oral ACB&D  potassium chloride (K-DUR, KLOR-CON) SR tablet 20 mEq  20 mEq Oral BID  ranolazine ER (RANEXA) tablet 500 mg  500 mg Oral BID  sodium chloride (NS) flush 5-40 mL  5-40 mL IntraVENous Q8H  
 sodium chloride (NS) flush 5-40 mL  5-40 mL IntraVENous PRN  
 nitroglycerin (NITROSTAT) tablet 0.4 mg  0.4 mg SubLINGual Q5MIN PRN  
 morphine injection 2 mg  2 mg IntraVENous Q4H PRN  
  acetaminophen (TYLENOL) tablet 650 mg  650 mg Oral Q4H PRN  
 HYDROcodone-acetaminophen (NORCO) 5-325 mg per tablet 1 Tab  1 Tab Oral Q4H PRN  
 ondansetron (ZOFRAN) injection 4 mg  4 mg IntraVENous Q4H PRN  potassium bicarb-citric acid (EFFER-K) tablet 40 mEq  40 mEq Oral NOW  nitroglycerin (Tridil) 200 mcg/ml infusion  0-20 mcg/min IntraVENous TITRATE  metoprolol tartrate (LOPRESSOR) tablet 25 mg  25 mg Oral TID Current Outpatient Medications Medication Sig  
 isosorbide mononitrate ER (IMDUR) 60 mg CR tablet Take 1 Tab by mouth daily.  GABAPENTIN PO Take  by mouth.  ranolazine ER (RANEXA) 500 mg SR tablet Take 1 Tab by mouth two (2) times a day.  potassium chloride (K-DUR, KLOR-CON) 20 mEq tablet Take 1 Tab by mouth two (2) times a day. (Patient taking differently: Take 10 mEq by mouth two (2) times a day.)  magnesium oxide (MAG-OX) 400 mg tablet Take 1 Tab by mouth daily.  apixaban (ELIQUIS) 5 mg tablet Take 1 Tab by mouth every twelve (12) hours. Indications: Treatment to Prevent Blood Clots in Chronic Atrial Fibrillation  ferrous sulfate (IRON) 325 mg (65 mg iron) tablet Take  by mouth Daily (before breakfast).  nitroglycerin (NITROSTAT) 0.4 mg SL tablet 1 Tab by SubLINGual route every five (5) minutes as needed for Chest Pain. Up to 3 doses.  omeprazole (PRILOSEC) 40 mg capsule Take 40 mg by mouth daily. Indications: gastroesophageal reflux disease, heartburn  HYDROcodone-acetaminophen (NORCO) 7.5-325 mg per tablet Take 1 Tab by mouth every six (6) hours as needed for Pain. Max Daily Amount: 4 Tabs.  ondansetron (ZOFRAN ODT) 8 mg disintegrating tablet Take 1 Tab by mouth every eight (8) hours as needed for Nausea.  levothyroxine (SYNTHROID) 50 mcg tablet Take  by mouth Daily (before breakfast).  acetaminophen (TYLENOL) 325 mg tablet Take 500 mg by mouth every six (6) hours as needed for Pain.  loperamide (IMODIUM) 2 mg capsule Take 2 mg by mouth as needed for Diarrhea.  aspirin delayed-release 81 mg tablet Take 81 mg by mouth nightly.  atorvastatin (LIPITOR) 40 mg tablet Take 1 Tab by mouth daily. Indications: HYPERCHOLESTEROLEMIA (Patient taking differently: Take 40 mg by mouth nightly. Indications: hypercholesterolemia) Corticosteroids (glucocorticoids) and Iodine Social History Socioeconomic History  Marital status:  Spouse name: Not on file  Number of children: Not on file  Years of education: Not on file  Highest education level: Not on file Tobacco Use  Smoking status: Former Smoker Packs/day: 1.00 Years: 20.00 Pack years: 20.00 Last attempt to quit: 1995 Years since quittin.9  Smokeless tobacco: Never Used Substance and Sexual Activity  Alcohol use: No  
 Drug use: No  
 
Social History Tobacco Use Smoking Status Former Smoker  Packs/day: 1.00  Years: 20.00  Pack years: 20.00  Last attempt to quit: 1995  Years since quittin.9 Smokeless Tobacco Never Used Family History Problem Relation Age of Onset  Breast Cancer Sister ROS: The patient has no difficulty with chest pain or shortness of breath. No fever or chills. Comprehensive review of systems was otherwise unremarkable except as noted above. Physical Exam:  
Visit Vitals /58 Pulse 69 Temp 98 °F (36.7 °C) Resp 23 Ht 5' 5\" (1.651 m) Wt 99 lb (44.9 kg) SpO2 99% BMI 16.47 kg/m² Constitutional: Alert, oriented, cooperative patient in no acute distress; appears stated age Eyes:Sclera are clear. EOMs intact ENMT: no external lesions gross hearing normal; no obvious neck masses, no ear or lip lesions, nares normal 
CV: RRR. Normal perfusion Resp: No JVD. Breathing is  non-labored; no audible wheezing. GI: soft and non-distended, nontender, active BSs Musculoskeletal: unremarkable with normal function. No embolic signs or cyanosis. Neuro:  Oriented; moves all 4; no focal deficits Psychiatric: normal affect and mood, no memory impairment Recent vitals (if inpt): 
Patient Vitals for the past 24 hrs: 
 BP Temp Pulse Resp SpO2 Height Weight 12/12/19 1104 126/58  69 23 99 %    
12/12/19 1053 111/57  68 16     
12/12/19 0937   78 17     
12/12/19 0925 149/70  76 18     
12/12/19 0851 140/73    98 %    
12/12/19 0825 127/70  69 16 98 %    
12/12/19 0810 155/72  72 16 99 %    
12/12/19 0333 126/58  73      
12/12/19 0332 126/58  71      
12/12/19 0249 142/67  73      
12/12/19 0025 143/70 98 °F (36.7 °C) 79 16 97 % 5' 5\" (1.651 m) 99 lb (44.9 kg) Labs: 
Recent Labs 12/12/19 
0809 12/12/19 
0036 WBC  --  13.9* HGB  --  12.7 PLT  --  236 NA  --  145  
K 4.1 3.1*  
CL  --  109* CO2  --  30  
BUN  --  20  
CREA  --  1.17* GLU  --  128* APTT  --  31.0 TBILI  --  0.3 SGOT  --  21 ALT  --  16  
AP  --  74  
TROIQ 0.28* 0.39* Lab Results Component Value Date/Time WBC 13.9 (H) 12/12/2019 12:36 AM  
 HGB 12.7 12/12/2019 12:36 AM  
 PLATELET 086 26/36/1913 12:36 AM  
 Sodium 145 12/12/2019 12:36 AM  
 Potassium 4.1 12/12/2019 08:09 AM  
 Chloride 109 (H) 12/12/2019 12:36 AM  
 CO2 30 12/12/2019 12:36 AM  
 BUN 20 12/12/2019 12:36 AM  
 Creatinine 1.17 (H) 12/12/2019 12:36 AM  
 Glucose 128 (H) 12/12/2019 12:36 AM  
 INR 1.0 08/26/2019 07:16 PM  
 aPTT 31.0 12/12/2019 12:36 AM  
 Bilirubin, total 0.3 12/12/2019 12:36 AM  
 Bilirubin, direct 0.1 08/19/2013 08:17 AM  
 AST (SGOT) 21 12/12/2019 12:36 AM  
 ALT (SGPT) 16 12/12/2019 12:36 AM  
 Alk. phosphatase 74 12/12/2019 12:36 AM  
 Lipase 339 08/26/2019 07:16 PM  
 Troponin-I, Qt. 0.28 (HH) 12/12/2019 08:09 AM  
 
 
I reviewed recent labs and recent radiologic studies. Abd xray 12/12 Findings: 1 upright and 2 supine abdomen views are submitted. There is no free 
air identified. There has been cholecystectomy. Both small bowel and colonic 
loops are seen but in the midline epigastrium there are clearly dilated small 
bowel loops again measuring up to greater than 5 cm in diameter. This pattern is 
similar to what was seen on the  view of the prior CT abdomen which showed 
a small bowel obstruction pattern. The colon and loops in the pelvis are 
nondistended. Properitoneal fat stripes are maintained. No suspicious mass or 
calcification seen. 
  
IMPRESSION IMPRESSION: Several stacked, focally dilated small bowel loops in the midabdomen 
again today with other bowel loops nondistended. Pattern again suspicious for mechanical small bowel obstruction. Small bowel 
loops measure up to approximately 5 cm caliber. No free air identified. I independently reviewed radiology images for studies I described above or studies I have ordered. Admission date (for inpatients): 12/12/2019 * No surgery found *  * No surgery found * ASSESSMENT/PLAN: 
Problem List  Date Reviewed: 12/12/2019 Codes Class Noted Hypomagnesemia ICD-10-CM: H72.06 
ICD-9-CM: 275.2  12/12/2019 PAF (paroxysmal atrial fibrillation) (HCC) (Chronic) ICD-10-CM: I48.0 ICD-9-CM: 427.31  12/12/2019 Partial small bowel obstruction ([facility] ICD-10-CM: K56.600 ICD-9-CM: 560.9  8/26/2019 Electrolyte abnormality ICD-10-CM: E87.8 ICD-9-CM: 276.9  8/26/2019 Overview Signed 8/26/2019  9:26 PM by Kami Serrano MD  
  Low Mg, K, Ca Systolic CHF, chronic (HCC) (Chronic) ICD-10-CM: K50.91 ICD-9-CM: 428.22, 428.0  5/14/2019 * (Principal) Unstable angina (HCC) ICD-10-CM: I20.0 ICD-9-CM: 411.1  6/16/2016 CAD (coronary artery disease) ICD-10-CM: I25.10 ICD-9-CM: 414.00  6/16/2016 Hypokalemia ICD-10-CM: E87.6 ICD-9-CM: 276.8  2/27/2016 Hypertension (Chronic) ICD-10-CM: I10 
ICD-9-CM: 401.9  2/27/2016 Principal Problem: 
  Unstable angina ([facility] (6/16/2016) Active Problems: Hypokalemia (2/27/2016) Hypertension (2/27/2016) CAD (coronary artery disease) (6/16/2016) Hypomagnesemia (12/12/2019) PAF (paroxysmal atrial fibrillation) ([facility] (12/12/2019) Continue care per primary team 
NPO Bowel rest 
Serial exams Follow up KUB in AM 
Replace Mag and K Follow labs Hopeful for conservative managment Signed:  Dixie Mclean NP

## 2019-12-13 PROBLEM — I21.4 NSTEMI (NON-ST ELEVATED MYOCARDIAL INFARCTION) (HCC): Status: ACTIVE | Noted: 2019-12-13

## 2019-12-13 PROBLEM — K56.609 SMALL BOWEL OBSTRUCTION (HCC): Status: ACTIVE | Noted: 2019-12-13

## 2019-12-13 LAB
ANION GAP SERPL CALC-SCNC: 5 MMOL/L (ref 7–16)
APTT PPP: 87 SEC (ref 24.7–39.8)
APTT PPP: >200 SEC (ref 24.7–39.8)
BUN SERPL-MCNC: 22 MG/DL (ref 8–23)
CALCIUM SERPL-MCNC: 7.5 MG/DL (ref 8.3–10.4)
CHLORIDE SERPL-SCNC: 111 MMOL/L (ref 98–107)
CHOLEST SERPL-MCNC: 58 MG/DL
CO2 SERPL-SCNC: 28 MMOL/L (ref 21–32)
CREAT SERPL-MCNC: 0.96 MG/DL (ref 0.6–1)
ERYTHROCYTE [DISTWIDTH] IN BLOOD BY AUTOMATED COUNT: 16.5 % (ref 11.9–14.6)
GLUCOSE SERPL-MCNC: 81 MG/DL (ref 65–100)
HCT VFR BLD AUTO: 34 % (ref 35.8–46.3)
HDLC SERPL-MCNC: 46 MG/DL (ref 40–60)
HDLC SERPL: 1.3 {RATIO}
HGB BLD-MCNC: 10.7 G/DL (ref 11.7–15.4)
LDLC SERPL CALC-MCNC: 1.6 MG/DL
LIPID PROFILE,FLP: NORMAL
MCH RBC QN AUTO: 29.9 PG (ref 26.1–32.9)
MCHC RBC AUTO-ENTMCNC: 31.5 G/DL (ref 31.4–35)
MCV RBC AUTO: 95 FL (ref 79.6–97.8)
NRBC # BLD: 0 K/UL (ref 0–0.2)
PLATELET # BLD AUTO: 202 K/UL (ref 150–450)
PMV BLD AUTO: 11.2 FL (ref 9.4–12.3)
POTASSIUM SERPL-SCNC: 4.9 MMOL/L (ref 3.5–5.1)
RBC # BLD AUTO: 3.58 M/UL (ref 4.05–5.2)
SODIUM SERPL-SCNC: 144 MMOL/L (ref 136–145)
TRIGL SERPL-MCNC: 52 MG/DL (ref 35–150)
VLDLC SERPL CALC-MCNC: 10.4 MG/DL (ref 6–23)
WBC # BLD AUTO: 9.1 K/UL (ref 4.3–11.1)

## 2019-12-13 PROCEDURE — 85730 THROMBOPLASTIN TIME PARTIAL: CPT

## 2019-12-13 PROCEDURE — 65660000000 HC RM CCU STEPDOWN

## 2019-12-13 PROCEDURE — 74011250637 HC RX REV CODE- 250/637: Performed by: INTERNAL MEDICINE

## 2019-12-13 PROCEDURE — 74011250637 HC RX REV CODE- 250/637: Performed by: NURSE PRACTITIONER

## 2019-12-13 PROCEDURE — 96366 THER/PROPH/DIAG IV INF ADDON: CPT

## 2019-12-13 PROCEDURE — 80048 BASIC METABOLIC PNL TOTAL CA: CPT

## 2019-12-13 PROCEDURE — 99218 HC RM OBSERVATION: CPT

## 2019-12-13 PROCEDURE — 85027 COMPLETE CBC AUTOMATED: CPT

## 2019-12-13 PROCEDURE — 36415 COLL VENOUS BLD VENIPUNCTURE: CPT

## 2019-12-13 PROCEDURE — 80061 LIPID PANEL: CPT

## 2019-12-13 RX ORDER — METOPROLOL TARTRATE 25 MG/1
12.5 TABLET, FILM COATED ORAL EVERY 12 HOURS
Status: DISCONTINUED | OUTPATIENT
Start: 2019-12-14 | End: 2019-12-14 | Stop reason: HOSPADM

## 2019-12-13 RX ADMIN — ASPIRIN 81 MG: 81 TABLET ORAL at 22:56

## 2019-12-13 RX ADMIN — Medication 5 ML: at 06:22

## 2019-12-13 RX ADMIN — ISOSORBIDE MONONITRATE 60 MG: 60 TABLET, EXTENDED RELEASE ORAL at 08:57

## 2019-12-13 RX ADMIN — Medication 5 ML: at 22:57

## 2019-12-13 RX ADMIN — PANTOPRAZOLE SODIUM 40 MG: 40 TABLET, DELAYED RELEASE ORAL at 07:37

## 2019-12-13 RX ADMIN — POTASSIUM CHLORIDE 20 MEQ: 20 TABLET, EXTENDED RELEASE ORAL at 08:57

## 2019-12-13 RX ADMIN — RANOLAZINE 500 MG: 500 TABLET, FILM COATED, EXTENDED RELEASE ORAL at 08:57

## 2019-12-13 RX ADMIN — ATORVASTATIN CALCIUM 40 MG: 40 TABLET, FILM COATED ORAL at 22:56

## 2019-12-13 RX ADMIN — Medication 10 ML: at 14:33

## 2019-12-13 RX ADMIN — Medication 400 MG: at 08:57

## 2019-12-13 RX ADMIN — APIXABAN 5 MG: 5 TABLET, FILM COATED ORAL at 17:37

## 2019-12-13 RX ADMIN — POTASSIUM CHLORIDE 20 MEQ: 20 TABLET, EXTENDED RELEASE ORAL at 17:37

## 2019-12-13 RX ADMIN — LEVOTHYROXINE SODIUM 50 MCG: 50 TABLET ORAL at 07:37

## 2019-12-13 RX ADMIN — METOPROLOL TARTRATE 25 MG: 25 TABLET ORAL at 16:58

## 2019-12-13 RX ADMIN — PANTOPRAZOLE SODIUM 40 MG: 40 TABLET, DELAYED RELEASE ORAL at 16:58

## 2019-12-13 RX ADMIN — RANOLAZINE 500 MG: 500 TABLET, FILM COATED, EXTENDED RELEASE ORAL at 17:37

## 2019-12-13 NOTE — PROGRESS NOTES
Nutrition Reason for Nutrition Assessment: BPA: Malnutrition Screening Tool Recently Lost Weight Without Trying: Yes If Yes, How Much Weight Loss: 2-13 lbs Assessment:  
Food/Nutrition Patient History:  Patient was admitted with chest pain associated with nausea and vomiting x 3 episodes. Chest xray incidentally showed some dilated bowl loops. Abdominal xray showed possible concern for SBO. Noted that she has refused surgery if it were recommended and refuses NGT. She has PMH significant for CAD, CABG, CHF, GERD, hypotyroidism, hyperlipidemia, HTN, MI, IBS, rectal prolapse s/p repair. She was seen with daughter at bedside who provides most of history. Daughter reports that weight has been trending down since July when she had rectal prolapse. She states that she weight ~140 lbs 6-8 months ago, but with in the last few months has lost from ~114 lbs to current weight of 94 lbs. Patient states that she just does not have an appetite and eats very small volumes. She states that she is drinking Boost at home, but only drinks about 1 per day. DIET NPO Anthropometrics:Height: 5' 4\" (162.6 cm),  Weight: 43.4 kg (95 lb 11.2 oz), Weight Source: Standing scale (comment), Body mass index is 16.43 kg/m². BMI class of  Underweight. Per EMR outpatient office visit with cardiology 7/2/2019, weight was 110 lbs (50.1 kg). This reflects a 15 lbs weight loss (14%) in 5 months which is clinically significant. Macronutrient needs: 43.4 kg (using CBW (Current body weight) standing scale) EER:  1587-4985 kcal /day (28-33 kcal/kg) EPR:  52-56 grams protein/day (1.2-1.3 grams/kg) Intake/Comparative Standards: Patient is NPO which does not meet EEN of EPN Nutrition Diagnosis:  
Unintended weight loss related to decreased ability to consume sufficient energy as evidenced by patient reported barrier to PO intake and diet recall, 14% weight loss in ~5 months. Intervention: Meals and snacks: Advance diet as medically appropriate. If patient remains NPO/clear liquids for prolonged time (>5 days), would recommend nutrition support for primary needs. Nutrition supplement therapy: Will consider if/when diet advanced Discharge Nutrition Plan: Too soon to determine. Addendum: Noted that patient has been advanced to clear liquids since RD assessment. Will add Ensure Clear to trays. 150 [facility] [address] [place] [address], [address], [address] [facility]

## 2019-12-13 NOTE — PROGRESS NOTES
Shift change report received from William Low, [facility] (off going nurse). Plan of care reviewed with patient at bedside. Opportunity to ask questions provided. Denies needs at this time. Bed low and locked with call light within reach. Patient instructed to call for assistance. Patient verbalized understanding. Heparin gtt verified at bedside. Pt denies CP at this time.

## 2019-12-13 NOTE — PROGRESS NOTES
Bedside and Verbal shift change report given to self (oncoming nurse) by Carolina Siddiqui (offgoing nurse). Report included the following information SBAR, Kardex and MAR.

## 2019-12-13 NOTE — PROGRESS NOTES
Bedside and Verbal shift change report given to [address] [place] [address] (oncoming nurse) by Romeo Alonso (offgoing nurse). Report included the following information SBAR, Kardex and MAR.

## 2019-12-13 NOTE — PROGRESS NOTES
Bedside and Verbal shift change report given to Cathleen Leyden, RN (oncoming nurse) by self (offgoing nurse). Report included the following information SBAR, Kardex, MAR and Recent Results. Heparin gtt verified at bedside. Nitro gtt currently infusing. Pt denies chest pain at this time. Pt alert and oriented to person, place, and time. Pt unable to verbalize original reason for coming to hospital. Family at bedside after pt became agitated last night, stating her grandchildren and  were in the room next. Bed alarm placed last night for safety.

## 2019-12-13 NOTE — PROGRESS NOTES
Care Management Interventions PCP Verified by CM: Yes Last Visit to PCP: 12/05/19 Mode of Transport at Discharge: Other (see comment)(Boone Briones Spouse [phone] ) Transition of Care Consult (CM Consult): Discharge Planning, Home Health [facility]: No 
Reason Outside [place]: Patient already serviced by other home care/hospice agency Discharge Durable Medical Equipment: No 
Physical Therapy Consult: No 
Occupational Therapy Consult: No 
Speech Therapy Consult: No 
Current Support Network: Lives with Spouse, Own Home Confirm Follow Up Transport: Family Plan discussed with Pt/Family/Caregiver: Yes Freedom of Choice Offered: Yes Discharge Location Discharge Placement: Home with home health Pt admitted to 3rd floor [facility] for  Unstable angina. CM met with pt to discuss CM needs & DCP. Pt is A&Ox4. Pt is indep at home with all ADLS. Pt has a private caregiver 3h/day. Pt lives with spouse-currently in hospice care. Pt has walker, cane, WC; no further DME needs. Pt has no difficulty with obtaining medications or transport. Pt has had [facility] in the past would like to resume. Order and referral sent. DCP home with [facility]. CM to continue to monitor. The Plan for Transition of Care is related to the following treatment goals: [place] The Patient and/or patient representative was provided with a choice of provider and agrees  
with the discharge plan. [x] Yes [] No 
 
Freedom of choice list was provided with basic dialogue that supports the patient's individualized plan of care/goals, treatment preferences and shares the quality data associated with the providers.  [x] Yes [] No

## 2019-12-13 NOTE — PROGRESS NOTES
Subjective:  
 
Patient has complaints of hunger. Per daughter, confused due to poor sleep last night. Pt thinks heparin making her drowsy. Daughter has been here since midnight- has not noted pt passing any flatus Objective:  
 
Visit Vitals /56 (BP Patient Position: At rest) Pulse 60 Temp 97.4 °F (36.3 °C) Resp 16 Ht 5' 4\" (1.626 m) Wt 95 lb 11.2 oz (43.4 kg) SpO2 98% BMI 16.43 kg/m²  
; Temp (24hrs), Av.9 °F (36.6 °C), Min:97 °F (36.1 °C), Max:98.6 °F (37 °C) I/O reviewed in graphics Physical Exam:  
 General-in no distress. Lungs-CTA bilaterally without rales, rhonchi, or wheezes. Bases are diminished. Respiratory effort isDecreased Heart- Regular rate and rhythm Abdomen-distention decreased  from previous exam.  Bowel sounds are hypoactive, non-obstructive . There is no tenderness in the entire abdomen Labs:  
Recent Results (from the past 24 hour(s)) TROPONIN I Collection Time: 19  2:36 PM  
Result Value Ref Range Troponin-I, Qt. 0.27 (HH) 0.02 - 0.05 NG/ML  
PTT Collection Time: 19  7:10 PM  
Result Value Ref Range aPTT 39.2 24.7 - 39.8 SEC  
LIPID PANEL Collection Time: 19  4:08 AM  
Result Value Ref Range LIPID PROFILE Cholesterol, total 58 <200 MG/DL Triglyceride 52 35 - 150 MG/DL  
 HDL Cholesterol 46 40 - 60 MG/DL  
 LDL, calculated 1.6 <100 MG/DL VLDL, calculated 10.4 6.0 - 23.0 MG/DL  
 CHOL/HDL Ratio 1.3 METABOLIC PANEL, BASIC Collection Time: 19  4:08 AM  
Result Value Ref Range Sodium 144 136 - 145 mmol/L Potassium 4.9 3.5 - 5.1 mmol/L Chloride 111 (H) 98 - 107 mmol/L  
 CO2 28 21 - 32 mmol/L Anion gap 5 (L) 7 - 16 mmol/L Glucose 81 65 - 100 mg/dL BUN 22 8 - 23 MG/DL Creatinine 0.96 0.6 - 1.0 MG/DL  
 GFR est AA >60 >60 ml/min/1.73m2 GFR est non-AA >60 >60 ml/min/1.73m2  Calcium 7.5 (L) 8.3 - 10.4 MG/DL  
CBC W/O DIFF  
 Collection Time: 12/13/19  4:08 AM  
Result Value Ref Range WBC 9.1 4.3 - 11.1 K/uL  
 RBC 3.58 (L) 4.05 - 5.2 M/uL  
 HGB 10.7 (L) 11.7 - 15.4 g/dL HCT 34.0 (L) 35.8 - 46.3 % MCV 95.0 79.6 - 97.8 FL  
 MCH 29.9 26.1 - 32.9 PG  
 MCHC 31.5 31.4 - 35.0 g/dL  
 RDW 16.5 (H) 11.9 - 14.6 % PLATELET 844 091 - 594 K/uL MPV 11.2 9.4 - 12.3 FL ABSOLUTE NRBC 0.00 0.0 - 0.2 K/uL PTT Collection Time: 12/13/19  4:08 AM  
Result Value Ref Range aPTT >200.0 (HH) 24.7 - 39.8 SEC  
PTT Collection Time: 12/13/19 12:22 PM  
Result Value Ref Range aPTT 87.0 (H) 24.7 - 39.8 SEC Data Review - Assessment:  
 
Principal Problem: 
  Unstable angina ([facility] (6/16/2016) Active Problems: Hypokalemia (2/27/2016) Hypertension (2/27/2016) CAD (coronary artery disease) (6/16/2016) Hypomagnesemia (12/12/2019) PAF (paroxysmal atrial fibrillation) ([facility] (12/12/2019) Small bowel obstruction ([facility] (12/13/2019) NSTEMI (non-ST elevated myocardial infarction) ([facility] (12/13/2019) Plan/Recommendations/Medical Decision Making:  
 
Trial of clear liquids If tolerates, she could be discharged as early as tomorrow- would recommend staying on clear liquids/full liquids at least 2-3 days at home Pt has previously stated refusal of NG or any recommended surgical intervention. She should be admitted to medical service for any future SBO presentations.

## 2019-12-13 NOTE — MED STUDENT NOTES
am 
12/13/2019 7:32 AM 
 
Admit Date: 12/12/2019 Admit Diagnosis: Unstable angina ([facility] [I20.0] Subjective:  
 Patient is a 65 yo female who presented to the ED with small bowel obstruction and chest pain. Her troponins were elevated. She has a history of CAD, CABG, and small vessel disease. She was not taking her medicines as prescribed for the past week. Decision was made to treat her medically. She does not want any surgery or NGT for the SBO. 
 
12/13 - Ok this am, tired. No chest pain, dyspnea, palpitations. Objective:  
  
Visit Vitals /54 (BP 1 Location: Left arm, BP Patient Position: At rest) Pulse 60 Temp 98.6 °F (37 °C) Resp 20 Ht 5' 4\" (1.626 m) Wt 95 lb 11.2 oz (43.4 kg) SpO2 95% BMI 16.43 kg/m² ROS:  General ROS: negative for - chills Hematological and Lymphatic ROS: negative for - blood clots or jaundice Respiratory ROS: no cough, shortness of breath, or wheezing Cardiovascular ROS: no chest pain or dyspnea on exertion Gastrointestinal ROS: no abdominal pain, change in bowel habits, or black or bloody stools Neurological ROS: no TIA or stroke symptoms Physical Exam: 
 
Physical Examination: General appearance - alert, well appearing, and in no distress Mental status - alert, oriented to person, place, and time Eyes - pupils equal and reactive, extraocular eye movements intact Neck/lymph - supple, no significant adenopathy Chest/CV - clear to auscultation, no wheezes, rales or rhonchi, symmetric air entry Heart - normal rate, regular rhythm, normal S1, S2, no murmurs, rubs, clicks or gallops Abdomen/GI - soft, nontender, nondistended, no masses or organomegaly Musculoskeletal - no joint tenderness, deformity or swelling Extremities - peripheral pulses normal, no pedal edema, no clubbing or cyanosis Skin - normal coloration and turgor, no rashes, no suspicious skin lesions noted Current Facility-Administered Medications Medication Dose Route Frequency  heparin 25,000 units in dextrose 500 mL infusion  12-25 Units/kg/hr IntraVENous TITRATE  aspirin delayed-release tablet 81 mg  81 mg Oral QHS  atorvastatin (LIPITOR) tablet 40 mg  40 mg Oral QHS  isosorbide mononitrate ER (IMDUR) tablet 60 mg  60 mg Oral DAILY  levothyroxine (SYNTHROID) tablet 50 mcg  50 mcg Oral ACB  magnesium oxide (MAG-OX) tablet 400 mg  400 mg Oral DAILY  pantoprazole (PROTONIX) tablet 40 mg  40 mg Oral ACB&D  potassium chloride (K-DUR, KLOR-CON) SR tablet 20 mEq  20 mEq Oral BID  ranolazine ER (RANEXA) tablet 500 mg  500 mg Oral BID  sodium chloride (NS) flush 5-40 mL  5-40 mL IntraVENous Q8H  
 sodium chloride (NS) flush 5-40 mL  5-40 mL IntraVENous PRN  
 nitroglycerin (NITROSTAT) tablet 0.4 mg  0.4 mg SubLINGual Q5MIN PRN  
 morphine injection 2 mg  2 mg IntraVENous Q4H PRN  
 acetaminophen (TYLENOL) tablet 650 mg  650 mg Oral Q4H PRN  
 HYDROcodone-acetaminophen (NORCO) 5-325 mg per tablet 1 Tab  1 Tab Oral Q4H PRN  
 ondansetron (ZOFRAN) injection 4 mg  4 mg IntraVENous Q4H PRN  
 nitroglycerin (Tridil) 200 mcg/ml infusion  0-20 mcg/min IntraVENous TITRATE  metoprolol tartrate (LOPRESSOR) tablet 25 mg  25 mg Oral TID Data Review:  
@LABRCNT(Na,K,BUN,CREA,WBC,HGB,HCT,PLT,INR,TRP,TCHOL*,Triglyceride*,LDL*,LDLCPOC HDL*,HDL])@ TELEMETRY:  
 
Assessment/Plan:  
 
Principal Problem: 
  Unstable angina ([address] (6/16/2016) Echo showed that her systolic function was normal. Ejection fraction was estimated in the range of 45 % to 50 %. There was mild diffuse hypokinesis Troponins trending down Continue ASA, Ranexa, Imdur, lopressor and statin therapy Active Problems: Hypokalemia (2/27/2016) Resolved Hypertension (2/27/2016) Continue current therapy CAD (coronary artery disease) (6/16/2016) Continue current therapy as above Hypomagnesemia (12/12/2019) Resolved - overcorrected PAF (paroxysmal atrial fibrillation) ([facility] (12/12/2019) Restart eliquis Discontinue heparin Consider transfer to hospitalist service or GI as we are primary and SBO is not in our scope of specialty. She is not gong to have any further cardiac workup. Ladarius Bonner

## 2019-12-14 VITALS
HEART RATE: 59 BPM | HEIGHT: 64 IN | WEIGHT: 94 LBS | TEMPERATURE: 97.3 F | BODY MASS INDEX: 16.05 KG/M2 | DIASTOLIC BLOOD PRESSURE: 48 MMHG | SYSTOLIC BLOOD PRESSURE: 94 MMHG | RESPIRATION RATE: 17 BRPM | OXYGEN SATURATION: 96 %

## 2019-12-14 PROBLEM — R77.8 ELEVATED TROPONIN I LEVEL: Status: ACTIVE | Noted: 2019-12-13

## 2019-12-14 PROCEDURE — 74011250637 HC RX REV CODE- 250/637: Performed by: NURSE PRACTITIONER

## 2019-12-14 RX ORDER — METOPROLOL TARTRATE 25 MG/1
12.5 TABLET, FILM COATED ORAL EVERY 12 HOURS
Qty: 30 TAB | Refills: 5 | Status: SHIPPED | OUTPATIENT
Start: 2019-12-14 | End: 2020-01-10

## 2019-12-14 RX ADMIN — POTASSIUM CHLORIDE 20 MEQ: 20 TABLET, EXTENDED RELEASE ORAL at 08:59

## 2019-12-14 RX ADMIN — APIXABAN 5 MG: 5 TABLET, FILM COATED ORAL at 06:21

## 2019-12-14 RX ADMIN — ISOSORBIDE MONONITRATE 60 MG: 60 TABLET, EXTENDED RELEASE ORAL at 09:00

## 2019-12-14 RX ADMIN — LEVOTHYROXINE SODIUM 50 MCG: 50 TABLET ORAL at 06:23

## 2019-12-14 RX ADMIN — PANTOPRAZOLE SODIUM 40 MG: 40 TABLET, DELAYED RELEASE ORAL at 06:23

## 2019-12-14 RX ADMIN — METOPROLOL TARTRATE 12.5 MG: 25 TABLET ORAL at 09:00

## 2019-12-14 RX ADMIN — Medication 400 MG: at 09:00

## 2019-12-14 RX ADMIN — RANOLAZINE 500 MG: 500 TABLET, FILM COATED, EXTENDED RELEASE ORAL at 09:00

## 2019-12-14 RX ADMIN — Medication 5 ML: at 06:24

## 2019-12-14 NOTE — PROGRESS NOTES
Verbal bedside report received from Anaya Cabrales, offgoing RN. Patient's situation, background, assessment and recommendations were provided. Kardex, Mar, and recent results also reviewed. Opportunity for questions provided. Assumed care of patient.

## 2019-12-14 NOTE — DISCHARGE SUMMARY
[facility] Discharge Summary Patient ID: Michell Perkins 
[id number] 
41 y.o. 
1945 Admit date: 12/12/2019 Discharge date and time:  12/14/19 Admitting Physician: Helen Reynolds MD  
 
Discharge Physician: Victor M Garcia NP/Dr. Ashli Bang Admission Diagnoses: Unstable angina ([address] [I20.0] NSTEMI (non-ST elevated myocardial infarction) ([address] [I21.4] Small bowel obstruction ([address] [F82.682] Discharge Diagnoses:  
 Diagnosis  Small bowel obstruction ([address]  Elevated troponin I level  Hypomagnesemia  PAF (paroxysmal atrial fibrillation) ([address]  Partial small bowel obstruction ([address]  Electrolyte abnormality  Systolic CHF, chronic ([address]  Unstable angina (HCC)  CAD (coronary artery disease)  Hypokalemia  Hypertension Cardiology Procedures this admission: Echo, CXR, ABD XR Consults: General Surgery Hospital Course: The patient came to the ED by EMS with complaints of CP that has been on going for 2-3 days. The pain was resolved with Nitro. She was also noted to have SOB with N/V for one week and has not been taking her medications including Eliquis, Renexa, ASA, and Imdur. She as found to have an elevated Trop I peaking at 0.39 which trended down when started back on medications. She was also noted to have Hypokalemia and hypomagnesemia which were replaced. Abd KUB found a SBO which the patient declined surgical treatment or NG tube. General surgery saw the patient with recommendations for clear liquids 2-4 days after discharge and to stay away from dry fruits and nuts when progressing diet. Conservative management was continued for the patient with no ongoing CP noted during the stay with elevated Trop thought to be demand ischemia in the setting of medication none compliance due to illness, electrolyte imbalance, and SBO. Echo Showed:  -  Left ventricle: Systolic function was normal. Ejection fraction was estimated in the range of 45 % to 50 %. There was mild diffuse hypokinesis. 
  
-  Left atrium: Not well visualized. 
  
-  Aortic valve: There was mild regurgitation. 
  
-  Tricuspid valve: There was mild regurgitation. The day of discharge Pt's labs were WNL for patients baseline. Pt was seen and examined by Dr. Otilio Nair and determined stable and ready for discharge. She will be discharged on home meds including ASA, OAC statin, BB, and Long acting nitrates. She was instructed in the importance of medication compliance and seeing medical attention if she is to sick to take her medications. Pt was instructed to follow discharge instructions given by nursing staff. The patient will have a BMP and Mag Drawn in one week. DISPOSITION: The patient is being discharged home in stable condition on a low saturated fat, low cholesterol and low salt diet. Pt is instructed to advance activities as tolerated limited to fatigue or shortness of breath. Discharge Exam:  
Visit Vitals BP 94/48 Pulse (!) 59 Temp 97.3 °F (36.3 °C) Resp 17 Ht 5' 4\" (1.626 m) Wt 42.6 kg (94 lb) SpO2 96% BMI 16.14 kg/m² Pt has been seen by Yessenia: see his progress note for exam details. Recent Results (from the past 24 hour(s)) PTT Collection Time: 12/13/19 12:22 PM  
Result Value Ref Range aPTT 87.0 (H) 24.7 - 39.8 SEC Patient Instructions:  
 
Current Discharge Medication List  
  
START taking these medications Details  
metoprolol tartrate (LOPRESSOR) 25 mg tablet Take 0.5 Tabs by mouth every twelve (12) hours. Qty: 30 Tab, Refills: 5 CONTINUE these medications which have NOT CHANGED Details  
isosorbide mononitrate ER (IMDUR) 60 mg CR tablet Take 1 Tab by mouth daily. Qty: 90 Tab, Refills: 3 GABAPENTIN PO Take  by mouth. ranolazine ER (RANEXA) 500 mg SR tablet Take 1 Tab by mouth two (2) times a day. Qty: 60 Tab, Refills: 5 potassium chloride (K-DUR, KLOR-CON) 20 mEq tablet Take 1 Tab by mouth two (2) times a day. Qty: 60 Tab, Refills: 1  
  
magnesium oxide (MAG-OX) 400 mg tablet Take 1 Tab by mouth daily. Qty: 30 Tab, Refills: 2  
  
apixaban (ELIQUIS) 5 mg tablet Take 1 Tab by mouth every twelve (12) hours. Indications: Treatment to Prevent Blood Clots in Chronic Atrial Fibrillation 
Qty: 60 Tab, Refills: 2  
  
ferrous sulfate (IRON) 325 mg (65 mg iron) tablet Take  by mouth Daily (before breakfast). nitroglycerin (NITROSTAT) 0.4 mg SL tablet 1 Tab by SubLINGual route every five (5) minutes as needed for Chest Pain. Up to 3 doses. Qty: 1 Bottle, Refills: 3  
  
omeprazole (PRILOSEC) 40 mg capsule Take 40 mg by mouth daily. Indications: gastroesophageal reflux disease, heartburn HYDROcodone-acetaminophen (NORCO) 7.5-325 mg per tablet Take 1 Tab by mouth every six (6) hours as needed for Pain. Max Daily Amount: 4 Tabs. Qty: 17 Tab, Refills: 0 Associated Diagnoses: Closed compression fracture of thoracic vertebra, initial encounter ([facility])  
  
ondansetron (ZOFRAN ODT) 8 mg disintegrating tablet Take 1 Tab by mouth every eight (8) hours as needed for Nausea. Qty: 12 Tab, Refills: 0 Associated Diagnoses: Non-intractable vomiting with nausea, unspecified vomiting type  
  
levothyroxine (SYNTHROID) 50 mcg tablet Take  by mouth Daily (before breakfast). acetaminophen (TYLENOL) 325 mg tablet Take 500 mg by mouth every six (6) hours as needed for Pain. loperamide (IMODIUM) 2 mg capsule Take 2 mg by mouth as needed for Diarrhea. aspirin delayed-release 81 mg tablet Take 81 mg by mouth nightly. atorvastatin (LIPITOR) 40 mg tablet Take 1 Tab by mouth daily. Indications: HYPERCHOLESTEROLEMIA Qty: 90 Tab, Refills: 3 Associated Diagnoses: Coronary artery disease involving native heart without angina pectoris, unspecified vessel or lesion type Signed: 
Tierney Medley NP 
12/14/2019 8:56 AM

## 2019-12-14 NOTE — PROGRESS NOTES
Pt originally came in with CP and found to have a small bowel obstruction. Home doses of Imdur and Renexa have been restarted. Lopressor 25 mg TID was ordered yesterday. Pt pressures have been low 100s to 90s all day. She missed her afternoon dose since her HR has been 50s-60s. Her last two pressures have been low 90s. Home medications and blood pressures discussed with daughter and she reports pt has a history of hypotension. Shar Forbes NP, paged. Lopressor dose adjusted to 12.5 mg BID with parameters. Lopressor 25 mg dose held tonight.

## 2019-12-14 NOTE — PROGRESS NOTES
Bedside and Verbal shift change report given to Laura Velazquez RN (oncoming nurse) by self Jayesh Valdezing ). Report included the following information SBAR, Kardex, MAR and Recent Results.

## 2019-12-14 NOTE — PROGRESS NOTES
Subjective:  
 
Patient awake in bed and eating breakfast of clear liquids. No complaints and wants to go home. Tolerating Clear liquids. No nausea or vomiting. Objective:  
 
Visit Vitals BP 94/48 Pulse (!) 59 Temp 97.3 °F (36.3 °C) Resp 17 Ht 5' 4\" (1.626 m) Wt 94 lb (42.6 kg) SpO2 96% BMI 16.14 kg/m²  
; Temp (24hrs), Av.7 °F (36.5 °C), Min:97.3 °F (36.3 °C), Max:98.5 °F (36.9 °C) I/O reviewed in graphics Physical Exam:  
 General-in no distress. Lungs-CTA bilaterally without rales, rhonchi, or wheezes. Bases are diminished. Respiratory effort is Decreased Heart- Regular rate and rhythm Abdomen-Soft, non-tender. Labs:  
Recent Results (from the past 24 hour(s)) PTT Collection Time: 19 12:22 PM  
Result Value Ref Range aPTT 87.0 (H) 24.7 - 39.8 SEC Data Review - Assessment:  
 
Principal Problem: 
  Unstable angina ([address] (2016) Active Problems: Hypokalemia (2016) Hypertension (2016) CAD (coronary artery disease) (2016) Hypomagnesemia (2019) PAF (paroxysmal atrial fibrillation) ([address] (2019) Small bowel obstruction ([address] (2019) NSTEMI (non-ST elevated myocardial infarction) ([facility] (2019) Plan/Recommendations/Medical Decision Making:  
 
Pt has tolerated Clear liquids. Ok to discharge from surgical perspective - would recommend staying on clear liquids/full liquids at least 2-3 days at home. Avoid hard to digest roughage afterwards (dried fruits, nuts, popcorn, hard raw vegetables and hard raw fruits with skin). No surgical follow up indicated as pt has refused surgical intervention. Pt has previously stated refusal of NG or any recommended surgical intervention. She should be admitted to medical service for any future SBO presentations. I have personally performed a face-to-face diagnostic evaluation and management  service on this patient. I have independently seen the patient. I have independently obtained the above history from the patient/family. I have independently examined the patient with above findings. I have independently reviewed data/labs for this patient and developed the above plan of care (MDM).  
Signed: Sherry Kent MD, FACS

## 2019-12-14 NOTE — PROGRESS NOTES
Pt is for discharge home today with [facility] for follow up care as ordered. Care Management Interventions PCP Verified by CM: Yes Last Visit to PCP: 12/05/19 Mode of Transport at Discharge: Other (see comment)(Boone Briones Spouse [phone] ) Transition of Care Consult (CM Consult): Discharge Planning, Home Health [address] [place] [address] Road: No 
Reason Outside [place]: Patient already serviced by other home care/hospice agency Discharge Durable Medical Equipment: No 
Physical Therapy Consult: No 
Occupational Therapy Consult: No 
Speech Therapy Consult: No 
Current Support Network: Lives with Spouse, Own Home Confirm Follow Up Transport: Family Plan discussed with Pt/Family/Caregiver: Yes Freedom of Choice Offered: Yes Discharge Location Discharge Placement: Home with home health

## 2019-12-14 NOTE — PROGRESS NOTES
START taking these medications  
  Details  
metoprolol tartrate (LOPRESSOR) 25 mg tablet Discharge instructions reviewed with patient and daughter. Prescriptions given for see above and med info sheets provided for all new medications. Opportunity for questions provided. Patient and daughter voiced understanding of all discharge instructions. IV and monitor out at time of d/c.

## 2019-12-14 NOTE — PROGRESS NOTES
Shift change report received from [facility] (off going nurse). Plan of care reviewed with patient at bedside. Opportunity to ask questions provided. Denies needs at this time. Bed low and locked with call light within reach. Patient instructed to call for assistance. Patient verbalized understanding. Bed alarm on. Family at bedside.

## 2019-12-14 NOTE — DISCHARGE INSTRUCTIONS
Patient Education   Patient Education     Clear Liquids 2-3 Days. No fruits/nuts. DISPOSITION: The patient is being discharged home in stable condition on a low saturated fat, low cholesterol and low salt diet. Pt is instructed to advance activities as tolerated limited to fatigue or shortness of breath. Angina: Care Instructions  Your Care Instructions    You have a problem called angina. Angina happens when there is not enough blood flow to your heart muscle. Angina is a sign of coronary artery disease (CAD). CAD occurs when blood vessels that supply the heart become narrowed. Having CAD increases your risk of a heart attack. Chest pain or pressure is the most common symptom of angina. But some people have other symptoms, like:  · Pain, pressure, or a strange feeling in the back, neck, jaw, or upper belly, or in one or both shoulders or arms. · Shortness of breath. · Nausea or vomiting. · Lightheadedness or sudden weakness. · Fast or irregular heartbeat. Women are somewhat more likely than men to have angina symptoms like shortness of breath, nausea, and back or jaw pain. Angina can be dangerous. That's why it is important to pay attention to your symptoms. Know what is typical for you, learn how to control your symptoms, and understand when you need to get treatment. A change in your usual pattern of symptoms is an emergency. It may mean that you are having a heart attack. The doctor has checked you carefully, but problems can develop later. If you notice any problems or new symptoms, get medical treatment right away. Follow-up care is a key part of your treatment and safety. Be sure to make and go to all appointments, and call your doctor if you are having problems. It's also a good idea to know your test results and keep a list of the medicines you take. How can you care for yourself at home?   Medicines    · If your doctor has given you nitroglycerin for angina symptoms, keep it with you at all times. If you have symptoms, sit down and rest, and take the first dose of nitroglycerin as directed. If your symptoms get worse or are not getting better within 5 minutes, call 911 right away. Stay on the phone. The emergency  will give you further instructions.     · If your doctor advises it, take 1 low-dose aspirin a day to prevent heart attack.     · Be safe with medicines. Take your medicines exactly as prescribed. Call your doctor if you think you are having a problem with your medicine. You will get more details on the specific medicines your doctor prescribes.    Lifestyle changes    · Do not smoke. If you need help quitting, talk to your doctor about stop-smoking programs and medicines. These can increase your chances of quitting for good.     · Eat a heart-healthy diet that is low in saturated fat and salt, and is high in fiber. Talk to your doctor or a dietitian about healthy eating.     · Stay at a healthy weight. Or lose weight if you need to. Activity    · Talk to your doctor about a level of activity that is safe for you.     · If an activity causes angina symptoms, stop and rest.   When should you call for help? Call 911 anytime you think you may need emergency care. For example, call if:    · You passed out (lost consciousness).     · You have symptoms of a heart attack. These may include:  ? Chest pain or pressure, or a strange feeling in the chest.  ? Sweating. ? Shortness of breath. ? Nausea or vomiting. ? Pain, pressure, or a strange feeling in the back, neck, jaw, or upper belly or in one or both shoulders or arms. ? Lightheadedness or sudden weakness. ? A fast or irregular heartbeat. After you call 911, the  may tell you to chew 1 adult-strength or 2 to 4 low-dose aspirin. Wait for an ambulance.  Do not try to drive yourself.     · You have angina symptoms that do not go away with rest or are not getting better within 5 minutes after you take a dose of nitroglycerin.    Call your doctor now or seek immediate medical care if:    · You are having angina symptoms more often than usual, or they are different or worse than usual.     · You feel dizzy or lightheaded, or you feel like you may faint.    Watch closely for changes in your health, and be sure to contact your doctor if you have any problems. Where can you learn more? Go to http://barbara-charu.info/. Enter H129 in the search box to learn more about \"Angina: Care Instructions. \"  Current as of: April 9, 2019  Content Version: 12.2  © 7499-4022 [employer]. Care instructions adapted under license by [employer] (which disclaims liability or warranty for this information). If you have questions about a medical condition or this instruction, always ask your healthcare professional. [address] any warranty or liability for your use of this information. Learning About Clear Liquid Diets  Introduction    Sometimes you have to stop eating solid food for a while. This may be because you're preparing for a medical procedure or recovering from one. Or solid food might irritate your digestive tract because of illness. But even when you can't eat solid food, your body still needs liquids and energy. A clear liquid diet gives your body basic nutrition until you can go back to eating solid food. Your body digests these foods easily, and these foods don't leave behind any solids as they pass through your system. The clear liquid diet helps reduce the nausea and diarrhea you may have while you're ill. A clear liquid diet isn't very satisfying. And it doesn't supply all the nutrients you need over the long term. But you will be on this diet for just a day or two. After that, you will probably start eating bland foods. Soon you'll be able to return to your regular diet.   Examples  A clear liquid diet may include:  · Clear, fat-free broth and bouillon. · Water, ice chips, and flavored ice pops. · Strained fruit juices and flavored fruit drinks. · Fruit-flavored gelatin, like Jell-O.  · Soft drinks like lemon-lime soda and ginger ale. · Sweetened coffee or tea without cream.  Where can you learn more? Go to http://barbara-charu.info/. Enter L086 in the search box to learn more about \"Learning About Clear Liquid Diets. \"  Current as of: November 7, 2018  Content Version: 12.2  © 3554-2906 [email or web address]. Care instructions adapted under license by [employer] (which disclaims liability or warranty for this information). If you have questions about a medical condition or this instruction, always ask your healthcare professional. [place] [address] any warranty or liability for your use of this information. Patient Education        Bowel Blockage (Intestinal Obstruction): Care Instructions  Your Care Instructions  A bowel blockage, also called an intestinal obstruction, can prevent gas, fluids, or solids from moving through the intestines normally. It can cause constipation and, rarely, diarrhea. You may have pain, nausea, vomiting, and cramping. Most of the time, complete blockages require a stay in the hospital and possibly surgery. But if your bowel is only partly blocked, your doctor may tell you to wait until it clears on its own and you are able to pass gas and stool. If so, there are things you can do at home to help make you feel better. If you have had surgery for a bowel blockage, there are things you can do at home to make sure you heal well. You can also make some changes to keep your bowel from becoming blocked again. Follow-up care is a key part of your treatment and safety. Be sure to make and go to all appointments, and call your doctor if you are having problems. It's also a good idea to know your test results and keep a list of the medicines you take.   How can you care for yourself at home? If your doctor has told you to wait at home for a blockage to clear on its own:  · Follow your doctor's instructions. These may include eating a liquid diet to avoid complete blockage. · Be safe with medicines. Take your medicines exactly as prescribed. Call your doctor if you think you are having a problem with your medicine. · Put a heating pad set on low on your belly to relieve mild cramps and pain. To prevent another blockage  · Try to eat smaller amounts of food more often. For example, have 5 or 6 small meals throughout the day instead of 2 or 3 large meals. · Chew your food very well. Try to chew each bite about 20 times or until it is liquid. · Avoid high-fiber foods and raw fruits and vegetables with skins, husks, strings, or seeds. These can form a ball of undigested material that can cause a blockage if a part of your bowel is scarred or narrowed. · Check with your doctor before you eat whole-grain products or use a fiber supplement such as Citrucel or Metamucil. · To help you have regular bowel movements, eat at regular times, do not strain during a bowel movement, and drink at least 8 to 10 glasses of water each day. If you have kidney, heart, or liver disease and have to limit fluids, talk with your doctor or before you increase the amount of fluids you drink. · Drink high-calorie liquid formulas if your doctor says to. Severe symptoms may make it hard for your body to take in vitamins and minerals. · Get regular exercise. It helps you digest your food better. Get at least 30 minutes of physical activity on most days of the week. Walking is a good choice. When should you call for help?   Call your doctor now or seek immediate medical care if:    · You have a fever.     · You are vomiting.     · You have new or worse belly pain.     · You cannot pass stools or gas.    Watch closely for changes in your health, and be sure to contact your doctor if you have any problems. Where can you learn more? Go to http://barbara-charu.info/. Enter Y786 in the search box to learn more about \"Bowel Blockage (Intestinal Obstruction): Care Instructions. \"  Current as of: November 7, 2018  Content Version: 12.2  © 1568-1998 [employer]. Care instructions adapted under license by [employer] (which disclaims liability or warranty for this information). If you have questions about a medical condition or this instruction, always ask your healthcare professional. [place] [address] any warranty or liability for your use of this information. DISCHARGE SUMMARY from Nurse    PATIENT INSTRUCTIONS:    After general anesthesia or intravenous sedation, for 24 hours or while taking prescription Narcotics:  · Limit your activities  · Do not drive and operate hazardous machinery  · Do not make important personal or business decisions  · Do  not drink alcoholic beverages  · If you have not urinated within 8 hours after discharge, please contact your surgeon on call. Report the following to your surgeon:  · Excessive pain, swelling, redness or odor of or around the surgical area  · Temperature over 100.5  · Nausea and vomiting lasting longer than 4 hours or if unable to take medications  · Any signs of decreased circulation or nerve impairment to extremity: change in color, persistent  numbness, tingling, coldness or increase pain  · Any questions    What to do at Home:  Recommended activity: Activity as tolerated,     If you experience any of the following symptoms chest pain, shortness of breath, abdominal pain, please follow up with [address] Cardiology and/or your PCP. *  Please give a list of your current medications to your Primary Care Provider. *  Please update this list whenever your medications are discontinued, doses are      changed, or new medications (including over-the-counter products) are added.     *  Please carry medication information at all times in case of emergency situations. These are general instructions for a healthy lifestyle:    No smoking/ No tobacco products/ Avoid exposure to second hand smoke  Surgeon General's Warning:  Quitting smoking now greatly reduces serious risk to your health. Obesity, smoking, and sedentary lifestyle greatly increases your risk for illness    A healthy diet, regular physical exercise & weight monitoring are important for maintaining a healthy lifestyle    You may be retaining fluid if you have a history of heart failure or if you experience any of the following symptoms:  Weight gain of 3 pounds or more overnight or 5 pounds in a week, increased swelling in our hands or feet or shortness of breath while lying flat in bed. Please call your doctor as soon as you notice any of these symptoms; do not wait until your next office visit. The discharge information has been reviewed with the patient. The patient verbalized understanding. Discharge medications reviewed with the patient and appropriate educational materials and side effects teaching were provided. Patient Education   Metoprolol (By mouth)   Metoprolol (met-oh-PROE-lol)  Treats high blood pressure, angina (chest pain), and heart failure. May lower the risk of death after a heart attack. This medicine is a beta-blocker. Brand Name(s): Lopressor, Toprol XL   There may be other brand names for this medicine. When This Medicine Should Not Be Used: This medicine is not right for everyone. Do not use if you had an allergic reaction to metoprolol or similar medicines. Do not use this medicine if you have certain blood circulation or heart problems. Ask your doctor about these problems. How to Use This Medicine:   Tablet, Long Acting Tablet  · Take your medicine as directed. Your dose may need to be changed several times to find what works best for you.   · Take this medicine with a meal or right after a meal. Take this medicine the same way every day, at the same time. · Swallow the tablet whole with a glass of water. You may break the extended-release tablet in half, but do not chew or crush it. · Missed dose: Take a dose as soon as you remember. If it is almost time for your next dose, wait until then and take a regular dose. Do not take extra medicine to make up for a missed dose. · Store the medicine in a closed container at room temperature, away from heat, moisture, and direct light. Drugs and Foods to Avoid:   Ask your doctor or pharmacist before using any other medicine, including over-the-counter medicines, vitamins, and herbal products. · Some medicines can affect how metoprolol works. Tell your doctor if you are taking any of the following:   ¨ Digoxin, dipyridamole, hydralazine, hydroxychloroquine, methyldopa, quinidine  ¨ Medicine to treat depression (such as bupropion, clomipramine, desipramine, fluoxetine, fluvoxamine, paroxetine, sertraline), medicine to treat mental illness (such as chlorpromazine, fluphenazine, haloperidol, thioridazine), medicine for heart rhythm problems (such as propafenone), HIV/AIDS medicine (such as ritonavir), medicine to treat a fungus infection (such as terbinafine), a monoamine oxidase inhibitor (MAOI), an ergot medicine for headaches, a calcium channel blocker (such as amlodipine, diltiazem, verapamil), or an alpha blocker (such as clonidine, prazosin, reserpine, guanethidine)  Warnings While Using This Medicine:   · Tell your doctor if you are pregnant or breastfeeding, or if you have blood vessel, heart, or circulation problems (such as heart failure, rhythm problems, or slow heartbeat). Tell your doctor if you have kidney disease, liver disease, diabetes, lung disease (such as asthma), an overactive thyroid, or a history of allergies. · This medicine may cause worse symptoms of heart failure while the dose is being adjusted.   · Do not stop using this medicine suddenly. Your doctor will need to slowly decrease your dose before you stop it completely. · Tell any doctor or dentist who treats you that you are using this medicine. You may need to stop using this medicine several days before you have surgery or medical tests. · This medicine could lower your blood pressure too much, especially when you first use it or if you are dehydrated. Stand or sit up slowly if you feel lightheaded or dizzy. · This medicine may make you dizzy or drowsy. Do not drive or do anything else that could be dangerous until you know how this medicine affects you. · Your doctor will check your progress and the effects of this medicine at regular visits. Keep all appointments. · Keep all medicine out of the reach of children. Never share your medicine with anyone. Possible Side Effects While Using This Medicine:   Call your doctor right away if you notice any of these side effects:  · Allergic reaction: Itching or hives, swelling in your face or hands, swelling or tingling in your mouth or throat, chest tightness, trouble breathing  · Lightheadedness, dizziness, or fainting  · Slow heartbeat  · Swelling in your hands, ankles, or feet, trouble breathing, tiredness  · Worsening chest pain  If you notice these less serious side effects, talk with your doctor:   · Diarrhea  · Mild dizziness or tiredness  If you notice other side effects that you think are caused by this medicine, tell your doctor. Call your doctor for medical advice about side effects. You may report side effects to FDA at 4-102-FDA-0186  © 2017 [facility] Information is for End User's use only and may not be sold, redistributed or otherwise used for commercial purposes. The above information is an  only. It is not intended as medical advice for individual conditions or treatments.  Talk to your doctor, nurse or pharmacist before following any medical regimen to see if it is safe and effective for you.       ___________________________________________________________________________________________________________________________________

## 2020-02-11 ENCOUNTER — APPOINTMENT (OUTPATIENT)
Dept: GENERAL RADIOLOGY | Age: 75
End: 2020-02-11
Attending: EMERGENCY MEDICINE
Payer: MEDICARE

## 2020-02-11 ENCOUNTER — APPOINTMENT (OUTPATIENT)
Dept: CT IMAGING | Age: 75
End: 2020-02-11
Attending: EMERGENCY MEDICINE
Payer: MEDICARE

## 2020-02-11 ENCOUNTER — HOSPITAL ENCOUNTER (EMERGENCY)
Age: 75
Discharge: HOME | End: 2020-02-11
Attending: EMERGENCY MEDICINE
Payer: MEDICARE

## 2020-02-11 VITALS
SYSTOLIC BLOOD PRESSURE: 125 MMHG | HEART RATE: 66 BPM | RESPIRATION RATE: 22 BRPM | OXYGEN SATURATION: 98 % | WEIGHT: 95 LBS | DIASTOLIC BLOOD PRESSURE: 58 MMHG | TEMPERATURE: 98 F | BODY MASS INDEX: 16.31 KG/M2

## 2020-02-11 DIAGNOSIS — I21.4 NSTEMI (NON-ST ELEVATED MYOCARDIAL INFARCTION) (HCC): Primary | ICD-10-CM

## 2020-02-11 LAB
ALBUMIN SERPL-MCNC: 2.6 G/DL (ref 3.2–4.6)
ALBUMIN/GLOB SERPL: 0.7 {RATIO} (ref 1.2–3.5)
ALP SERPL-CCNC: 84 U/L (ref 50–130)
ALT SERPL-CCNC: 21 U/L (ref 12–65)
ANION GAP SERPL CALC-SCNC: 7 MMOL/L (ref 7–16)
APTT PPP: 42.6 SEC (ref 24.3–35.4)
AST SERPL-CCNC: 25 U/L (ref 15–37)
BASOPHILS # BLD: 0.1 K/UL (ref 0–0.2)
BASOPHILS NFR BLD: 1 % (ref 0–2)
BILIRUB SERPL-MCNC: 0.3 MG/DL (ref 0.2–1.1)
BUN SERPL-MCNC: 22 MG/DL (ref 8–23)
CALCIUM SERPL-MCNC: 8.9 MG/DL (ref 8.3–10.4)
CHLORIDE SERPL-SCNC: 106 MMOL/L (ref 98–107)
CO2 SERPL-SCNC: 28 MMOL/L (ref 21–32)
CREAT SERPL-MCNC: 1.27 MG/DL (ref 0.6–1)
DIFFERENTIAL METHOD BLD: ABNORMAL
EOSINOPHIL # BLD: 0.1 K/UL (ref 0–0.8)
EOSINOPHIL NFR BLD: 1 % (ref 0.5–7.8)
ERYTHROCYTE [DISTWIDTH] IN BLOOD BY AUTOMATED COUNT: 14.7 % (ref 11.9–14.6)
GLOBULIN SER CALC-MCNC: 3.7 G/DL (ref 2.3–3.5)
GLUCOSE SERPL-MCNC: 99 MG/DL (ref 65–100)
HCT VFR BLD AUTO: 38.1 % (ref 35.8–46.3)
HGB BLD-MCNC: 12 G/DL (ref 11.7–15.4)
IMM GRANULOCYTES # BLD AUTO: 0.1 K/UL (ref 0–0.5)
IMM GRANULOCYTES NFR BLD AUTO: 1 % (ref 0–5)
LYMPHOCYTES # BLD: 2.3 K/UL (ref 0.5–4.6)
LYMPHOCYTES NFR BLD: 22 % (ref 13–44)
MCH RBC QN AUTO: 30.2 PG (ref 26.1–32.9)
MCHC RBC AUTO-ENTMCNC: 31.5 G/DL (ref 31.4–35)
MCV RBC AUTO: 96 FL (ref 79.6–97.8)
MONOCYTES # BLD: 1.1 K/UL (ref 0.1–1.3)
MONOCYTES NFR BLD: 10 % (ref 4–12)
NEUTS SEG # BLD: 6.9 K/UL (ref 1.7–8.2)
NEUTS SEG NFR BLD: 66 % (ref 43–78)
NRBC # BLD: 0 K/UL (ref 0–0.2)
PLATELET # BLD AUTO: 283 K/UL (ref 150–450)
PMV BLD AUTO: 10.6 FL (ref 9.4–12.3)
POTASSIUM SERPL-SCNC: 4.4 MMOL/L (ref 3.5–5.1)
PROT SERPL-MCNC: 6.3 G/DL (ref 6.3–8.2)
RBC # BLD AUTO: 3.97 M/UL (ref 4.05–5.2)
SODIUM SERPL-SCNC: 141 MMOL/L (ref 136–145)
TROPONIN I SERPL-MCNC: 0.22 NG/ML (ref 0.02–0.05)
WBC # BLD AUTO: 10.4 K/UL (ref 4.3–11.1)

## 2020-02-11 PROCEDURE — 70450 CT HEAD/BRAIN W/O DYE: CPT

## 2020-02-11 PROCEDURE — 74011250636 HC RX REV CODE- 250/636: Performed by: EMERGENCY MEDICINE

## 2020-02-11 PROCEDURE — 72125 CT NECK SPINE W/O DYE: CPT

## 2020-02-11 PROCEDURE — 73030 X-RAY EXAM OF SHOULDER: CPT

## 2020-02-11 PROCEDURE — 96376 TX/PRO/DX INJ SAME DRUG ADON: CPT

## 2020-02-11 PROCEDURE — 84484 ASSAY OF TROPONIN QUANT: CPT

## 2020-02-11 PROCEDURE — 96365 THER/PROPH/DIAG IV INF INIT: CPT

## 2020-02-11 PROCEDURE — 80053 COMPREHEN METABOLIC PANEL: CPT

## 2020-02-11 PROCEDURE — 85730 THROMBOPLASTIN TIME PARTIAL: CPT

## 2020-02-11 PROCEDURE — 71046 X-RAY EXAM CHEST 2 VIEWS: CPT

## 2020-02-11 PROCEDURE — 85025 COMPLETE CBC W/AUTO DIFF WBC: CPT

## 2020-02-11 PROCEDURE — 93005 ELECTROCARDIOGRAM TRACING: CPT | Performed by: EMERGENCY MEDICINE

## 2020-02-11 PROCEDURE — 99285 EMERGENCY DEPT VISIT HI MDM: CPT

## 2020-02-11 RX ORDER — HEPARIN SODIUM 5000 [USP'U]/ML
60 INJECTION, SOLUTION INTRAVENOUS; SUBCUTANEOUS ONCE
Status: COMPLETED | OUTPATIENT
Start: 2020-02-11 | End: 2020-02-11

## 2020-02-11 RX ORDER — HEPARIN SODIUM 5000 [USP'U]/100ML
12-25 INJECTION, SOLUTION INTRAVENOUS
Status: DISCONTINUED | OUTPATIENT
Start: 2020-02-11 | End: 2020-02-12 | Stop reason: HOSPADM

## 2020-02-11 RX ADMIN — SODIUM CHLORIDE 1000 ML: 900 INJECTION, SOLUTION INTRAVENOUS at 20:18

## 2020-02-11 RX ADMIN — HEPARIN SODIUM 12 UNITS/KG/HR: 5000 INJECTION, SOLUTION INTRAVENOUS at 22:55

## 2020-02-11 RX ADMIN — HEPARIN SODIUM 2600 UNITS: 5000 INJECTION INTRAVENOUS; SUBCUTANEOUS at 22:52

## 2020-02-12 ENCOUNTER — HOSPITAL ENCOUNTER (OUTPATIENT)
Age: 75
Setting detail: OBSERVATION
Discharge: HOME | End: 2020-02-12
Attending: INTERNAL MEDICINE | Admitting: INTERNAL MEDICINE
Payer: MEDICARE

## 2020-02-12 VITALS
WEIGHT: 99.8 LBS | HEART RATE: 64 BPM | DIASTOLIC BLOOD PRESSURE: 63 MMHG | SYSTOLIC BLOOD PRESSURE: 131 MMHG | RESPIRATION RATE: 18 BRPM | OXYGEN SATURATION: 96 % | BODY MASS INDEX: 17.13 KG/M2 | TEMPERATURE: 98 F

## 2020-02-12 PROBLEM — R29.6 FREQUENT FALLS: Status: ACTIVE | Noted: 2020-02-12

## 2020-02-12 PROBLEM — I95.9 HYPOTENSION: Status: ACTIVE | Noted: 2020-02-12

## 2020-02-12 PROBLEM — I50.22 SYSTOLIC CHF, CHRONIC (HCC): Chronic | Status: RESOLVED | Noted: 2019-05-14 | Resolved: 2020-02-12

## 2020-02-12 PROBLEM — E83.42 HYPOMAGNESEMIA: Status: RESOLVED | Noted: 2019-12-12 | Resolved: 2020-02-12

## 2020-02-12 PROBLEM — E87.8 ELECTROLYTE ABNORMALITY: Status: RESOLVED | Noted: 2019-08-26 | Resolved: 2020-02-12

## 2020-02-12 PROBLEM — R55 NEAR SYNCOPE: Status: ACTIVE | Noted: 2020-02-12

## 2020-02-12 LAB
ANION GAP SERPL CALC-SCNC: 6 MMOL/L (ref 7–16)
ATRIAL RATE: 62 BPM
BUN SERPL-MCNC: 23 MG/DL (ref 8–23)
CALCIUM SERPL-MCNC: 7.8 MG/DL (ref 8.3–10.4)
CALCULATED P AXIS, ECG09: -80 DEGREES
CALCULATED R AXIS, ECG10: -47 DEGREES
CALCULATED T AXIS, ECG11: 135 DEGREES
CHLORIDE SERPL-SCNC: 113 MMOL/L (ref 98–107)
CO2 SERPL-SCNC: 24 MMOL/L (ref 21–32)
CREAT SERPL-MCNC: 1.03 MG/DL (ref 0.6–1)
DIAGNOSIS, 93000: NORMAL
ERYTHROCYTE [DISTWIDTH] IN BLOOD BY AUTOMATED COUNT: 14.6 % (ref 11.9–14.6)
GLUCOSE SERPL-MCNC: 88 MG/DL (ref 65–100)
HCT VFR BLD AUTO: 35.1 % (ref 35.8–46.3)
HGB BLD-MCNC: 11.1 G/DL (ref 11.7–15.4)
MCH RBC QN AUTO: 30.5 PG (ref 26.1–32.9)
MCHC RBC AUTO-ENTMCNC: 31.6 G/DL (ref 31.4–35)
MCV RBC AUTO: 96.4 FL (ref 79.6–97.8)
NRBC # BLD: 0 K/UL (ref 0–0.2)
P-R INTERVAL, ECG05: 220 MS
PLATELET # BLD AUTO: 188 K/UL (ref 150–450)
PMV BLD AUTO: 10.7 FL (ref 9.4–12.3)
POTASSIUM SERPL-SCNC: 4.5 MMOL/L (ref 3.5–5.1)
Q-T INTERVAL, ECG07: 440 MS
QRS DURATION, ECG06: 128 MS
QTC CALCULATION (BEZET), ECG08: 446 MS
RBC # BLD AUTO: 3.64 M/UL (ref 4.05–5.2)
SODIUM SERPL-SCNC: 143 MMOL/L (ref 136–145)
TROPONIN I SERPL-MCNC: 0.2 NG/ML (ref 0.02–0.05)
TROPONIN I SERPL-MCNC: 0.2 NG/ML (ref 0.02–0.05)
VENTRICULAR RATE, ECG03: 62 BPM
WBC # BLD AUTO: 8.1 K/UL (ref 4.3–11.1)

## 2020-02-12 PROCEDURE — 97161 PT EVAL LOW COMPLEX 20 MIN: CPT

## 2020-02-12 PROCEDURE — 99218 HC RM OBSERVATION: CPT

## 2020-02-12 PROCEDURE — 97110 THERAPEUTIC EXERCISES: CPT

## 2020-02-12 PROCEDURE — C8929 TTE W OR WO FOL WCON,DOPPLER: HCPCS

## 2020-02-12 PROCEDURE — 36415 COLL VENOUS BLD VENIPUNCTURE: CPT

## 2020-02-12 PROCEDURE — 85027 COMPLETE CBC AUTOMATED: CPT

## 2020-02-12 PROCEDURE — 77030019605

## 2020-02-12 PROCEDURE — 84484 ASSAY OF TROPONIN QUANT: CPT

## 2020-02-12 PROCEDURE — 74011250636 HC RX REV CODE- 250/636: Performed by: INTERNAL MEDICINE

## 2020-02-12 PROCEDURE — 74011250637 HC RX REV CODE- 250/637: Performed by: NURSE PRACTITIONER

## 2020-02-12 PROCEDURE — 74011250636 HC RX REV CODE- 250/636: Performed by: NURSE PRACTITIONER

## 2020-02-12 PROCEDURE — 74011000250 HC RX REV CODE- 250: Performed by: INTERNAL MEDICINE

## 2020-02-12 PROCEDURE — 80048 BASIC METABOLIC PNL TOTAL CA: CPT

## 2020-02-12 RX ORDER — ONDANSETRON 2 MG/ML
4 INJECTION INTRAMUSCULAR; INTRAVENOUS
Status: DISCONTINUED | OUTPATIENT
Start: 2020-02-12 | End: 2020-02-12 | Stop reason: HOSPADM

## 2020-02-12 RX ORDER — LEVOTHYROXINE SODIUM 50 UG/1
50 TABLET ORAL
Status: DISCONTINUED | OUTPATIENT
Start: 2020-02-12 | End: 2020-02-12 | Stop reason: HOSPADM

## 2020-02-12 RX ORDER — ASPIRIN 81 MG/1
81 TABLET ORAL
Status: DISCONTINUED | OUTPATIENT
Start: 2020-02-12 | End: 2020-02-12 | Stop reason: HOSPADM

## 2020-02-12 RX ORDER — PANTOPRAZOLE SODIUM 40 MG/1
40 TABLET, DELAYED RELEASE ORAL
Status: DISCONTINUED | OUTPATIENT
Start: 2020-02-12 | End: 2020-02-12 | Stop reason: HOSPADM

## 2020-02-12 RX ORDER — POTASSIUM CHLORIDE 750 MG/1
10 TABLET, EXTENDED RELEASE ORAL 2 TIMES DAILY
Status: DISCONTINUED | OUTPATIENT
Start: 2020-02-12 | End: 2020-02-12 | Stop reason: HOSPADM

## 2020-02-12 RX ORDER — ACETAMINOPHEN 325 MG/1
650 TABLET ORAL
Status: DISCONTINUED | OUTPATIENT
Start: 2020-02-12 | End: 2020-02-12 | Stop reason: HOSPADM

## 2020-02-12 RX ORDER — SODIUM CHLORIDE 0.9 % (FLUSH) 0.9 %
5-40 SYRINGE (ML) INJECTION EVERY 8 HOURS
Status: DISCONTINUED | OUTPATIENT
Start: 2020-02-12 | End: 2020-02-12 | Stop reason: HOSPADM

## 2020-02-12 RX ORDER — RANOLAZINE 500 MG/1
500 TABLET, EXTENDED RELEASE ORAL 2 TIMES DAILY
Status: DISCONTINUED | OUTPATIENT
Start: 2020-02-12 | End: 2020-02-12 | Stop reason: HOSPADM

## 2020-02-12 RX ORDER — SODIUM CHLORIDE 0.9 % (FLUSH) 0.9 %
5-40 SYRINGE (ML) INJECTION AS NEEDED
Status: DISCONTINUED | OUTPATIENT
Start: 2020-02-12 | End: 2020-02-12 | Stop reason: HOSPADM

## 2020-02-12 RX ORDER — LANOLIN ALCOHOL/MO/W.PET/CERES
1 CREAM (GRAM) TOPICAL
Status: DISCONTINUED | OUTPATIENT
Start: 2020-02-12 | End: 2020-02-12 | Stop reason: HOSPADM

## 2020-02-12 RX ORDER — LANOLIN ALCOHOL/MO/W.PET/CERES
400 CREAM (GRAM) TOPICAL DAILY
Status: DISCONTINUED | OUTPATIENT
Start: 2020-02-12 | End: 2020-02-12 | Stop reason: HOSPADM

## 2020-02-12 RX ORDER — SODIUM CHLORIDE 9 MG/ML
50 INJECTION, SOLUTION INTRAVENOUS CONTINUOUS
Status: DISCONTINUED | OUTPATIENT
Start: 2020-02-12 | End: 2020-02-12 | Stop reason: HOSPADM

## 2020-02-12 RX ORDER — ATORVASTATIN CALCIUM 40 MG/1
40 TABLET, FILM COATED ORAL
Status: DISCONTINUED | OUTPATIENT
Start: 2020-02-12 | End: 2020-02-12 | Stop reason: HOSPADM

## 2020-02-12 RX ADMIN — PANTOPRAZOLE SODIUM 40 MG: 40 TABLET, DELAYED RELEASE ORAL at 05:41

## 2020-02-12 RX ADMIN — FERROUS SULFATE TAB 325 MG (65 MG ELEMENTAL FE) 325 MG: 325 (65 FE) TAB at 08:51

## 2020-02-12 RX ADMIN — Medication 10 ML: at 01:22

## 2020-02-12 RX ADMIN — ASPIRIN 81 MG: 81 TABLET ORAL at 01:21

## 2020-02-12 RX ADMIN — RANOLAZINE 500 MG: 500 TABLET, FILM COATED, EXTENDED RELEASE ORAL at 08:51

## 2020-02-12 RX ADMIN — POTASSIUM CHLORIDE 10 MEQ: 750 TABLET, EXTENDED RELEASE ORAL at 08:50

## 2020-02-12 RX ADMIN — SODIUM CHLORIDE 50 ML/HR: 900 INJECTION, SOLUTION INTRAVENOUS at 01:22

## 2020-02-12 RX ADMIN — LEVOTHYROXINE SODIUM 50 MCG: 50 TABLET ORAL at 05:41

## 2020-02-12 RX ADMIN — Medication 400 MG: at 08:51

## 2020-02-12 RX ADMIN — Medication 10 ML: at 05:41

## 2020-02-12 RX ADMIN — PERFLUTREN 1 ML: 6.52 INJECTION, SUSPENSION INTRAVENOUS at 11:31

## 2020-02-12 NOTE — PROGRESS NOTES
Discharge instructions reviewed with patient and her daughter. Medications and appointments discussed. Time allowed for questions. Patient escorted to discharge area via wheelchair where her daughter drove her home.

## 2020-02-12 NOTE — PROGRESS NOTES
Problem: Falls - Risk of 
Goal: *Absence of Falls Description Document Talib Reas Fall Risk and appropriate interventions in the flowsheet. Outcome: Progressing Towards Goal 
Note: Fall Risk Interventions: 
Mobility Interventions: Bed/chair exit alarm, Communicate number of staff needed for ambulation/transfer, Patient to call before getting OOB Medication Interventions: Patient to call before getting OOB, Bed/chair exit alarm Elimination Interventions: Call light in reach, Bed/chair exit alarm History of Falls Interventions: Bed/chair exit alarm Problem: Pressure Injury - Risk of 
Goal: *Prevention of pressure injury Description Document Eliel Scale and appropriate interventions in the flowsheet. Outcome: Progressing Towards Goal 
Note: Pressure Injury Interventions: 
  
 
Moisture Interventions: Apply protective barrier, creams and emollients, Absorbent underpads Activity Interventions: Increase time out of bed, Pressure redistribution bed/mattress(bed type) Mobility Interventions: Pressure redistribution bed/mattress (bed type) Nutrition Interventions: Document food/fluid/supplement intake

## 2020-02-12 NOTE — ED PROVIDER NOTES
Katie Pang is a 66 y.o. female seen on 2/11/2020 at 8:45 PM in the [facility] EMERGENCY DEPT in room ER05/05. Chief Complaint Patient presents with  Fatigue HPI: 77-year-old female presenting to the emergency department with her daughter for evaluation of near syncope. The patient had eaten dinner tonight at a restaurant and when it was time to leave she stood up and began to feel lightheaded and felt like she was going to pass out. She states that she sat down to avoid this. She was feeling \"twitchy\". She states that she has this feeling often, and one time was admitted for dehydration and low blood pressure. Upon arrival in the emergency department she was noted to be hypotensive. Her daughter also states that she had a fall earlier this morning. The patient says that she had bent over to clean up the mess that her dog created when she fell backwards hitting her head against a chair. She is anticoagulated. She is complaining of headache, neck pain and right shoulder pain. The pain in her right shoulder is worse with movement. Daughter denies any confusion, change in mental status etc. 
 
Historian: Patient REVIEW OF SYSTEMS Review of Systems PAST MEDICAL HISTORY Past Medical History:  
Diagnosis Date  Abnormal EKG 6/17/2016  Acute systolic (congestive) heart failure ([address] 6/17/2016  Anterior myocardial infarction ([address] 11/20/1997  Avascular necrosis ([address] R hip  CAD (coronary artery disease) 1990  
 s/p CABG  
 Cellulitis of right lower extremity 6/17/2016  Chest pain, precordial 3/28/2016  COPD (chronic obstructive pulmonary disease) ([address]   
 pt denies  Coronary atherosclerosis of native coronary vessel 03/28/2016  
 per heart cath (6/2016)- \"pt with diffusely diseased LAD and small caliber vessels. At present. .. best option would be medical management. .. do not see any obvious good options for redo CABG. ..  Fracture of femoral neck, right ([address] 2016  
 s/p repair with hardware- Dr Kourtney Astudillo  GERD (gastroesophageal reflux disease) 2016  
 managed with medication  HLD (hyperlipidemia) 2016  
 managed with medication  Hx of echocardiogram 2016 EF 45-50%, hypokinesis of the apical anterior and basal-mid anteroseptal wall. Mild aortic valve regurgitation. Mild tricuspid regurgitation.  Hypertension   
 managed with medication  Hypokalemia 2016  
 hx of- WNL since 2016- pt on K+ supplement  IBS (irritable bowel syndrome)  Ischemic cardiomyopathy 5/15/2018  MI (myocardial infarction) ([facility]  and spring 2016  
 x2  Osteoarthritis  Osteoporosis  Rectal prolapse  S/P CABG (coronary artery bypass graft)   
 S/P total hip arthroplasty 2016  Scleroderma ([facility] 3/28/2016  Shortness of breath dyspnea 3/28/2016  Tracheal stenosis   
 s/p repair  Unstable angina ([facility] 1997 Past Surgical History:  
Procedure Laterality Date  HX ANKLE FRACTURE TX Right 1992  
 hardware placed  HX CORONARY ARTERY BYPASS GRAFT    
 cabg x 3  
 HX FRACTURE TX    
 cervical spine- C2  
 HX HEART CATHETERIZATION    
 HX HEENT    
 tracheal stricture/stenosis [place] HIP FRACTURE TX  2016  
 repaired with hardware- Dr Kourtney Astudillo  HX HYSTERECTOMY  1970s  HX OTHER SURGICAL Peg tube placement  HX PTCA  HX TRACHEOSTOMY Social History Socioeconomic History  Marital status:  Spouse name: Not on file  Number of children: Not on file  Years of education: Not on file  Highest education level: Not on file Tobacco Use  Smoking status: Former Smoker Packs/day: 1.00 Years: 20.00 Pack years: 20.00 Last attempt to quit: 1995 Years since quittin.1  Smokeless tobacco: Never Used Substance and Sexual Activity  Alcohol use: No  
 Drug use: No  
 
Prior to Admission Medications Prescriptions Last Dose Informant Patient Reported? Taking? GABAPENTIN PO   Yes No  
Sig: Take  by mouth. HYDROcodone-acetaminophen (NORCO) 7.5-325 mg per tablet   No No  
Sig: Take 1 Tab by mouth every six (6) hours as needed for Pain. Max Daily Amount: 4 Tabs. acetaminophen (TYLENOL) 325 mg tablet   Yes No  
Sig: Take 500 mg by mouth every six (6) hours as needed for Pain. apixaban (ELIQUIS) 5 mg tablet   No No  
Sig: Take 1 Tab by mouth every twelve (12) hours. Indications: Treatment to Prevent Blood Clots in Chronic Atrial Fibrillation  
aspirin delayed-release 81 mg tablet   Yes No  
Sig: Take 81 mg by mouth nightly. atorvastatin (LIPITOR) 40 mg tablet   No No  
Sig: Take 1 Tab by mouth daily. Indications: HYPERCHOLESTEROLEMIA Patient taking differently: Take 40 mg by mouth nightly. Indications: hypercholesterolemia  
ferrous sulfate (IRON) 325 mg (65 mg iron) tablet   Yes No  
Sig: Take  by mouth Daily (before breakfast). levothyroxine (SYNTHROID) 50 mcg tablet   Yes No  
Sig: Take  by mouth Daily (before breakfast). loperamide (IMODIUM) 2 mg capsule   Yes No  
Sig: Take 2 mg by mouth as needed for Diarrhea.  
magnesium oxide (MAG-OX) 400 mg tablet   No No  
Sig: Take 1 Tab by mouth daily. nitroglycerin (NITROSTAT) 0.4 mg SL tablet   No No  
Si Tab by SubLINGual route every five (5) minutes as needed for Chest Pain. Up to 3 doses. omeprazole (PRILOSEC) 40 mg capsule   Yes No  
Sig: Take 40 mg by mouth daily. Indications: gastroesophageal reflux disease, heartburn  
ondansetron (ZOFRAN ODT) 8 mg disintegrating tablet   No No  
Sig: Take 1 Tab by mouth every eight (8) hours as needed for Nausea. potassium chloride (K-DUR, KLOR-CON) 20 mEq tablet   No No  
Sig: Take 1 Tab by mouth two (2) times a day. Patient taking differently: Take 10 mEq by mouth two (2) times a day. ranolazine ER (RANEXA) 500 mg SR tablet   No No  
Sig: Take 1 Tab by mouth two (2) times a day. Facility-Administered Medications: None Allergies Allergen Reactions  Corticosteroids (Glucocorticoids) Anaphylaxis  Iodine Hives and Other (comments)  
  hypertension PHYSICAL EXAM    
 
Vitals:  
 02/11/20 2018 02/11/20 2029 BP: (!) 78/52 124/59 Pulse:  66 Resp: 16 18 Temp: 98 °F (36.7 °C) SpO2: 98% Vital signs were reviewed. Physical Exam 
Vitals signs reviewed. Constitutional:   
   General: She is not in acute distress. Appearance: She is well-developed. She is not diaphoretic. Comments: Very thin HENT:  
   Head: Normocephalic and atraumatic. Mouth/Throat:  
   Mouth: Mucous membranes are dry. Eyes:  
   General: No visual field deficit. Pupils: Pupils are equal, round, and reactive to light. Neck: Musculoskeletal: Normal range of motion and neck supple. Cardiovascular:  
   Rate and Rhythm: Normal rate and regular rhythm. Heart sounds: Normal heart sounds. No murmur. No friction rub. No gallop. Pulmonary:  
   Effort: Pulmonary effort is normal. No respiratory distress. Breath sounds: Normal breath sounds. No stridor. No wheezing. Abdominal:  
   General: Bowel sounds are normal. There is no distension. Palpations: Abdomen is soft. There is no mass. Tenderness: There is no abdominal tenderness. There is no guarding or rebound. Musculoskeletal: Normal range of motion. General: No tenderness or deformity. Skin: 
   General: Skin is warm and dry. Findings: No erythema or rash. Comments: Decreased skin turgor Neurological:  
   Mental Status: She is alert and oriented to person, place, and time. GCS: GCS eye subscore is 4. GCS verbal subscore is 5. GCS motor subscore is 6. Cranial Nerves: No cranial nerve deficit or dysarthria. Sensory: Sensation is intact. No sensory deficit. Motor: Motor function is intact. No weakness. Coordination: Coordination is intact. Comments: No focal neuro deficits Psychiatric:     
   Behavior: Behavior normal.  
 
  
 
MEDICAL DECISION MAKING  
 
MDM 
 
EKG Date/Time: 2/11/2020 9:48 PM 
Performed by: Home Merida MD 
Authorized by: Home Merida MD  
 
ECG reviewed by ED Physician in the absence of a cardiologist: yes Rate:  
  ECG rate assessment: normal   
Rhythm:  
  Rhythm: sinus rhythm ST segments: ST segments:  Abnormal 
  Elevation:  V2 and V3 T waves:  
  T waves: normal   
 
CRITICAL CARE (ASAP ONLY) Performed by: Home Merida MD 
Authorized by: Home Merida MD  
 
Critical care provider statement:  
  Critical care time (minutes):  65 
  Critical care was necessary to treat or prevent imminent or life-threatening deterioration of the following conditions:  Cardiac failure Critical care was time spent personally by me on the following activities:  Discussions with consultants, development of treatment plan with patient or surrogate, ordering and review of laboratory studies, ordering and review of radiographic studies, ordering and performing treatments and interventions, pulse oximetry, re-evaluation of patient's condition, review of old charts and evaluation of patient's response to treatment ED Course: Evaluation in the room with the patient lying flat her blood pressure is 124/59. Initial EKG has some nonspecific ST changes anteriorly without any current chest pain or any recent chest pain. 9:51 PM 
Called by lab for positive troponin of 0.22. Back to reassess the patient, she continues to endorse no chest pain over the last couple of days. Not short of breath, she is laying in bed without any symptoms at this time. Given the changes noted on EKG, I have paged cardiology at this time to discuss further.  
 
10:06 PM 
 This with cardiology, patient has a history of extensive coronary vascular disease. Recommend initiating heparin infusion and transfer to [facility]. Agree that this does not represent acute STEMI given lack of chest pain. Disposition: Transfer to [facility] Diagnosis: Near syncope, dehydration, fall 
____________________________________________________________________ A portion of this note was generated using voice recognition dictation software. While the note has been reviewed for accuracy, please note certain words and phrases may not be transcribed as intended and some grammatical and/or typographical errors may be present.

## 2020-02-12 NOTE — ED NOTES
I have reviewed discharge instructions with the patient. The patient verbalized understanding. Patient left ED via Discharge Method: stretcher to [id number] [facility] CV stepdown rm # [id number] with [facility]. Opportunity for questions and clarification provided. Patient given 0 scripts. To continue your aftercare when you leave the hospital, you may receive an automated call from our care team to check in on how you are doing. This is a free service and part of our promise to provide the best care and service to meet your aftercare needs.  If you have questions, or wish to unsubscribe from this service please call [phone]. Thank you for Choosing our [employer] Emergency Department.

## 2020-02-12 NOTE — PROGRESS NOTES
TRANSFER - IN REPORT: 
 
Verbal report received from Karli Uribe RN (name) on Iona Willett  being received from [facility] (unit) for routine progression of care Report consisted of patients Situation, Background, Assessment and  
Recommendations(SBAR). Information from the following report(s) SBAR, Kardex, ED Summary, Procedure Summary, MAR, Recent Results, Med Rec Status and Cardiac Rhythm NSR was reviewed with the receiving nurse. Opportunity for questions and clarification was provided. Assessment completed upon patients arrival to unit and care assumed.

## 2020-02-12 NOTE — ROUTINE PROCESS
TRANSFER - OUT REPORT: 
 
Verbal report given to Jennifer Aburto on Elizabeth Butterfield  being transferred to  step down rm # [phone] for routine progression of care Report consisted of patients Situation, Background, Assessment and  
Recommendations(SBAR). Information from the following report(s) SBAR was reviewed with the receiving nurse. Lines:  
Peripheral IV 02/11/20 Left Antecubital (Active) Site Assessment Clean, dry, & intact 2/11/2020  8:14 PM  
Phlebitis Assessment 0 2/11/2020  8:14 PM  
Infiltration Assessment 0 2/11/2020  8:14 PM  
  
 
Opportunity for questions and clarification was provided. Patient transported with: 
 Monitor by paramedic staff with transport by [employer].

## 2020-02-12 NOTE — PROGRESS NOTES
02/12/20 0054 Skin Integumentary Skin Integumentary (WDL) X Pressure  Injury Documentation No Pressure Injury Noted-Pressure Ulcer Prevention Initiated Skin Color Appropriate for ethnicity Skin Condition/Temp Dry; Warm  
Skin Integrity Other (comment) (skin tear to right elbow ) Hair Growth Present Varicosities Absent No pressure ulcer noted. Skin tear noted to right elbow. Redness to sacrum but blanchable.

## 2020-02-12 NOTE — H&P
[facility] Cardiology History & Physical  
  
Date of  Admission: 2/12/2020 12:08 AM  
 
Primary Care Physician: Dr. Xavier Pham Primary Cardiologist: Dr. Shandra Dean Admitting Physician: Dr. Idalmis Sotelo 
 
CC: near syncope HPI:  Morris Allison is a 66 y.o. female with a past medical history of CAD s/p CABG, chronic systolic CHF, paroxysmal AFib on chronic anticoagulation, HLD, and HTN that presented to the [facility] ER after a near syncopal episode at dinner. Patient states she was at a restaurant and when it was time to leave, could not get up and with the help of a family member stood up and felt light headed and dizzy like she was going to pass out. Patient states when she felt dizzy she also felt short of breath but denies any chest pain or diaphoresis. Patient denies falling or passing out but does state she fell early yesterday afternoon while trying to clean up a mess. She says she lost her balance and fell backward but denies passing out. Patient states she has a poor appetite because food does not taste good to her and that she only drinks 2 small glasses of iced tea a day for hydration. Patient endorses frequent falls and uses a walker to get around. She denies any N/V or cough. Recent admitted for SBO in December and states she has frequent BMs. Denies any frequency or burning with urination. While at [facility] troponin was 0.22. CT head completed and was negative for abnormality, CT spine negative for fracture. Patient was hypotensive upon arrival to [facility], BP 78/52; patient given 1L bolus and 2600U heparin bolus and started on heparin gtt. Patient transferred to [facility] for further treatment. Upon assessment, patient denies any dizziness, SOB or chest pain at this time. BP normotensive and NSR on monitor. Diagnosis  Abnormal EKG  Acute systolic (congestive) heart failure (HCC)  Anterior myocardial infarction ([address]  CAD (coronary artery disease)  GERD (gastroesophageal reflux disease)  HLD (hyperlipidemia)  Hypertension  Scler[address]a ([facility] Past Surgical History:  
Procedure Laterality Date  HX ANKLE FRACTURE TX Right 1992  
 hardware placed  HX CORONARY ARTERY BYPASS GRAFT    
 cabg x 3  
 HX FRACTURE TX    
 cervical spine- C2  
 HX HEART CATHETERIZATION    
 HX HEENT    
 tracheal stricture/stenosis [place] HIP FRACTURE [place]  2016  
 repaired with hardware- Dr Joyce Carlos  HX HYSTERECTOMY  1970s  HX OTHER SURGICAL Peg tube placement  HX PTCA  HX TRACHEOSTOMY Allergies Allergen Reactions  Corticosteroids (Glucocorticoids) Anaphylaxis  Iodine Hives and Other (comments)  
  hypertension Social History Socioeconomic History  Marital status:  Spouse name: Not on file  Number of children: Not on file  Years of education: Not on file  Highest education level: Not on file Occupational History  Not on file Social Needs  Financial resource strain: Not on file  Food insecurity:  
  Worry: Not on file Inability: Not on file  Transportation needs:  
  Medical: Not on file Non-medical: Not on file Tobacco Use  Smoking status: Former Smoker Packs/day: 1.00 Years: 20.00 Pack years: 20.00 Last attempt to quit: 1995 Years since quittin.1  Smokeless tobacco: Never Used Substance and Sexual Activity  Alcohol use: No  
 Drug use: No  
 Sexual activity: Not on file Lifestyle  Physical activity:  
  Days per week: Not on file Minutes per session: Not on file  Stress: Not on file Relationships  Social connections:  
  Talks on phone: Not on file Gets together: Not on file Attends Islam service: Not on file Active member of club or organization: Not on file Attends meetings of clubs or organizations: Not on file Relationship status: Not on file  Intimate partner violence: Fear of current or ex partner: Not on file Emotionally abused: Not on file Physically abused: Not on file Forced sexual activity: Not on file Other Topics Concern  Not on file Social History Narrative  Not on file Family History Problem Relation Age of Onset  Breast Cancer Sister Current Facility-Administered Medications Medication Dose Route Frequency  sodium chloride (NS) flush 5-40 mL  5-40 mL IntraVENous Q8H  
 sodium chloride (NS) flush 5-40 mL  5-40 mL IntraVENous PRN  
 acetaminophen (TYLENOL) tablet 650 mg  650 mg Oral Q4H PRN  
 ondansetron (ZOFRAN) injection 4 mg  4 mg IntraVENous Q4H PRN Review of Systems Review of Systems Constitutional: Negative. HENT: Negative. Eyes: Negative. Respiratory: Positive for shortness of breath. Cardiovascular: Negative. Gastrointestinal: Negative. Genitourinary: Negative. Musculoskeletal: Negative. Skin: Negative. Neurological: Positive for dizziness and weakness. Endo/Heme/Allergies: Negative. Psychiatric/Behavioral: Negative. Subjective:  
 
Visit Vitals /59 (BP 1 Location: Left arm, BP Patient Position: At rest;Supine) Pulse 68 Temp 97.7 °F (36.5 °C) Resp 16 SpO2 96% Physical Exam 
HENT:  
   Head: Normocephalic. Mouth/Throat:  
   Mouth: Mucous membranes are dry. Pharynx: Oropharynx is clear. Eyes:  
   Pupils: Pupils are equal, round, and reactive to light. Cardiovascular:  
   Rate and Rhythm: Normal rate and regular rhythm. Pulses: Normal pulses. Heart sounds: Normal heart sounds. Pulmonary:  
   Effort: Pulmonary effort is normal.  
   Breath sounds: Normal breath sounds. Abdominal:  
   General: Abdomen is flat. Bowel sounds are normal.  
   Palpations: Abdomen is soft. Musculoskeletal:  
   Right lower leg: No edema. Left lower leg: No edema. Skin: 
   General: Skin is warm and dry. Neurological: Mental Status: She is alert and oriented to person, place, and time. Psychiatric:     
   Mood and Affect: Mood normal.     
   Behavior: Behavior normal.  
 
 
 
Cardiographics Telemetry: normal sinus rhythm ECG: normal sinus rhythm, nonspecific ST and T waves changes Echocardiogram: done 12/12/2019 -  Left ventricle: Systolic function was normal. Ejection fraction was estimated in the range of 45 % to 50 %. There was mild diffuse hypokinesis. -  Left atrium: Not well visualized. -  Aortic valve: There was mild regurgitation. 
-  Tricuspid valve: There was mild regurgitation. 
  
Labs:  
Recent Results (from the past 24 hour(s)) CBC WITH AUTOMATED DIFF Collection Time: 02/11/20  8:12 PM  
Result Value Ref Range WBC 10.4 4.3 - 11.1 K/uL  
 RBC 3.97 (L) 4.05 - 5.2 M/uL  
 HGB 12.0 11.7 - 15.4 g/dL HCT 38.1 35.8 - 46.3 % MCV 96.0 79.6 - 97.8 FL  
 MCH 30.2 26.1 - 32.9 PG  
 MCHC 31.5 31.4 - 35.0 g/dL  
 RDW 14.7 (H) 11.9 - 14.6 % PLATELET 288 908 - 499 K/uL MPV 10.6 9.4 - 12.3 FL ABSOLUTE NRBC 0.00 0.0 - 0.2 K/uL  
 DF AUTOMATED NEUTROPHILS 66 43 - 78 % LYMPHOCYTES 22 13 - 44 % MONOCYTES 10 4.0 - 12.0 % EOSINOPHILS 1 0.5 - 7.8 % BASOPHILS 1 0.0 - 2.0 % IMMATURE GRANULOCYTES 1 0.0 - 5.0 %  
 ABS. NEUTROPHILS 6.9 1.7 - 8.2 K/UL  
 ABS. LYMPHOCYTES 2.3 0.5 - 4.6 K/UL  
 ABS. MONOCYTES 1.1 0.1 - 1.3 K/UL  
 ABS. EOSINOPHILS 0.1 0.0 - 0.8 K/UL  
 ABS. BASOPHILS 0.1 0.0 - 0.2 K/UL  
 ABS. IMM. GRANS. 0.1 0.0 - 0.5 K/UL METABOLIC PANEL, COMPREHENSIVE Collection Time: 02/11/20  8:12 PM  
Result Value Ref Range Sodium 141 136 - 145 mmol/L Potassium 4.4 3.5 - 5.1 mmol/L Chloride 106 98 - 107 mmol/L  
 CO2 28 21 - 32 mmol/L Anion gap 7 7 - 16 mmol/L Glucose 99 65 - 100 mg/dL BUN 22 8 - 23 MG/DL Creatinine 1.27 (H) 0.6 - 1.0 MG/DL  
 GFR est AA 53 (L) >60 ml/min/1.73m2 GFR est non-AA 43 (L) >60 ml/min/1.73m2  Calcium 8.9 8.3 - 10.4 MG/DL  
 Bilirubin, total 0.3 0.2 - 1.1 MG/DL  
 ALT (SGPT) 21 12 - 65 U/L  
 AST (SGOT) 25 15 - 37 U/L Alk. phosphatase 84 50 - 130 U/L Protein, total 6.3 6.3 - 8.2 g/dL Albumin 2.6 (L) 3.2 - 4.6 g/dL Globulin 3.7 (H) 2.3 - 3.5 g/dL A-G Ratio 0.7 (L) 1.2 - 3.5    
TROPONIN I Collection Time: 02/11/20  8:12 PM  
Result Value Ref Range Troponin-I, Qt. 0.22 (HH) 0.02 - 0.05 NG/ML  
EKG, 12 LEAD, INITIAL Collection Time: 02/11/20  8:29 PM  
Result Value Ref Range Ventricular Rate 62 BPM  
 Atrial Rate 62 BPM  
 P-R Interval 220 ms QRS Duration 128 ms Q-T Interval 440 ms QTC Calculation (Bezet) 446 ms Calculated P Axis -80 degrees Calculated R Axis -47 degrees Calculated T Axis 135 degrees Diagnosis    
  !! AGE AND GENDER SPECIFIC ECG ANALYSIS !! 
Unusual P axis, possible ectopic atrial rhythm Left ventricular hypertrophy with QRS widening and repolarization abnormality Cannot rule out Septal infarct (cited on or before 17-JUN-2016) Abnormal ECG When compared with ECG of 12-DEC-2019 00:15, 
Ectopic atrial rhythm has replaced Atrial fibrillation PTT Collection Time: 02/11/20 10:40 PM  
Result Value Ref Range aPTT 42.6 (H) 24.3 - 35.4 SEC Patient has been seen and examined by Dr. Oskar Pearson and he agrees with the following assessment and plan: 
 
 Assessment/Plan:  
  
  Near syncope -- likely due to dehydration and hypotension. Received 1L NS bolus at [facility] ER, will start on continuous IV hydration at this time. Patient states she only drinks 2 small glasses of iced tea at home per day. Has had recent admission in 12/2019 for similar episodes. Will complete orthostatic BPs and will likely need to have a device monitor upon discharge. Continue cardiac monitoring Hypertension -- not currently on any anti-HTN at home due to episodes of hypotension, BP on arrival to [address]. Given 1L NS bolus at [facility] ER and became normotensive. PAF (paroxysmal atrial fibrillation) -- currently NSR on monitor, on eliquis at home. May need to readdress [facility] usage given frequent falls as reported by patient. CT head showed no evidence of bleed after most recent fall yesterday. Elevated troponin I level -- likely demand in setting of hypotension with known severe underlying CAD will repeat trend cardiac enzymes. Non specific ST-T wave changes on EKG done at [facility] ER, patient currently denying any chest pain. Hypotension -- 78/52 in [facility] ER, received 1L NS bolus and became normotensive, likely dehydrated, since CR 1.27 which is above patient baseline. Will start NS for hydration and monitor. Not currently on any medications that would lower BP. Frequent falls -- patient states she frequently loses her balance, states this occurs almost weekly. Does endorse using a walker. Will have PT/OT assess patient. CAD -- last C was 8/2019 that showed severe CAD s/p CABG with patent VG to RCA. See cath report for full details. Continue ASA and statin therapy.   
 
 
 
Kelsey Omer NP 
2/12/2020 12:53 AM

## 2020-02-12 NOTE — PROGRESS NOTES
UPDATE 2:48PM: Pt to discharge home this day with daughter. Home health referral made. No additional CM needs at this time. Milestones met. Care Management Interventions PCP Verified by CM: Fannie Ruvalcaba) Mode of Transport at Discharge: Other (see comment)(family) Transition of Care Consult (CM Consult): Discharge Planning, Home Health [address]: No 
Reason Outside [place]: Patient already serviced by other home care/hospice agency Discharge Durable Medical Equipment: No 
Physical Therapy Consult: Yes Occupational Therapy Consult: Yes Speech Therapy Consult: No 
Current Support Network: Own Home, Other(Pt's son lives with her) Confirm Follow Up Transport: Family The Plan for Transition of Care is Related to the Following Treatment Goals : home with home health The Patient and/or Patient Representative was Provided with a Choice of Provider and Agrees with the Discharge Plan?: No(Pt already serviced by [facility]) Name of the Patient Representative Who was Provided with a Choice of Provider and Agrees with the Discharge Plan: (N/A) Freedom of Choice List was Provided with Basic Dialogue that Supports the Patient's Individualized Plan of Care/Goals, Treatment Preferences and Shares the Quality Data Associated with the Providers?: No(Pt already serviced by [facility]) [employer] Information Provided?: No 
Discharge Location Discharge Placement: Home with home health This CM met with pt at bedside this day to complete assessment. Pt verified her PCP, insurance emergency contact, and home address. She reports no difficulty obtaining her medications in the community. She lives at home with her son with 3 steps to enter. Her home DME includes a cane, walker, w/c, shower chair, and BSC. Pt confirms that at baseline prior to admission she was mostly independent with her ADLs including bathing, dressing, and ambulating.   She does endorse using a walker and needing some assistance at times. Pt does not drive. We discussed discharge planning this day - pt would like to return home with son when stable for discharge. Upon pt's last admission to hospital, [facility] was set up at discharge. Pt confirms visits and would like their services continued at discharge. This CM to send referral this day. No additional CM needs at this time. Will continue to follow.

## 2020-02-12 NOTE — PROGRESS NOTES
Problem: Mobility Impaired (Adult and Pediatric) Goal: *Acute Goals and Plan of Care (Insert Text) Description LTG: 
(1.)Ms. Mariela Becker will move from supine to sit and sit to supine , scoot up and down and roll side to side in flat bed without siderails with  INDEPENDENT within 7 day(s). (2.)Ms. Mariela Becker will perform all functional transfers with  MODIFIED INDEPENDENCE using the least restrictive/no device within 7 day(s). (3.)Ms. Mariela Becker will ambulate with  MODIFIED INDEPENDENCE for 250+ feet with normal vital sign response with the least restrictive device within 7 day(s). (4.)Ms. Mariela Becker will ambulate up/down 3 steps with bilateral  railing with  STAND BY ASSIST with no device within 7 day(s). Outcome: Progressing Towards Goal 
  
PHYSICAL THERAPY: Initial Assessment and Daily Note 2/12/2020 OBSERVATION: PT Visit Days : 1 Payor: [facility] / Plan: [facility] MEDICARE CHOICE PPO/PFFS / Product Type: Managed Care Medicare /   
  
NAME/AGE/GENDER: Jessica Fernandez is a 66 y.o. female PRIMARY DIAGNOSIS: Near syncope [R55] Near syncope Near syncope ICD-10: Treatment Diagnosis:  
 · Other abnormalities of gait and mobility (R26.89) · Repeated Falls (R29.6) Precaution/Allergies: 
Corticosteroids (glucocorticoids) and Iodine ASSESSMENT:  
 
Ms. Mariela Becker presents supine in bed with daughter at bedside. She lives with her son and ambulates with a rollator with CGA to independence in home and community. Patient transitioned to sit with min A. She stood with CGA and endorsed lightheadedness. Instructed patient to sit and took BP,  sitting and 114 standing. Patient then ambulated 100' with RW and CGA. Patient appears to have some orthostatic hypotension which may be contributing to her lightheadedness. Ms. Mariela Becker would benefit from skilled physical therapy (medically necessary) to address her deficits and maximize her function. Initiated treatment to include exercises outlined below. Patient required min HHAx2 for balance. Good participation. This section established at most recent assessment PROBLEM LIST (Impairments causing functional limitations): 1. Decreased ADL/Functional Activities 2. Decreased Transfer Abilities 3. Decreased Ambulation Ability/Technique 4. Decreased Balance 5. Decreased Activity Tolerance 6. Decreased [place] with Home Exercise Program 
 INTERVENTIONS PLANNED: (Benefits and precautions of physical therapy have been discussed with the patient.) 1. Balance Exercise 2. Bed Mobility 3. Gait Training 4. Home Exercise Program (HEP) 5. Therapeutic Activites 6. Therapeutic Exercise/Strengthening 7. Transfer Training 8. education TREATMENT PLAN: Frequency/Duration: 3 times a week for duration of hospital stay Rehabilitation Potential For Stated Goals: Good REHAB RECOMMENDATIONS (at time of discharge pending progress):   
Placement: It is my opinion, based on this patient's performance to date, that Ms. Shreya Quintana may benefit from [address] after discharge due to the functional deficits listed above that are likely to improve with skilled rehabilitation because patient has had multiple falls and remains at high risk of falls. Equipment:  
? None at this time HISTORY:  
History of Present Injury/Illness (Reason for Referral): 
Per MD note, \"Lorene Stein is a 66 y.o. female with a past medical history of CAD s/p CABG, chronic systolic CHF, paroxysmal AFib on chronic anticoagulation, HLD, and HTN that presented to the [facility] ER after a near syncopal episode at dinner. Patient states she was at a restaurant and when it was time to leave, could not get up and with the help of a family member stood up and felt light headed and dizzy like she was going to pass out.  Patient states when she felt dizzy she also felt short of breath but denies any chest pain or diaphoresis. Patient denies falling or passing out but does state she fell early yesterday afternoon while trying to clean up a mess. She says she lost her balance and fell backward but denies passing out. Patient states she has a poor appetite because food does not taste good to her and that she only drinks 2 small glasses of iced tea a day for hydration. Patient endorses frequent falls and uses a walker to get around. She denies any N/V or cough. Recent admitted for SBO in December and states she has frequent BMs. Denies any frequency or burning with urination. While at [facility] troponin was 0.22. CT head completed and was negative for abnormality, CT spine negative for fracture. Patient was hypotensive upon arrival to [facility], BP 78/52; patient given 1L bolus and 2600U heparin bolus and started on heparin gtt. Patient transferred to [facility] for further treatment. Upon assessment, patient denies any dizziness, SOB or chest pain at this time. BP normotensive and NSR on monitor. \" 
Past Medical History/Comorbidities: Ms. Mariela Becker  has a past medical history of Abnormal EKG (2/67/1718), Acute systolic (congestive) heart failure ([address] (6/17/2016), Anterior myocardial infarction ([address] (11/20/1997), Avascular necrosis ([address] CAD (coronary artery disease) (1990), Cellulitis of right lower extremity (6/17/2016), Chest pain, precordial (3/28/2016), COPD (chronic obstructive pulmonary disease) ([address] Coronary atherosclerosis of native coronary vessel (03/28/2016), Fracture of femoral neck, right ([address] (02/27/2016), GERD (gastroesophageal reflux disease) (02/27/2016), HLD (hyperlipidemia) (03/28/2016), echocardiogram (02/27/2016), Hypertension, Hypokalemia (02/27/2016), IBS (irritable bowel syndrome), Ischemic cardiomyopathy (5/15/2018), MI (myocardial infarction) ([address] (1990 and spring 2016), Osteoarthritis, Osteoporosis, Rectal prolapse, S/P CABG (coronary artery bypass graft) (1990), S/P total hip arthroplasty (12/14/2016), Scleroderma ([facility] (3/28/2016), Shortness of breath dyspnea (3/28/2016), Tracheal stenosis (1995), and Unstable angina ([facility] (11/20/1997). Ms. Omari Mensah  has a past surgical history that includes hx coronary artery bypass graft (1990); hx hysterectomy (1970s); hx tracheostomy; hx ptca; hx heart catheterization; hx other surgical; hx heent (1995); hx fracture tx (1995); hx ankle fracture tx (Right, 1992); and hx hip fracture tx (02/2016). Social History/Living Environment:  
Home Environment: Trailer/mobile home # Steps to Enter: 3 Rails to Enter: Yes Hand Rails : Bilateral 
One/Two Story Residence: One story Living Alone: No 
Support Systems: Family member(s) Patient Expects to be Discharged to[de-identified] Private residence Current DME Used/Available at Home: Braden Canavan, rollator, [place], straight, Commode, bedside, Shower chair, Wheelchair Prior Level of Function/Work/Activity: She lives with her son and ambulates with a rollator with CGA to independence in home and community. Number of Personal Factors/Comorbidities that affect the Plan of Care: 1-2: MODERATE COMPLEXITY EXAMINATION:  
Most Recent Physical Functioning:  
Gross Assessment: 
AROM: Generally decreased, functional 
Strength: Generally decreased, functional 
Sensation: Impaired(peripheral neuropathy) Posture: 
Posture (WDL): Exceptions to [facility] Posture Assessment: Forward head, Rounded shoulders Balance: 
Sitting: Intact Standing: Impaired Standing - Static: Fair Standing - Dynamic : Fair Bed Mobility: 
Supine to Sit: Minimum assistance Wheelchair Mobility: 
  
Transfers: 
Sit to Stand: Contact guard assistance Stand to Sit: Contact guard assistance Gait: 
  
Base of Support: Widened Speed/Dimple: Slow Step Length: Right shortened;Left shortened Distance (ft): 100 Feet (ft) Assistive Device: Walker, rolling;Gait belt Ambulation - Level of Assistance: Contact guard assistance Body Structures Involved: 1. None Body Functions Affected: 1. Movement Related Activities and Participation Affected: 1. Mobility 2. Self Care 3. Domestic Life 4. Community, Social and [place] Number of elements that affect the Plan of Care: 4+: HIGH COMPLEXITY CLINICAL PRESENTATION:  
Presentation: Stable and uncomplicated: LOW COMPLEXITY CLINICAL DECISION [address] [place] [address] AM-PAC 6 Clicks Basic Mobility Inpatient Short Form How much difficulty does the patient currently have. .. Unable A Lot A Little None 1. Turning over in bed (including adjusting bedclothes, sheets and blankets)? [] 1   [] 2   [x] 3   [] 4  
2. Sitting down on and standing up from a chair with arms ( e.g., wheelchair, bedside commode, etc.)   [] 1   [] 2   [x] 3   [] 4  
3. Moving from lying on back to sitting on the side of the bed? [] 1   [] 2   [x] 3   [] 4 How much help from another person does the patient currently need. .. Total A Lot A Little None 4. Moving to and from a bed to a chair (including a wheelchair)? [] 1   [] 2   [x] 3   [] 4  
5. Need to walk in hospital room? [] 1   [] 2   [x] 3   [] 4  
6. Climbing 3-5 steps with a railing? [] 1   [] 2   [x] 3   [] 4  
© , Trustees of [address] [place] [address], under license to [employer]. All rights reserved Score:  Initial: 18 Most Recent: X (Date: -- ) Interpretation of Tool:  Represents activities that are increasingly more difficult (i.e. Bed mobility, Transfers, Gait). Medical Necessity:    
· Patient is expected to demonstrate progress in  
· balance, functional technique, and activity tolerance ·  to  
· increase independence with   and improve safety during all functional mobility · . Reason for Services/Other Comments: 
· Patient continues to require skilled intervention due to · Recent falls, decreased balance. · . Use of outcome tool(s) and clinical judgement create a POC that gives a: Clear prediction of patient's progress: LOW COMPLEXITY  
  
 
 
 
TREATMENT:  
(In addition to Assessment/Re-Assessment sessions the following treatments were rendered) Pre-treatment Symptoms/Complaints:  none. Pain: Initial:  
Pain Intensity 1: 0  Post Session:  0 Therapeutic Exercise: ( 8 minutes):  Exercises per grid below to improve strength and balance. Required minimal visual and verbal cues to promote proper body alignment. Progressed complexity of movement as indicated. Date: 2/12/20 Ambulation Device 
assistance Partial Squats Hip Abduction/ Adduction Standing 2x10 AB Heel Raises Standing X20 AB Toe Raises Standing 2x10 AB Hip Flexion Standing 2x10 AB Sit to Stand Key:  R=right, L=left, B=bilaterally, A=active, AA=active assisted, P=passive Braces/Orthotics/Lines/Etc:  
· IV 
· O2 Device: Room air Treatment/Session Assessment:   
· Response to Treatment:  pleasant and cooperative. · Interdisciplinary Collaboration:  
o Physical Therapist 
o Registered Nurse · After treatment position/precautions:  
o Bed/Chair-wheels locked 
o Bed in low position 
o Call light within reach 
o RN notified 
o Sitting EOB · Compliance with Program/Exercises: Will assess as treatment progresses · Recommendations/Intent for next treatment session: \"Next visit will focus on advancements to more challenging activities and reduction in assistance provided\". Total Treatment Duration: PT Patient Time In/Time Out Time In: 1034 Time Out: 1110 A Debi Mejia, PT, DPT

## 2020-02-12 NOTE — DISCHARGE INSTRUCTIONS
Patient Education        Orthostatic Hypotension: Care Instructions  Your Care Instructions    Orthostatic hypotension is a quick drop in blood pressure. It happens when you get up from sitting or lying down. You may feel faint, lightheaded, or dizzy. When a person sits up or stands up, the body changes the way it pumps blood. This can slow the flow of blood to the brain for a very short time. And that can make you feel lightheaded. Many medicines can cause this problem, especially in older people. Lack of fluids (dehydration) or illnesses such as diabetes or heart disease also can cause it. Follow-up care is a key part of your treatment and safety. Be sure to make and go to all appointments, and call your doctor if you are having problems. It's also a good idea to know your test results and keep a list of the medicines you take. How can you care for yourself at home? · Tell your doctor about any problems you have with your medicines. · If your doctor prescribes medicine to help prevent a low blood pressure problem, take it exactly as prescribed. Call your doctor if you think you are having a problem with your medicine. · Drink plenty of fluids, enough so that your urine is light yellow or clear like water. Choose water and other caffeine-free clear liquids. If you have kidney, heart, or liver disease and have to limit fluids, talk with your doctor before you increase the amount of fluids you drink. · Limit or avoid alcohol and caffeine. · Get up slowly from bed or after sitting for a long time. If you are in bed, roll to your side and swing your legs over the edge of the bed and onto the floor. Push your body up to a sitting position. Wait for a while before you slowly stand up. If you are dizzy or lightheaded, sit or lie down. When should you call for help? Call 911 anytime you think you may need emergency care.  For example, call if:    · You passed out (lost consciousness).    Watch closely for changes in your health, and be sure to contact your doctor if:    · You do not get better as expected. Where can you learn more? Go to http://barbara-charu.info/. Enter T104 in the search box to learn more about \"Orthostatic Hypotension: Care Instructions. \"  Current as of: April 9, 2019  Content Version: 12.2  © 8136-4579 [employer]. Care instructions adapted under license by [employer] (which disclaims liability or warranty for this information). If you have questions about a medical condition or this instruction, always ask your healthcare professional. [place] [address] any warranty or liability for your use of this information.

## 2020-02-12 NOTE — DISCHARGE SUMMARY
[facility] Cardiology Discharge Summary Patient ID: Jessica Fernandez 
[id number] 
89 y.o. 
1945 Admit date: 2/12/2020 Discharge date and time:  2- Admitting Physician: Ferny Samuels MD  
 
Discharge Physician: Makayla Hernandez PA-C/ [address] [facility] Admission Diagnoses: Near syncope [R55] Discharge Diagnoses:  
Patient Active Problem List  
 Diagnosis Date Noted  Near syncope 02/12/2020  Hypotension 02/12/2020  Frequent falls 02/12/2020  Small bowel obstruction ([address] 12/13/2019  Elevated troponin I level 12/13/2019  PAF (paroxysmal atrial fibrillation) ([address] 12/12/2019  Partial small bowel obstruction ([address] 08/26/2019  CAD (coronary artery disease) 06/16/2016  Hypertension 02/27/2016 Cardiology Procedures this admission:  EchoCardiogram 
Consults: None Hospital Course: Pt is a 66 y.o. female with h/o CAD s/p CABG, HFrEF, pAF on chronic anticoagulation with Eliquis, HLD and HTN that presented to the [facility] ER after a near syncopal episode at dinner. Patient reported she was at a restaurant and when it was time to leave, could not get up and with the help of a family member stood up and felt light headed and dizzy like she was going to pass out. Patient states when she felt dizzy she also felt short of breath but denied any chest pain or diaphoresis. Patient denies falling or passing out but does state she fell early yesterday afternoon while trying to clean up a mess. She says she lost her balance and fell backward but denies passing out. Patient states she has a poor appetite because food does not taste good to her and that she only drinks 2 small glasses of iced tea a day for hydration. Patient endorses frequent falls and uses a walker to get around. While at [facility] ED, troponin was 0.22. CT head completed and was negative for abnormality, CT spine negative for fracture.  Patient was hypotensive [place] [facility] [place] 78[place]52; patient given 1L bolus and 2600U heparin bolus and started on heparin gtt. Patient transferred to [facility] for further treatment. Patient denied any dizziness, SOB or chest pain on arrival to [facility]. She was admitted with orthostatic hypotension with mildly elevated troponin from demand ischemia- BP stopped- hypotension resolved with IVF. Echo showed EF 55-60%, no regional wall motion abnormalities, LAE mildly dilated and mild TR. Will consider adding Midodrine as an out patient if increased PO fluid intake doesn't resolve hypotension. Pt was up feeling well without any complaints of CP, SOB or palpitations. Pt was seen and examined by Dr. Obdulia Jose and determined stable and ready for discharge. DISPOSITION: The patient is being discharged home in stable condition on a low saturated fat, low cholesterol and low salt diet. Pt is instructed to advance activities as tolerated limited to fatigue or shortness of breath. Pt is instructed to call office or return to ER for immediate evaluation of any shortness of breath or chest pain. Follow up with [facility] Cardiology Dr. Kaur Gonzales on Mon 2/24 at 2 PM [facility] office for TC 14 appt Follow up with PCP in 1-2 weeks Discharge Exam:  
Visit Vitals /65 Pulse (!) 57 Temp 97.4 °F (36.3 °C) Resp 16 Wt 45.3 kg (99 lb 12.8 oz) SpO2 100% BMI 17.13 kg/m² Pt has been seen by Dr. Obdulia Jose: see his progress note for exam details. Recent Results (from the past 24 hour(s)) CBC WITH AUTOMATED DIFF Collection Time: 02/11/20  8:12 PM  
Result Value Ref Range WBC 10.4 4.3 - 11.1 K/uL  
 RBC 3.97 (L) 4.05 - 5.2 M/uL  
 HGB 12.0 11.7 - 15.4 g/dL HCT 38.1 35.8 - 46.3 % MCV 96.0 79.6 - 97.8 FL  
 MCH 30.2 26.1 - 32.9 PG  
 MCHC 31.5 31.4 - 35.0 g/dL  
 RDW 14.7 (H) 11.9 - 14.6 % PLATELET 137 249 - 700 K/uL MPV 10.6 9.4 - 12.3 FL ABSOLUTE NRBC 0.00 0.0 - 0.2 K/uL  
 DF AUTOMATED NEUTROPHILS 66 43 - 78 % LYMPHOCYTES 22 13 - 44 % MONOCYTES 10 4.0 - 12.0 % EOSINOPHILS 1 0.5 - 7.8 % BASOPHILS 1 0.0 - 2.0 % IMMATURE GRANULOCYTES 1 0.0 - 5.0 %  
 ABS. NEUTROPHILS 6.9 1.7 - 8.2 K/UL  
 ABS. LYMPHOCYTES 2.3 0.5 - 4.6 K/UL  
 ABS. MONOCYTES 1.1 0.1 - 1.3 K/UL  
 ABS. EOSINOPHILS 0.1 0.0 - 0.8 K/UL  
 ABS. BASOPHILS 0.1 0.0 - 0.2 K/UL  
 ABS. IMM. GRANS. 0.1 0.0 - 0.5 K/UL METABOLIC PANEL, COMPREHENSIVE Collection Time: 02/11/20  8:12 PM  
Result Value Ref Range Sodium 141 136 - 145 mmol/L Potassium 4.4 3.5 - 5.1 mmol/L Chloride 106 98 - 107 mmol/L  
 CO2 28 21 - 32 mmol/L Anion gap 7 7 - 16 mmol/L Glucose 99 65 - 100 mg/dL BUN 22 8 - 23 MG/DL Creatinine 1.27 (H) 0.6 - 1.0 MG/DL  
 GFR est AA 53 (L) >60 ml/min/1.73m2 GFR est non-AA 43 (L) >60 ml/min/1.73m2 Calcium 8.9 8.3 - 10.4 MG/DL Bilirubin, total 0.3 0.2 - 1.1 MG/DL  
 ALT (SGPT) 21 12 - 65 U/L  
 AST (SGOT) 25 15 - 37 U/L Alk. phosphatase 84 50 - 130 U/L Protein, total 6.3 6.3 - 8.2 g/dL Albumin 2.6 (L) 3.2 - 4.6 g/dL Globulin 3.7 (H) 2.3 - 3.5 g/dL A-G Ratio 0.7 (L) 1.2 - 3.5    
TROPONIN I Collection Time: 02/11/20  8:12 PM  
Result Value Ref Range Troponin-I, Qt. 0.22 (HH) 0.02 - 0.05 NG/ML  
EKG, 12 LEAD, INITIAL Collection Time: 02/11/20  8:29 PM  
Result Value Ref Range Ventricular Rate 62 BPM  
 Atrial Rate 62 BPM  
 P-R Interval 220 ms QRS Duration 128 ms Q-T Interval 440 ms QTC Calculation (Bezet) 446 ms Calculated P Axis -80 degrees Calculated R Axis -47 degrees Calculated T Axis 135 degrees Diagnosis    
  !! AGE AND GENDER SPECIFIC ECG ANALYSIS !! Sinus rhythm with first degree AVB Left ventricular hypertrophy with QRS widening and repolarization abnormality Cannot rule out Septal infarct (cited on or before 17-JUN-2016) Abnormal ECG When compared with ECG of 12-DEC-2019 00:15, 
Ectopic atrial rhythm has replaced Atrial fibrillation Confirmed by Ethan Escalera ([id number]) on 2/12/2020 7:12:01 AM 
  
PTT Collection Time: 02/11/20 10:40 PM  
Result Value Ref Range aPTT 42.6 (H) 24.3 - 35.4 SEC  
TROPONIN I Collection Time: 02/12/20  1:08 AM  
Result Value Ref Range Troponin-I, Qt. 0.20 (HH) 0.02 - 0.05 NG/ML  
TROPONIN I Collection Time: 02/12/20  3:54 AM  
Result Value Ref Range Troponin-I, Qt. 0.20 (HH) 0.02 - 2.31 NG/ML  
METABOLIC PANEL, BASIC Collection Time: 02/12/20  3:54 AM  
Result Value Ref Range Sodium 143 136 - 145 mmol/L Potassium 4.5 3.5 - 5.1 mmol/L Chloride 113 (H) 98 - 107 mmol/L  
 CO2 24 21 - 32 mmol/L Anion gap 6 (L) 7 - 16 mmol/L Glucose 88 65 - 100 mg/dL BUN 23 8 - 23 MG/DL Creatinine 1.03 (H) 0.6 - 1.0 MG/DL  
 GFR est AA >60 >60 ml/min/1.73m2 GFR est non-AA 55 (L) >60 ml/min/1.73m2 Calcium 7.8 (L) 8.3 - 10.4 MG/DL  
CBC W/O DIFF Collection Time: 02/12/20  3:54 AM  
Result Value Ref Range WBC 8.1 4.3 - 11.1 K/uL  
 RBC 3.64 (L) 4.05 - 5.2 M/uL  
 HGB 11.1 (L) 11.7 - 15.4 g/dL HCT 35.1 (L) 35.8 - 46.3 % MCV 96.4 79.6 - 97.8 FL  
 MCH 30.5 26.1 - 32.9 PG  
 MCHC 31.6 31.4 - 35.0 g/dL  
 RDW 14.6 11.9 - 14.6 % PLATELET 060 746 - 811 K/uL MPV 10.7 9.4 - 12.3 FL ABSOLUTE NRBC 0.00 0.0 - 0.2 K/uL Patient Instructions:  
Current Discharge Medication List  
  
CONTINUE these medications which have NOT CHANGED Details GABAPENTIN PO Take 180 mg by mouth nightly. Indications: weakness, numbness or pain from nerve damage  
  
ranolazine ER (RANEXA) 500 mg SR tablet Take 1 Tab by mouth two (2) times a day. Qty: 60 Tab, Refills: 5  
  
magnesium oxide (MAG-OX) 400 mg tablet Take 1 Tab by mouth daily. Qty: 30 Tab, Refills: 2  
  
apixaban (ELIQUIS) 5 mg tablet Take 1 Tab by mouth every twelve (12) hours. Indications: Treatment to Prevent Blood Clots in Chronic Atrial Fibrillation 
Qty: 60 Tab, Refills: 2 ferrous sulfate (IRON) 325 mg (65 mg iron) tablet Take  by mouth Daily (before breakfast). omeprazole (PRILOSEC) 40 mg capsule Take 40 mg by mouth daily. Indications: gastroesophageal reflux disease, heartburn  
  
levothyroxine (SYNTHROID) 50 mcg tablet Take  by mouth Daily (before breakfast). aspirin delayed-release 81 mg tablet Take 81 mg by mouth nightly. atorvastatin (LIPITOR) 40 mg tablet Take 1 Tab by mouth daily. Indications: HYPERCHOLESTEROLEMIA Qty: 90 Tab, Refills: 3 Associated Diagnoses: Coronary artery disease involving native heart without angina pectoris, unspecified vessel or lesion type  
  
potassium chloride (K-DUR, KLOR-CON) 20 mEq tablet Take 1 Tab by mouth two (2) times a day. Qty: 60 Tab, Refills: 1  
  
nitroglycerin (NITROSTAT) 0.4 mg SL tablet 1 Tab by SubLINGual route every five (5) minutes as needed for Chest Pain. Up to 3 doses. Qty: 1 Bottle, Refills: 3 HYDROcodone-acetaminophen (NORCO) 7.5-325 mg per tablet Take 1 Tab by mouth every six (6) hours as needed for Pain. Max Daily Amount: 4 Tabs. Qty: 17 Tab, Refills: 0 Associated Diagnoses: Closed compression fracture of thoracic vertebra, initial encounter ([facility])  
  
ondansetron (ZOFRAN ODT) 8 mg disintegrating tablet Take 1 Tab by mouth every eight (8) hours as needed for Nausea. Qty: 12 Tab, Refills: 0 Associated Diagnoses: Non-intractable vomiting with nausea, unspecified vomiting type  
  
acetaminophen (TYLENOL) 325 mg tablet Take 500 mg by mouth every six (6) hours as needed for Pain. loperamide (IMODIUM) 2 mg capsule Take 2 mg by mouth as needed for Diarrhea.   
  
  
 
Chad Fountain PA-C 
2/12/2020 
8:29 AM

## 2020-02-12 NOTE — ED NOTES
The I.V. in the left [facility] was running heprin and appeared patent. There was no swelling or discoloration noted at the site. The I.V. was secured with a transparent dressing and continued on transport from our facility with EMS to [facility] CV stepdown.

## 2020-02-12 NOTE — PROGRESS NOTES
Bedside shift report given to Kiarra Onofre RN (oncoming nurse) by Geri Garnica RN (offgoing nurse). Bedside shift report included the following information: SBAR, Kardex, Procedure Summary, MAR, and Recent Results.

## 2020-02-12 NOTE — PROGRESS NOTES
Spiritual Care Visit, initial visit. Attempted to visit and assist with a HCPOA, Staff was with patient.  will come back later. Visit by Chris Kirkpatrick, Staff .  Goldie., Sydnee.HETAL, B.A.

## 2020-02-12 NOTE — ED TRIAGE NOTES
Pt was at dinner and became weak, diaphoretic, and short of breath. Pt denies chest pain.  
 
Rasta Herndon RN

## 2020-02-12 NOTE — PROGRESS NOTES
Spiritual Care visit. Follow up. Advance Directive Consult. Discussed patient's wishes with him/her. explained that this document will help her wishes to be followed if she is ever unable to make health care decisions for himself/herself. Assisted patient with completing the document, obtained witness, and made copies when finished. Placed a copy on patient's chart. Instructed patient to give original and at least one copy to patient's agent. Visit by Frandy Martines M.Ed., B.A., Th.B,  Staff

## 2020-02-24 PROBLEM — R63.4 WEIGHT LOSS: Status: ACTIVE | Noted: 2020-02-24

## 2021-01-01 ENCOUNTER — APPOINTMENT (OUTPATIENT)
Dept: INTERVENTIONAL RADIOLOGY/VASCULAR | Age: 76
DRG: 280 | End: 2021-01-01
Attending: RADIOLOGY
Payer: MEDICARE

## 2021-01-01 ENCOUNTER — APPOINTMENT (OUTPATIENT)
Dept: NUCLEAR MEDICINE | Age: 76
DRG: 280 | End: 2021-01-01
Attending: INTERNAL MEDICINE
Payer: MEDICARE

## 2021-01-01 ENCOUNTER — APPOINTMENT (OUTPATIENT)
Dept: GENERAL RADIOLOGY | Age: 76
DRG: 280 | End: 2021-01-01
Attending: EMERGENCY MEDICINE
Payer: MEDICARE

## 2021-01-01 ENCOUNTER — HOSPITAL ENCOUNTER (OUTPATIENT)
Age: 76
Setting detail: OBSERVATION
LOS: 1 days | Discharge: HOME HEALTH | End: 2021-01-08
Attending: INTERNAL MEDICINE | Admitting: INTERNAL MEDICINE
Payer: MEDICARE

## 2021-01-01 ENCOUNTER — APPOINTMENT (OUTPATIENT)
Dept: GENERAL RADIOLOGY | Age: 76
DRG: 280 | End: 2021-01-01
Attending: INTERNAL MEDICINE
Payer: MEDICARE

## 2021-01-01 ENCOUNTER — APPOINTMENT (OUTPATIENT)
Dept: ULTRASOUND IMAGING | Age: 76
DRG: 280 | End: 2021-01-01
Attending: INTERNAL MEDICINE
Payer: MEDICARE

## 2021-01-01 ENCOUNTER — HOSPITAL ENCOUNTER (INPATIENT)
Age: 76
LOS: 8 days | Discharge: HOME HEALTH | DRG: 280 | End: 2021-02-22
Attending: EMERGENCY MEDICINE | Admitting: FAMILY MEDICINE
Payer: MEDICARE

## 2021-01-01 ENCOUNTER — APPOINTMENT (OUTPATIENT)
Dept: CT IMAGING | Age: 76
DRG: 280 | End: 2021-01-01
Attending: INTERNAL MEDICINE
Payer: MEDICARE

## 2021-01-01 VITALS
OXYGEN SATURATION: 100 % | HEART RATE: 64 BPM | WEIGHT: 94.5 LBS | DIASTOLIC BLOOD PRESSURE: 48 MMHG | BODY MASS INDEX: 16.13 KG/M2 | TEMPERATURE: 97.5 F | SYSTOLIC BLOOD PRESSURE: 122 MMHG | HEIGHT: 64 IN | RESPIRATION RATE: 20 BRPM

## 2021-01-01 VITALS
HEIGHT: 64 IN | OXYGEN SATURATION: 98 % | DIASTOLIC BLOOD PRESSURE: 51 MMHG | TEMPERATURE: 97.8 F | SYSTOLIC BLOOD PRESSURE: 92 MMHG | BODY MASS INDEX: 13.49 KG/M2 | RESPIRATION RATE: 17 BRPM | WEIGHT: 79 LBS | HEART RATE: 66 BPM

## 2021-01-01 DIAGNOSIS — R07.9 CHEST PAIN, UNSPECIFIED TYPE: ICD-10-CM

## 2021-01-01 DIAGNOSIS — Z51.5 ENCOUNTER FOR PALLIATIVE CARE: ICD-10-CM

## 2021-01-01 DIAGNOSIS — R64 CACHEXIA (HCC): ICD-10-CM

## 2021-01-01 DIAGNOSIS — I25.110 CORONARY ARTERY DISEASE INVOLVING NATIVE CORONARY ARTERY OF NATIVE HEART WITH UNSTABLE ANGINA PECTORIS (HCC): ICD-10-CM

## 2021-01-01 DIAGNOSIS — Z87.19 HISTORY OF GI BLEED: Chronic | ICD-10-CM

## 2021-01-01 DIAGNOSIS — R63.0 ANOREXIA: ICD-10-CM

## 2021-01-01 DIAGNOSIS — I82.4Z1 DEEP VEIN THROMBOSIS (DVT) OF DISTAL VEIN OF RIGHT LOWER EXTREMITY, UNSPECIFIED CHRONICITY (HCC): ICD-10-CM

## 2021-01-01 DIAGNOSIS — I10 ESSENTIAL HYPERTENSION: Chronic | ICD-10-CM

## 2021-01-01 DIAGNOSIS — I21.3 ST ELEVATION MYOCARDIAL INFARCTION (STEMI), UNSPECIFIED ARTERY (HCC): Primary | ICD-10-CM

## 2021-01-01 DIAGNOSIS — R53.81 DEBILITY: ICD-10-CM

## 2021-01-01 DIAGNOSIS — I82.411 ACUTE DEEP VEIN THROMBOSIS (DVT) OF FEMORAL VEIN OF RIGHT LOWER EXTREMITY (HCC): ICD-10-CM

## 2021-01-01 DIAGNOSIS — I25.118 CORONARY ARTERY DISEASE INVOLVING NATIVE CORONARY ARTERY OF NATIVE HEART WITH OTHER FORM OF ANGINA PECTORIS (HCC): ICD-10-CM

## 2021-01-01 DIAGNOSIS — R62.51 FAILURE TO THRIVE (0-17): ICD-10-CM

## 2021-01-01 DIAGNOSIS — I48.0 PAF (PAROXYSMAL ATRIAL FIBRILLATION) (HCC): Chronic | ICD-10-CM

## 2021-01-01 DIAGNOSIS — R77.8 ELEVATED TROPONIN I LEVEL: ICD-10-CM

## 2021-01-01 DIAGNOSIS — Z95.828 S/P INSERTION OF IVC (INFERIOR VENA CAVAL) FILTER: ICD-10-CM

## 2021-01-01 LAB
ABO + RH BLD: NORMAL
ALBUMIN SERPL-MCNC: 2 G/DL (ref 3.2–4.6)
ALBUMIN/GLOB SERPL: 0.4 {RATIO} (ref 1.2–3.5)
ALP SERPL-CCNC: 80 U/L (ref 50–136)
ALT SERPL-CCNC: 22 U/L (ref 12–65)
ANION GAP SERPL CALC-SCNC: 1 MMOL/L (ref 7–16)
ANION GAP SERPL CALC-SCNC: 2 MMOL/L (ref 7–16)
ANION GAP SERPL CALC-SCNC: 4 MMOL/L (ref 7–16)
ANION GAP SERPL CALC-SCNC: 5 MMOL/L (ref 7–16)
ANION GAP SERPL CALC-SCNC: 6 MMOL/L (ref 7–16)
ANION GAP SERPL CALC-SCNC: 6 MMOL/L (ref 7–16)
ANION GAP SERPL CALC-SCNC: 8 MMOL/L (ref 7–16)
ANION GAP SERPL CALC-SCNC: 9 MMOL/L (ref 7–16)
APPEARANCE UR: CLEAR
APTT PPP: 37 SEC (ref 24.1–35.1)
APTT PPP: 38.6 SEC (ref 24.1–35.1)
AST SERPL-CCNC: 26 U/L (ref 15–37)
ATRIAL RATE: 62 BPM
ATRIAL RATE: 97 BPM
BACTERIA SPEC CULT: NORMAL
BACTERIA SPEC CULT: NORMAL
BACTERIA URNS QL MICRO: 0 /HPF
BASOPHILS # BLD: 0 K/UL (ref 0–0.2)
BASOPHILS # BLD: 0.1 K/UL (ref 0–0.2)
BASOPHILS NFR BLD: 0 % (ref 0–2)
BASOPHILS NFR BLD: 0 % (ref 0–2)
BASOPHILS NFR BLD: 1 % (ref 0–2)
BILIRUB SERPL-MCNC: 0.2 MG/DL (ref 0.2–1.1)
BILIRUB UR QL: NEGATIVE
BLD PROD TYP BPU: NORMAL
BLD PROD TYP BPU: NORMAL
BLOOD GROUP ANTIBODIES SERPL: NORMAL
BPU ID: NORMAL
BPU ID: NORMAL
BUN SERPL-MCNC: 16 MG/DL (ref 8–23)
BUN SERPL-MCNC: 17 MG/DL (ref 8–23)
BUN SERPL-MCNC: 22 MG/DL (ref 8–23)
BUN SERPL-MCNC: 27 MG/DL (ref 8–23)
BUN SERPL-MCNC: 30 MG/DL (ref 8–23)
BUN SERPL-MCNC: 32 MG/DL (ref 8–23)
BUN SERPL-MCNC: 37 MG/DL (ref 8–23)
BUN SERPL-MCNC: 38 MG/DL (ref 8–23)
BUN SERPL-MCNC: 54 MG/DL (ref 8–23)
BUN SERPL-MCNC: 55 MG/DL (ref 8–23)
CALCIUM SERPL-MCNC: 7.5 MG/DL (ref 8.3–10.4)
CALCIUM SERPL-MCNC: 7.5 MG/DL (ref 8.3–10.4)
CALCIUM SERPL-MCNC: 7.6 MG/DL (ref 8.3–10.4)
CALCIUM SERPL-MCNC: 7.6 MG/DL (ref 8.3–10.4)
CALCIUM SERPL-MCNC: 7.7 MG/DL (ref 8.3–10.4)
CALCIUM SERPL-MCNC: 7.9 MG/DL (ref 8.3–10.4)
CALCIUM SERPL-MCNC: 8 MG/DL (ref 8.3–10.4)
CALCIUM SERPL-MCNC: 8.1 MG/DL (ref 8.3–10.4)
CALCIUM SERPL-MCNC: 8.3 MG/DL (ref 8.3–10.4)
CALCIUM SERPL-MCNC: 9 MG/DL (ref 8.3–10.4)
CALCULATED P AXIS, ECG09: 85 DEGREES
CALCULATED P AXIS, ECG09: 90 DEGREES
CALCULATED R AXIS, ECG10: -41 DEGREES
CALCULATED R AXIS, ECG10: -59 DEGREES
CALCULATED T AXIS, ECG11: -145 DEGREES
CALCULATED T AXIS, ECG11: 113 DEGREES
CASTS URNS QL MICRO: 0 /LPF
CHLORIDE SERPL-SCNC: 101 MMOL/L (ref 98–107)
CHLORIDE SERPL-SCNC: 101 MMOL/L (ref 98–107)
CHLORIDE SERPL-SCNC: 103 MMOL/L (ref 98–107)
CHLORIDE SERPL-SCNC: 104 MMOL/L (ref 98–107)
CHLORIDE SERPL-SCNC: 107 MMOL/L (ref 98–107)
CHLORIDE SERPL-SCNC: 109 MMOL/L (ref 98–107)
CHLORIDE SERPL-SCNC: 111 MMOL/L (ref 98–107)
CHLORIDE SERPL-SCNC: 111 MMOL/L (ref 98–107)
CHOLEST SERPL-MCNC: 93 MG/DL
CO2 SERPL-SCNC: 19 MMOL/L (ref 21–32)
CO2 SERPL-SCNC: 19 MMOL/L (ref 21–32)
CO2 SERPL-SCNC: 21 MMOL/L (ref 21–32)
CO2 SERPL-SCNC: 24 MMOL/L (ref 21–32)
CO2 SERPL-SCNC: 24 MMOL/L (ref 21–32)
CO2 SERPL-SCNC: 26 MMOL/L (ref 21–32)
CO2 SERPL-SCNC: 29 MMOL/L (ref 21–32)
CO2 SERPL-SCNC: 30 MMOL/L (ref 21–32)
CO2 SERPL-SCNC: 32 MMOL/L (ref 21–32)
CO2 SERPL-SCNC: 33 MMOL/L (ref 21–32)
COLOR UR: YELLOW
CORTIS BS SERPL-MCNC: 27.4 UG/DL
CREAT SERPL-MCNC: 0.52 MG/DL (ref 0.6–1)
CREAT SERPL-MCNC: 0.54 MG/DL (ref 0.6–1)
CREAT SERPL-MCNC: 0.57 MG/DL (ref 0.6–1)
CREAT SERPL-MCNC: 0.62 MG/DL (ref 0.6–1)
CREAT SERPL-MCNC: 0.67 MG/DL (ref 0.6–1)
CREAT SERPL-MCNC: 0.72 MG/DL (ref 0.6–1)
CREAT SERPL-MCNC: 0.77 MG/DL (ref 0.6–1)
CREAT SERPL-MCNC: 0.79 MG/DL (ref 0.6–1)
CREAT SERPL-MCNC: 0.79 MG/DL (ref 0.6–1)
CREAT SERPL-MCNC: 0.99 MG/DL (ref 0.6–1)
CROSSMATCH RESULT,%XM: NORMAL
CROSSMATCH RESULT,%XM: NORMAL
DIAGNOSIS, 93000: NORMAL
DIAGNOSIS, 93000: NORMAL
DIFFERENTIAL METHOD BLD: ABNORMAL
EOSINOPHIL # BLD: 0 K/UL (ref 0–0.8)
EOSINOPHIL # BLD: 0 K/UL (ref 0–0.8)
EOSINOPHIL # BLD: 0.1 K/UL (ref 0–0.8)
EOSINOPHIL NFR BLD: 0 % (ref 0.5–7.8)
EOSINOPHIL NFR BLD: 1 % (ref 0.5–7.8)
EPI CELLS #/AREA URNS HPF: 0 /HPF
ERYTHROCYTE [DISTWIDTH] IN BLOOD BY AUTOMATED COUNT: 15.2 % (ref 11.9–14.6)
ERYTHROCYTE [DISTWIDTH] IN BLOOD BY AUTOMATED COUNT: 15.3 % (ref 11.9–14.6)
ERYTHROCYTE [DISTWIDTH] IN BLOOD BY AUTOMATED COUNT: 15.4 % (ref 11.9–14.6)
ERYTHROCYTE [DISTWIDTH] IN BLOOD BY AUTOMATED COUNT: 15.7 % (ref 11.9–14.6)
ERYTHROCYTE [DISTWIDTH] IN BLOOD BY AUTOMATED COUNT: 16.7 % (ref 11.9–14.6)
ERYTHROCYTE [DISTWIDTH] IN BLOOD BY AUTOMATED COUNT: 16.7 % (ref 11.9–14.6)
ERYTHROCYTE [DISTWIDTH] IN BLOOD BY AUTOMATED COUNT: 17.2 % (ref 11.9–14.6)
ERYTHROCYTE [DISTWIDTH] IN BLOOD BY AUTOMATED COUNT: 17.5 % (ref 11.9–14.6)
ERYTHROCYTE [DISTWIDTH] IN BLOOD BY AUTOMATED COUNT: 17.8 % (ref 11.9–14.6)
ERYTHROCYTE [SEDIMENTATION RATE] IN BLOOD: 17 MM/HR (ref 0–30)
FERRITIN SERPL-MCNC: 363 NG/ML (ref 8–388)
GLOBULIN SER CALC-MCNC: 4.9 G/DL (ref 2.3–3.5)
GLUCOSE BLD STRIP.AUTO-MCNC: 101 MG/DL (ref 65–100)
GLUCOSE BLD STRIP.AUTO-MCNC: 71 MG/DL (ref 65–100)
GLUCOSE SERPL-MCNC: 163 MG/DL (ref 65–100)
GLUCOSE SERPL-MCNC: 215 MG/DL (ref 65–100)
GLUCOSE SERPL-MCNC: 77 MG/DL (ref 65–100)
GLUCOSE SERPL-MCNC: 80 MG/DL (ref 65–100)
GLUCOSE SERPL-MCNC: 83 MG/DL (ref 65–100)
GLUCOSE SERPL-MCNC: 84 MG/DL (ref 65–100)
GLUCOSE SERPL-MCNC: 88 MG/DL (ref 65–100)
GLUCOSE SERPL-MCNC: 93 MG/DL (ref 65–100)
GLUCOSE SERPL-MCNC: 96 MG/DL (ref 65–100)
GLUCOSE SERPL-MCNC: 98 MG/DL (ref 65–100)
GLUCOSE UR STRIP.AUTO-MCNC: NEGATIVE MG/DL
HCT VFR BLD AUTO: 21.9 % (ref 35.8–46.3)
HCT VFR BLD AUTO: 27.2 % (ref 35.8–46.3)
HCT VFR BLD AUTO: 30.8 % (ref 35.8–46.3)
HCT VFR BLD AUTO: 32.5 % (ref 35.8–46.3)
HCT VFR BLD AUTO: 33 % (ref 35.8–46.3)
HCT VFR BLD AUTO: 33.1 % (ref 35.8–46.3)
HCT VFR BLD AUTO: 33.2 % (ref 35.8–46.3)
HCT VFR BLD AUTO: 34.9 % (ref 35.8–46.3)
HCT VFR BLD AUTO: 36 % (ref 35.8–46.3)
HCT VFR BLD AUTO: 40.2 % (ref 35.8–46.3)
HDLC SERPL-MCNC: 42 MG/DL (ref 40–60)
HDLC SERPL: 2.2 {RATIO}
HEMOCCULT STL QL: POSITIVE
HGB BLD-MCNC: 10.1 G/DL (ref 11.7–15.4)
HGB BLD-MCNC: 10.3 G/DL (ref 11.7–15.4)
HGB BLD-MCNC: 10.6 G/DL (ref 11.7–15.4)
HGB BLD-MCNC: 10.8 G/DL (ref 11.7–15.4)
HGB BLD-MCNC: 11.1 G/DL (ref 11.7–15.4)
HGB BLD-MCNC: 11.1 G/DL (ref 11.7–15.4)
HGB BLD-MCNC: 11.8 G/DL (ref 11.7–15.4)
HGB BLD-MCNC: 13.1 G/DL (ref 11.7–15.4)
HGB BLD-MCNC: 7 G/DL (ref 11.7–15.4)
HGB BLD-MCNC: 8.6 G/DL (ref 11.7–15.4)
HGB UR QL STRIP: NEGATIVE
HISTORY CHECKED?,CKHIST: NORMAL
IMM GRANULOCYTES # BLD AUTO: 0 K/UL (ref 0–0.5)
IMM GRANULOCYTES # BLD AUTO: 0 K/UL (ref 0–0.5)
IMM GRANULOCYTES # BLD AUTO: 0.1 K/UL (ref 0–0.5)
IMM GRANULOCYTES NFR BLD AUTO: 0 % (ref 0–5)
IMM GRANULOCYTES NFR BLD AUTO: 1 % (ref 0–5)
IRON SERPL-MCNC: 7 UG/DL (ref 35–150)
KETONES UR QL STRIP.AUTO: NEGATIVE MG/DL
LACTATE SERPL-SCNC: 1.2 MMOL/L (ref 0.4–2)
LACTATE SERPL-SCNC: 1.5 MMOL/L (ref 0.4–2)
LDLC SERPL CALC-MCNC: 33 MG/DL
LEUKOCYTE ESTERASE UR QL STRIP.AUTO: NEGATIVE
LIPID PROFILE,FLP: NORMAL
LYMPHOCYTES # BLD: 0.9 K/UL (ref 0.5–4.6)
LYMPHOCYTES # BLD: 1 K/UL (ref 0.5–4.6)
LYMPHOCYTES # BLD: 1.1 K/UL (ref 0.5–4.6)
LYMPHOCYTES # BLD: 1.1 K/UL (ref 0.5–4.6)
LYMPHOCYTES # BLD: 1.3 K/UL (ref 0.5–4.6)
LYMPHOCYTES # BLD: 1.4 K/UL (ref 0.5–4.6)
LYMPHOCYTES # BLD: 1.4 K/UL (ref 0.5–4.6)
LYMPHOCYTES # BLD: 1.7 K/UL (ref 0.5–4.6)
LYMPHOCYTES # BLD: 2.1 K/UL (ref 0.5–4.6)
LYMPHOCYTES NFR BLD: 11 % (ref 13–44)
LYMPHOCYTES NFR BLD: 12 % (ref 13–44)
LYMPHOCYTES NFR BLD: 14 % (ref 13–44)
LYMPHOCYTES NFR BLD: 15 % (ref 13–44)
LYMPHOCYTES NFR BLD: 16 % (ref 13–44)
LYMPHOCYTES NFR BLD: 17 % (ref 13–44)
LYMPHOCYTES NFR BLD: 29 % (ref 13–44)
MAGNESIUM SERPL-MCNC: 1.6 MG/DL (ref 1.8–2.4)
MAGNESIUM SERPL-MCNC: 1.6 MG/DL (ref 1.8–2.4)
MAGNESIUM SERPL-MCNC: 2 MG/DL (ref 1.8–2.4)
MAGNESIUM SERPL-MCNC: 2.1 MG/DL (ref 1.8–2.4)
MAGNESIUM SERPL-MCNC: 2.2 MG/DL (ref 1.8–2.4)
MCH RBC QN AUTO: 29.3 PG (ref 26.1–32.9)
MCH RBC QN AUTO: 29.8 PG (ref 26.1–32.9)
MCH RBC QN AUTO: 30 PG (ref 26.1–32.9)
MCH RBC QN AUTO: 30.2 PG (ref 26.1–32.9)
MCH RBC QN AUTO: 30.3 PG (ref 26.1–32.9)
MCH RBC QN AUTO: 30.3 PG (ref 26.1–32.9)
MCH RBC QN AUTO: 30.4 PG (ref 26.1–32.9)
MCHC RBC AUTO-ENTMCNC: 30.8 G/DL (ref 31.4–35)
MCHC RBC AUTO-ENTMCNC: 31.6 G/DL (ref 31.4–35)
MCHC RBC AUTO-ENTMCNC: 31.7 G/DL (ref 31.4–35)
MCHC RBC AUTO-ENTMCNC: 32 G/DL (ref 31.4–35)
MCHC RBC AUTO-ENTMCNC: 32 G/DL (ref 31.4–35)
MCHC RBC AUTO-ENTMCNC: 32.6 G/DL (ref 31.4–35)
MCHC RBC AUTO-ENTMCNC: 32.7 G/DL (ref 31.4–35)
MCHC RBC AUTO-ENTMCNC: 32.8 G/DL (ref 31.4–35)
MCHC RBC AUTO-ENTMCNC: 33.8 G/DL (ref 31.4–35)
MCV RBC AUTO: 88.1 FL (ref 79.6–97.8)
MCV RBC AUTO: 89.9 FL (ref 79.6–97.8)
MCV RBC AUTO: 90.9 FL (ref 79.6–97.8)
MCV RBC AUTO: 91.4 FL (ref 79.6–97.8)
MCV RBC AUTO: 94.6 FL (ref 79.6–97.8)
MCV RBC AUTO: 95.2 FL (ref 79.6–97.8)
MCV RBC AUTO: 95.4 FL (ref 79.6–97.8)
MCV RBC AUTO: 95.6 FL (ref 79.6–97.8)
MCV RBC AUTO: 96.5 FL (ref 79.6–97.8)
MONOCYTES # BLD: 0.8 K/UL (ref 0.1–1.3)
MONOCYTES # BLD: 0.8 K/UL (ref 0.1–1.3)
MONOCYTES # BLD: 0.9 K/UL (ref 0.1–1.3)
MONOCYTES # BLD: 1 K/UL (ref 0.1–1.3)
MONOCYTES # BLD: 1.2 K/UL (ref 0.1–1.3)
MONOCYTES # BLD: 1.3 K/UL (ref 0.1–1.3)
MONOCYTES # BLD: 1.3 K/UL (ref 0.1–1.3)
MONOCYTES # BLD: 1.5 K/UL (ref 0.1–1.3)
MONOCYTES # BLD: 1.8 K/UL (ref 0.1–1.3)
MONOCYTES NFR BLD: 11 % (ref 4–12)
MONOCYTES NFR BLD: 12 % (ref 4–12)
MONOCYTES NFR BLD: 13 % (ref 4–12)
MONOCYTES NFR BLD: 13 % (ref 4–12)
MONOCYTES NFR BLD: 14 % (ref 4–12)
NEUTS SEG # BLD: 11.3 K/UL (ref 1.7–8.2)
NEUTS SEG # BLD: 3.3 K/UL (ref 1.7–8.2)
NEUTS SEG # BLD: 4.2 K/UL (ref 1.7–8.2)
NEUTS SEG # BLD: 4.3 K/UL (ref 1.7–8.2)
NEUTS SEG # BLD: 6 K/UL (ref 1.7–8.2)
NEUTS SEG # BLD: 6.5 K/UL (ref 1.7–8.2)
NEUTS SEG # BLD: 7.6 K/UL (ref 1.7–8.2)
NEUTS SEG # BLD: 8.4 K/UL (ref 1.7–8.2)
NEUTS SEG # BLD: 8.9 K/UL (ref 1.7–8.2)
NEUTS SEG NFR BLD: 55 % (ref 43–78)
NEUTS SEG NFR BLD: 68 % (ref 43–78)
NEUTS SEG NFR BLD: 69 % (ref 43–78)
NEUTS SEG NFR BLD: 69 % (ref 43–78)
NEUTS SEG NFR BLD: 73 % (ref 43–78)
NEUTS SEG NFR BLD: 73 % (ref 43–78)
NEUTS SEG NFR BLD: 75 % (ref 43–78)
NITRITE UR QL STRIP.AUTO: NEGATIVE
NRBC # BLD: 0 K/UL (ref 0–0.2)
NRBC # BLD: 0.03 K/UL (ref 0–0.2)
NRBC # BLD: 0.03 K/UL (ref 0–0.2)
P-R INTERVAL, ECG05: 186 MS
PH UR STRIP: 5 [PH] (ref 5–9)
PHOSPHATE SERPL-MCNC: 3.6 MG/DL (ref 2.3–3.7)
PHOSPHATE SERPL-MCNC: 4.3 MG/DL (ref 2.3–3.7)
PLATELET # BLD AUTO: 190 K/UL (ref 150–450)
PLATELET # BLD AUTO: 194 K/UL (ref 150–450)
PLATELET # BLD AUTO: 253 K/UL (ref 150–450)
PLATELET # BLD AUTO: 265 K/UL (ref 150–450)
PLATELET # BLD AUTO: 276 K/UL (ref 150–450)
PLATELET # BLD AUTO: 332 K/UL (ref 150–450)
PLATELET # BLD AUTO: 340 K/UL (ref 150–450)
PLATELET # BLD AUTO: 342 K/UL (ref 150–450)
PLATELET # BLD AUTO: 572 K/UL (ref 150–450)
PMV BLD AUTO: 11.1 FL (ref 9.4–12.3)
PMV BLD AUTO: 11.2 FL (ref 9.4–12.3)
PMV BLD AUTO: 9.2 FL (ref 9.4–12.3)
PMV BLD AUTO: 9.2 FL (ref 9.4–12.3)
PMV BLD AUTO: 9.3 FL (ref 9.4–12.3)
PMV BLD AUTO: 9.3 FL (ref 9.4–12.3)
PMV BLD AUTO: 9.5 FL (ref 9.4–12.3)
PMV BLD AUTO: 9.6 FL (ref 9.4–12.3)
PMV BLD AUTO: 9.8 FL (ref 9.4–12.3)
POTASSIUM SERPL-SCNC: 3.7 MMOL/L (ref 3.5–5.1)
POTASSIUM SERPL-SCNC: 3.9 MMOL/L (ref 3.5–5.1)
POTASSIUM SERPL-SCNC: 4.2 MMOL/L (ref 3.5–5.1)
POTASSIUM SERPL-SCNC: 4.2 MMOL/L (ref 3.5–5.1)
POTASSIUM SERPL-SCNC: 4.3 MMOL/L (ref 3.5–5.1)
POTASSIUM SERPL-SCNC: 4.3 MMOL/L (ref 3.5–5.1)
POTASSIUM SERPL-SCNC: 4.4 MMOL/L (ref 3.5–5.1)
POTASSIUM SERPL-SCNC: 4.7 MMOL/L (ref 3.5–5.1)
POTASSIUM SERPL-SCNC: 5 MMOL/L (ref 3.5–5.1)
POTASSIUM SERPL-SCNC: 5.3 MMOL/L (ref 3.5–5.1)
PROT SERPL-MCNC: 6.9 G/DL (ref 6.3–8.2)
PROT UR STRIP-MCNC: NEGATIVE MG/DL
Q-T INTERVAL, ECG07: 362 MS
Q-T INTERVAL, ECG07: 490 MS
QRS DURATION, ECG06: 106 MS
QRS DURATION, ECG06: 112 MS
QTC CALCULATION (BEZET), ECG08: 462 MS
QTC CALCULATION (BEZET), ECG08: 497 MS
RBC # BLD AUTO: 2.3 M/UL (ref 4.05–5.2)
RBC # BLD AUTO: 2.85 M/UL (ref 4.05–5.2)
RBC # BLD AUTO: 3.37 M/UL (ref 4.05–5.2)
RBC # BLD AUTO: 3.4 M/UL (ref 4.05–5.2)
RBC # BLD AUTO: 3.5 M/UL (ref 4.05–5.2)
RBC # BLD AUTO: 3.63 M/UL (ref 4.05–5.2)
RBC # BLD AUTO: 3.73 M/UL (ref 4.05–5.2)
RBC # BLD AUTO: 3.96 M/UL (ref 4.05–5.2)
RBC # BLD AUTO: 4.47 M/UL (ref 4.05–5.2)
RBC #/AREA URNS HPF: NORMAL /HPF
SERVICE CMNT-IMP: NORMAL
SERVICE CMNT-IMP: NORMAL
SODIUM SERPL-SCNC: 134 MMOL/L (ref 136–145)
SODIUM SERPL-SCNC: 135 MMOL/L (ref 136–145)
SODIUM SERPL-SCNC: 136 MMOL/L (ref 136–145)
SODIUM SERPL-SCNC: 136 MMOL/L (ref 136–145)
SODIUM SERPL-SCNC: 137 MMOL/L (ref 136–145)
SODIUM SERPL-SCNC: 138 MMOL/L (ref 136–145)
SODIUM SERPL-SCNC: 139 MMOL/L (ref 136–145)
SODIUM SERPL-SCNC: 139 MMOL/L (ref 136–145)
SODIUM SERPL-SCNC: 141 MMOL/L (ref 136–145)
SODIUM SERPL-SCNC: 144 MMOL/L (ref 136–145)
SP GR UR REFRACTOMETRY: 1.02 (ref 1–1.02)
SPECIMEN EXP DATE BLD: NORMAL
STATUS OF UNIT,%ST: NORMAL
STATUS OF UNIT,%ST: NORMAL
TRIGL SERPL-MCNC: 90 MG/DL (ref 35–150)
TROPONIN-HIGH SENSITIVITY: 273.1 PG/ML (ref 0–14)
TROPONIN-HIGH SENSITIVITY: 310.4 PG/ML (ref 0–14)
TROPONIN-HIGH SENSITIVITY: 376.3 PG/ML (ref 0–14)
TROPONIN-HIGH SENSITIVITY: 386.6 PG/ML (ref 0–14)
TSH SERPL DL<=0.005 MIU/L-ACNC: 6.24 UIU/ML (ref 0.36–3.74)
TSH SERPL DL<=0.005 MIU/L-ACNC: 8.02 UIU/ML (ref 0.36–3.74)
UNIT DIVISION, %UDIV: 0
UNIT DIVISION, %UDIV: 0
UROBILINOGEN UR QL STRIP.AUTO: 0.2 EU/DL (ref 0.2–1)
VENTRICULAR RATE, ECG03: 62 BPM
VENTRICULAR RATE, ECG03: 98 BPM
VLDLC SERPL CALC-MCNC: 18 MG/DL (ref 6–23)
WBC # BLD AUTO: 10.5 K/UL (ref 4.3–11.1)
WBC # BLD AUTO: 11.2 K/UL (ref 4.3–11.1)
WBC # BLD AUTO: 11.8 K/UL (ref 4.3–11.1)
WBC # BLD AUTO: 15.4 K/UL (ref 4.3–11.1)
WBC # BLD AUTO: 6 K/UL (ref 4.3–11.1)
WBC # BLD AUTO: 6.1 K/UL (ref 4.3–11.1)
WBC # BLD AUTO: 6.3 K/UL (ref 4.3–11.1)
WBC # BLD AUTO: 8.7 K/UL (ref 4.3–11.1)
WBC # BLD AUTO: 8.7 K/UL (ref 4.3–11.1)
WBC URNS QL MICRO: NORMAL /HPF

## 2021-01-01 PROCEDURE — 96366 THER/PROPH/DIAG IV INF ADDON: CPT

## 2021-01-01 PROCEDURE — 80048 BASIC METABOLIC PNL TOTAL CA: CPT

## 2021-01-01 PROCEDURE — 93005 ELECTROCARDIOGRAM TRACING: CPT | Performed by: EMERGENCY MEDICINE

## 2021-01-01 PROCEDURE — 85025 COMPLETE CBC W/AUTO DIFF WBC: CPT

## 2021-01-01 PROCEDURE — 77030040393 HC DRSG OPTIFOAM GENT MDII -B

## 2021-01-01 PROCEDURE — 77030019905 HC CATH URETH INTMIT MDII -A

## 2021-01-01 PROCEDURE — 2709999900 HC NON-CHARGEABLE SUPPLY

## 2021-01-01 PROCEDURE — C9113 INJ PANTOPRAZOLE SODIUM, VIA: HCPCS | Performed by: PHYSICIAN ASSISTANT

## 2021-01-01 PROCEDURE — 74011250636 HC RX REV CODE- 250/636: Performed by: INTERNAL MEDICINE

## 2021-01-01 PROCEDURE — 36415 COLL VENOUS BLD VENIPUNCTURE: CPT

## 2021-01-01 PROCEDURE — 90686 IIV4 VACC NO PRSV 0.5 ML IM: CPT | Performed by: INTERNAL MEDICINE

## 2021-01-01 PROCEDURE — 74011250637 HC RX REV CODE- 250/637: Performed by: FAMILY MEDICINE

## 2021-01-01 PROCEDURE — 65270000029 HC RM PRIVATE

## 2021-01-01 PROCEDURE — 82272 OCCULT BLD FECES 1-3 TESTS: CPT

## 2021-01-01 PROCEDURE — 74011250636 HC RX REV CODE- 250/636: Performed by: NURSE PRACTITIONER

## 2021-01-01 PROCEDURE — 74011250637 HC RX REV CODE- 250/637: Performed by: INTERNAL MEDICINE

## 2021-01-01 PROCEDURE — 85652 RBC SED RATE AUTOMATED: CPT

## 2021-01-01 PROCEDURE — 74018 RADEX ABDOMEN 1 VIEW: CPT

## 2021-01-01 PROCEDURE — 36591 DRAW BLOOD OFF VENOUS DEVICE: CPT

## 2021-01-01 PROCEDURE — 99218 HC RM OBSERVATION: CPT

## 2021-01-01 PROCEDURE — 74011000250 HC RX REV CODE- 250: Performed by: RADIOLOGY

## 2021-01-01 PROCEDURE — 84484 ASSAY OF TROPONIN QUANT: CPT

## 2021-01-01 PROCEDURE — 82962 GLUCOSE BLOOD TEST: CPT

## 2021-01-01 PROCEDURE — 74011250637 HC RX REV CODE- 250/637: Performed by: NURSE PRACTITIONER

## 2021-01-01 PROCEDURE — 74011000250 HC RX REV CODE- 250: Performed by: PHYSICIAN ASSISTANT

## 2021-01-01 PROCEDURE — P9016 RBC LEUKOCYTES REDUCED: HCPCS

## 2021-01-01 PROCEDURE — 99238 HOSP IP/OBS DSCHRG MGMT 30/<: CPT | Performed by: INTERNAL MEDICINE

## 2021-01-01 PROCEDURE — 77030038269 HC DRN EXT URIN PURWCK BARD -A

## 2021-01-01 PROCEDURE — 37191 INS ENDOVAS VENA CAVA FILTR: CPT

## 2021-01-01 PROCEDURE — 83540 ASSAY OF IRON: CPT

## 2021-01-01 PROCEDURE — 74011250636 HC RX REV CODE- 250/636: Performed by: PHYSICIAN ASSISTANT

## 2021-01-01 PROCEDURE — 84443 ASSAY THYROID STIM HORMONE: CPT

## 2021-01-01 PROCEDURE — 90471 IMMUNIZATION ADMIN: CPT

## 2021-01-01 PROCEDURE — 74011000250 HC RX REV CODE- 250: Performed by: INTERNAL MEDICINE

## 2021-01-01 PROCEDURE — 71045 X-RAY EXAM CHEST 1 VIEW: CPT

## 2021-01-01 PROCEDURE — 77030018719 HC DRSG PTCH ANTIMIC J&J -A

## 2021-01-01 PROCEDURE — 65660000000 HC RM CCU STEPDOWN

## 2021-01-01 PROCEDURE — 93005 ELECTROCARDIOGRAM TRACING: CPT

## 2021-01-01 PROCEDURE — 76937 US GUIDE VASCULAR ACCESS: CPT

## 2021-01-01 PROCEDURE — 97161 PT EVAL LOW COMPLEX 20 MIN: CPT

## 2021-01-01 PROCEDURE — 82533 TOTAL CORTISOL: CPT

## 2021-01-01 PROCEDURE — 77030004629 HC CATH ANGI SZ AUR COOK -B

## 2021-01-01 PROCEDURE — 99225 PR SBSQ OBSERVATION CARE/DAY 25 MINUTES: CPT | Performed by: INTERNAL MEDICINE

## 2021-01-01 PROCEDURE — 84100 ASSAY OF PHOSPHORUS: CPT

## 2021-01-01 PROCEDURE — 74011000636 HC RX REV CODE- 636: Performed by: RADIOLOGY

## 2021-01-01 PROCEDURE — 97112 NEUROMUSCULAR REEDUCATION: CPT

## 2021-01-01 PROCEDURE — 06H03DZ INSERTION OF INTRALUMINAL DEVICE INTO INFERIOR VENA CAVA, PERCUTANEOUS APPROACH: ICD-10-PCS | Performed by: RADIOLOGY

## 2021-01-01 PROCEDURE — 97530 THERAPEUTIC ACTIVITIES: CPT

## 2021-01-01 PROCEDURE — 36430 TRANSFUSION BLD/BLD COMPNT: CPT

## 2021-01-01 PROCEDURE — P9047 ALBUMIN (HUMAN), 25%, 50ML: HCPCS | Performed by: INTERNAL MEDICINE

## 2021-01-01 PROCEDURE — C1880 VENA CAVA FILTER: HCPCS

## 2021-01-01 PROCEDURE — 99284 EMERGENCY DEPT VISIT MOD MDM: CPT | Performed by: INTERNAL MEDICINE

## 2021-01-01 PROCEDURE — 74011250637 HC RX REV CODE- 250/637: Performed by: EMERGENCY MEDICINE

## 2021-01-01 PROCEDURE — 70450 CT HEAD/BRAIN W/O DYE: CPT

## 2021-01-01 PROCEDURE — 85018 HEMOGLOBIN: CPT

## 2021-01-01 PROCEDURE — 86901 BLOOD TYPING SEROLOGIC RH(D): CPT

## 2021-01-01 PROCEDURE — 77030041974 HC CATH SYS PERIPH TELE -B

## 2021-01-01 PROCEDURE — 83605 ASSAY OF LACTIC ACID: CPT

## 2021-01-01 PROCEDURE — 99232 SBSQ HOSP IP/OBS MODERATE 35: CPT | Performed by: INTERNAL MEDICINE

## 2021-01-01 PROCEDURE — 99285 EMERGENCY DEPT VISIT HI MDM: CPT

## 2021-01-01 PROCEDURE — C1751 CATH, INF, PER/CENT/MIDLINE: HCPCS

## 2021-01-01 PROCEDURE — 85730 THROMBOPLASTIN TIME PARTIAL: CPT

## 2021-01-01 PROCEDURE — 86923 COMPATIBILITY TEST ELECTRIC: CPT

## 2021-01-01 PROCEDURE — 74011250636 HC RX REV CODE- 250/636: Performed by: FAMILY MEDICINE

## 2021-01-01 PROCEDURE — 83735 ASSAY OF MAGNESIUM: CPT

## 2021-01-01 PROCEDURE — 80053 COMPREHEN METABOLIC PANEL: CPT

## 2021-01-01 PROCEDURE — 97165 OT EVAL LOW COMPLEX 30 MIN: CPT

## 2021-01-01 PROCEDURE — 87040 BLOOD CULTURE FOR BACTERIA: CPT

## 2021-01-01 PROCEDURE — C1769 GUIDE WIRE: HCPCS

## 2021-01-01 PROCEDURE — C1894 INTRO/SHEATH, NON-LASER: HCPCS

## 2021-01-01 PROCEDURE — 80061 LIPID PANEL: CPT

## 2021-01-01 PROCEDURE — 77030018798 HC PMP KT ENTRL FED COVD -A

## 2021-01-01 PROCEDURE — APPSS45 APP SPLIT SHARED TIME 31-45 MINUTES: Performed by: NURSE PRACTITIONER

## 2021-01-01 PROCEDURE — 99223 1ST HOSP IP/OBS HIGH 75: CPT | Performed by: INTERNAL MEDICINE

## 2021-01-01 PROCEDURE — 77030032008 NM LUNG VENT/PERF

## 2021-01-01 PROCEDURE — C8929 TTE W OR WO FOL WCON,DOPPLER: HCPCS

## 2021-01-01 PROCEDURE — 81003 URINALYSIS AUTO W/O SCOPE: CPT

## 2021-01-01 PROCEDURE — 97535 SELF CARE MNGMENT TRAINING: CPT

## 2021-01-01 PROCEDURE — 3E0336Z INTRODUCTION OF NUTRITIONAL SUBSTANCE INTO PERIPHERAL VEIN, PERCUTANEOUS APPROACH: ICD-10-PCS | Performed by: FAMILY MEDICINE

## 2021-01-01 PROCEDURE — 74011250636 HC RX REV CODE- 250/636: Performed by: RADIOLOGY

## 2021-01-01 PROCEDURE — 02HV33Z INSERTION OF INFUSION DEVICE INTO SUPERIOR VENA CAVA, PERCUTANEOUS APPROACH: ICD-10-PCS | Performed by: RADIOLOGY

## 2021-01-01 PROCEDURE — C8924 2D TTE W OR W/O FOL W/CON,FU: HCPCS

## 2021-01-01 PROCEDURE — 96365 THER/PROPH/DIAG IV INF INIT: CPT

## 2021-01-01 PROCEDURE — 82728 ASSAY OF FERRITIN: CPT

## 2021-01-01 PROCEDURE — 93970 EXTREMITY STUDY: CPT

## 2021-01-01 PROCEDURE — 30233N1 TRANSFUSION OF NONAUTOLOGOUS RED BLOOD CELLS INTO PERIPHERAL VEIN, PERCUTANEOUS APPROACH: ICD-10-PCS | Performed by: INTERNAL MEDICINE

## 2021-01-01 PROCEDURE — B548ZZA ULTRASONOGRAPHY OF SUPERIOR VENA CAVA, GUIDANCE: ICD-10-PCS | Performed by: RADIOLOGY

## 2021-01-01 PROCEDURE — 99222 1ST HOSP IP/OBS MODERATE 55: CPT | Performed by: NURSE PRACTITIONER

## 2021-01-01 PROCEDURE — 77030002916 HC SUT ETHLN J&J -A

## 2021-01-01 PROCEDURE — 76450000000

## 2021-01-01 RX ORDER — METOCLOPRAMIDE 5 MG/1
5 TABLET ORAL
COMMUNITY

## 2021-01-01 RX ORDER — LANOLIN ALCOHOL/MO/W.PET/CERES
400 CREAM (GRAM) TOPICAL DAILY
Status: DISCONTINUED | OUTPATIENT
Start: 2021-01-01 | End: 2021-01-01 | Stop reason: HOSPADM

## 2021-01-01 RX ORDER — MIDODRINE HYDROCHLORIDE 5 MG/1
10 TABLET ORAL
Status: DISCONTINUED | OUTPATIENT
Start: 2021-01-01 | End: 2021-01-01

## 2021-01-01 RX ORDER — RANOLAZINE 500 MG/1
1000 TABLET, EXTENDED RELEASE ORAL EVERY 12 HOURS
Qty: 120 TAB | Refills: 5 | Status: ON HOLD | OUTPATIENT
Start: 2021-01-01 | End: 2021-01-01

## 2021-01-01 RX ORDER — ISOSORBIDE MONONITRATE 30 MG/1
30 TABLET, EXTENDED RELEASE ORAL DAILY
Status: DISCONTINUED | OUTPATIENT
Start: 2021-01-01 | End: 2021-01-01 | Stop reason: HOSPADM

## 2021-01-01 RX ORDER — ASPIRIN 81 MG/1
81 TABLET ORAL
Status: DISCONTINUED | OUTPATIENT
Start: 2021-01-01 | End: 2021-01-01

## 2021-01-01 RX ORDER — LEVOTHYROXINE SODIUM 100 UG/1
50 TABLET ORAL
Status: DISCONTINUED | OUTPATIENT
Start: 2021-01-01 | End: 2021-01-01

## 2021-01-01 RX ORDER — AMIODARONE HYDROCHLORIDE 200 MG/1
200 TABLET ORAL DAILY
COMMUNITY
End: 2021-01-01

## 2021-01-01 RX ORDER — ONDANSETRON 2 MG/ML
4 INJECTION INTRAMUSCULAR; INTRAVENOUS
Status: DISCONTINUED | OUTPATIENT
Start: 2021-01-01 | End: 2021-01-01 | Stop reason: HOSPADM

## 2021-01-01 RX ORDER — PROMETHAZINE HYDROCHLORIDE 25 MG/1
12.5 TABLET ORAL
Status: DISCONTINUED | OUTPATIENT
Start: 2021-01-01 | End: 2021-01-01 | Stop reason: HOSPADM

## 2021-01-01 RX ORDER — GABAPENTIN 100 MG/1
200 CAPSULE ORAL
Status: DISCONTINUED | OUTPATIENT
Start: 2021-01-01 | End: 2021-01-01 | Stop reason: HOSPADM

## 2021-01-01 RX ORDER — SODIUM CHLORIDE TAB 1 GM 1 G
1 TAB MISCELLANEOUS 2 TIMES DAILY
Status: DISCONTINUED | OUTPATIENT
Start: 2021-01-01 | End: 2021-01-01 | Stop reason: HOSPADM

## 2021-01-01 RX ORDER — LEVOTHYROXINE SODIUM 75 UG/1
75 TABLET ORAL
Status: DISCONTINUED | OUTPATIENT
Start: 2021-01-01 | End: 2021-01-01 | Stop reason: HOSPADM

## 2021-01-01 RX ORDER — ALBUMIN HUMAN 250 G/1000ML
50 SOLUTION INTRAVENOUS ONCE
Status: COMPLETED | OUTPATIENT
Start: 2021-01-01 | End: 2021-01-01

## 2021-01-01 RX ORDER — RANOLAZINE 500 MG/1
1000 TABLET, EXTENDED RELEASE ORAL EVERY 12 HOURS
Qty: 120 TAB | Refills: 1 | Status: SHIPPED | OUTPATIENT
Start: 2021-01-01

## 2021-01-01 RX ORDER — SODIUM CHLORIDE 0.9 % (FLUSH) 0.9 %
5-40 SYRINGE (ML) INJECTION EVERY 8 HOURS
Status: DISCONTINUED | OUTPATIENT
Start: 2021-01-01 | End: 2021-01-01 | Stop reason: HOSPADM

## 2021-01-01 RX ORDER — SODIUM CHLORIDE 9 MG/ML
250 INJECTION, SOLUTION INTRAVENOUS AS NEEDED
Status: DISCONTINUED | OUTPATIENT
Start: 2021-01-01 | End: 2021-01-01 | Stop reason: HOSPADM

## 2021-01-01 RX ORDER — MAGNESIUM SULFATE HEPTAHYDRATE 40 MG/ML
2 INJECTION, SOLUTION INTRAVENOUS ONCE
Status: COMPLETED | OUTPATIENT
Start: 2021-01-01 | End: 2021-01-01

## 2021-01-01 RX ORDER — SULFAMETHOXAZOLE AND TRIMETHOPRIM 800; 160 MG/1; MG/1
1 TABLET ORAL EVERY 12 HOURS
Qty: 6 TAB | Refills: 0 | Status: SHIPPED | OUTPATIENT
Start: 2021-01-01 | End: 2021-01-01

## 2021-01-01 RX ORDER — LIDOCAINE HYDROCHLORIDE 20 MG/ML
40-120 INJECTION, SOLUTION INFILTRATION; PERINEURAL ONCE
Status: COMPLETED | OUTPATIENT
Start: 2021-01-01 | End: 2021-01-01

## 2021-01-01 RX ORDER — FAMOTIDINE 20 MG/1
20 TABLET, FILM COATED ORAL EVERY 24 HOURS
Status: DISCONTINUED | OUTPATIENT
Start: 2021-01-01 | End: 2021-01-01

## 2021-01-01 RX ORDER — PSEUDOEPHED/ACETAMINOPHEN/CPM 30-500-2MG
2 TABLET ORAL
Status: DISCONTINUED | OUTPATIENT
Start: 2021-01-01 | End: 2021-01-01 | Stop reason: HOSPADM

## 2021-01-01 RX ORDER — HEPARIN SODIUM 200 [USP'U]/100ML
1000 INJECTION, SOLUTION INTRAVENOUS
Status: DISCONTINUED | OUTPATIENT
Start: 2021-01-01 | End: 2021-01-01 | Stop reason: HOSPADM

## 2021-01-01 RX ORDER — PANTOPRAZOLE SODIUM 40 MG/1
40 TABLET, DELAYED RELEASE ORAL
Status: DISCONTINUED | OUTPATIENT
Start: 2021-01-01 | End: 2021-01-01 | Stop reason: HOSPADM

## 2021-01-01 RX ORDER — LANSOPRAZOLE 30 MG/1
30 TABLET, ORALLY DISINTEGRATING, DELAYED RELEASE ORAL
Qty: 60 TAB | Refills: 1 | Status: SHIPPED | OUTPATIENT
Start: 2021-01-01 | End: 2021-01-01 | Stop reason: SDUPTHER

## 2021-01-01 RX ORDER — ACETAMINOPHEN 650 MG/1
650 SUPPOSITORY RECTAL
Status: DISCONTINUED | OUTPATIENT
Start: 2021-01-01 | End: 2021-01-01 | Stop reason: HOSPADM

## 2021-01-01 RX ORDER — METOCLOPRAMIDE HYDROCHLORIDE 5 MG/ML
5 INJECTION INTRAMUSCULAR; INTRAVENOUS EVERY 6 HOURS
Status: DISCONTINUED | OUTPATIENT
Start: 2021-01-01 | End: 2021-01-01

## 2021-01-01 RX ORDER — LANOLIN ALCOHOL/MO/W.PET/CERES
325 CREAM (GRAM) TOPICAL
Qty: 30 TAB | Refills: 1 | Status: SHIPPED | OUTPATIENT
Start: 2021-01-01

## 2021-01-01 RX ORDER — SODIUM CHLORIDE TAB 1 GM 1 G
1 TAB MISCELLANEOUS 2 TIMES DAILY
Qty: 20 TAB | Refills: 0 | Status: SHIPPED | OUTPATIENT
Start: 2021-01-01

## 2021-01-01 RX ORDER — ATORVASTATIN CALCIUM 40 MG/1
40 TABLET, FILM COATED ORAL
Status: DISCONTINUED | OUTPATIENT
Start: 2021-01-01 | End: 2021-01-01 | Stop reason: HOSPADM

## 2021-01-01 RX ORDER — HEPARIN SODIUM 5000 [USP'U]/100ML
12-25 INJECTION, SOLUTION INTRAVENOUS
Status: DISCONTINUED | OUTPATIENT
Start: 2021-01-01 | End: 2021-01-01

## 2021-01-01 RX ORDER — RANOLAZINE 500 MG/1
500 TABLET, EXTENDED RELEASE ORAL 2 TIMES DAILY
Status: DISCONTINUED | OUTPATIENT
Start: 2021-01-01 | End: 2021-01-01

## 2021-01-01 RX ORDER — NITROGLYCERIN 0.4 MG/1
0.4 TABLET SUBLINGUAL
Status: DISCONTINUED | OUTPATIENT
Start: 2021-01-01 | End: 2021-01-01 | Stop reason: HOSPADM

## 2021-01-01 RX ORDER — MIDODRINE HYDROCHLORIDE 10 MG/1
10 TABLET ORAL
Qty: 90 TAB | Refills: 1 | Status: SHIPPED | OUTPATIENT
Start: 2021-01-01 | End: 2021-01-01

## 2021-01-01 RX ORDER — SODIUM CHLORIDE 0.9 % (FLUSH) 0.9 %
5-40 SYRINGE (ML) INJECTION AS NEEDED
Status: DISCONTINUED | OUTPATIENT
Start: 2021-01-01 | End: 2021-01-01 | Stop reason: HOSPADM

## 2021-01-01 RX ORDER — SODIUM CHLORIDE, SODIUM LACTATE, POTASSIUM CHLORIDE, CALCIUM CHLORIDE 600; 310; 30; 20 MG/100ML; MG/100ML; MG/100ML; MG/100ML
100 INJECTION, SOLUTION INTRAVENOUS CONTINUOUS
Status: DISCONTINUED | OUTPATIENT
Start: 2021-01-01 | End: 2021-01-01 | Stop reason: HOSPADM

## 2021-01-01 RX ORDER — METOCLOPRAMIDE HYDROCHLORIDE 5 MG/ML
5 INJECTION INTRAMUSCULAR; INTRAVENOUS
Status: DISCONTINUED | OUTPATIENT
Start: 2021-01-01 | End: 2021-01-01 | Stop reason: HOSPADM

## 2021-01-01 RX ORDER — LEVOTHYROXINE SODIUM 50 UG/1
50 TABLET ORAL
Qty: 30 TAB | Refills: 1 | Status: SHIPPED | OUTPATIENT
Start: 2021-01-01

## 2021-01-01 RX ORDER — SUCRALFATE 1 G/1
1 TABLET ORAL
Qty: 30 TAB | Refills: 0 | Status: SHIPPED | OUTPATIENT
Start: 2021-01-01 | End: 2021-01-01

## 2021-01-01 RX ORDER — ASPIRIN 81 MG/1
81 TABLET ORAL DAILY
Status: DISCONTINUED | OUTPATIENT
Start: 2021-01-01 | End: 2021-01-01 | Stop reason: HOSPADM

## 2021-01-01 RX ORDER — GABAPENTIN 100 MG/1
100 CAPSULE ORAL 2 TIMES DAILY
Status: DISCONTINUED | OUTPATIENT
Start: 2021-01-01 | End: 2021-01-01

## 2021-01-01 RX ORDER — GABAPENTIN 100 MG/1
100 CAPSULE ORAL 2 TIMES DAILY
Status: DISCONTINUED | OUTPATIENT
Start: 2021-01-01 | End: 2021-01-01 | Stop reason: HOSPADM

## 2021-01-01 RX ORDER — SODIUM CHLORIDE 9 MG/ML
75 INJECTION, SOLUTION INTRAVENOUS CONTINUOUS
Status: DISCONTINUED | OUTPATIENT
Start: 2021-01-01 | End: 2021-01-01 | Stop reason: HOSPADM

## 2021-01-01 RX ORDER — SUCRALFATE 1 G/1
1 TABLET ORAL
Status: DISCONTINUED | OUTPATIENT
Start: 2021-01-01 | End: 2021-01-01 | Stop reason: HOSPADM

## 2021-01-01 RX ORDER — SULFAMETHOXAZOLE AND TRIMETHOPRIM 800; 160 MG/1; MG/1
1 TABLET ORAL EVERY 12 HOURS
Qty: 6 TAB | Refills: 0 | Status: SHIPPED | OUTPATIENT
Start: 2021-01-01

## 2021-01-01 RX ORDER — ACETAMINOPHEN 325 MG/1
650 TABLET ORAL
Status: DISCONTINUED | OUTPATIENT
Start: 2021-01-01 | End: 2021-01-01 | Stop reason: HOSPADM

## 2021-01-01 RX ORDER — SODIUM CHLORIDE 0.9 % (FLUSH) 0.9 %
10 SYRINGE (ML) INJECTION
Status: COMPLETED | OUTPATIENT
Start: 2021-01-01 | End: 2021-01-01

## 2021-01-01 RX ORDER — HYDROCODONE BITARTRATE AND ACETAMINOPHEN 7.5; 325 MG/15ML; MG/15ML
5 SOLUTION ORAL
Status: DISCONTINUED | OUTPATIENT
Start: 2021-01-01 | End: 2021-01-01

## 2021-01-01 RX ORDER — LIDOCAINE 50 MG/G
1 PATCH TOPICAL EVERY 24 HOURS
COMMUNITY

## 2021-01-01 RX ORDER — GUAIFENESIN 100 MG/5ML
324 LIQUID (ML) ORAL
Status: COMPLETED | OUTPATIENT
Start: 2021-01-01 | End: 2021-01-01

## 2021-01-01 RX ORDER — HALOPERIDOL 5 MG/ML
5 INJECTION INTRAMUSCULAR ONCE
Status: COMPLETED | OUTPATIENT
Start: 2021-01-01 | End: 2021-01-01

## 2021-01-01 RX ORDER — LANSOPRAZOLE 30 MG/1
30 TABLET, ORALLY DISINTEGRATING, DELAYED RELEASE ORAL
Qty: 60 TAB | Refills: 1 | Status: SHIPPED | OUTPATIENT
Start: 2021-01-01

## 2021-01-01 RX ORDER — LANSOPRAZOLE 30 MG/1
30 TABLET, ORALLY DISINTEGRATING, DELAYED RELEASE ORAL
Status: ON HOLD | COMMUNITY
End: 2021-01-01 | Stop reason: SDUPTHER

## 2021-01-01 RX ORDER — DIPHENHYDRAMINE HCL 25 MG
25 CAPSULE ORAL
Status: DISCONTINUED | OUTPATIENT
Start: 2021-01-01 | End: 2021-01-01 | Stop reason: HOSPADM

## 2021-01-01 RX ORDER — HYDROCODONE BITARTRATE AND ACETAMINOPHEN 5; 325 MG/1; MG/1
1 TABLET ORAL
Status: DISCONTINUED | OUTPATIENT
Start: 2021-01-01 | End: 2021-01-01 | Stop reason: HOSPADM

## 2021-01-01 RX ORDER — LEVOTHYROXINE SODIUM 50 UG/1
50 TABLET ORAL
Status: DISCONTINUED | OUTPATIENT
Start: 2021-01-01 | End: 2021-01-01 | Stop reason: HOSPADM

## 2021-01-01 RX ORDER — HEPARIN SODIUM 5000 [USP'U]/100ML
12-25 INJECTION, SOLUTION INTRAVENOUS
Status: DISCONTINUED | OUTPATIENT
Start: 2021-01-01 | End: 2021-01-01 | Stop reason: SDUPTHER

## 2021-01-01 RX ORDER — ISOSORBIDE MONONITRATE 30 MG/1
30 TABLET, EXTENDED RELEASE ORAL DAILY
Qty: 30 TAB | Refills: 5 | Status: ON HOLD | OUTPATIENT
Start: 2021-01-01 | End: 2021-01-01

## 2021-01-01 RX ORDER — MORPHINE SULFATE 2 MG/ML
2 INJECTION, SOLUTION INTRAMUSCULAR; INTRAVENOUS
Status: DISCONTINUED | OUTPATIENT
Start: 2021-01-01 | End: 2021-01-01 | Stop reason: HOSPADM

## 2021-01-01 RX ORDER — METOPROLOL TARTRATE 25 MG/1
12.5 TABLET, FILM COATED ORAL EVERY 12 HOURS
Qty: 60 TAB | Refills: 5 | Status: ON HOLD | OUTPATIENT
Start: 2021-01-01 | End: 2021-01-01

## 2021-01-01 RX ORDER — RANOLAZINE 500 MG/1
1000 TABLET, EXTENDED RELEASE ORAL 2 TIMES DAILY
Status: DISCONTINUED | OUTPATIENT
Start: 2021-01-01 | End: 2021-01-01 | Stop reason: HOSPADM

## 2021-01-01 RX ORDER — PANTOPRAZOLE SODIUM 40 MG/1
40 TABLET, DELAYED RELEASE ORAL
Status: DISCONTINUED | OUTPATIENT
Start: 2021-01-01 | End: 2021-01-01

## 2021-01-01 RX ORDER — RANOLAZINE 500 MG/1
1000 TABLET, EXTENDED RELEASE ORAL EVERY 12 HOURS
Qty: 120 TAB | Refills: 5 | Status: SHIPPED | OUTPATIENT
Start: 2021-01-01 | End: 2021-01-01

## 2021-01-01 RX ORDER — SODIUM CHLORIDE 0.9 % (FLUSH) 0.9 %
5-10 SYRINGE (ML) INJECTION EVERY 8 HOURS
Status: DISCONTINUED | OUTPATIENT
Start: 2021-01-01 | End: 2021-01-01 | Stop reason: HOSPADM

## 2021-01-01 RX ORDER — METOPROLOL TARTRATE 25 MG/1
12.5 TABLET, FILM COATED ORAL EVERY 12 HOURS
Status: DISCONTINUED | OUTPATIENT
Start: 2021-01-01 | End: 2021-01-01 | Stop reason: HOSPADM

## 2021-01-01 RX ORDER — SULFAMETHOXAZOLE AND TRIMETHOPRIM 800; 160 MG/1; MG/1
1 TABLET ORAL EVERY 12 HOURS
Status: DISCONTINUED | OUTPATIENT
Start: 2021-01-01 | End: 2021-01-01 | Stop reason: HOSPADM

## 2021-01-01 RX ORDER — SUCRALFATE 1 G/1
1 TABLET ORAL
Qty: 30 TAB | Refills: 0 | Status: SHIPPED | OUTPATIENT
Start: 2021-01-01

## 2021-01-01 RX ORDER — RANOLAZINE 500 MG/1
1000 TABLET, EXTENDED RELEASE ORAL EVERY 12 HOURS
Status: DISCONTINUED | OUTPATIENT
Start: 2021-01-01 | End: 2021-01-01

## 2021-01-01 RX ORDER — LANOLIN ALCOHOL/MO/W.PET/CERES
325 CREAM (GRAM) TOPICAL
Qty: 30 TAB | Refills: 1 | Status: SHIPPED | OUTPATIENT
Start: 2021-01-01 | End: 2021-01-01 | Stop reason: SDUPTHER

## 2021-01-01 RX ORDER — RANOLAZINE 500 MG/1
500 TABLET, EXTENDED RELEASE ORAL EVERY 12 HOURS
Status: DISCONTINUED | OUTPATIENT
Start: 2021-01-01 | End: 2021-01-01 | Stop reason: HOSPADM

## 2021-01-01 RX ORDER — SODIUM CHLORIDE TAB 1 GM 1 G
1 TAB MISCELLANEOUS 2 TIMES DAILY
Qty: 20 TAB | Refills: 0 | Status: SHIPPED | OUTPATIENT
Start: 2021-01-01 | End: 2021-01-01

## 2021-01-01 RX ORDER — MIDODRINE HYDROCHLORIDE 5 MG/1
10 TABLET ORAL
Status: DISCONTINUED | OUTPATIENT
Start: 2021-01-01 | End: 2021-01-01 | Stop reason: HOSPADM

## 2021-01-01 RX ORDER — POTASSIUM CHLORIDE 20 MEQ/1
20 TABLET, EXTENDED RELEASE ORAL 2 TIMES DAILY
Status: DISCONTINUED | OUTPATIENT
Start: 2021-01-01 | End: 2021-01-01 | Stop reason: HOSPADM

## 2021-01-01 RX ORDER — DIPHENHYDRAMINE HYDROCHLORIDE 50 MG/ML
12.5-5 INJECTION, SOLUTION INTRAMUSCULAR; INTRAVENOUS ONCE
Status: COMPLETED | OUTPATIENT
Start: 2021-01-01 | End: 2021-01-01

## 2021-01-01 RX ORDER — LEVOTHYROXINE SODIUM 50 UG/1
50 TABLET ORAL
Qty: 30 TAB | Refills: 1 | Status: SHIPPED | OUTPATIENT
Start: 2021-01-01 | End: 2021-01-01 | Stop reason: SDUPTHER

## 2021-01-01 RX ORDER — METOCLOPRAMIDE HYDROCHLORIDE 5 MG/5ML
5 SOLUTION ORAL
Status: DISCONTINUED | OUTPATIENT
Start: 2021-01-01 | End: 2021-01-01

## 2021-01-01 RX ORDER — MAG HYDROX/ALUMINUM HYD/SIMETH 200-200-20
30 SUSPENSION, ORAL (FINAL DOSE FORM) ORAL ONCE
Status: DISCONTINUED | OUTPATIENT
Start: 2021-01-01 | End: 2021-01-01

## 2021-01-01 RX ORDER — METOCLOPRAMIDE HYDROCHLORIDE 5 MG/5ML
5 SOLUTION ORAL
Status: DISCONTINUED | OUTPATIENT
Start: 2021-01-01 | End: 2021-01-01 | Stop reason: HOSPADM

## 2021-01-01 RX ORDER — MAG HYDROX/ALUMINUM HYD/SIMETH 200-200-20
15 SUSPENSION, ORAL (FINAL DOSE FORM) ORAL ONCE
Status: COMPLETED | OUTPATIENT
Start: 2021-01-01 | End: 2021-01-01

## 2021-01-01 RX ORDER — MIDODRINE HYDROCHLORIDE 5 MG/1
5 TABLET ORAL
Status: DISCONTINUED | OUTPATIENT
Start: 2021-01-01 | End: 2021-01-01

## 2021-01-01 RX ADMIN — MIDODRINE HYDROCHLORIDE 10 MG: 5 TABLET ORAL at 18:20

## 2021-01-01 RX ADMIN — MIDODRINE HYDROCHLORIDE 10 MG: 5 TABLET ORAL at 15:49

## 2021-01-01 RX ADMIN — MIDODRINE HYDROCHLORIDE 10 MG: 5 TABLET ORAL at 18:31

## 2021-01-01 RX ADMIN — Medication 1 G: at 08:42

## 2021-01-01 RX ADMIN — SODIUM CHLORIDE 40 MG: 9 INJECTION, SOLUTION INTRAMUSCULAR; INTRAVENOUS; SUBCUTANEOUS at 16:07

## 2021-01-01 RX ADMIN — Medication 10 ML: at 21:21

## 2021-01-01 RX ADMIN — ASPIRIN 324 MG: 81 TABLET, CHEWABLE ORAL at 20:37

## 2021-01-01 RX ADMIN — FAMOTIDINE 20 MG: 20 TABLET, FILM COATED ORAL at 13:01

## 2021-01-01 RX ADMIN — RANOLAZINE 500 MG: 500 TABLET, FILM COATED, EXTENDED RELEASE ORAL at 09:04

## 2021-01-01 RX ADMIN — RANOLAZINE 500 MG: 500 TABLET, FILM COATED, EXTENDED RELEASE ORAL at 08:18

## 2021-01-01 RX ADMIN — SODIUM CHLORIDE, SODIUM LACTATE, POTASSIUM CHLORIDE, AND CALCIUM CHLORIDE 1000 ML: 600; 310; 30; 20 INJECTION, SOLUTION INTRAVENOUS at 09:00

## 2021-01-01 RX ADMIN — ONDANSETRON 4 MG: 2 INJECTION INTRAMUSCULAR; INTRAVENOUS at 16:22

## 2021-01-01 RX ADMIN — SODIUM CHLORIDE, SODIUM LACTATE, POTASSIUM CHLORIDE, AND CALCIUM CHLORIDE 100 ML/HR: 600; 310; 30; 20 INJECTION, SOLUTION INTRAVENOUS at 21:32

## 2021-01-01 RX ADMIN — SULFAMETHOXAZOLE AND TRIMETHOPRIM 1 TABLET: 800; 160 TABLET ORAL at 21:20

## 2021-01-01 RX ADMIN — PANTOPRAZOLE SODIUM 40 MG: 40 INJECTION, POWDER, FOR SOLUTION INTRAVENOUS at 08:36

## 2021-01-01 RX ADMIN — GABAPENTIN 100 MG: 100 CAPSULE ORAL at 08:35

## 2021-01-01 RX ADMIN — SODIUM CHLORIDE 40 MG: 9 INJECTION, SOLUTION INTRAMUSCULAR; INTRAVENOUS; SUBCUTANEOUS at 08:57

## 2021-01-01 RX ADMIN — LEVOTHYROXINE SODIUM 50 MCG: 0.1 TABLET ORAL at 06:04

## 2021-01-01 RX ADMIN — ISOSORBIDE MONONITRATE 30 MG: 30 TABLET, EXTENDED RELEASE ORAL at 09:34

## 2021-01-01 RX ADMIN — LEVOTHYROXINE SODIUM 50 MCG: 100 TABLET ORAL at 06:18

## 2021-01-01 RX ADMIN — RANOLAZINE 1000 MG: 500 TABLET, FILM COATED, EXTENDED RELEASE ORAL at 02:01

## 2021-01-01 RX ADMIN — RANOLAZINE 500 MG: 500 TABLET, FILM COATED, EXTENDED RELEASE ORAL at 20:26

## 2021-01-01 RX ADMIN — METOPROLOL TARTRATE 12.5 MG: 25 TABLET, FILM COATED ORAL at 09:34

## 2021-01-01 RX ADMIN — MIDODRINE HYDROCHLORIDE 10 MG: 5 TABLET ORAL at 10:48

## 2021-01-01 RX ADMIN — Medication 10 ML: at 21:26

## 2021-01-01 RX ADMIN — ALBUMIN (HUMAN) 50 G: 0.25 INJECTION, SOLUTION INTRAVENOUS at 21:51

## 2021-01-01 RX ADMIN — ACETAMINOPHEN 650 MG: 325 TABLET ORAL at 21:46

## 2021-01-01 RX ADMIN — RANOLAZINE 500 MG: 500 TABLET, FILM COATED, EXTENDED RELEASE ORAL at 08:42

## 2021-01-01 RX ADMIN — Medication 10 ML: at 15:50

## 2021-01-01 RX ADMIN — SUCRALFATE 1 G: 1 TABLET ORAL at 18:31

## 2021-01-01 RX ADMIN — METOCLOPRAMIDE HYDROCHLORIDE 5 MG: 5 SOLUTION ORAL at 06:46

## 2021-01-01 RX ADMIN — RANOLAZINE 500 MG: 500 TABLET, FILM COATED, EXTENDED RELEASE ORAL at 21:25

## 2021-01-01 RX ADMIN — APIXABAN 2.5 MG: 2.5 TABLET, FILM COATED ORAL at 08:35

## 2021-01-01 RX ADMIN — Medication 10 ML: at 02:10

## 2021-01-01 RX ADMIN — ASPIRIN 81 MG: 81 TABLET ORAL at 01:54

## 2021-01-01 RX ADMIN — MIDODRINE HYDROCHLORIDE 10 MG: 5 TABLET ORAL at 17:29

## 2021-01-01 RX ADMIN — METOCLOPRAMIDE HYDROCHLORIDE 5 MG: 5 INJECTION INTRAMUSCULAR; INTRAVENOUS at 03:27

## 2021-01-01 RX ADMIN — RANOLAZINE 500 MG: 500 TABLET, FILM COATED, EXTENDED RELEASE ORAL at 08:35

## 2021-01-01 RX ADMIN — PERFLUTREN 1 ML: 6.52 INJECTION, SUSPENSION INTRAVENOUS at 14:00

## 2021-01-01 RX ADMIN — MIDODRINE HYDROCHLORIDE 10 MG: 5 TABLET ORAL at 11:29

## 2021-01-01 RX ADMIN — SUCRALFATE 1 G: 1 TABLET ORAL at 11:21

## 2021-01-01 RX ADMIN — RANOLAZINE 500 MG: 500 TABLET, FILM COATED, EXTENDED RELEASE ORAL at 08:58

## 2021-01-01 RX ADMIN — SUCRALFATE 1 G: 1 TABLET ORAL at 15:49

## 2021-01-01 RX ADMIN — LEVOTHYROXINE SODIUM 75 MCG: 0.07 TABLET ORAL at 05:14

## 2021-01-01 RX ADMIN — SODIUM CHLORIDE, SODIUM LACTATE, POTASSIUM CHLORIDE, AND CALCIUM CHLORIDE 100 ML/HR: 600; 310; 30; 20 INJECTION, SOLUTION INTRAVENOUS at 05:30

## 2021-01-01 RX ADMIN — RANOLAZINE 500 MG: 500 TABLET, FILM COATED, EXTENDED RELEASE ORAL at 08:07

## 2021-01-01 RX ADMIN — METOCLOPRAMIDE HYDROCHLORIDE 5 MG: 5 SOLUTION ORAL at 10:48

## 2021-01-01 RX ADMIN — ALUMINUM HYDROXIDE, MAGNESIUM HYDROXIDE, AND SIMETHICONE 15 ML: 200; 200; 20 SUSPENSION ORAL at 13:48

## 2021-01-01 RX ADMIN — ACETAMINOPHEN 650 MG: 325 TABLET ORAL at 13:47

## 2021-01-01 RX ADMIN — ACETAMINOPHEN 650 MG: 325 TABLET ORAL at 14:49

## 2021-01-01 RX ADMIN — LOPERAMIDE HCL 2 MG: 1 SOLUTION ORAL at 03:06

## 2021-01-01 RX ADMIN — ACETAMINOPHEN 650 MG: 325 TABLET ORAL at 14:34

## 2021-01-01 RX ADMIN — GABAPENTIN 200 MG: 100 CAPSULE ORAL at 11:02

## 2021-01-01 RX ADMIN — Medication 10 ML: at 06:18

## 2021-01-01 RX ADMIN — Medication 1 G: at 17:58

## 2021-01-01 RX ADMIN — SUCRALFATE 1 G: 1 TABLET ORAL at 21:46

## 2021-01-01 RX ADMIN — SODIUM CHLORIDE 75 ML/HR: 900 INJECTION, SOLUTION INTRAVENOUS at 01:59

## 2021-01-01 RX ADMIN — Medication 10 ML: at 15:02

## 2021-01-01 RX ADMIN — HYDROCODONE BITARTRATE AND ACETAMINOPHEN 5 MG: 7.5; 325 SOLUTION ORAL at 21:27

## 2021-01-01 RX ADMIN — PANTOPRAZOLE SODIUM 40 MG: 40 INJECTION, POWDER, FOR SOLUTION INTRAVENOUS at 21:20

## 2021-01-01 RX ADMIN — SODIUM CHLORIDE 75 ML/HR: 900 INJECTION, SOLUTION INTRAVENOUS at 04:20

## 2021-01-01 RX ADMIN — INFLUENZA VIRUS VACCINE 0.5 ML: 15; 15; 15; 15 SUSPENSION INTRAMUSCULAR at 14:38

## 2021-01-01 RX ADMIN — Medication 10 ML: at 13:23

## 2021-01-01 RX ADMIN — Medication 400 MG: at 08:18

## 2021-01-01 RX ADMIN — SODIUM CHLORIDE, SODIUM LACTATE, POTASSIUM CHLORIDE, AND CALCIUM CHLORIDE 100 ML/HR: 600; 310; 30; 20 INJECTION, SOLUTION INTRAVENOUS at 04:28

## 2021-01-01 RX ADMIN — LOPERAMIDE HCL 2 MG: 1 SOLUTION ORAL at 10:38

## 2021-01-01 RX ADMIN — Medication 10 ML: at 17:33

## 2021-01-01 RX ADMIN — PERFLUTREN 1 ML: 6.52 INJECTION, SUSPENSION INTRAVENOUS at 13:00

## 2021-01-01 RX ADMIN — Medication 10 ML: at 13:39

## 2021-01-01 RX ADMIN — Medication 30 ML: at 21:21

## 2021-01-01 RX ADMIN — MAGNESIUM SULFATE HEPTAHYDRATE 2 G: 2 INJECTION, SOLUTION INTRAVENOUS at 07:43

## 2021-01-01 RX ADMIN — ASPIRIN 81 MG: 81 TABLET ORAL at 08:18

## 2021-01-01 RX ADMIN — Medication 10 ML: at 05:39

## 2021-01-01 RX ADMIN — MIDODRINE HYDROCHLORIDE 10 MG: 5 TABLET ORAL at 09:04

## 2021-01-01 RX ADMIN — HYDROCODONE BITARTRATE AND ACETAMINOPHEN 5 MG: 7.5; 325 SOLUTION ORAL at 01:59

## 2021-01-01 RX ADMIN — RANOLAZINE 500 MG: 500 TABLET, FILM COATED, EXTENDED RELEASE ORAL at 08:54

## 2021-01-01 RX ADMIN — Medication 10 ML: at 21:25

## 2021-01-01 RX ADMIN — HALOPERIDOL LACTATE 5 MG: 5 INJECTION, SOLUTION INTRAMUSCULAR at 04:27

## 2021-01-01 RX ADMIN — MIDODRINE HYDROCHLORIDE 10 MG: 5 TABLET ORAL at 08:35

## 2021-01-01 RX ADMIN — Medication 10 ML: at 20:22

## 2021-01-01 RX ADMIN — METOCLOPRAMIDE HYDROCHLORIDE 5 MG: 5 SOLUTION ORAL at 18:23

## 2021-01-01 RX ADMIN — Medication 10 ML: at 21:43

## 2021-01-01 RX ADMIN — LEVOTHYROXINE SODIUM 50 MCG: 0.05 TABLET ORAL at 08:18

## 2021-01-01 RX ADMIN — DIPHENHYDRAMINE HYDROCHLORIDE 50 MG: 50 INJECTION, SOLUTION INTRAMUSCULAR; INTRAVENOUS at 16:05

## 2021-01-01 RX ADMIN — Medication 10 ML: at 20:26

## 2021-01-01 RX ADMIN — ACETAMINOPHEN 650 MG: 325 TABLET ORAL at 11:21

## 2021-01-01 RX ADMIN — MAGNESIUM SULFATE HEPTAHYDRATE 2 G: 40 INJECTION, SOLUTION INTRAVENOUS at 16:52

## 2021-01-01 RX ADMIN — SUCRALFATE 1 G: 1 TABLET ORAL at 10:48

## 2021-01-01 RX ADMIN — SUCRALFATE 1 G: 1 TABLET ORAL at 17:29

## 2021-01-01 RX ADMIN — APIXABAN 2.5 MG: 2.5 TABLET, FILM COATED ORAL at 21:17

## 2021-01-01 RX ADMIN — ALBUMIN (HUMAN) 50 G: 0.25 INJECTION, SOLUTION INTRAVENOUS at 08:58

## 2021-01-01 RX ADMIN — LEVOTHYROXINE SODIUM 50 MCG: 100 TABLET ORAL at 05:35

## 2021-01-01 RX ADMIN — PANTOPRAZOLE SODIUM 40 MG: 40 INJECTION, POWDER, FOR SOLUTION INTRAVENOUS at 21:11

## 2021-01-01 RX ADMIN — HYDROCODONE BITARTRATE AND ACETAMINOPHEN 5 MG: 7.5; 325 SOLUTION ORAL at 11:12

## 2021-01-01 RX ADMIN — LEVOTHYROXINE SODIUM 50 MCG: 100 TABLET ORAL at 06:00

## 2021-01-01 RX ADMIN — Medication 10 ML: at 13:01

## 2021-01-01 RX ADMIN — PANTOPRAZOLE SODIUM 40 MG: 40 INJECTION, POWDER, FOR SOLUTION INTRAVENOUS at 08:43

## 2021-01-01 RX ADMIN — SODIUM CHLORIDE, SODIUM LACTATE, POTASSIUM CHLORIDE, AND CALCIUM CHLORIDE 500 ML: 600; 310; 30; 20 INJECTION, SOLUTION INTRAVENOUS at 08:24

## 2021-01-01 RX ADMIN — Medication 10 ML: at 02:01

## 2021-01-01 RX ADMIN — Medication 10 ML: at 21:45

## 2021-01-01 RX ADMIN — PANTOPRAZOLE SODIUM 40 MG: 40 TABLET, DELAYED RELEASE ORAL at 08:18

## 2021-01-01 RX ADMIN — GABAPENTIN 100 MG: 100 CAPSULE ORAL at 08:42

## 2021-01-01 RX ADMIN — GABAPENTIN 100 MG: 100 CAPSULE ORAL at 17:55

## 2021-01-01 RX ADMIN — METOCLOPRAMIDE HYDROCHLORIDE 5 MG: 5 SOLUTION ORAL at 06:00

## 2021-01-01 RX ADMIN — Medication 400 MG: at 09:34

## 2021-01-01 RX ADMIN — SODIUM CHLORIDE 40 MG: 9 INJECTION, SOLUTION INTRAMUSCULAR; INTRAVENOUS; SUBCUTANEOUS at 22:42

## 2021-01-01 RX ADMIN — PROMETHAZINE HYDROCHLORIDE 12.5 MG: 25 TABLET ORAL at 21:46

## 2021-01-01 RX ADMIN — IOPAMIDOL 30 ML: 612 INJECTION, SOLUTION INTRAVENOUS at 17:02

## 2021-01-01 RX ADMIN — RANOLAZINE 1000 MG: 500 TABLET, FILM COATED, EXTENDED RELEASE ORAL at 09:34

## 2021-01-01 RX ADMIN — RANOLAZINE 500 MG: 500 TABLET, FILM COATED, EXTENDED RELEASE ORAL at 20:23

## 2021-01-01 RX ADMIN — SUCRALFATE 1 G: 1 TABLET ORAL at 06:30

## 2021-01-01 RX ADMIN — ACETAMINOPHEN 650 MG: 325 TABLET ORAL at 14:38

## 2021-01-01 RX ADMIN — Medication 10 ML: at 06:15

## 2021-01-01 RX ADMIN — METOCLOPRAMIDE HYDROCHLORIDE 5 MG: 5 INJECTION INTRAMUSCULAR; INTRAVENOUS at 08:36

## 2021-01-01 RX ADMIN — SODIUM CHLORIDE, SODIUM LACTATE, POTASSIUM CHLORIDE, AND CALCIUM CHLORIDE 1000 ML: 600; 310; 30; 20 INJECTION, SOLUTION INTRAVENOUS at 18:05

## 2021-01-01 RX ADMIN — ACETAMINOPHEN 650 MG: 325 TABLET ORAL at 07:43

## 2021-01-01 RX ADMIN — Medication 10 ML: at 05:14

## 2021-01-01 RX ADMIN — RANOLAZINE 500 MG: 500 TABLET, FILM COATED, EXTENDED RELEASE ORAL at 21:17

## 2021-01-01 RX ADMIN — ACETAMINOPHEN 650 MG: 325 TABLET ORAL at 21:23

## 2021-01-01 RX ADMIN — ATORVASTATIN CALCIUM 40 MG: 40 TABLET, FILM COATED ORAL at 21:43

## 2021-01-01 RX ADMIN — METOCLOPRAMIDE HYDROCHLORIDE 5 MG: 5 INJECTION INTRAMUSCULAR; INTRAVENOUS at 21:11

## 2021-01-01 RX ADMIN — ACETAMINOPHEN 650 MG: 325 TABLET ORAL at 21:01

## 2021-01-01 RX ADMIN — ONDANSETRON 4 MG: 2 INJECTION INTRAMUSCULAR; INTRAVENOUS at 18:01

## 2021-01-01 RX ADMIN — RANOLAZINE 1000 MG: 500 TABLET, FILM COATED, EXTENDED RELEASE ORAL at 17:06

## 2021-01-01 RX ADMIN — SUCRALFATE 1 G: 1 TABLET ORAL at 21:20

## 2021-01-01 RX ADMIN — POTASSIUM CHLORIDE 20 MEQ: 20 TABLET, EXTENDED RELEASE ORAL at 09:34

## 2021-01-01 RX ADMIN — LEVOTHYROXINE SODIUM 50 MCG: 0.05 TABLET ORAL at 09:34

## 2021-01-01 RX ADMIN — LEVOTHYROXINE SODIUM 50 MCG: 100 TABLET ORAL at 06:19

## 2021-01-01 RX ADMIN — SODIUM CHLORIDE 250 ML: 900 INJECTION, SOLUTION INTRAVENOUS at 17:19

## 2021-01-01 RX ADMIN — RANOLAZINE 500 MG: 500 TABLET, FILM COATED, EXTENDED RELEASE ORAL at 21:43

## 2021-01-01 RX ADMIN — MIDODRINE HYDROCHLORIDE 10 MG: 5 TABLET ORAL at 08:42

## 2021-01-01 RX ADMIN — Medication 10 ML: at 05:55

## 2021-01-01 RX ADMIN — GABAPENTIN 100 MG: 100 CAPSULE ORAL at 08:57

## 2021-01-01 RX ADMIN — SUCRALFATE 1 G: 1 TABLET ORAL at 05:16

## 2021-01-01 RX ADMIN — HEPARIN SODIUM 12 UNITS/KG/HR: 5000 INJECTION, SOLUTION INTRAVENOUS at 03:30

## 2021-01-01 RX ADMIN — HYDROCODONE BITARTRATE AND ACETAMINOPHEN 5 MG: 7.5; 325 SOLUTION ORAL at 14:53

## 2021-01-01 RX ADMIN — Medication 10 ML: at 21:18

## 2021-01-01 RX ADMIN — Medication 30 ML: at 05:55

## 2021-01-01 RX ADMIN — SUCRALFATE 1 G: 1 TABLET ORAL at 21:43

## 2021-01-01 RX ADMIN — RANOLAZINE 500 MG: 500 TABLET, FILM COATED, EXTENDED RELEASE ORAL at 21:01

## 2021-01-01 RX ADMIN — FAMOTIDINE 20 MG: 20 TABLET, FILM COATED ORAL at 13:47

## 2021-01-01 RX ADMIN — RANOLAZINE 500 MG: 500 TABLET, FILM COATED, EXTENDED RELEASE ORAL at 21:46

## 2021-01-01 RX ADMIN — SULFAMETHOXAZOLE AND TRIMETHOPRIM 1 TABLET: 800; 160 TABLET ORAL at 08:42

## 2021-01-01 RX ADMIN — APIXABAN 2.5 MG: 2.5 TABLET, FILM COATED ORAL at 21:20

## 2021-01-01 RX ADMIN — APIXABAN 2.5 MG: 2.5 TABLET, FILM COATED ORAL at 08:42

## 2021-01-01 RX ADMIN — POTASSIUM CHLORIDE 20 MEQ: 20 TABLET, EXTENDED RELEASE ORAL at 18:00

## 2021-01-01 RX ADMIN — Medication 10 ML: at 05:05

## 2021-01-01 RX ADMIN — SUCRALFATE 1 G: 1 TABLET ORAL at 05:53

## 2021-01-01 RX ADMIN — POTASSIUM CHLORIDE 20 MEQ: 20 TABLET, EXTENDED RELEASE ORAL at 08:18

## 2021-01-01 RX ADMIN — ACETAMINOPHEN 650 MG: 325 TABLET ORAL at 23:09

## 2021-01-01 RX ADMIN — METOCLOPRAMIDE HYDROCHLORIDE 5 MG: 5 SOLUTION ORAL at 15:47

## 2021-01-01 RX ADMIN — LEVOTHYROXINE SODIUM 50 MCG: 0.1 TABLET ORAL at 07:43

## 2021-01-01 RX ADMIN — HYDROCODONE BITARTRATE AND ACETAMINOPHEN 5 MG: 7.5; 325 SOLUTION ORAL at 22:59

## 2021-01-01 RX ADMIN — SUCRALFATE 1 G: 1 TABLET ORAL at 06:47

## 2021-01-01 RX ADMIN — SODIUM ZIRCONIUM CYCLOSILICATE 5 G: 5 POWDER, FOR SUSPENSION ORAL at 14:32

## 2021-01-01 RX ADMIN — METOCLOPRAMIDE HYDROCHLORIDE 5 MG: 5 INJECTION INTRAMUSCULAR; INTRAVENOUS at 16:48

## 2021-01-01 RX ADMIN — ACETAMINOPHEN 650 MG: 325 TABLET ORAL at 06:20

## 2021-01-01 RX ADMIN — Medication 10 ML: at 17:25

## 2021-01-01 RX ADMIN — ACETAMINOPHEN 650 MG: 325 TABLET ORAL at 09:04

## 2021-01-01 RX ADMIN — SODIUM CHLORIDE, SODIUM LACTATE, POTASSIUM CHLORIDE, AND CALCIUM CHLORIDE 1000 ML: 600; 310; 30; 20 INJECTION, SOLUTION INTRAVENOUS at 18:23

## 2021-01-01 RX ADMIN — LEVOTHYROXINE SODIUM 75 MCG: 0.07 TABLET ORAL at 05:53

## 2021-01-01 RX ADMIN — Medication 10 ML: at 16:28

## 2021-01-01 RX ADMIN — GABAPENTIN 100 MG: 100 CAPSULE ORAL at 17:29

## 2021-01-01 RX ADMIN — SUCRALFATE 1 G: 1 TABLET ORAL at 11:35

## 2021-01-01 RX ADMIN — Medication 5 ML: at 05:46

## 2021-01-01 RX ADMIN — RANOLAZINE 500 MG: 500 TABLET, FILM COATED, EXTENDED RELEASE ORAL at 08:25

## 2021-01-01 RX ADMIN — LIDOCAINE HYDROCHLORIDE 80 MG: 20 INJECTION, SOLUTION INFILTRATION; PERINEURAL at 16:57

## 2021-01-01 RX ADMIN — Medication 10 ML: at 06:29

## 2021-01-01 RX ADMIN — Medication 10 ML: at 04:28

## 2021-01-01 RX ADMIN — ASPIRIN 81 MG: 81 TABLET ORAL at 09:34

## 2021-01-01 RX ADMIN — HYDROCODONE BITARTRATE AND ACETAMINOPHEN 5 MG: 7.5; 325 SOLUTION ORAL at 16:06

## 2021-01-01 RX ADMIN — SUCRALFATE 1 G: 1 TABLET ORAL at 11:29

## 2021-01-01 RX ADMIN — HYDROCODONE BITARTRATE AND ACETAMINOPHEN 5 MG: 7.5; 325 SOLUTION ORAL at 01:56

## 2021-01-01 RX ADMIN — Medication 10 ML: at 18:31

## 2021-01-01 RX ADMIN — SODIUM CHLORIDE 40 MG: 9 INJECTION, SOLUTION INTRAMUSCULAR; INTRAVENOUS; SUBCUTANEOUS at 21:44

## 2021-01-01 RX ADMIN — ISOSORBIDE MONONITRATE 30 MG: 30 TABLET, EXTENDED RELEASE ORAL at 11:02

## 2021-01-01 RX ADMIN — Medication 10 ML: at 05:10

## 2021-01-01 RX ADMIN — GABAPENTIN 100 MG: 100 CAPSULE ORAL at 18:31

## 2021-01-01 RX ADMIN — RANOLAZINE 500 MG: 500 TABLET, FILM COATED, EXTENDED RELEASE ORAL at 21:20

## 2021-01-01 RX ADMIN — LOPERAMIDE HCL 2 MG: 1 SOLUTION ORAL at 13:48

## 2021-01-01 RX ADMIN — METOCLOPRAMIDE HYDROCHLORIDE 5 MG: 5 SOLUTION ORAL at 11:29

## 2021-01-01 RX ADMIN — METOCLOPRAMIDE HYDROCHLORIDE 5 MG: 5 SOLUTION ORAL at 18:31

## 2021-01-01 RX ADMIN — ATORVASTATIN CALCIUM 40 MG: 40 TABLET, FILM COATED ORAL at 01:54

## 2021-01-01 RX ADMIN — SUCRALFATE 1 G: 1 TABLET ORAL at 21:17

## 2021-01-01 RX ADMIN — MIDODRINE HYDROCHLORIDE 10 MG: 5 TABLET ORAL at 11:35

## 2021-01-01 RX ADMIN — GABAPENTIN 100 MG: 100 CAPSULE ORAL at 11:35

## 2021-01-01 RX ADMIN — Medication 10 ML: at 21:46

## 2021-01-01 RX ADMIN — PANTOPRAZOLE SODIUM 40 MG: 40 TABLET, DELAYED RELEASE ORAL at 09:34

## 2021-01-01 RX ADMIN — RANOLAZINE 500 MG: 500 TABLET, FILM COATED, EXTENDED RELEASE ORAL at 07:43

## 2021-01-01 RX ADMIN — HYDROCODONE BITARTRATE AND ACETAMINOPHEN 5 MG: 7.5; 325 SOLUTION ORAL at 08:41

## 2021-01-01 RX ADMIN — Medication 10 ML: at 15:52

## 2021-01-01 RX ADMIN — MIDODRINE HYDROCHLORIDE 10 MG: 5 TABLET ORAL at 11:21

## 2021-01-01 RX ADMIN — LEVOTHYROXINE SODIUM 50 MCG: 100 TABLET ORAL at 06:27

## 2021-01-01 RX ADMIN — SODIUM CHLORIDE, SODIUM LACTATE, POTASSIUM CHLORIDE, AND CALCIUM CHLORIDE 100 ML/HR: 600; 310; 30; 20 INJECTION, SOLUTION INTRAVENOUS at 02:06

## 2021-01-01 RX ADMIN — Medication 10 ML: at 13:10

## 2021-01-01 RX ADMIN — RANOLAZINE 1000 MG: 500 TABLET, FILM COATED, EXTENDED RELEASE ORAL at 09:05

## 2021-01-01 RX ADMIN — Medication 10 ML: at 13:11

## 2021-01-07 NOTE — H&P
[facility] Cardiology History & Physical  
  
Date of  Admission: 1/6/2021 10:40 PM  
 
Primary Care Physician: Dr. Aquino Staff Primary Cardiologist: Dr. Patrick Sanchez Admitting Physician: Dr. Marianela Torrez CC: chest pain HPI:  Reji Mcadams is a 66 y.o. female with past medical history of CAD with remote CABG, HTN, PAF on Eliquis, GERD, and HLD who presented to the ER with complaints of chest pain. Patient reports 3 day history of intermittent chest pain that she describes as sharp in nature from mid chest into epigastric region and different that her previous cardiac pain. Her daughter was present with her in the ER and reported radiation into her jaw and teeth. She thinks that she may have been short of breath as well but states that she can not remember. Serial cardiac enzymes were done in the ER with troponin of 0.13-0. 12. She reports vomiting episode in er daughters car on the way to the ER. She was started on IV heparin and transferred to [facility] telemetry unit for continued care. She is currently symptom free. Patient was also diagnosed with COVID-19 on 12/14/2020. Currently afebrile. [facility] history --  
2016 -- multivessel disease with SVG to LAD (100% occluded), SVG to diagonal (100% occluded) and patent SVG to RCA 
5/2019 -- continued patency of SVG to RCA, known occlusion of SVG to LAD, occlusion on native LAD and high-grade diagonal stenosis -- all consistent with prior [facility] findings 8/2019 -- stable CAD, not felt to be a candidate for  intervention of native LAD Past Medical History:  
Diagnosis Date  Abnormal EKG 6/17/2016  Acute systolic (congestive) heart failure ([address] 6/17/2016  Anterior myocardial infarction ([address] 11/20/1997  Avascular necrosis ([address] R hip  CAD (coronary artery disease) 1990  
 s/p CABG  
 Cellulitis of right lower extremity 6/17/2016  Chest pain, precordial 3/28/2016  COPD (chronic obstructive pulmonary disease) ([address]   
 pt denies  Coronary atherosclerosis of native coronary vessel 03/28/2016  
 per heart cath (6/2016)- \"pt with diffusely diseased LAD and small caliber vessels. At present. .. best option would be medical management. .. do not see any obvious good options for redo CABG. ..  Fracture of femoral neck, right ([address] 02/27/2016  
 s/p repair with hardware- Dr Gabo Salomon  GERD (gastroesophageal reflux disease) 02/27/2016  
 managed with medication  HLD (hyperlipidemia) 03/28/2016  
 managed with medication  Hx of echocardiogram 02/27/2016 EF 45-50%, hypokinesis of the apical anterior and basal-mid anteroseptal wall. Mild aortic valve regurgitation. Mild tricuspid regurgitation.  Hypertension   
 managed with medication  Hypokalemia 02/27/2016  
 hx of- WNL since 6/2016- pt on K+ supplement  IBS (irritable bowel syndrome)  Ischemic cardiomyopathy 5/15/2018  MI (myocardial infarction) ([address] 1990 and spring 2016  
 x2  Osteoarthritis  Osteoporosis  Rectal prolapse  S/P CABG (coronary artery bypass graft) 1990  
 S/P total hip arthroplasty 12/14/2016  Scleroderma ([address] 3/28/2016  Shortness of breath dyspnea 3/28/2016  Tracheal stenosis 1995  
 s/p repair  Unstable angina ([address] 11/20/1997 Past Surgical History:  
Procedure Laterality Date  HX ANKLE FRACTURE TX Right 1992  
 hardware placed  HX CORONARY ARTERY BYPASS GRAFT  1990  
 cabg x 3  
 HX FRACTURE TX  1995  
 cervical spine- C2  
 HX HEART CATHETERIZATION    
 HX HEENT  1995  
 tracheal stricture/stenosis Derrick Come HIP FRACTURE TX  02/2016  
 repaired with hardware- Dr Gabo Salomon  HX HYSTERECTOMY  1970s  HX OTHER SURGICAL Peg tube placement  HX PTCA  HX TRACHEOSTOMY Allergies Allergen Reactions  Corticosteroids (Glucocorticoids) Anaphylaxis  Iodine Hives and Other (comments)  
  hypertension Social History Socioeconomic History  Marital status:  Spouse name: Not on file  Number of children: Not on file  Years of education: Not on file  Highest education level: Not on file Occupational History  Not on file Social Needs  Financial resource strain: Not on file  Food insecurity Worry: Not on file Inability: Not on file  Transportation needs Medical: Not on file Non-medical: Not on file Tobacco Use  Smoking status: Former Smoker Packs/day: 1.00 Years: 20.00 Pack years: 20.00 Quit date: 1995 Years since quittin.0  Smokeless tobacco: Never Used Substance and Sexual Activity  Alcohol use: No  
 Drug use: No  
 Sexual activity: Not on file Lifestyle  Physical activity Days per week: Not on file Minutes per session: Not on file  Stress: Not on file Relationships  Social connections Talks on phone: Not on file Gets together: Not on file Attends Restoration service: Not on file Active member of club or organization: Not on file Attends meetings of clubs or organizations: Not on file Relationship status: Not on file  Intimate partner violence Fear of current or ex partner: Not on file Emotionally abused: Not on file Physically abused: Not on file Forced sexual activity: Not on file Other Topics Concern  Not on file Social History Narrative  Not on file Family History Problem Relation Age of Onset  Breast Cancer Sister Current Facility-Administered Medications Medication Dose Route Frequency  aspirin delayed-release tablet 81 mg  81 mg Oral QHS  atorvastatin (LIPITOR) tablet 40 mg  40 mg Oral QHS  . PHARMACY TO SUBSTITUTE PER PROTOCOL (Reordered from: GABAPENTIN PO)    Per Protocol  levothyroxine (SYNTHROID) tablet 50 mcg  50 mcg Oral ACB  magnesium oxide (MAG-OX) tablet 400 mg  400 mg Oral DAILY  pantoprazole (PROTONIX) tablet 40 mg  40 mg Oral ACB  ranolazine ER (RANEXA) tablet 500 mg  500 mg Oral BID  potassium chloride (K-DUR, KLOR-CON) SR tablet 20 mEq  20 mEq Oral BID  sodium chloride (NS) flush 5-40 mL  5-40 mL IntraVENous Q8H  
 sodium chloride (NS) flush 5-40 mL  5-40 mL IntraVENous PRN  
 nitroglycerin (NITROBID) 2 % ointment 1 Inch  1 Inch Topical Q6H PRN  
 nitroglycerin (NITROSTAT) tablet 0.4 mg  0.4 mg SubLINGual Q5MIN PRN  
 morphine injection 2 mg  2 mg IntraVENous Q4H PRN  
 acetaminophen (TYLENOL) tablet 650 mg  650 mg Oral Q4H PRN  
 HYDROcodone-acetaminophen (NORCO) 5-325 mg per tablet 1 Tab  1 Tab Oral Q6H PRN  
 ondansetron (ZOFRAN) injection 4 mg  4 mg IntraVENous Q4H PRN  
 0.9% sodium chloride infusion  75 mL/hr IntraVENous CONTINUOUS  
 heparin 25,000 units in dextrose 500 mL infusion  12-25 Units/kg/hr IntraVENous TITRATE Review of Systems Review of Systems Constitutional: Positive for weight loss. HENT: Negative. Eyes: Negative. Respiratory: Positive for shortness of breath. Cardiovascular: Positive for chest pain. Gastrointestinal: Positive for vomiting. Genitourinary: Negative. Musculoskeletal: Negative. Skin: Negative. Neurological: Negative. Endo/Heme/Allergies: Negative. Psychiatric/Behavioral: Negative. Subjective:  
 
Visit Vitals BP (!) 118/51 (BP 1 Location: Left arm, BP Patient Position: At rest) Pulse 64 Temp 97.7 °F (36.5 °C) Resp 16 Ht 5' 4\" (1.626 m) Wt 42.7 kg (94 lb 2.2 oz) SpO2 95% BMI 16.16 kg/m² Physical Exam 
Constitutional:   
   Comments: underweight Eyes:  
   Pupils: Pupils are equal, round, and reactive to light. Cardiovascular:  
   Rate and Rhythm: Normal rate. Rhythm irregular. Heart sounds: Normal heart sounds. Pulmonary:  
   Breath sounds: Normal breath sounds. Abdominal:  
   General: Bowel sounds are normal.  
Musculoskeletal:     
   General: No swelling. Skin: 
   General: Skin is warm and dry. Neurological:  
   Mental Status: She is alert and oriented to person, place, and time. Psychiatric:     
   Mood and Affect: Mood normal.  
 
 
 
Cardiographics Telemetry: AFIB 
ECG: ordered Echocardiogram: from 2/2020 -  Left ventricle: Systolic function was normal. Ejection fraction was estimated in the range of 55 % to 60 %. There were no regional wall motion abnormalities. Wall thickness was mildly increased.  
-  Ventricular septum: Thickness was moderately increased. -  Left atrium: The atrium was mildly dilated. -  Tricuspid valve: There was mild regurgitation Labs: from [facility] ER -- WBC 8.1, HGB 11.6, HCT 35.3, , sodium 140, potassium 3.7, BUN 15, creatinine 0.88, glucose 118, albumiin 2.9, troponin 0.13 - 0.12 Assessment/Plan:  
  
  Chest pain -- admit to telemetry for observation. Continue IV heparin that was started in [facility] ER. On ASA, statin and Ranexa as outpatient - continue on admission. NPO with IV hydration in case LHC is warranted later today. Check lipid panel in the AM 
 
  Hypertension -- not on anti-hypertensives as outpatient. Continue to monitor. Currently controlled. CAD (coronary artery disease) -- see above HPI 
 
  PAF (paroxysmal atrial fibrillation) -- currently rate controlled AFIB on the monitor. On Eliquis as outpatient. Last dose was the evening of 1/5/2021 Chidi Ram NP 
1/7/2021 12:03 AM

## 2021-01-07 NOTE — PROGRESS NOTES
ACUTE OT GOALS: 
(Developed with and agreed upon by patient and/or caregiver.) 1. Patient will complete lower body bathing and dressing with MOD I and adaptive equipment as needed. 2. Patient will complete toileting with MOD I.  
3. Patient will tolerate 25 minutes of OT treatment with 1-2 rest breaks to increase activity tolerance for ADLs. 4. Patient will complete functional transfers with supervision/SBA and adaptive equipment as needed. 5. Patient will complete functional mobility for household distances with SBA and adaptive equipment as needed. 6. Patient will complete self-grooming while standing edge of sink with SBA and adaptive equipment as needed. Timeframe: 7 visits OCCUPATIONAL THERAPY ASSESSMENT: Initial Assessment and Daily Note OT Treatment Day # 1 Devan Oliver is a 66 y.o. female PRIMARY DIAGNOSIS: Chest pain Chest pain [R07.9] Reason for Referral: ICD-10: Treatment Diagnosis: Generalized Muscle Weakness (M62.81) Repeated Falls (R29.6) OBSERVATION: Payor: Jie Bueno / Plan: [facility] [employer] MEDICARE CHOICE PPO/PFFS / Product Type: Managed Care Medicare /  
ASSESSMENT:  
 
REHAB RECOMMENDATIONS:  
Recommendation to date pending progress: 
Setting: ? Home Health Therapy Equipment:  
? None PRIOR LEVEL OF FUNCTION: 
(Prior to Hospitalization)  INITIAL/CURRENT LEVEL OF FUNCTION: 
(Most Recently Demonstrated) Bathing: 
? Supervision Dressing: ? Independent Feeding/Grooming: ? Independent Toileting: ? Independent Functional Mobility: 
? w/c for community level distances; RW for short distances Bathing: 
? Not tested Dressing: 
? Not tested Feeding/Grooming: 
? Set Up Toileting: 
? Not tested Functional Mobility: ? Minimal Assistance ASSESSMENT: 
 Ms. Mak Rosas presents with deficits in overall strength, activity tolerance, ADL performance and functional mobility. BUE AROM and strength (4/5) are generally decreased but WFL. Min A for functional mobility/transfers. Pt completed functional mobility in room with min A x RW with verbal cues for facilitation. Set-up for self-grooming tasks in supported sitting. At this time, Willard Ryan is functioning below baseline for ADLs and functional mobility. Pt would benefit from skilled OT services to address OT goals and and plan of care. SUBJECTIVE:  
Ms. Mak Rosas states, \"My feet are hurting. It's a burning pain. \" SOCIAL HISTORY/LIVING ENVIRONMENT: Lives with daughter in [place] home. OBJECTIVE:  
 
PAIN: VITAL SIGNS: LINES/DRAINS:  
Pre Treatment: Pain Screen Pain Scale 1: Numeric (0 - 10) Pain Intensity 1: 6 Pain Onset 1: chronic Pain Location 1: Foot Post Treatment: 6/10 (RN notified)   IV 
O2 Device: Room air GROSS EVALUATION: 
BUEs Within Functional Limits Abnormal/ Functional Abnormal/ Non-Functional (see comments) Not Tested Comments: AROM [] [x] [] [] PROM [] [x] [] [] Strength [] [x] [] [] Balance [] [x] [] [] Posture [] [x] [] [] Sensation [x] [] [] [] Coordination [x] [] [] [] Tone [x] [] [] [] Edema [x] [] [] [] Activity Tolerance [] [x] [] []   
 [] [] [] [] COGNITION/ 
PERCEPTION: Intact Impaired  
(see comments) Comments:  
Orientation [x] [] Vision [x] [] Wears glasses (not present at [facility]) Hearing [x] [] Judgment/ Insight [x] [] Attention [x] [] Memory [x] [] Command Following [x] [] Emotional Regulation [x] []   
 [] [] ACTIVITIES OF DAILY LIVING: I Mod I S SBA CGA Min Mod Max Total NT Comments BASIC ADLs:             
Bathing/ Showering [] [] [] [] [] [] [x] [] [] [] Toileting [] [] [] [] [] [] [x] [] [] [] Dressing [] [] [] [] [] [] [x] [] [] [] Feeding [] [] [x] [] [] [] [] [] [] [] Grooming [] [] [x] [] [] [] [] [] [] [] Personal Device Care [] [] [] [] [] [] [x] [] [] [] Functional Mobility [] [] [] [] [] [x] [] [] [] [] X RW with household mobility INSTRUMENTAL ADLs:             
Care of Others [] [] [] [] [] [] [] [] [] [x] Community Mobility [] [] [] [] [] [] [] [] [] [x] Financial Management [] [] [] [] [] [] [] [] [] [x] Home Management [] [] [] [] [] [] [] [] [] [x] Meal Preparation [] [] [] [] [] [] [] [] [] [x] Health Management [] [] [] [] [] [] [] [] [] [x] Work/Leisure [] [] [] [] [] [] [] [] [] [x] I=Independent, Mod I=Modified Independent, S=Supervision, SBA=Standby Assistance, CGA=Contact Guard Assistance,  
Min=Minimal Assistance, Mod=Moderate Assistance, Max=Maximal Assistance, Total=Total Assistance, NT=Not Tested MOBILITY: I Mod I S SBA CGA Min Mod Max Total  NT x2 Comments:  
Supine to sit [] [] [] [] [] [x] [] [] [] [] [] Sit to supine [] [] [] [] [] [] [] [] [] [x] [] Sit to stand [] [] [] [] [] [x] [] [] [] [] [] Bed to chair [] [] [] [] [] [x] [] [] [] [] [] X RW  
I=Independent, Mod I=Modified Independent, S=Supervision, SBA=Standby Assistance, CGA=Contact Guard Assistance,  
Min=Minimal Assistance, Mod=Moderate Assistance, Max=Maximal Assistance, Total=Total Assistance, NT=Not Tested [facility] Daily Activity Inpatient Short Form How much help from another person does the patient currently need. .. Total A Lot A Little None 1. Putting on and taking off regular lower body clothing? [] 1   [x] 2   [] 3   [] 4  
2. Bathing (including washing, rinsing, drying)? [] 1   [x] 2   [] 3   [] 4  
3. Toileting, which includes using toilet, bedpan or urinal?   [] 1   [x] 2   [] 3   [] 4  
4. Putting on and taking off regular upper body clothing? [] 1   [] 2   [x] 3   [] 4  
5. Taking care of personal grooming such as brushing teeth?    [] 1   [] 2   [x] 3   [] 4  
 6.  Eating meals? [] 1   [] 2   [x] 3   [] 4  
© 2007, Trustees of [address] [place] [address] [address] [address], under license to [employer]. All rights reserved Score:  Initial: 15 Most Recent: X (Date: -- ) Interpretation of Tool:  Represents activities that are increasingly more difficult (i.e. Bed mobility, Transfers, Gait). PLAN:  
FREQUENCY/DURATION: OT Plan of Care: 3 times/week for duration of hospital stay or until stated goals are met, whichever comes first. 
 
PROBLEM LIST:  
(Skilled intervention is medically necessary to address:) 1. Decreased ADL/Functional Activities 2. Decreased Activity Tolerance 3. Decreased AROM/PROM 4. Decreased Balance 5. Decreased Gait Ability 6. Decreased Strength 7. Decreased Transfer Abilities 8. Increased Pain INTERVENTIONS PLANNED:  
(Benefits and precautions of occupational therapy have been discussed with the patient.) 1. Self Care Training 2. Therapeutic Activity 3. Therapeutic Exercise/HEP 4. Neuromuscular Re-education 5. Education TREATMENT:  
 
EVALUATION: Low Complexity : (Untimed Charge) TREATMENT:  
(     ) Self Care (10 Minutes): Self care including Grooming to increase independence. Neuromuscular Re-education (15 Minutes): Neuromuscular Re-education included Balance Training, Postural training and Sitting balance training to improve Postural Control. AFTER TREATMENT POSITION/PRECAUTIONS: 
Alarm Activated, Chair, Needs within reach and RN notified INTERDISCIPLINARY COLLABORATION: 
RN/PCT, PT/PTA and OT/VICTOR TOTAL TREATMENT DURATION: 
OT Patient Time In/Time Out Time In: 1020 Time Out: 1051 Alee Moreland OT

## 2021-01-07 NOTE — ROUTINE PROCESS
TRANSFER - IN REPORT: 
 
Verbal report received from Marcelle Coughlin (name) on Jeff Richard  being received from [facility] ED (unit) for routine progression of care Report consisted of patients Situation, Background, Assessment and  
Recommendations(SBAR). Information from the following report(s) SBAR, Kardex, ED Summary, [facility] and Recent Results was reviewed with the receiving nurse. Opportunity for questions and clarification was provided. Assessment completed upon patients arrival to unit and care assumed.

## 2021-01-07 NOTE — PROGRESS NOTES
Pt presented to the ER with complaints of chest pain. Patient reports 3 day history of intermittent chest pain that she describes as sharp in nature from mid chest into epigastric region and different that her previous cardiac pain. Pt is in [address]. Pt reported that she lives with her daughter. She is not on any home O2. Uses a RW and WC to assist with ambulation. Reports having a BSC, SS and uses grab bars. PT/OT are recommending HH at discharge; will discuss with pt. Pts daughter will transport her home at d/c. CM will continue to monitor. Care Management Interventions PCP Verified by CM: Yes(Dr. Mei Haq MD.) Mode of Transport at Discharge: Other (see comment)(Family) Transition of Care Consult (CM Consult): Discharge Planning Current Support Network: Abdirashid's, Family Lives [place] Confirm Follow Up Transport: Family The Plan for Transition of Care is Related to the Following Treatment Goals : Return to baseline Discharge Location Discharge Placement: Home

## 2021-01-07 NOTE — ROUTINE PROCESS
Bedside and Verbal shift change report given to [facility] JACQUELINE POTTER (oncoming nurse) by self (offgoing nurse). Report included the following information SBAR, Kardex, ED Summary, Intake/Output, MAR, Recent Results and Cardiac Rhythm SR with PACs, PVCs. NS infusing at 75 mL/hr.

## 2021-01-07 NOTE — ROUTINE PROCESS
Bedside and Verbal shift change report given to [facility] (oncoming nurse) by self (offgoing nurse). Report included the following information SBAR, Kardex, ED Summary, Procedure Summary, Intake/Output, MAR and Recent Results.

## 2021-01-07 NOTE — PROGRESS NOTES
ACUTE PHYSICAL THERAPY GOALS: 
(Developed with and agreed upon by patient and/or caregiver. ) LTG: 
(1.)Ms. Haider Hsu will move from supine to sit and sit to supine , scoot up and down and roll side to side in bed with MODIFIED INDEPENDENCE within 7 treatment day(s). (2.)Ms. Haider Hsu will transfer from bed to chair and chair to bed with SUPERVISION using the least restrictive device within 7 treatment day(s). (3.)Ms. Haider Hsu will ambulate with SUPERVISION for 50 feet with the least restrictive device within 7 treatment day(s). (4.)Ms. Haider Hsu will participate in therapeutic activity/exercises x 25 minutes for increased strength within 7 treatment days. (3.)Ms. Haider Hsu will propel manual wheelchair for 100 ft with SUPERVISION within 7 treatment days. ________________________________________________________________________________________________ PHYSICAL THERAPY ASSESSMENT: Initial Assessment and AM PT Treatment Day # 1 Jeff Richard is a 66 y.o. female PRIMARY DIAGNOSIS: Chest pain Chest pain [R07.9] Reason for Referral: ICD-10: Treatment Diagnosis: Generalized Muscle Weakness (M62.81) History of falling (Z91.81) OBSERVATION: Payor: Vito Patel / Plan: [facility] MEDICARE CHOICE PPO/PFFS / Product Type: Managed Care Medicare /  
 
ASSESSMENT:  
 
REHAB RECOMMENDATIONS:  
Recommendation to date pending progress: 
Setting: ? Home Health Therapy Equipment:  
? None PRIOR LEVEL OF FUNCTION: 
(Prior to Hospitalization) INITIAL/CURRENT LEVEL OF FUNCTION: 
(Most Recently Demonstrated) Bed Mobility: 
? Independent Sit to Stand: 
? Contact Guard Assistance Transfers: ? Minimal Assistance Gait/Mobility: ? Minimal Assistance Bed Mobility: ? Minimal Assistance Sit to Stand: ? Minimal Assistance Transfers: ? Minimal Assistance Gait/Mobility: ? Minimal Assistance ASSESSMENT: 
 Ms. Gina Potter presents to PT with decreased AROM and strength in B LEs. Pt also appears to demonstrate balance deficits and decreased activity tolerance. Pt performed bed mobility and transfers with Brandee. She ambulated using RW and CGA-Brandee demonstrating decreased gait speed. OT present to assist with ADLs while PT addressed transfers/ambulation. Ms. Gina Potter could benefit from skilled PT as she is currently functioning below her baseline. .  
 
SUBJECTIVE:  
Ms. Gina Potter states, \"I think so. \" SOCIAL HISTORY/LIVING ENVIRONMENT:  
Home Environment: Private residence # Steps to Enter: 1 One/Two Story Residence: Other (Comment)(three story) Living Alone: No 
Support Systems: Child(samira) OBJECTIVE:  
 
PAIN: VITAL SIGNS: LINES/DRAINS:  
Pre Treatment: Pain Screen Pain Scale 1: Numeric (0 - 10) Pain Intensity 1: 10 
Pain Onset 1: acute Pain Location 1: Foot Pain Orientation 1: Left;Right Post Treatment: 10 (RN alerted)   IV 
O2 Device: Room air GROSS EVALUATION: 
B LEs Within Functional Limits Abnormal/ Functional Abnormal/ Non-Functional (see comments) Not Tested Comments: AROM [] [x] [] [] PROM [] [] [] [] Strength [] [x] [] [] Balance [] [x] [] [] Posture [] [] [] [] Sensation [] [] [] [] Coordination [] [] [] [] Tone [] [] [] [] Edema [] [] [] [] Activity Tolerance [] [x] [] []   
 [] [] [] [] COGNITION/ 
PERCEPTION: Intact Impaired  
(see comments) Comments:  
Orientation [x] [] Vision [x] [] Hearing [] [] Command Following [x] [] Safety Awareness [x] []   
 [] [] MOBILITY: I Mod I S SBA CGA Min Mod Max Total  NT x2 Comments:  
Bed Mobility Rolling [] [] [] [x] [] [] [] [] [] [] [] Supine to Sit [] [] [] [] [] [x] [] [] [] [] [] Scooting [] [] [] [] [x] [] [] [] [] [] [] Sit to Supine [] [] [] [] [] [] [] [] [] [x] [] Transfers Sit to Stand [] [] [] [] [] [x] [] [] [] [] [] Bed to Chair [] [] [] [] [] [x] [] [] [] [] [] Stand to Sit [] [] [] [] [] [x] [] [] [] [] [] I=Independent, Mod I=Modified Independent, S=Supervision, SBA=Standby Assistance, CGA=Contact Guard Assistance,  
Min=Minimal Assistance, Mod=Moderate Assistance, Max=Maximal Assistance, Total=Total Assistance, NT=Not Tested GAIT: I Mod I S SBA CGA Min Mod Max Total  NT x2 Comments:  
Level of Assistance [] [] [] [] [] [x] [] [] [] [] [] Distance 10 ft   
DME [employer] Gait Quality Narrowed BROCK and decreased gait speed I=Independent, Mod I=Modified Independent, S=Supervision, SBA=Standby Assistance, CGA=Contact Guard Assistance,  
Min=Minimal Assistance, Mod=Moderate Assistance, Max=Maximal Assistance, Total=Total Assistance, NT=Not Tested [facility] Basic Mobility Inpatient Short Form How much difficulty does the patient currently have. .. Unable A Lot A Little None 1. Turning over in bed (including adjusting bedclothes, sheets and blankets)? [] 1   [] 2   [] 3   [x] 4  
2. Sitting down on and standing up from a chair with arms ( e.g., wheelchair, bedside commode, etc.)   [] 1   [] 2   [x] 3   [] 4  
3. Moving from lying on back to sitting on the side of the bed? [] 1   [] 2   [x] 3   [] 4 How much help from another person does the patient currently need. .. Total A Lot A Little None 4. Moving to and from a bed to a chair (including a wheelchair)? [] 1   [] 2   [x] 3   [] 4  
5. Need to walk in hospital room? [] 1   [] 2   [x] 3   [] 4  
6. Climbing 3-5 steps with a railing? [] 1   [x] 2   [] 3   [] 4  
© 2007, Trustees of [address] [place] [address] [address] [address], under license to [employer]. All rights reserved Score:  Initial: 18 Most Recent: X (Date: -- ) Interpretation of Tool:  Represents activities that are increasingly more difficult (i.e. Bed mobility, Transfers, Gait).  
 
PLAN:  
 FREQUENCY/DURATION: PT Plan of Care: 3 times/week for duration of hospital stay or until stated goals are met, whichever comes first. 
 
PROBLEM LIST:  
(Skilled intervention is medically necessary to address:) 1. Decreased Activity Tolerance 2. Decreased Balance 3. Decreased Coordination 4. Decreased Gait Ability 5. Decreased Strength 6. Decreased Transfer Abilities 7. Increased Pain INTERVENTIONS PLANNED:  
(Benefits and precautions of physical therapy have been discussed with the patient.) 1. Therapeutic Activity 2. Therapeutic Exercise/HEP 3. Neuromuscular Re-education 4. Gait Training 5. Manual Therapy 6. Education TREATMENT:  
 
EVALUATION: Low Complexity : (Untimed Charge) TREATMENT:  
(     ) Therapeutic Activity (23 Minutes): Therapeutic activity included Rolling, Supine to Sit, Scooting, Transfer Training, Ambulation on level ground and Standing balance to improve functional Mobility, Strength and Activity tolerance. AFTER TREATMENT POSITION/PRECAUTIONS: 
Alarm Activated, Chair, Needs within reach and RN notified INTERDISCIPLINARY COLLABORATION: 
RN/PCT, PT/PTA and OT/VICTOR TOTAL TREATMENT DURATION: 
PT Patient Time In/Time Out Time In: 1020 Time Out: 1046 John Wilburn, PT, DPT

## 2021-01-07 NOTE — ROUTINE PROCESS
Bedside and Verbal shift change report given to self (oncoming nurse) by Loc Piña RN (offgoing nurse). Report included the following information SBAR, Kardex, ED Summary, Intake/Output, MAR, Recent Results and Cardiac Rhythm SR w/PACs, PVCs. Heparin gtt verified per MAR. Pt used bedpan at shift change. Matilde marcum pt for [address] [facility].

## 2021-01-07 NOTE — PROGRESS NOTES
Pt arrived with transport from [facility]. Transferred to bed via stretcher. Skin assessment completed with 2nd RN. Midsternal scar present post CABG. Scattered bruising present. Sacrum and heels free from breakdown. No other abnormalities noted. 01/06/21 5677 Dual Skin Pressure Injury Assessment Dual Skin Pressure Injury Assessment WDL Second Care Provider (Based on [address] [place] [address]) Shyann Fernandez., RN Skin Integumentary Skin Integumentary (WDL) X Pressure  Injury Documentation No Pressure Injury Noted-Pressure Ulcer Prevention Initiated Skin Color Appropriate for ethnicity; Ecchymosis (comment) Skin Condition/Temp Dry;Fragile Skin Integrity Intact;Scars (comment) Turgor Epidermis thin w/ loss of subcut tissue Hair Growth Sparce Nails WDL Varicosities Absent

## 2021-01-08 NOTE — PROGRESS NOTES
ACUTE PHYSICAL THERAPY GOALS: 
(Developed with and agreed upon by patient and/or caregiver.) (1.)Ms. Kourtney Bonilla will move from supine to sit and sit to supine , scoot up and down and roll side to side in bed with MODIFIED INDEPENDENCE within 7 treatment day(s). (2.)Ms. Kourtney Bonilla will transfer from bed to chair and chair to bed with SUPERVISION using the least restrictive device within 7 treatment day(s). (3.)Ms. Kourtney Bonilla will ambulate with SUPERVISION for 50 feet with the least restrictive device within 7 treatment day(s). (4.)Ms. Kourtney Bonilla will participate in therapeutic activity/exercises x 25 minutes for increased strength within 7 treatment days. (3.)Ms. Kourtney Bonilla will propel manual wheelchair for 100 ft with SUPERVISION within 7 treatment days. PHYSICAL THERAPY: Daily Note and AM Treatment Day # 2 Anitra Hazel is a 66 y.o. female PRIMARY DIAGNOSIS: Chest pain Chest pain [R07.9] ASSESSMENT:  
 
REHAB RECOMMENDATIONS: CURRENT LEVEL OF FUNCTION: 
(Most Recently Demonstrated) Recommendation to date pending progress: 
Setting: ? Home Health Therapy Equipment:  
? None Bed Mobility: ? Moderate Assistance Sit to Stand: ? Minimal Assistance Transfers: ? Minimal Assistance Gait/Mobility: ? Minimal Assistance ASSESSMENT: 
Ms. Kourtney Bonilla made progress today requiring less assistance to get to EOB. She was able to amb around the room with min to CGA using . SUBJECTIVE:  
Ms. Kourtney Bonilla states, \"My daughter takes care of me at home. \" SOCIAL HISTORY/ LIVING ENVIRONMENT:  
Home Environment: Private residence # Steps to Enter: 1 One/Two Story Residence: Other (Comment)(three story) Living Alone: No 
Support Systems: Child(samira) OBJECTIVE:  
 
PAIN: VITAL SIGNS: LINES/DRAINS:  
Pre Treatment:  feet hurt all the time per pt Post Treatment: no change   none O2 Device: Room air MOBILITY: I Mod I S SBA CGA Min Mod Max Total  NT x2 Comments:  
Bed Mobility Rolling [] [] [] [] [] [] [] [] [] [] [] Supine to Sit [] [] [] [] [] [] [x] [] [] [] [] Scooting [] [] [] [] [] [x] [] [] [] [] [] Sit to Supine [] [] [] [] [] [] [x] [] [] [] []   
Transfers Sit to Stand [] [] [] [] [x] [] [] [] [] [] [] Bed to Chair [] [] [] [] [] [] [] [] [] [] []   
Stand to Sit [] [] [] [] [x] [] [] [] [] [] [] I=Independent, Mod I=Modified Independent, S=Supervision, SBA=Standby Assistance, CGA=Contact Guard Assistance,  
Min=Minimal Assistance, Mod=Moderate Assistance, Max=Maximal Assistance, Total=Total Assistance, NT=Not Tested GAIT: I Mod I S SBA CGA Min Mod Max Total  NT x2 Comments:  
Level of Assistance [] [] [] [] [x] [x] [] [] [] [] [] Distance 20' [facility] Gait Quality Slow with short steps Weightbearing Status WBAT I=Independent, Mod I=Modified Independent, S=Supervision, SBA=Standby Assistance, CGA=Contact Guard Assistance,  
Min=Minimal Assistance, Mod=Moderate Assistance, Max=Maximal Assistance, Total=Total Assistance, NT=Not Tested PLAN:  
FREQUENCY/DURATION: PT Plan of Care: 3 times/week for duration of hospital stay or until stated goals are met, whichever comes first. 
TREATMENT:  
 
TREATMENT:  
 
Therapeutic Activity (25 Minutes): Therapeutic activity included Supine to Sit, Sit to Supine, Scooting, Ambulation on level ground, Sitting balance  and Standing balance to improve functional Mobility, Strength and Activity tolerance. AFTER TREATMENT POSITION/PRECAUTIONS: 
Alarm Activated, Bed, Needs within reach and RN notified INTERDISCIPLINARY COLLABORATION: 
PT/PTA and RN Case Manager/  TOTAL TREATMENT DURATION: 
PT Patient Time In/Time Out Time In: 9504 Time Out: 8868 Naren Mike, PTA

## 2021-01-08 NOTE — ROUTINE PROCESS
Discharge instructions reviewed with pt and daughter. Prescriptions given for pt and med info sheets provided for all new medications. Opportunity for questions provided. Pt and daughter voiced understanding of all discharge instructions. Iv and lines removed.

## 2021-01-08 NOTE — PROGRESS NOTES
PT/OT recommending pt with HH. CM spoke with pt about the therapy and recommending that writer contact her Jean Kangr ([phone]). CM spoke with Lee Reich and requested [facility]; order sent for RN, PT, OT & [facility]. Pt will be discharging home later today. No other d/c needs identified. 1054-CM advised [facility] that pts address on her face sheet is not her daughters address; gave: [address], [address]. Care Management Interventions PCP Verified by CM: Yes(Dr. Mary Pina MD.) Mode of Transport at Discharge: Other (see comment)(Family) Transition of Care Consult (CM Consult): Discharge Planning, Home Health Discharge Durable Medical Equipment: No 
Physical Therapy Consult: Yes Occupational Therapy Consult: Yes Speech Therapy Consult: No 
Current Support Network: Relative's Home, Family Lives [place] Confirm Follow Up Transport: Family The Plan for Transition of Care is Related to the Following Treatment Goals : Return to her baseline The Patient and/or Patient Representative was Provided with a Choice of Provider and Agrees with the Discharge Plan?: Yes Name of the Patient Representative Who was Provided with a Choice of Provider and Agrees with the Discharge Plan: Patient Freedom of Choice List was Provided with Basic Dialogue that Supports the Patient's Individualized Plan of Care/Goals, Treatment Preferences and Shares the Quality Data Associated with the Providers?: Yes [employer] Information Provided?: No 
Discharge Location Discharge Placement: Home with home health

## 2021-01-08 NOTE — DISCHARGE INSTRUCTIONS
Patient Education        Coronary Artery Disease: Care Instructions  Your Care Instructions     The heart is a muscle, and like any muscle, it needs blood to work well. Coronary artery disease occurs when the arteries that bring oxygen-rich blood to your heart have a buildup of plaque--deposits of fats and other substances. Plaque can reduce blood flow to the heart muscle. This can cause angina symptoms such as chest pain or pressure. A heart attack can happen if blood flow is completely blocked. You can do a lot to improve your health and prevent a heart attack. Eating healthy food, not smoking, getting regular exercise, and taking your medicine are the main things you can do every day to stay healthy. Follow-up care is a key part of your treatment and safety. Be sure to make and go to all appointments, and call your doctor if you are having problems. It's also a good idea to know your test results and keep a list of the medicines you take. How can you care for yourself at home? Medicines    · Be safe with medicines. Take your medicines exactly as prescribed. Call your doctor if you think you are having a problem with your medicine. You will get more details on the specific medicines your doctor prescribes.     · You will take medicines that lower your risk of a heart attack and lower your risk of dying early from heart disease. These medicines include:  ? Angiotensin-converting enzyme (ACE) inhibitors or angiotensin II receptor blockers (ARBs). They lower blood pressure. ? Aspirin and other blood thinners. They prevent blood clots that could cause a heart attack. ? Beta-blockers. They lower the heart's workload. ? Statins and other cholesterol medicines. They lower cholesterol.     · If your doctor has given you nitroglycerin for angina symptoms (such as chest pain or pressure) keep it with you at all times. If you have symptoms, sit down and rest, and take the first dose of nitroglycerin as directed.  If your symptoms get worse or are not getting better within 5 minutes, call 911 right away. Stay on the phone. The emergency  will give you further instructions.     · Do not take any over-the-counter medicines, vitamins, or herbal products without talking to your doctor first.   Lifestyle  Ask your doctor if a cardiac rehab program is right for you. Cardiac rehab can help you make lifestyle changes. In cardiac rehab, a team of health professionals provides education and support to help you make new, healthy habits.    · Do not smoke. Avoid secondhand smoke too. Smoking can increase your risk of a heart attack or stroke. If you need help quitting, talk to your doctor about stop-smoking programs and medicines. These can increase your chances of quitting for good.     · Eat heart-healthy foods. These include vegetables, fruits, nuts, beans, lean meat, fish, and whole grains. Limit saturated fat, sodium, and alcohol. Limit drinks and foods with added sugar.     · If your doctor recommends it, get more exercise. Ask your doctor what level of exercise is safe for you. Walking is a good choice. Bit by bit, increase the amount you walk every day. Try for at least 30 minutes on most days of the week. You also may want to swim, bike, or do other activities.     · Stay at a healthy weight. Lose weight if you need to.     · Manage other health problems. These include diabetes, high blood pressure, and high cholesterol. If you think you may have a problem with alcohol or drug use, talk to your doctor.     · If you have angina symptoms, pay attention to your symptoms. This can help you see what causes them and what is typical for you.     · Avoid colds and flu. Get a pneumococcal vaccine shot. If you have had one before, ask your doctor whether you need another dose. Get a flu vaccine every year.  If you must be around people with colds or flu, wash your hands often.     · If you think you have symptoms of depression, talk to your doctor. Symptoms include feeling sad or hopeless most of the time, or losing interest in activities that used to make you happy. When should you call for help? Call 911 anytime you think you may need emergency care. For example, call if:    · You have symptoms of a heart attack. These may include:  ? Chest pain or pressure, or a strange feeling in the chest.  ? Sweating. ? Shortness of breath. ? Nausea or vomiting. ? Pain, pressure, or a strange feeling in the back, neck, jaw, or upper belly or in one or both shoulders or arms. ? Lightheadedness or sudden weakness. ? A fast or irregular heartbeat. After you call 911, the  may tell you to chew 1 adult-strength or 2 to 4 low-dose aspirin. Wait for an ambulance. Do not try to drive yourself.     · You have angina symptoms (such as chest pain or pressure) that do not go away with rest or are not getting better within 5 minutes after you take a dose of nitroglycerin.     · You passed out (lost consciousness). Call your doctor now or seek immediate medical care if:    · You are having angina symptoms, such as chest pain or pressure, more often than usual, or they are different or worse than usual.     · You have new or increased shortness of breath.     · You are dizzy or lightheaded, or you feel like you may faint. Watch closely for changes in your health, and be sure to contact your doctor if you have any problems. Where can you learn more? Go to http://www.gray.com/  Enter J389 in the search box to learn more about \"Coronary Artery Disease: Care Instructions. \"  Current as of: December 16, 2019               Content Version: 12.6  © 9355-9293 [employer]. Care instructions adapted under license by [employer] (which disclaims liability or warranty for this information).  If you have questions about a medical condition or this instruction, always ask your healthcare professional. [place] disclaims any warranty or liability for your use of this information. Patient Education        Angina: Care Instructions  Your Care Instructions     You have a problem called angina. Angina happens when there is not enough blood flow to your heart muscle. Angina is a sign of coronary artery disease (CAD). CAD occurs when blood vessels that supply the heart become narrowed. Having CAD increases your risk of a heart attack. Chest pain or pressure is the most common symptom of angina. But some people have other symptoms, like:  · Pain, pressure, or a strange feeling in the back, neck, jaw, or upper belly, or in one or both shoulders or arms. · Shortness of breath. · Nausea or vomiting. · Lightheadedness or sudden weakness. · Fast or irregular heartbeat. Women are somewhat more likely than men to have angina symptoms like shortness of breath, nausea, and back or jaw pain. Angina can be dangerous. That's why it is important to pay attention to your symptoms. Know what is typical for you, learn how to control your symptoms, and understand when you need to get treatment. A change in your usual pattern of symptoms is an emergency. It may mean that you are having a heart attack. The doctor has checked you carefully, but problems can develop later. If you notice any problems or new symptoms, get medical treatment right away. Follow-up care is a key part of your treatment and safety. Be sure to make and go to all appointments, and call your doctor if you are having problems. It's also a good idea to know your test results and keep a list of the medicines you take. How can you care for yourself at home? Medicines    · If your doctor has given you nitroglycerin for angina symptoms, keep it with you at all times. If you have symptoms, sit down and rest, and take the first dose of nitroglycerin as directed.  If your symptoms get worse or are not getting better within 5 minutes, call 911 right away. Stay on the phone. The emergency  will give you further instructions.     · If your doctor advises it, take 1 low-dose aspirin a day to prevent heart attack.     · Be safe with medicines. Take your medicines exactly as prescribed. Call your doctor if you think you are having a problem with your medicine. You will get more details on the specific medicines your doctor prescribes. Lifestyle changes    · Do not smoke. If you need help quitting, talk to your doctor about stop-smoking programs and medicines. These can increase your chances of quitting for good.     · Eat a heart-healthy diet that is low in saturated fat and salt, and is high in fiber. Talk to your doctor or a dietitian about healthy eating.     · Stay at a healthy weight. Or lose weight if you need to. Activity    · Talk to your doctor about a level of activity that is safe for you.     · If an activity causes angina symptoms, stop and rest.   When should you call for help? Call 911 anytime you think you may need emergency care. For example, call if:    · You passed out (lost consciousness).     · You have symptoms of a heart attack. These may include:  ? Chest pain or pressure, or a strange feeling in the chest.  ? Sweating. ? Shortness of breath. ? Nausea or vomiting. ? Pain, pressure, or a strange feeling in the back, neck, jaw, or upper belly or in one or both shoulders or arms. ? Lightheadedness or sudden weakness. ? A fast or irregular heartbeat. After you call 911, the  may tell you to chew 1 adult-strength or 2 to 4 low-dose aspirin. Wait for an ambulance. Do not try to drive yourself.     · You have angina symptoms that do not go away with rest or are not getting better within 5 minutes after you take a dose of nitroglycerin. Call your doctor now if:    · Your angina symptoms seem worse but still follow your typical pattern.  You can predict when symptoms will happen, but they may come on sooner, feel worse, or last longer.     · You feel dizzy or lightheaded, or you feel like you may faint. Watch closely for changes in your health, and be sure to contact your doctor if you have any problems. Where can you learn more? Go to http://www.gray.com/  Enter H129 in the search box to learn more about \"Angina: Care Instructions. \"  Current as of: December 16, 2019               Content Version: 12.6  © 2006-2020 [employer]. Care instructions adapted under license by [employer] (which disclaims liability or warranty for this information). If you have questions about a medical condition or this instruction, always ask your healthcare professional. [place] [address] any warranty or liability for your use of this information. Patient Education   Isosorbide Mononitrate (By mouth)   Isosorbide Mononitrate (eye-partha-SOR-bide yff-jc-VJP-trate)  Prevents angina. This medicine is a nitrate. Brand Name(s):   There may be other brand names for this medicine. When This Medicine Should Not Be Used: This medicine is not right for everyone. Do not use it if you had an allergic reaction to isosorbide or similar medicines. How to Use This Medicine:   Tablet, Long Acting Tablet  · Take your medicine as directed. Your dose may need to be changed several times to find what works best for you. You should take this medicine first thing in the morning. Carefully follow the same schedule each day so the medicine will work properly. · It is best to take this medicine on an empty stomach with at least half a glass of water. · Swallow the extended-release tablet whole. Do not crush, break, or chew it. · Missed dose: Take a dose as soon as you remember. If it is almost time for your next dose, wait until then and take a regular dose. Do not take extra medicine to make up for a missed dose.   · Store the medicine in a closed container at room temperature, away from heat, moisture, and direct light. Drugs and Foods to Avoid:   Ask your doctor or pharmacist before using any other medicine, including over-the-counter medicines, vitamins, and herbal products. · Do not use this medicine together with riociguat, sildenafil, tadalafil, or vardenafil. · Some foods and medicines can affect how isosorbide mononitrate works. Tell your doctor if you are using blood pressure medicine or if you drink alcohol. Warnings While Using This Medicine:   · Tell your doctor if you are pregnant or breastfeeding. Tell him if you had a recent heart attack or you have heart failure, an enlarged heart, low blood pressure, or other heart problems. · This medicine may cause the following problems:  ¨ Severe low blood pressure  ¨ Worsening of chest pain caused by an enlarged heart  · Medicines that treat chest pain sometimes cause headaches. These headaches are a sign that the medicine is working. Do not stop taking the medicine or change the time you take it in order to avoid the headaches. Ask your doctor if you can take aspirin or acetaminophen to treat the headache. · This medicine may make you dizzy. Do not drive or do anything else that could be dangerous until you know how this medicine affects you. You may feel lightheaded when standing, so stand up slowly. Drinking alcohol may make these symptoms worse. · Do not stop using this medicine suddenly. Your doctor will need to slowly decrease your dose before you stop it completely. · Your doctor will do lab tests at regular visits to check on the effects of this medicine. Keep all appointments. · Keep all medicine out of the reach of children. Never share your medicine with anyone.   Possible Side Effects While Using This Medicine:   Call your doctor right away if you notice any of these side effects:  · Allergic reaction: Itching or hives, swelling in your face or hands, swelling or tingling in your mouth or throat, chest tightness, trouble breathing  · Severe or ongoing dizziness, lightheadedness, or fainting  · Severe headache  · Slow heartbeat, increased chest pain  If you notice these less serious side effects, talk with your doctor:   · Mild headache  If you notice other side effects that you think are caused by this medicine, tell your doctor. Call your doctor for medical advice about side effects. You may report side effects to FDA at 9-874-LWX-7926  © 2017 [facility] [place] Information is for End User's use only and may not be sold, redistributed or otherwise used for commercial purposes. The above information is an  only. It is not intended as medical advice for individual conditions or treatments. Talk to your doctor, nurse or pharmacist before following any medical regimen to see if it is safe and effective for you. Patient Education   Metoprolol (By mouth)   Metoprolol (met-oh-PROE-lol)  Treats high blood pressure, angina (chest pain), and heart failure. May lower the risk of death after a heart attack. This medicine is a beta-blocker. Brand Name(s): Lopressor, Toprol XL   There may be other brand names for this medicine. When This Medicine Should Not Be Used: This medicine is not right for everyone. Do not use if you had an allergic reaction to metoprolol or similar medicines. Do not use this medicine if you have certain blood circulation or heart problems. Ask your doctor about these problems. How to Use This Medicine:   Tablet, Long Acting Tablet  · Take your medicine as directed. Your dose may need to be changed several times to find what works best for you. · Take this medicine with a meal or right after a meal. Take this medicine the same way every day, at the same time. · Swallow the tablet whole with a glass of water. You may break the extended-release tablet in half, but do not chew or crush it. · Missed dose: Take a dose as soon as you remember.  If it is almost time for your next dose, wait until then and take a regular dose. Do not take extra medicine to make up for a missed dose. · Store the medicine in a closed container at room temperature, away from heat, moisture, and direct light. Drugs and Foods to Avoid:   Ask your doctor or pharmacist before using any other medicine, including over-the-counter medicines, vitamins, and herbal products. · Some medicines can affect how metoprolol works. Tell your doctor if you are taking any of the following:   ¨ Digoxin, dipyridamole, hydralazine, hydroxychloroquine, methyldopa, quinidine  ¨ Medicine to treat depression (such as bupropion, clomipramine, desipramine, fluoxetine, fluvoxamine, paroxetine, sertraline), medicine to treat mental illness (such as chlorpromazine, fluphenazine, haloperidol, thioridazine), medicine for heart rhythm problems (such as propafenone), HIV/AIDS medicine (such as ritonavir), medicine to treat a fungus infection (such as terbinafine), a monoamine oxidase inhibitor (MAOI), an ergot medicine for headaches, a calcium channel blocker (such as amlodipine, diltiazem, verapamil), or an alpha blocker (such as clonidine, prazosin, reserpine, guanethidine)  Warnings While Using This Medicine:   · Tell your doctor if you are pregnant or breastfeeding, or if you have blood vessel, heart, or circulation problems (such as heart failure, rhythm problems, or slow heartbeat). Tell your doctor if you have kidney disease, liver disease, diabetes, lung disease (such as asthma), an overactive thyroid, or a history of allergies. · This medicine may cause worse symptoms of heart failure while the dose is being adjusted. · Do not stop using this medicine suddenly. Your doctor will need to slowly decrease your dose before you stop it completely. · Tell any doctor or dentist who treats you that you are using this medicine. You may need to stop using this medicine several days before you have surgery or medical tests.   · This medicine could lower your blood pressure too much, especially when you first use it or if you are dehydrated. Stand or sit up slowly if you feel lightheaded or dizzy. · This medicine may make you dizzy or drowsy. Do not drive or do anything else that could be dangerous until you know how this medicine affects you. · Your doctor will check your progress and the effects of this medicine at regular visits. Keep all appointments. · Keep all medicine out of the reach of children. Never share your medicine with anyone. Possible Side Effects While Using This Medicine:   Call your doctor right away if you notice any of these side effects:  · Allergic reaction: Itching or hives, swelling in your face or hands, swelling or tingling in your mouth or throat, chest tightness, trouble breathing  · Lightheadedness, dizziness, or fainting  · Slow heartbeat  · Swelling in your hands, ankles, or feet, trouble breathing, tiredness  · Worsening chest pain  If you notice these less serious side effects, talk with your doctor:   · Diarrhea  · Mild dizziness or tiredness  If you notice other side effects that you think are caused by this medicine, tell your doctor. Call your doctor for medical advice about side effects. You may report side effects to FDA at 5-766-FDA-8919  © 2017 [facility] Information is for End User's use only and may not be sold, redistributed or otherwise used for commercial purposes. The above information is an  only. It is not intended as medical advice for individual conditions or treatments. Talk to your doctor, nurse or pharmacist before following any medical regimen to see if it is safe and effective for you. normal...

## 2021-01-08 NOTE — DISCHARGE SUMMARY
[address] Cardiology Discharge Summary Patient ID: Calin Penn 
[id number] 
71 y.o. 
1945 Admit date: 1/6/2021 Discharge date:  01/08/2021 Admitting Physician: Elenita Hickman MD  
 
Discharge Physician: Caroline Josue NP/Dr. Marcelo Diaz Admission Diagnoses: Chest pain [R07.9] Discharge Diagnoses:  
 Diagnosis  Weight loss  Near syncope  Hypotension  Frequent falls  Elevated troponin I level  PAF (paroxysmal atrial fibrillation) ([address]  Chest pain  CAD (coronary artery disease)  Hypertension Cardiology Procedures this admission:  EchoCardiogram 
Consults: None Hospital Course: Patient presented to the ER with c/o chest pain. Patient reported 3 day history of intermittent chest pain that she describes as sharp in nature from mid chest into epigastric region and different that her previous cardiac pain. Her daughter was present with her in the ER and reported radiation into her jaw and teeth. She thinks that she may have been short of breath as well but states that she can not remember. Serial cardiac enzymes were done in the ER with troponin of 0.13-0. 12. She reported vomiting episode in er daughters car on the way to the ER. She was started on IV heparin and transferred to [facility] telemetry unit for continued care. She is currently symptom free. Patient was also diagnosed with COVID-19 on 12/14/2020. Currently afebrile. LHC in 2019 revealed  of LAD with collaterals,  of LAD , MVD not amenable for intervention and SVG to RCA patient. Troponin levels remained flat and patient remained chest pain free. Echo results: -  Left ventricle: Systolic function was at the lower limits of normal. 
Ejection fraction was estimated to be 50 %. There was hypokinesis of the 
basal-mid anteroseptal and basal-mid inferoseptal wall(s). There was mild concentric hypertrophy. Avg. E/e': 9.90. Features were consistent with (grade 2 diastolic dysfunction).   
-  Left atrium: The atrium was mildly dilated. 
  
-  Right atrium: The atrium was mildly dilated. 
  
-  Mitral valve: There was mild annular calcification. There was mild 
regurgitation. 
  
 
 
The morning of discharge, patient was up feeling well without any complaints of chest pain or shortness of breath. The patient will follow up with [facility]iology -- Dr. Palmer Zuleta . Patient has been referred to cardiac rehab. DISPOSITION: The patient is being discharged home in stable condition on a low saturated fat, low cholesterol and low salt diet. The patient is instructed to advance activities as tolerated to the limit of fatigue or shortness of breath. The patient is instructed to avoid all heavy lifting, straining, stooping or squatting for 3-5 days. The patient is instructed to watch the cath site for bleeding/oozing; if seen, the patient is instructed to apply firm pressure with a clean cloth and call [facility]iology at [phone]. The patient is instructed to watch for signs of infection which include: increasing area of redness, fever/hot to touch or purulent drainage at the catheterization site. The patient is instructed not to soak in a bathtub for 7-10 days, but is cleared to shower. The patient is instructed to call the office or return to the ER for immediate evaluation for any shortness of breath or chest pain not relieved by NTG. Discharge Exam:  
Visit Vitals BP (!) 107/94 Pulse 83 Temp 97.8 °F (36.6 °C) Resp 18 Ht 5' 4\" (1.626 m) Wt 35.8 kg (79 lb) SpO2 94% BMI 13.56 kg/m² Recent Results (from the past 24 hour(s)) TROPONIN-HIGH SENSITIVITY Collection Time: 01/07/21 10:52 AM  
Result Value Ref Range Troponin-High Sensitivity 386.6 (HH) 0 - 14 pg/mL CBC WITH AUTOMATED DIFF Collection Time: 01/08/21  3:37 AM  
Result Value Ref Range WBC 6.3 4.3 - 11.1 K/uL  
 RBC 3.40 (L) 4.05 - 5.2 M/uL  
 HGB 10.3 (L) 11.7 - 15.4 g/dL HCT 32.5 (L) 35.8 - 46.3 % MCV 95.6 79.6 - 97.8 FL  
 MCH 30.3 26.1 - 32.9 PG  
 MCHC 31.7 31.4 - 35.0 g/dL  
 RDW 15.2 (H) 11.9 - 14.6 % PLATELET 360 932 - 412 K/uL MPV 11.2 9.4 - 12.3 FL ABSOLUTE NRBC 0.00 0.0 - 0.2 K/uL  
 DF AUTOMATED NEUTROPHILS 68 43 - 78 % LYMPHOCYTES 17 13 - 44 % MONOCYTES 13 (H) 4.0 - 12.0 % EOSINOPHILS 1 0.5 - 7.8 % BASOPHILS 1 0.0 - 2.0 % IMMATURE GRANULOCYTES 0 0.0 - 5.0 %  
 ABS. NEUTROPHILS 4.3 1.7 - 8.2 K/UL  
 ABS. LYMPHOCYTES 1.1 0.5 - 4.6 K/UL  
 ABS. MONOCYTES 0.9 0.1 - 1.3 K/UL  
 ABS. EOSINOPHILS 0.1 0.0 - 0.8 K/UL  
 ABS. BASOPHILS 0.0 0.0 - 0.2 K/UL  
 ABS. IMM. GRANS. 0.0 0.0 - 0.5 K/UL Patient Instructions:  
Current Discharge Medication List  
  
START taking these medications Details  
metoprolol tartrate (LOPRESSOR) 25 mg tablet Take 0.5 Tabs by mouth every twelve (12) hours. Qty: 60 Tab, Refills: 5  
  
isosorbide mononitrate ER (IMDUR) 30 mg tablet Take 1 Tab by mouth daily. Qty: 30 Tab, Refills: 5 CONTINUE these medications which have CHANGED Details  
ranolazine ER (Ranexa) 500 mg SR tablet Take 2 Tabs by mouth every twelve (12) hours. Qty: 120 Tab, Refills: 5 CONTINUE these medications which have NOT CHANGED Details  
apixaban (ELIQUIS) 5 mg tablet Take 1 Tab by mouth every twelve (12) hours. Indications: treatment to prevent blood clots in chronic atrial fibrillation 
Qty: 180 Tab, Refills: 3 GABAPENTIN PO Take 180 mg by mouth nightly. Indications: weakness, numbness or pain from nerve damage  
  
potassium chloride (K-DUR, KLOR-CON) 20 mEq tablet Take 1 Tab by mouth two (2) times a day. Qty: 60 Tab, Refills: 1  
  
magnesium oxide (MAG-OX) 400 mg tablet Take 1 Tab by mouth daily. Qty: 30 Tab, Refills: 2  
  
ferrous sulfate (IRON) 325 mg (65 mg iron) tablet Take  by mouth Daily (before breakfast). nitroglycerin (NITROSTAT) 0.4 mg SL tablet 1 Tab by SubLINGual route every five (5) minutes as needed for Chest Pain. Up to 3 doses. Qty: 1 Bottle, Refills: 3  
  
omeprazole (PRILOSEC) 40 mg capsule Take 40 mg by mouth daily. Indications: gastroesophageal reflux disease, heartburn HYDROcodone-acetaminophen (NORCO) 7.5-325 mg per tablet Take 1 Tab by mouth every six (6) hours as needed for Pain. Max Daily Amount: 4 Tabs. Qty: 17 Tab, Refills: 0 Associated Diagnoses: Closed compression fracture of thoracic vertebra, initial encounter ([facility])  
  
ondansetron (ZOFRAN ODT) 8 mg disintegrating tablet Take 1 Tab by mouth every eight (8) hours as needed for Nausea. Qty: 12 Tab, Refills: 0 Associated Diagnoses: Non-intractable vomiting with nausea, unspecified vomiting type  
  
levothyroxine (SYNTHROID) 50 mcg tablet Take  by mouth Daily (before breakfast). acetaminophen (TYLENOL) 325 mg tablet Take 500 mg by mouth every six (6) hours as needed for Pain. loperamide (IMODIUM) 2 mg capsule Take 2 mg by mouth as needed for Diarrhea. aspirin delayed-release 81 mg tablet Take 81 mg by mouth nightly. atorvastatin (LIPITOR) 40 mg tablet Take 1 Tab by mouth daily. Indications: HYPERCHOLESTEROLEMIA Qty: 90 Tab, Refills: 3 Associated Diagnoses: Coronary artery disease involving native heart without angina pectoris, unspecified vessel or lesion type Signed: 
Marinus Apley, NP 
1/8/2021 
9:59 AM

## 2021-01-08 NOTE — ROUTINE PROCESS
Bedside and Verbal shift change report given to [employer] (oncoming nurse) by self (offgoing nurse). Report included the following information SBAR, Kardex, ED Summary, Intake/Output, MAR and Recent Results.

## 2021-02-13 PROBLEM — I21.3 STEMI (ST ELEVATION MYOCARDIAL INFARCTION) (HCC): Status: ACTIVE | Noted: 2021-01-01

## 2021-02-13 PROBLEM — Z87.19 HISTORY OF GI BLEED: Chronic | Status: ACTIVE | Noted: 2021-01-01

## 2021-02-13 PROBLEM — E03.9 ACQUIRED HYPOTHYROIDISM: Chronic | Status: ACTIVE | Noted: 2021-01-01

## 2021-02-14 NOTE — PROGRESS NOTES
See DICKSON note for complete details. 66 yof in very poor health presents with acute CP and ECG changes concerning for STEMI. Hx of CABG last cath in 2018 without good interventional targets a severe end stage CAD presented in January with similar complaints and deferred LHC at that time. Recent admit to [facility] with GIB after a PEG tube placement. Found to have multiple ulcers in her stomach. When I saw her she stated \"I dont want a heart cath, I just want to be out of pain. \" 
 
RRR normal S1/S2 Lungs CTA Abd NT ND Normal PPP 
AOx3 A/P 
- no interventional options for her 
- not a candidate for heparin or antiplatelet meds 
- adjust Anginal and BP meds as tolerated - palliative care consult for comfort

## 2021-02-14 NOTE — PROGRESS NOTES
TRANSFER - IN REPORT: 
 
Verbal report received from irene(name) on Joey Alvarado  being received from ed(unit) for routine progression of care Report consisted of patients Situation, Background, Assessment and  
Recommendations(SBAR). Information from the following report(s) SBAR was reviewed with the receiving nurse. Opportunity for questions and clarification was provided. Assessment completed upon patients arrival to unit and care assumed.

## 2021-02-14 NOTE — PROGRESS NOTES
Verbal bedside report given to oncoming RN, Yuval Lombardi. Patient's situation, background, assessment and recommendations provided. Opportunity for questions provided. Oncoming RN assumed care of patient.

## 2021-02-14 NOTE — ED PROVIDER NOTES
78-year-old female with a significant history of cardiac disease, CABG, COPD, congestive heart failure, hypertension, high cholesterol presents with 3 days of intermittent diffuse chest pain rating to her back and shortness of breath. She has been taking nitro with some improvement, but pain always returns and worsened today with vomiting. She was admitted in January for an NSTEMI. Cardiology decided not to proceed with further interventions and maximize medical therapy. She was then admitted to [address] [place] [address] recently for GI bleed and PEG tube placement. She was taken off of anticoagulants and all \"cardiac meds\" due to low blood pressure and heart rate and abnormal \"temperature. \"  STEMI called upon arrival due to ST elevation inferior leads with reciprocal changes. Has not taken ASA today. Hypotensive in triage. Improved upon placement into room. [facility]  
  
1/8/2021     3:58 PM 
  
I have personally seen and examined Gamal Amaral with  Davon Trivedi NP. I agree and confirm findings in history, physical exam, and assessment/plan as outlined above with following pertinent additions/exceptions:    
  
66 frail female presents with SSCP that resolved in the ER. Troponin was elevated, she has hx of longstanding multivessel CAD without interventional targets (last cath in 2019). She was not interested in another cath and I agree, treated her for angina with long acting nitrate and additional ranexa doses. Echo showed basal septal wall motion abnormality with his new from prior but was mild and did not correspond well to a vascular territory. She also did not have a cardiac biomarker pattern consistent with ACS. Okay for d/c today, pain free. 
  
RRR normal S1/S2 Lungs CTA Abd NT ND Normal PPP 
AOx3 
  
Margo Navarrete MD\" Chest Pain (Angina) Associated symptoms include back pain, nausea, shortness of breath and vomiting. Pertinent negatives include no abdominal pain, no cough, no fever, no headaches, no palpitations and no weakness. Past Medical History:  
Diagnosis Date  Abnormal EKG 6/17/2016  Acute systolic (congestive) heart failure ([address] 6/17/2016  Anterior myocardial infarction ([address] 11/20/1997  Avascular necrosis ([address] R hip  CAD (coronary artery disease) 1990  
 s/p CABG  
 Cellulitis of right lower extremity 6/17/2016  Chest pain, precordial 3/28/2016  COPD (chronic obstructive pulmonary disease) ([address]   
 pt denies  Coronary atherosclerosis of native coronary vessel 03/28/2016  
 per heart cath (6/2016)- \"pt with diffusely diseased LAD and small caliber vessels. At present. .. best option would be medical management. .. do not see any obvious good options for redo CABG. ..  Fracture of femoral neck, right ([address] 02/27/2016  
 s/p repair with hardware- Dr Brooke Simental  GERD (gastroesophageal reflux disease) 02/27/2016  
 managed with medication  HLD (hyperlipidemia) 03/28/2016  
 managed with medication  Hx of echocardiogram 02/27/2016 EF 45-50%, hypokinesis of the apical anterior and basal-mid anteroseptal wall. Mild aortic valve regurgitation. Mild tricuspid regurgitation.  Hypertension   
 managed with medication  Hypokalemia 02/27/2016  
 hx of- WNL since 6/2016- pt on K+ supplement  IBS (irritable bowel syndrome)  Ischemic cardiomyopathy 5/15/2018  MI (myocardial infarction) ([address] 1990 and spring 2016  
 x2  Osteoarthritis  Osteoporosis  Rectal prolapse  S/P CABG (coronary artery bypass graft) 1990  
 S/P total hip arthroplasty 12/14/2016  Scleroderma ([address] 3/28/2016  Shortness of breath dyspnea 3/28/2016  Tracheal stenosis 1995  
 s/p repair  Unstable angina ([address] 11/20/1997 Past Surgical History:  
Procedure Laterality Date  HX ANKLE FRACTURE TX Right 1992  
 hardware placed  HX CORONARY ARTERY BYPASS GRAFT    
 cabg x 3  
 HX FRACTURE TX    
 cervical spine- C2  
 HX HEART CATHETERIZATION    
 HX HEENT    
 tracheal stricture/stenosis [facility] HIP FRACTURE TX  2016  
 repaired with hardware- Dr Brooke Simental  HX HYSTERECTOMY  1970s  HX OTHER SURGICAL Peg tube placement  HX PTCA  HX TRACHEOSTOMY Family History:  
Problem Relation Age of Onset  Breast Cancer Sister Social History Socioeconomic History  Marital status:  Spouse name: Not on file  Number of children: Not on file  Years of education: Not on file  Highest education level: Not on file Occupational History  Not on file Social Needs  Financial resource strain: Not on file  Food insecurity Worry: Not on file Inability: Not on file  Transportation needs Medical: Not on file Non-medical: Not on file Tobacco Use  Smoking status: Former Smoker Packs/day: 1.00 Years: 20.00 Pack years: 20.00 Quit date: 1995 Years since quittin.1  Smokeless tobacco: Never Used Substance and Sexual Activity  Alcohol use: No  
 Drug use: No  
 Sexual activity: Not on file Lifestyle  Physical activity Days per week: Not on file Minutes per session: Not on file  Stress: Not on file Relationships  Social connections Talks on phone: Not on file Gets together: Not on file Attends [employer] service: Not on file Active member of club or organization: Not on file Attends meetings of clubs or organizations: Not on file Relationship status: Not on file  Intimate partner violence Fear of current or ex partner: Not on file Emotionally abused: Not on file Physically abused: Not on file Forced sexual activity: Not on file Other Topics Concern  Not on file Social History Narrative  Not on file ALLERGIES: Corticosteroids (glucocorticoids) and Iodine Review of Systems Constitutional: Negative for chills and fever. HENT: Negative for hearing loss. Eyes: Negative for visual disturbance. Respiratory: Positive for shortness of breath. Negative for cough. Cardiovascular: Positive for chest pain. Negative for palpitations. Gastrointestinal: Positive for nausea and vomiting. Negative for abdominal pain and diarrhea. Musculoskeletal: Positive for back pain. Skin: Negative for rash. Neurological: Negative for weakness and headaches. Psychiatric/Behavioral: Negative for confusion. Vitals:  
 02/13/21 2014 BP: (!) 86/60 Pulse: 81 Resp: 18 Temp: 97.7 °F (36.5 °C) SpO2: 98% Weight: 35.8 kg (79 lb) Height: 5' 4\" (1.626 m) Physical Exam 
Vitals signs and nursing note reviewed. Constitutional:   
   Appearance: She is well-developed. She is ill-appearing. Comments: Cachectic HENT:  
   Head: Normocephalic and atraumatic. Eyes:  
   Pupils: Pupils are equal, round, and reactive to light. Neck: Musculoskeletal: Normal range of motion and neck supple. Cardiovascular:  
   Rate and Rhythm: Regular rhythm. Heart sounds: Normal heart sounds. Pulmonary:  
   Effort: Pulmonary effort is normal.  
   Breath sounds: Normal breath sounds. Abdominal:  
   Palpations: Abdomen is soft. Tenderness: There is no abdominal tenderness. Comments: Left upper quadrant PEG tube with continuous feeds Musculoskeletal: Normal range of motion. Skin: 
   General: Skin is warm and dry. Neurological:  
   Mental Status: She is alert. Psychiatric:     
   Behavior: Behavior normal.  
 
  
 
MDM Number of Diagnoses or Management Options Diagnosis management comments: Parts of this document were created using dragon voice recognition software. The chart has been reviewed but errors may still be present. I wore appropriate PPE throughout this patient's ED visit. Elizabeth Daily MD, 8:38 PM 
 
dw Dr Buddy Frankel, cardiology. He will examine and discuss with patient prior to taking to Cath Lab. Given ASA. 9:15 PM 
Not an interventional candidate per Dr Buddy Frankel and Dr Raphael Amador on prior cath. Family agreeable. Cards recommends admission for pain control and possible palliative care consult. Amount and/or Complexity of Data Reviewed Clinical lab tests: reviewed and ordered Tests in the radiology section of CPT®: ordered and reviewed Tests in the medicine section of CPT®: ordered and reviewed Critical Care Performed by: Kim Carbajla MD 
Authorized by: Kim Carbajal MD  
 
Critical care provider statement:  
  Critical care time (minutes):  30 Critical care time was exclusive of:  Separately billable procedures and treating other patients Critical care was necessary to treat or prevent imminent or life-threatening deterioration of the following conditions:  Cardiac failure Critical care was time spent personally by me on the following activities:  Development of treatment plan with patient or surrogate, discussions with consultants, evaluation of patient's response to treatment, examination of patient, interpretation of cardiac output measurements, review of old charts, re-evaluation of patient's condition, ordering and review of radiographic studies, ordering and review of laboratory studies and ordering and performing treatments and interventions I assumed direction of critical care for this patient from another provider in my specialty: yes

## 2021-02-14 NOTE — ED TRIAGE NOTES
Pt arrives with face mask in place. Complains of CP that began intermittently x3 days ago. Since onset pain has worsened and become more constant. Vomiting began today. Pt reports SOB. Hx of triple bypass.

## 2021-02-14 NOTE — PROGRESS NOTES
Bedside and Verbal shift change report given to self (oncoming nurse) by Reji (offgoing nurse). Report included the following information SBAR, Kardex and MAR.

## 2021-02-14 NOTE — ACP (ADVANCE CARE PLANNING)
Advance care planninginitial encounter Face to face time - 18 minutes face-to-face time spent discussion the care Pt's decision making capacity [X] Yes 
[ ] NO Names of POA/surrogate decision maker when patient is altered :Lashawn Nix (daughter) Other members present in the meeting : Patient and Daughter Kayla Sánchez Patient / surrogate decision-maker ultimately chose. .. 
 
[ ] Full code- full aggressive medical and surgical interventions, including intubations, resuscitations, pressors, artificial tube feeding [ ] DO NOT RESUSCITATE -okay to intubate,  and other aggressive medical and surgical intervention [ ] Not resuscitate, DO NOT INTUBATE, but okay for other aggressive medical and surgical intervention [X] DNR/DNI, hospice, comfort focus care to maximize patient's quality of life [ ] DNR/DNI, strict comfort care ONLY Further discussion regarding principle disease process. prognosis, plans of care, and treatment goals 
: Patient is not a candidate for interventions for current STEMI. She does not wish to pursue cardiac catheterization. She cannot be anticoagulation. She understands poor prognosis and does not wish to have aggressive treatment. Patient has continue tube feedings and can have food PO for comfort. She wishes to have her pain controlled. Agreeable to consultation to Palliative Care and potentially Hospice.

## 2021-02-14 NOTE — H&P
HOSPITALIST H&P/CONSULT 
NAME:  Diana Griffith Age:  66 y.o. 
:   1945 MRN:   [id number] PCP: Cortney Liao MD 
Consulting MD: Treatment Team: Attending Provider: Levon Yun MD; Primary Nurse: Lauryn Renae RN; Consulting Provider: Kelin Morales MD 
HPI:  
Patient is 58WLQ with complex medical history including CAD s/p CABG, scleroderma (requiring feeding tube), recent GI bleed (hospitalized at [facility]) who presents with chest pain. On ER evaluation, patient was found to have evidence of STEMI with EKG changes and elevated troponin. Cardiology evaluated patient. Patient declines cardiac cath and reported that she wanted to be comfortable, but did not wish to have additional medical interventions. Patient is not a candidate for anticoagulation, given recent severe GI bleed. Hospitalist consulted for admission for pain control and potentially comfort care. Complete ROS done and is as stated in HPI or otherwise negative. Past Medical History:  
Diagnosis Date  Abnormal EKG 2016  Acute systolic (congestive) heart failure ([address] 2016  Anterior myocardial infarction ([address] 1997  Avascular necrosis ([address] R hip  CAD (coronary artery disease)   
 s/p CABG  
 Cellulitis of right lower extremity 2016  Chest pain, precordial 3/28/2016  COPD (chronic obstructive pulmonary disease) ([address]   
 pt denies  Coronary atherosclerosis of native coronary vessel 2016  
 per heart cath (2016)- \"pt with diffusely diseased LAD and small caliber vessels. At present. .. best option would be medical management. .. do not see any obvious good options for redo CABG. ..  Fracture of femoral neck, right ([address] 2016  
 s/p repair with hardware- Dr Harrison Lebron  GERD (gastroesophageal reflux disease) 2016  
 managed with medication  HLD (hyperlipidemia) 2016  
 managed with medication  Hx of echocardiogram 2016 EF 45-50%, hypokinesis of the apical anterior and basal-mid anteroseptal wall. Mild aortic valve regurgitation. Mild tricuspid regurgitation.  Hypertension   
 managed with medication  Hypokalemia 02/27/2016  
 hx of- WNL since 6/2016- pt on K+ supplement  IBS (irritable bowel syndrome)  Ischemic cardiomyopathy 5/15/2018  MI (myocardial infarction) ([address] 1990 and spring 2016  
 x2  Osteoarthritis  Osteoporosis  Rectal prolapse  S/P CABG (coronary artery bypass graft) 1990  
 S/P total hip arthroplasty 12/14/2016  Scleroderma ([address] 3/28/2016  Shortness of breath dyspnea 3/28/2016  Tracheal stenosis 1995  
 s/p repair  Unstable angina ([address] 11/20/1997 Past Medical History reviewed. Past Surgical History:  
Procedure Laterality Date  HX ANKLE FRACTURE TX Right 1992  
 hardware placed  HX CORONARY ARTERY BYPASS GRAFT  1990  
 cabg x 3  
 HX FRACTURE TX  1995  
 cervical spine- C2  
 HX HEART CATHETERIZATION    
 HX HEENT  1995  
 tracheal stricture/stenosis Revonda Do HIP FRACTURE TX  02/2016  
 repaired with hardware- Dr Catrachito Hawkins  HX HYSTERECTOMY  1970s  HX OTHER SURGICAL Peg tube placement  HX PTCA  HX TRACHEOSTOMY Prior to Admission Medications Prescriptions Last Dose Informant Patient Reported? Taking? GABAPENTIN PO   Yes No  
Sig: Take 180 mg by mouth nightly. Indications: weakness, numbness or pain from nerve damage HYDROcodone-acetaminophen (NORCO) 7.5-325 mg per tablet   No No  
Sig: Take 1 Tab by mouth every six (6) hours as needed for Pain. Max Daily Amount: 4 Tabs. acetaminophen (TYLENOL) 325 mg tablet   Yes No  
Sig: Take 500 mg by mouth every six (6) hours as needed for Pain. apixaban (ELIQUIS) 5 mg tablet   No No  
Sig: Take 1 Tab by mouth every twelve (12) hours.  Indications: treatment to prevent blood clots in chronic atrial fibrillation  
aspirin delayed-release 81 mg tablet   Yes No  
 Sig: Take 81 mg by mouth nightly. atorvastatin (LIPITOR) 40 mg tablet   No No  
Sig: Take 1 Tab by mouth daily. Indications: HYPERCHOLESTEROLEMIA Patient taking differently: Take 40 mg by mouth nightly. Indications: hypercholesterolemia  
ferrous sulfate (IRON) 325 mg (65 mg iron) tablet   Yes No  
Sig: Take  by mouth Daily (before breakfast). isosorbide mononitrate ER (IMDUR) 30 mg tablet   No No  
Sig: Take 1 Tab by mouth daily. levothyroxine (SYNTHROID) 50 mcg tablet   Yes No  
Sig: Take  by mouth Daily (before breakfast). loperamide (IMODIUM) 2 mg capsule   Yes No  
Sig: Take 2 mg by mouth as needed for Diarrhea.  
magnesium oxide (MAG-OX) 400 mg tablet   No No  
Sig: Take 1 Tab by mouth daily. metoprolol tartrate (LOPRESSOR) 25 mg tablet   No No  
Sig: Take 0.5 Tabs by mouth every twelve (12) hours. nitroglycerin (NITROSTAT) 0.4 mg SL tablet   No No  
Si Tab by SubLINGual route every five (5) minutes as needed for Chest Pain. Up to 3 doses. omeprazole (PRILOSEC) 40 mg capsule   Yes No  
Sig: Take 40 mg by mouth daily. Indications: gastroesophageal reflux disease, heartburn  
ondansetron (ZOFRAN ODT) 8 mg disintegrating tablet   No No  
Sig: Take 1 Tab by mouth every eight (8) hours as needed for Nausea. potassium chloride (K-DUR, KLOR-CON) 20 mEq tablet   No No  
Sig: Take 1 Tab by mouth two (2) times a day. ranolazine ER (Ranexa) 500 mg SR tablet   No No  
Sig: Take 2 Tabs by mouth every twelve (12) hours. Facility-Administered Medications: None Allergies Allergen Reactions  Corticosteroids (Glucocorticoids) Anaphylaxis  Iodine Hives and Other (comments)  
  hypertension Social History Tobacco Use  Smoking status: Former Smoker Packs/day: 1.00 Years: 20.00 Pack years: 20.00 Quit date: 1995 Years since quittin.1  Smokeless tobacco: Never Used Substance Use Topics  Alcohol use: No  
  
Family History Problem Relation Age of Onset  Breast Cancer Sister Family History reviewed and is non-contributory to current presentation. Objective:  
 
Visit Vitals BP (!) 129/56 Pulse (!) 105 Temp 97.7 °F (36.5 °C) Resp 21 Ht 5' 4\" (1.626 m) Wt 35.8 kg (79 lb) SpO2 98% BMI 13.56 kg/m² Temp (24hrs), Av.7 °F (36.5 °C), Min:97.7 °F (36.5 °C), Max:97.7 °F (36.5 °C) Oxygen Therapy O2 Sat (%): 98 % (21) O2 Device: Room air (21) Physical Exam: 
General:    Alert, cooperative, no distress, appears stated age. Head:   Normocephalic, without obvious abnormality, atraumatic. Nose:  Nares normal. No drainage or sinus tenderness. Lungs:   Clear to auscultation bilaterally. No Wheezing or Rhonchi. No rales. Heart:   Regular rate and rhythm, no murmur, rub or gallop. Abdomen:   Soft, non-tender. Not distended. Bowel sounds normal. Feeding tube in place Extremities: No cyanosis. No edema. No clubbing. Skin:     Texture, turgor normal.  Not Jaundiced. Neurologic: Alert and oriented x 4, no focal deficits. Data Review:  
Recent Results (from the past 24 hour(s)) TROPONIN-HIGH SENSITIVITY Collection Time: 21  8:30 PM  
Result Value Ref Range Troponin-High Sensitivity 273.1 (HH) 0 - 14 pg/mL CBC WITH AUTOMATED DIFF Collection Time: 21  8:30 PM  
Result Value Ref Range WBC 15.4 (H) 4.3 - 11.1 K/uL  
 RBC 3.73 (L) 4.05 - 5.2 M/uL  
 HGB 11.1 (L) 11.7 - 15.4 g/dL HCT 36.0 35.8 - 46.3 % MCV 96.5 79.6 - 97.8 FL  
 MCH 29.8 26.1 - 32.9 PG  
 MCHC 30.8 (L) 31.4 - 35.0 g/dL  
 RDW 15.7 (H) 11.9 - 14.6 % PLATELET 672 (H) 585 - 450 K/uL MPV 9.5 9.4 - 12.3 FL ABSOLUTE NRBC 0.00 0.0 - 0.2 K/uL  
 DF AUTOMATED NEUTROPHILS 73 43 - 78 % LYMPHOCYTES 14 13 - 44 % MONOCYTES 11 4.0 - 12.0 % EOSINOPHILS 0 (L) 0.5 - 7.8 % BASOPHILS 1 0.0 - 2.0 % IMMATURE GRANULOCYTES 1 0.0 - 5.0 %  
 ABS. NEUTROPHILS 11.3 (H) 1.7 - 8.2 K/UL ABS. LYMPHOCYTES 2.1 0.5 - 4.6 K/UL  
 ABS. MONOCYTES 1.8 (H) 0.1 - 1.3 K/UL  
 ABS. EOSINOPHILS 0.0 0.0 - 0.8 K/UL  
 ABS. BASOPHILS 0.1 0.0 - 0.2 K/UL  
 ABS. IMM. GRANS. 0.1 0.0 - 0.5 K/UL METABOLIC PANEL, COMPREHENSIVE Collection Time: 02/13/21  8:30 PM  
Result Value Ref Range Sodium 139 136 - 145 mmol/L Potassium 4.7 3.5 - 5.1 mmol/L Chloride 101 98 - 107 mmol/L  
 CO2 33 (H) 21 - 32 mmol/L Anion gap 5 (L) 7 - 16 mmol/L Glucose 215 (H) 65 - 100 mg/dL BUN 27 (H) 8 - 23 MG/DL Creatinine 0.99 0.6 - 1.0 MG/DL  
 GFR est AA >60 >60 ml/min/1.73m2 GFR est non-AA 58 (L) >60 ml/min/1.73m2 Calcium 9.0 8.3 - 10.4 MG/DL Bilirubin, total 0.2 0.2 - 1.1 MG/DL  
 ALT (SGPT) 22 12 - 65 U/L  
 AST (SGOT) 26 15 - 37 U/L Alk. phosphatase 80 50 - 136 U/L Protein, total 6.9 6.3 - 8.2 g/dL Albumin 2.0 (L) 3.2 - 4.6 g/dL Globulin 4.9 (H) 2.3 - 3.5 g/dL A-G Ratio 0.4 (L) 1.2 - 3.5 MAGNESIUM Collection Time: 02/13/21  8:30 PM  
Result Value Ref Range Magnesium 1.6 (L) 1.8 - 2.4 mg/dL Imaging Daily Hartman Molt Assessment and Plan: Active Hospital Problems Diagnosis Date Noted  Acquired hypothyroidism 02/13/2021  
 History of GI bleed 02/13/2021  
 STEMI (ST elevation myocardial infarction) ([facility] 02/13/2021  Weight loss 02/24/2020  PAF (paroxysmal atrial fibrillation) ([facility] 12/12/2019  Chest pain 05/14/2019  CAD (coronary artery disease) 06/16/2016 PLAN 
STEMI 
- Appreciate Cardiology evaluation, please see their note for details - Patient declines cardiac catheterization - Treatment options limited by recent GIB 
- Expressed desire to be pain-free and comfortable - Will minimize interventions per patient request 
- Patient expressly wished to be DNR/DNI without further interventions, agreeable to Palliative Care consultation - Admit for pain control/comfort care - Will consult with Case Management PAF 
 - Off of Eliquis given recent bleed - Currently stable 
- Holding metoprolol given hypotension/borderline pressures CAD 
- As per Cardiology note, patient has been off all meds since discharge with GIB from [place] - Will continue to hold ASA, but restart imdur and ranexa, continue to hold metoprolol given BP, but if BP improves, Cardiology recommends restarting this Weight Loss 
- Has feeding tube - Uses 1.5kCal/mL tube feed @ 35ml/hr 
- Daughter will restart tube feeds in ER 
- Will order appropriate replacement feeds to continue - Uses \"Peptamen 1.5\" at home, reportedly Osmolyte cause significant GI upset Chest Pain - Likely from STEMI 
- Admit for pain control - Will need to be careful as BP is marginal at best currently - Will start liquid norco for now - BP allows, can order morphine IV at that time as well Hypothyroidism 
- 50mcg daily 
- Continue to hold for now Anticipated discharge: 1-2 days, pending clinical course Signed By: Nereida Will MD   
 February 13, 2021

## 2021-02-14 NOTE — ROUTINE PROCESS
TRANSFER - OUT REPORT: 
 
Verbal report given to [facility] RN(name) on Vita Rodriguez  being transferred to CV step down(unit) for routine progression of care Report consisted of patients Situation, Background, Assessment and  
Recommendations(SBAR). Information from the following report(s) SBAR was reviewed with the receiving nurse. Lines:  
Peripheral IV 02/13/21 Right Forearm (Active) Site Assessment Clean, dry, & intact 02/13/21 2032 Phlebitis Assessment 0 02/13/21 2032 Infiltration Assessment 0 02/13/21 2032 Dressing Status Clean, dry, & intact 02/13/21 2032 Hub Color/Line Status Pink 02/13/21 2032 Action Taken Blood drawn 02/13/21 2032 Opportunity for questions and clarification was provided. Patient transported with: 
Patient transport

## 2021-02-14 NOTE — H&P
[facility] CARDIOLOGY Initial Cardiac Evaluation Primary Cardiologist: Dr. Bhargavi Donald Primary Care Physician: Dr. Susana Hurt Evaluating Physician: Dr. Ashlyn Puentes Subjective:  
 
Patient is a 66 y.o.  female with PMHx of CAD with remote CABG, HTN, PAF on Eliquis, GERD, and HLD who presented to the ER with complaints of chest pain that started 3 days ago. Waxing and waning but increasing in severity to the point that she came to the ED. Also reports some vomiting. Initial EKG with findings concerning for acute MI and STEMI protocol was activated. She is evaluated now in the ED by Dr. Ashlyn Puentes. She is accompanied by her daughter. In the ED: STEMI protocol activated and Pt given 324mg ASA. Labs drawn and pending. She had recent admission here to [facility] with CP 1/7/21 - 1/8/21 and declined LHC at that time with increased medical therapy. She was admitted to [facility] 1/10/21 - 1/27/21 with confusion (CT neg CVA), weakness, acute GIB requiring 3u PRBCs, dysphagia requiring PEG placement, hypotension and AFib RVR. Her Eliquis was stopped as well as her Imdur, Metoprolol and Ranexa. DC Summary 1/8/21 from [facility] 
66 frail female presents with SSCP that resolved in the ER. Troponin was elevated, she has hx of longstanding multivessel CAD without interventional targets (last cath in 2019). She was not interested in another cath and I agree, treated her for angina with long acting nitrate and additional ranexa doses. Echo showed basal septal wall motion abnormality with his new from prior but was mild and did not correspond well to a vascular territory. She also did not have a cardiac biomarker pattern consistent with ACS. Okay for d/c today, pain free. [facility] 8/2020: IMPRESSION:  Severe coronary artery disease as described. The coronary anatomy is stable in comparison to her last cardiac catheterization six months ago. Her distal left anterior descending is occluded with late filling by collateral flow. It appears to be a severely diseased vessel and not a particularly good chronic total occlusion target. The major diagonal branch of the left anterior descending which comes off proximal to the chronic total occlusion is a relatively large distribution and important. It is severely diseased and not well suited for small vessel angioplasty. The left circumflex has nonobstructive-looking midportion disease. The right coronary artery is occluded. The vein graft to the right coronary artery remains patent with good runoff into the distal vessel which also has distal branch occlusion. Past Medical History:  
Diagnosis Date  Abnormal EKG 6/17/2016  Acute systolic (congestive) heart failure ([address] 6/17/2016  Anterior myocardial infarction ([address] 11/20/1997  Avascular necrosis ([address] R hip  CAD (coronary artery disease) 1990  
 s/p CABG  
 Cellulitis of right lower extremity 6/17/2016  Chest pain, precordial 3/28/2016  COPD (chronic obstructive pulmonary disease) ([address]   
 pt denies  Coronary atherosclerosis of native coronary vessel 03/28/2016  
 per heart cath (6/2016)- \"pt with diffusely diseased LAD and small caliber vessels. At present. .. best option would be medical management. .. do not see any obvious good options for redo CABG. ..  Fracture of femoral neck, right ([address] 02/27/2016  
 s/p repair with hardware- Dr Griselda Villarreal  GERD (gastroesophageal reflux disease) 02/27/2016  
 managed with medication  HLD (hyperlipidemia) 03/28/2016  
 managed with medication  Hx of echocardiogram 02/27/2016 EF 45-50%, hypokinesis of the apical anterior and basal-mid anteroseptal wall. Mild aortic valve regurgitation. Mild tricuspid regurgitation.  Hypertension   
 managed with medication  Hypokalemia 2016  
 hx of- WNL since 2016- pt on K+ supplement  IBS (irritable bowel syndrome)  Ischemic cardiomyopathy 5/15/2018  MI (myocardial infarction) ([facility]  and spring 2016  
 x2  Osteoarthritis  Osteoporosis  Rectal prolapse  S/P CABG (coronary artery bypass graft)   
 S/P total hip arthroplasty 2016  [facility] 3/28/2016  Shortness of breath dyspnea 3/28/2016  Tracheal stenosis   
 s/p repair  [facility] Past Surgical History:  
Procedure Laterality Date  HX ANKLE FRACTURE TX Right 1992  
 hardware placed  HX CORONARY ARTERY BYPASS GRAFT    
 cabg x 3  
 HX FRACTURE TX    
 cervical spine- C2  
 HX HEART CATHETERIZATION    
 HX HEENT    
 tracheal stricture/stenosis Bia Haines HIP FRACTURE TX  2016  
 repaired with hardware- Dr Jessee Parrish  HX HYSTERECTOMY  1970s  HX OTHER SURGICAL Peg tube placement  HX PTCA  HX TRACHEOSTOMY Allergies Allergen Reactions  Corticosteroids (Glucocorticoids) Anaphylaxis  Iodine Hives and Other (comments)  
  hypertension Social History Tobacco Use  Smoking status: Former Smoker Packs/day: 1.00 Years: 20.00 Pack years: 20.00 Quit date: 1995 Years since quittin.1  Smokeless tobacco: Never Used Substance Use Topics  Alcohol use: No  
  
FH:  
Family History Problem Relation Age of Onset  Breast Cancer Sister Review of Systems General: + weight loss, +weakness, no fever or chills, no diaphoresis, +debility Skin: no acute rashes, lumps, wounds, sores or other skin changes HEENT: no headache, dizziness, lightheadedness, vision changes, hearing changes, tinnitus, vertigo, sinus pressure/pain, bleeding gums, sore throat, or hoarseness Neck: no swollen glands, goiter, pain or stiffness Respiratory: no cough, no congestion, no sputum, no hemoptysis, no dyspnea, no wheezing Cardiovascular: + as per HPI Gastrointestinal: no increased reflux, no constipation, diarrhea, liver problems, +recent GI bleeding, +vomiting, no abdominal pain or distension Urinary: no frequency, urgency, hematuria, burning/pain with urination, flank pain, polyuria, nocturia, or difficulty urinating Peripheral Vascular: no claudication, leg cramps, prior DVTs, swelling of calves, legs, or feet, color change, or swelling with redness or tenderness Musculoskeletal: no muscle or joint pain/stiffness, joint swelling, erythema of joints, or back pain Psychiatric: no increased depression, anxiety or excessive stress Neurological: no sensory or motor loss, seizures, syncope, tremors, numbness, tingling, no changes in mood, attention, or speech, no changes in orientation, memory, insight, or judgment. Hematologic: no anemia, easy bruising or bleeding Endocrine: +thyroid problems, no heat or cold intolerance, excessive sweating, polyuria, polydipsia, no diabetes. Objective:  
 
 
Visit Vitals BP (!) 86/60 (BP 1 Location: Left upper arm, BP Patient Position: At rest) Pulse 81 Temp 97.7 °F (36.5 °C) Resp 18 Ht 5' 4\" (1.626 m) Wt 35.8 kg (79 lb) SpO2 98% BMI 13.56 kg/m² No intake/output data recorded. No intake/output data recorded. Physical Exam: 
General: thin, frail and cachetic, resting on stretcher HEENT: pupils equal and round, no abnormalities noted, sclera clear, EOMs intact Neck: supple, no JVD,trachea midline Heart: S1S2 with RRR without murmurs, rubs or gallops Lungs: clear throughout auscultation bilaterally, normal effort on RA, no wheezing or rales Abd: PEG noted, soft, nontender, nondistended, +bowel sounds Ext: warm, no edema, calves supple/nontender, pulses 2+ bilaterally Skin: warm and dry, intact to view Psychiatric: appropriate mood and affect, cooperative Neurologic: A&O X 3, moves all 4 equally, CNs intact ECG: SR with PACs with acute ST changes concerning for ACS -- reviewed by Dr. Tom Julian ECHO: 1/7/21 
-  Left ventricle: Systolic function was at the lower limits of normal. 
Ejection fraction was estimated to be 50 %. There was hypokinesis of the 
basal-mid anteroseptal and basal-mid inferoseptal wall(s). There was mild 
concentric hypertrophy. Avg. E/e': 9.90. Features were consistent with (grade 2 diastolic dysfunction). -  Left atrium: The atrium was mildly dilated. -  Right atrium: The atrium was mildly dilated. -  Mitral valve: There was mild annular calcification. There was mild regurgitation. CXR: no acute findings Data Review:  
  
Recent Results (from the past 24 hour(s)) TROPONIN-HIGH SENSITIVITY Collection Time: 02/13/21  8:30 PM  
Result Value Ref Range Troponin-High Sensitivity 273.1 (HH) 0 - 14 pg/mL CBC WITH AUTOMATED DIFF Collection Time: 02/13/21  8:30 PM  
Result Value Ref Range WBC 15.4 (H) 4.3 - 11.1 K/uL  
 RBC 3.73 (L) 4.05 - 5.2 M/uL  
 HGB 11.1 (L) 11.7 - 15.4 g/dL HCT 36.0 35.8 - 46.3 % MCV 96.5 79.6 - 97.8 FL  
 MCH 29.8 26.1 - 32.9 PG  
 MCHC 30.8 (L) 31.4 - 35.0 g/dL  
 RDW 15.7 (H) 11.9 - 14.6 % PLATELET 008 (H) 564 - 450 K/uL MPV 9.5 9.4 - 12.3 FL ABSOLUTE NRBC 0.00 0.0 - 0.2 K/uL  
 DF AUTOMATED NEUTROPHILS 73 43 - 78 % LYMPHOCYTES 14 13 - 44 % MONOCYTES 11 4.0 - 12.0 % EOSINOPHILS 0 (L) 0.5 - 7.8 % BASOPHILS 1 0.0 - 2.0 % IMMATURE GRANULOCYTES 1 0.0 - 5.0 %  
 ABS. NEUTROPHILS 11.3 (H) 1.7 - 8.2 K/UL  
 ABS. LYMPHOCYTES 2.1 0.5 - 4.6 K/UL  
 ABS. MONOCYTES 1.8 (H) 0.1 - 1.3 K/UL  
 ABS. EOSINOPHILS 0.0 0.0 - 0.8 K/UL  
 ABS. BASOPHILS 0.1 0.0 - 0.2 K/UL  
 ABS. IMM. GRANS. 0.1 0.0 - 0.5 K/UL METABOLIC PANEL, COMPREHENSIVE Collection Time: 02/13/21  8:30 PM  
Result Value Ref Range Sodium 139 136 - 145 mmol/L  Potassium 4.7 3.5 - 5.1 mmol/L  
 Chloride 101 98 - 107 mmol/L  
 CO2 33 (H) 21 - 32 mmol/L Anion gap 5 (L) 7 - 16 mmol/L Glucose 215 (H) 65 - 100 mg/dL BUN 27 (H) 8 - 23 MG/DL Creatinine 0.99 0.6 - 1.0 MG/DL  
 GFR est AA >60 >60 ml/min/1.73m2 GFR est non-AA 58 (L) >60 ml/min/1.73m2 Calcium 9.0 8.3 - 10.4 MG/DL Bilirubin, total 0.2 0.2 - 1.1 MG/DL  
 ALT (SGPT) 22 12 - 65 U/L  
 AST (SGOT) 26 15 - 37 U/L Alk. phosphatase 80 50 - 136 U/L Protein, total 6.9 6.3 - 8.2 g/dL Albumin 2.0 (L) 3.2 - 4.6 g/dL Globulin 4.9 (H) 2.3 - 3.5 g/dL A-G Ratio 0.4 (L) 1.2 - 3.5 MAGNESIUM Collection Time: 02/13/21  8:30 PM  
Result Value Ref Range Magnesium 1.6 (L) 1.8 - 2.4 mg/dL Assessment/Plan: Active Problems: 
   
  Chest pain -- noted EKG changes with c/o CP, trop pending, discussion with Pt and Dr. Allegra Mckeon -- Pt does not wish to have [facility] and wishes for comfort care, furthermore, with recent GIB -- Pt not a candidate for AC/DAPT at this time, Dr. Allegra Mckeon discussed with Pt and Stephanie Chance that there were no targets identified during her last [facility] 8/20 and thus medical therapy is most appropriate, she has been off meds since [facility] d/c, will plan for Hospitalist admission and Palliative Care planning, would restart anti-anginal meds -- Ranexa and Imdur then add BB as BP tolerates,  
 
  CAD (coronary artery disease) -- has been off ASA, BB, Ranexa and Imdur since d/c from [place] PAF (paroxysmal atrial fibrillation) -- off Eliquis, restart BB as tolerated for HR control Weight loss -- per primary team 
 
  Acquired hypothyroidism -- per primary team 
 
  History of GI bleed -- this complicates management, off Eliquis, would continue ASA Dr. Allegra Mckeon discussed above with Pt and her daughter Narcisa Dubon as well as Dr. Racquel Rosenberg. Will ask for Hospitalist admit and Palliative planning as Pt with multiple medical problems and no interventional targets combined with Pts expressed desire to NOT have a LHC. LIZZIE Brantley 
2/13/2021 
8:47 PM

## 2021-02-14 NOTE — PROGRESS NOTES
Hospitalist Progress Note Admit Date:  2021  8:24 PM  
Name:  Willard Ryan Age:  66 y.o. 
:  1945 MRN:  [id number] PCP:  Niraj Giron MD 
Treatment Team: Attending Provider: Adriana Modi MD; Utilization Review: Frandy Choe Subjective:  
Patient is 72yoF with complex medical history including CAD s/p CABG, scleroderma (requiring feeding tube), recent GI bleed (hospitalized at [facility]) who presents with chest pain. On ER evaluation, patient was found to have evidence of STEMI with EKG changes and elevated troponin. Cardiology evaluated patient. Patient declines cardiac cath and reported that she wanted to be comfortable, but did not wish to have additional medical interventions. Patient is not a candidate for anticoagulation, given recent severe GI bleed. Hospitalist consulted for admission for pain control and potentially comfort care. 2021 Says no chest pain now but on and off 
Cardiology no intervention like cath Low normal bp Objective:  
 
Patient Vitals for the past 24 hrs: 
 Temp Pulse Resp BP SpO2  
21 0713 97.8 °F (36.6 °C) 79 14 (!) 98/57 97 % 21 0221 97.8 °F (36.6 °C) 94 16 (!) 98/53 93 % 21 0026 97.4 °F (36.3 °C) 99 20 125/84 93 % 21 2342  100 (!) 33    
21 2341  (!) 110 26    
21 2340  95 22    
21 2339  (!) 102 23    
21 2338  (!) 107 29    
21 2337  (!) 103 20    
21 2336  (!) 105 27    
21 2335  (!) 105 18    
21 2334  (!) 102 23    
21 2333  97 28    
21 2332  (!) 104 (!) 42    
21 2331  90 24    
21 2330  100 23    
21 2329  (!) 102 28    
21 2328  100 15    
21 2327  (!) 102 26    
02/13/21 2326  95 30    
21 2325  (!) 102 23    
21 2324  97 27    
21 2323  95 25    
21 2322  98 12    
21 2321  (!) 103 27   02/13/21 2320  (!) 108 27    
02/13/21 2319  96 27 (!) 124/59   
02/13/21 2318  (!) 101 28    
02/13/21 2317  93 18    
02/13/21 2316  98 29    
02/13/21 2315  (!) 110 (!) 34    
02/13/21 2314  (!) 114 29    
02/13/21 2313  (!) 112 27    
02/13/21 2312  (!) 103 22    
02/13/21 2311  95 23    
02/13/21 2310  100 28    
02/13/21 2309  (!) 101 24 127/62   
02/13/21 2308  (!) 106 (!) 31    
02/13/21 2307  100 29    
02/13/21 2306  (!) 108 24    
02/13/21 2305  99 17    
02/13/21 2304  (!) 121 28    
02/13/21 2303  100 28    
02/13/21 2302  100 (!) 34    
02/13/21 2301  (!) 101 27    
02/13/21 2300  (!) 110 25    
02/13/21 2259  (!) 101 24 127/62   
02/13/21 2258  97 26    
02/13/21 2257  97 23    
02/13/21 2256  (!) 118 (!) 33    
02/13/21 2255  94 22    
02/13/21 2254  (!) 103 25    
02/13/21 2253  97 (!) 31    
02/13/21 2252  (!) 101 21    
02/13/21 2251  99 29    
02/13/21 2250  92 23    
02/13/21 2249  100 20    
02/13/21 2248  (!) 116 22 103/60   
02/13/21 2247  97 (!) 41    
02/13/21 2246  (!) 110 18    
02/13/21 2245  (!) 108 27    
02/13/21 2244  98 23    
02/13/21 2243  (!) 105 (!) 32    
02/13/21 2242  (!) 110 28    
02/13/21 2241  90 23    
02/13/21 2240  (!) 101 26    
02/13/21 2239  98 24 105/62   
02/13/21 2238  (!) 103 (!) 37    
02/13/21 2237  (!) 105 27    
02/13/21 2236  (!) 101 27    
02/13/21 2235  (!) 104 (!) 31    
02/13/21 2234  98 25    
02/13/21 2233  100 27    
02/13/21 2232  94 24    
02/13/21 2231  (!) 101 (!) 31    
02/13/21 2230  95 24    
02/13/21 2229  (!) 102 25 (!) 117/53   
02/13/21 2228  98 21    
02/13/21 2227  90 21    
02/13/21 2226  91 25    
02/13/21 2225  91 (!) 35    
02/13/21 2224  92 22    
02/13/21 2223  (!) 107 24    
02/13/21 2222  97 23    
02/13/21 2221  91 24    
02/13/21 2220  100 21   02/13/21 2219  (!) 117 26 107/63   
02/13/21 2218  88 25    
02/13/21 2217  (!) 112 22    
02/13/21 2216  90 28    
02/13/21 2215  99 24    
02/13/21 2214  89 24    
02/13/21 2213  93 (!) 32    
02/13/21 2212  90 28    
02/13/21 2211  99 19    
02/13/21 2210  100 22    
02/13/21 2209  96 22 (!) 109/57   
02/13/21 2208  98 21    
02/13/21 2207  93 21    
02/13/21 2206  93 26    
02/13/21 2205  98 19    
02/13/21 2204  (!) 101 26    
02/13/21 2203  94 21    
02/13/21 2202  99 (!) 34    
02/13/21 2201  100 21    
02/13/21 2200  (!) 107 (!) 31    
02/13/21 2159  (!) 105 21 (!) 129/56   
02/13/21 2158  (!) 109 30    
02/13/21 2157  97 (!) 31    
02/13/21 2156  89 27    
02/13/21 2155  87 23    
02/13/21 2154  90 22    
02/13/21 2153  93 26    
02/13/21 2152  (!) 107 23    
02/13/21 2151  96 34    
02/13/21 2150  98 26    
02/13/21 2149  94 24 124/60   
02/13/21 2148  (!) 102 (!) 31    
02/13/21 2147  (!) 104 (!) 35    
02/13/21 2146  (!) 108 26    
02/13/21 2145  100 29    
02/13/21 2144  90 25    
02/13/21 2143  90 20    
02/13/21 2142  91 21    
02/13/21 2141  92 27    
02/13/21 2140  93 25    
02/13/21 2139  97 26 (!) 115/55   
02/13/21 2138  93 20    
02/13/21 2137  91 22    
02/13/21 2136  95 24    
02/13/21 2135  98 24    
02/13/21 2134  93 24    
02/13/21 2133  92 25    
02/13/21 2132  96 22    
02/13/21 2131  89 23    
02/13/21 2130  98 21    
02/13/21 2129  94 20 (!) 115/55   
02/13/21 2128  100 23    
02/13/21 2127  96 23    
02/13/21 2126  89 21    
02/13/21 2125  89 20    
02/13/21 2124  86 25    
02/13/21 2123  97 30    
02/13/21 2122  93 27    
02/13/21 2121  84 29    
02/13/21 2120  91 21    
02/13/21 2119  93 25 (!) 100/59   
02/13/21 2118  99 20    
02/13/21 2117  97 21    
02/13/21 2116  89 21   02/13/21 2115  99 19    
02/13/21 2114  88 24    
02/13/21 2113  89 19    
02/13/21 2112  92 21    
02/13/21 2111  95 23    
02/13/21 2110  85 23    
02/13/21 2109  85 23 (!) 113/55   
02/13/21 2108  91 21    
02/13/21 2107  88 20    
02/13/21 2106  90 21    
02/13/21 2105  89 22    
02/13/21 2104  87 20    
02/13/21 2103  87 18    
02/13/21 2102  87 20    
02/13/21 2101  93 22    
02/13/21 2100  (!) 105 29    
02/13/21 2059  (!) 103 28 (!) 103/55   
02/13/21 2058  87 24    
02/13/21 2057  89 24    
02/13/21 2056  85 20    
02/13/21 2055  87 (!) 31    
02/13/21 2054  86 29    
02/13/21 2053  91 26    
02/13/21 2052  95 (!) 32  (!) 80 % 02/13/21 2051  93 24    
02/13/21 2050  93 (!) 32    
02/13/21 2049  95 28    
02/13/21 2048  87 (!) 32 (!) 127/58   
02/13/21 2047  87 22    
02/13/21 2046  87 18    
02/13/21 2045  99 29    
02/13/21 2044  87 30    
02/13/21 2043  87 30    
02/13/21 2042  92 (!) 34 134/63   
02/13/21 2041  88 25    
02/13/21 2040  94 (!) 32    
02/13/21 2039  89 27    
02/13/21 2038  90 (!) 34    
02/13/21 2037  89 23    
02/13/21 2036  93 (!) 43    
02/13/21 2035  88 25    
02/13/21 2034  86 (!) 34    
02/13/21 2033  87 22    
02/13/21 2032  94 (!) 31    
02/13/21 2031  89 (!) 40    
02/13/21 2030  93 (!) 34    
02/13/21 2029  97 27    
02/13/21 2028  95 (!) 50    
02/13/21 2027  100 30    
02/13/21 2026  (!) 101 30    
02/13/21 2025  (!) 111 (!) 45    
02/13/21 2024   23 133/64   
02/13/21 2014 97.7 °F (36.5 °C) 81 18 (!) 86/60 98 % Oxygen Therapy O2 Sat (%): 97 % (02/14/21 0713) Pulse via Oximetry: 54 beats per minute (02/13/21 2052) O2 Device: Room air (02/14/21 0753) Intake/Output Summary (Last 24 hours) at 2/14/2021 2812 Last data filed at 2/14/2021 0295 Gross per 24 hour Intake 130 ml Output  Net 130 ml  
   
 
 General:    Well nourished. Alert.   
heent- normal occular movements, atraumatic, normocephalic, pupils equal reacting to light CV:   RRR. No murmur, rub, or gallop. Lungs:   Clear to auscultation bilaterally. No wheezing, rhonchi, or rales. Abdomen:   Soft, nontender, nondistended. Cns- no focal neurological deficits Extremities: Warm and dry. No cyanosis or edema. Skin:     No rashes or jaundice. Data Review: 
I have reviewed all labs, meds, telemetry events, and studies from the last 24 hours. Recent Results (from the past 24 hour(s)) EKG, 12 LEAD, INITIAL Collection Time: 02/13/21  8:12 PM  
Result Value Ref Range Ventricular Rate 98 BPM  
 Atrial Rate 97 BPM  
 QRS Duration 106 ms  
 Q-T Interval 362 ms QTC Calculation (Bezet) 462 ms Calculated P Axis 90 degrees Calculated R Axis -41 degrees Calculated T Axis 113 degrees Diagnosis Undetermined rhythm Left axis deviation Pulmonary disease pattern Left ventricular hypertrophy with repolarization abnormality Cannot rule out Septal infarct (cited on or before 17-JUN-2016) Inferior injury pattern 
** ** ACUTE MI / STEMI ** ** 
Consider right ventricular involvement in acute inferior infarct Abnormal ECG When compared with ECG of 06-JAN-2021 23:48, Significant changes have occurred TROPONIN-HIGH SENSITIVITY Collection Time: 02/13/21  8:30 PM  
Result Value Ref Range Troponin-High Sensitivity 273.1 (HH) 0 - 14 pg/mL CBC WITH AUTOMATED DIFF Collection Time: 02/13/21  8:30 PM  
Result Value Ref Range WBC 15.4 (H) 4.3 - 11.1 K/uL  
 RBC 3.73 (L) 4.05 - 5.2 M/uL  
 HGB 11.1 (L) 11.7 - 15.4 g/dL HCT 36.0 35.8 - 46.3 % MCV 96.5 79.6 - 97.8 FL  
 MCH 29.8 26.1 - 32.9 PG  
 MCHC 30.8 (L) 31.4 - 35.0 g/dL  
 RDW 15.7 (H) 11.9 - 14.6 % PLATELET 982 (H) 428 - 450 K/uL MPV 9.5 9.4 - 12.3 FL ABSOLUTE NRBC 0.00 0.0 - 0.2 K/uL  
 DF AUTOMATED NEUTROPHILS 73 43 - 78 % LYMPHOCYTES 14 13 - 44 % MONOCYTES 11 4.0 - 12.0 % EOSINOPHILS 0 (L) 0.5 - 7.8 % BASOPHILS 1 0.0 - 2.0 % IMMATURE GRANULOCYTES 1 0.0 - 5.0 %  
 ABS. NEUTROPHILS 11.3 (H) 1.7 - 8.2 K/UL  
 ABS. LYMPHOCYTES 2.1 0.5 - 4.6 K/UL  
 ABS. MONOCYTES 1.8 (H) 0.1 - 1.3 K/UL  
 ABS. EOSINOPHILS 0.0 0.0 - 0.8 K/UL  
 ABS. BASOPHILS 0.1 0.0 - 0.2 K/UL  
 ABS. IMM. GRANS. 0.1 0.0 - 0.5 K/UL METABOLIC PANEL, COMPREHENSIVE Collection Time: 02/13/21  8:30 PM  
Result Value Ref Range Sodium 139 136 - 145 mmol/L Potassium 4.7 3.5 - 5.1 mmol/L Chloride 101 98 - 107 mmol/L  
 CO2 33 (H) 21 - 32 mmol/L Anion gap 5 (L) 7 - 16 mmol/L Glucose 215 (H) 65 - 100 mg/dL BUN 27 (H) 8 - 23 MG/DL Creatinine 0.99 0.6 - 1.0 MG/DL  
 GFR est AA >60 >60 ml/min/1.73m2 GFR est non-AA 58 (L) >60 ml/min/1.73m2 Calcium 9.0 8.3 - 10.4 MG/DL Bilirubin, total 0.2 0.2 - 1.1 MG/DL  
 ALT (SGPT) 22 12 - 65 U/L  
 AST (SGOT) 26 15 - 37 U/L Alk. phosphatase 80 50 - 136 U/L Protein, total 6.9 6.3 - 8.2 g/dL Albumin 2.0 (L) 3.2 - 4.6 g/dL Globulin 4.9 (H) 2.3 - 3.5 g/dL A-G Ratio 0.4 (L) 1.2 - 3.5 MAGNESIUM Collection Time: 02/13/21  8:30 PM  
Result Value Ref Range Magnesium 1.6 (L) 1.8 - 2.4 mg/dL TROPONIN-HIGH SENSITIVITY Collection Time: 02/13/21 10:42 PM  
Result Value Ref Range Troponin-High Sensitivity 310.4 (HH) 0 - 14 pg/mL CBC WITH AUTOMATED DIFF Collection Time: 02/14/21  7:35 AM  
Result Value Ref Range WBC 11.2 (H) 4.3 - 11.1 K/uL  
 RBC 2.85 (L) 4.05 - 5.2 M/uL HGB 8.6 (L) 11.7 - 15.4 g/dL HCT 27.2 (L) 35.8 - 46.3 % MCV 95.4 79.6 - 97.8 FL  
 MCH 30.2 26.1 - 32.9 PG  
 MCHC 31.6 31.4 - 35.0 g/dL  
 RDW 15.4 (H) 11.9 - 14.6 % PLATELET 325 520 - 472 K/uL MPV 9.8 9.4 - 12.3 FL ABSOLUTE NRBC 0.00 0.0 - 0.2 K/uL  
 DF AUTOMATED NEUTROPHILS 75 43 - 78 % LYMPHOCYTES 12 (L) 13 - 44 % MONOCYTES 12 4.0 - 12.0 % EOSINOPHILS 0 (L) 0.5 - 7.8 % BASOPHILS 0 0.0 - 2.0 % IMMATURE GRANULOCYTES 1 0.0 - 5.0 %  
 ABS. NEUTROPHILS 8.4 (H) 1.7 - 8.2 K/UL  
 ABS. LYMPHOCYTES 1.4 0.5 - 4.6 K/UL  
 ABS. MONOCYTES 1.3 0.1 - 1.3 K/UL  
 ABS. EOSINOPHILS 0.0 0.0 - 0.8 K/UL  
 ABS. BASOPHILS 0.0 0.0 - 0.2 K/UL  
 ABS. IMM. GRANS. 0.1 0.0 - 0.5 K/UL  
LACTIC ACID Collection Time: 02/14/21  7:35 AM  
Result Value Ref Range Lactic acid 1.5 0.4 - 2.0 MMOL/L All Micro Results Procedure Component Value Units Date/Time CULTURE, BLOOD [[id number]] Collected: 02/14/21 0735 Order Status: Completed Specimen: Blood Updated: 02/14/21 8543 CULTURE, BLOOD [[id number]] Collected: 02/14/21 0735 Order Status: Completed Specimen: Blood Updated: 02/14/21 3398 Current Meds: 
Current Facility-Administered Medications Medication Dose Route Frequency  levothyroxine (SYNTHROID) tablet 50 mcg  50 mcg Oral ACB  nitroglycerin (NITROSTAT) tablet 0.4 mg  0.4 mg SubLINGual Q5MIN PRN  
 ranolazine ER (RANEXA) tablet 1,000 mg  1,000 mg Oral Q12H  
 isosorbide mononitrate ER (IMDUR) tablet 30 mg  30 mg Oral DAILY  sodium chloride (NS) flush 5-40 mL  5-40 mL IntraVENous Q8H  
 sodium chloride (NS) flush 5-40 mL  5-40 mL IntraVENous PRN  
 acetaminophen (TYLENOL) tablet 650 mg  650 mg Oral Q6H PRN Or  
 acetaminophen (TYLENOL) suppository 650 mg  650 mg Rectal Q6H PRN  promethazine (PHENERGAN) tablet 12.5 mg  12.5 mg Oral Q6H PRN Or  
 ondansetron (ZOFRAN) injection 4 mg  4 mg IntraVENous Q6H PRN  
 HYDROcodone-acetaminophen (HYCET) 0.5-21.7 mg/mL oral solution 5 mg  5 mg Oral Q4H PRN Other Studies (last 24 hours): Xr Chest [facility] Result Date: 2/13/2021 EXAMINATION: CHEST RADIOGRAPH 2/13/2021 8:57 PM INDICATION: Chest pain COMPARISON: February 11, 2020 chest radiograph TECHNIQUE: A single view of the chest was obtained. FINDINGS: Support Devices: None Osseous : No acute abnormality. Cardiomediastinal Silhouette: Prior CABG. Normal in size Lungs: Clear lungs. Normal pulmonary vascularity. Pleura: No pleural fluid or pneumothorax. Upper Abdomen: Support tubes coiled over the upper abdomen. No acute abnormality. Assessment and Plan:  
 
Hospital Problems as of 2/14/2021 Date Reviewed: 1/6/2021 Codes Class Noted - Resolved POA Acquired hypothyroidism (Chronic) ICD-10-CM: E03.9 ICD-9-CM: 244.9  2/13/2021 - Present Yes History of GI bleed (Chronic) ICD-10-CM: Z87.19 ICD-9-CM: V12.79  2/13/2021 - Present Yes * (Principal) STEMI (ST elevation myocardial infarction) ([facility] [address] ICD-10-CM: I21.3 ICD-9-CM: 410.90  2/13/2021 - Present Unknown Weight loss ICD-10-CM: R63.4 ICD-9-CM: 783.21  2/24/2020 - Present Yes PAF (paroxysmal atrial fibrillation) (HCC) (Chronic) ICD-10-CM: I48.0 ICD-9-CM: 427.31  12/12/2019 - Present Yes Chest pain ICD-10-CM: R07.9 ICD-9-CM: 786.50  5/14/2019 - Present Yes CAD (coronary artery disease) ICD-10-CM: I25.10 ICD-9-CM: 414.00  6/16/2016 - Present Yes PLAN:   
Chest pain/? NSTEMI-not a cath candidate as per cardiology, no heparin or anticoagulation as recent gi bleed. PAF- off eliquis Low normal bp- held imdur and decreased the dose of renexa. Spoke to daughter about pt management. Family says pt is not comfort care and not hospice. Wanted medical treatment. Need to talk to pt/daughter if pts blood is low. hypothyroid DC planning/Dispo: DVT ppx:  scd Signed: 
Leighton Delgado MD

## 2021-02-14 NOTE — PROGRESS NOTES
[facility] CARDIOLOGY PROGRESS NOTE 
      
 
2/14/2021 8:09 AM 
 
Admit Date: 2/13/2021 Subjective: CP has resolved on ranexa and IMDUR. Palliative care consult. Biomarker profile is not consistent with an acute myocardial infarct. ROS: 
Cardiovascular:  As noted above Objective:  
  
Vitals:  
 02/13/21 2342 02/14/21 8607 02/14/21 0221 02/14/21 0187 BP:  125/84 (!) 98/53 (!) 98/57 Pulse: 100 99 94 79 Resp: (!) 33 20 16 14 Temp:  97.4 °F (36.3 °C) 97.8 °F (36.6 °C) 97.8 °F (36.6 °C) SpO2:  93% 93% 97% Weight:      
Height:      
 
 
Physical Exam: 
General: Well Developed, Well Nourished, No Acute Distress, Alert & Oriented x 3, Appropriate mood Neck: supple, no JVD Heart: S1S2 with RRR without murmurs or gallops Lungs: Clear throughout auscultation bilaterally without adventitious sounds Abd: soft, nontender, nondistended, with good bowel sounds Ext: no edema bilaterally Skin: warm and dry Data Review:  
Recent Labs  
  02/13/21 2030   
K 4.7 MG 1.6*  
BUN 27* CREA 0.99 * WBC 15.4* HGB 11.1*  
HCT 36.0 * No results for input(s): Luisa Zimmerman in the last 72 hours. Assessment/Plan:  
 
Principal Problem: [facility] (2/13/2021) Active Problems: 
  CAD (coronary artery disease) (6/16/2016) Chest pain (5/14/2019) PAF (paroxysmal atrial fibrillation) ([facility] [address] (12/12/2019) Weight loss (2/24/2020) Acquired hypothyroidism (2/13/2021) History of GI bleed (2/13/2021) A/P 
1) CAD - Feel her CP is likely ischemic but not ACS. Continue IMDUR/ranexa, palliative care 2) HTN - controlled 3) GIB - per primary team not a candidate for either invasive or medical therapy for ACS likely has ischemic pain. Will sign off, call with questions.  
 
 
Angela Feliciano MD 
2/14/2021 8:09 AM

## 2021-02-15 PROBLEM — R77.8 ELEVATED TROPONIN: Status: ACTIVE | Noted: 2021-01-01

## 2021-02-15 NOTE — CONSULTS
Comprehensive Nutrition Assessment Type and Reason for Visit: Initial, Consult Best Practice Alert for Malnutrition Screening Tool: Recently Lost Weight Without Trying: Yes, If Yes, How Much Weight Loss: 14 - 23 lbs, Eating Poorly Due to Decreased Appetite: Yes Tube Feeding Management (Hospitalist) Best Practice Alert for EN/PN PTA Nutrition Recommendations/Plan:  
Enteral Nutrition:  
Vial AF 1.2 via J-tube at goal rate of 45ml/hour. Water flush 70ml Q4H. At goal will provide 1296 kcal (100% estimated calorie needs), 81 grams protein (100% estimated protein needs) and 1296ml free fluid (1ml/kcal). Vitamin and Mineral Supplement Therapy: 
Electrolyte management replacement protocol active. Labs: 
? BMP daily, Mg and Phos MWF. · Oral Nutrition: Regular diet for pleasure and Magic Cup once daily. Malnutrition Assessment: 
Malnutrition Status: Severe malnutrition Context: Chronic illness Findings of clinical characteristics of malnutrition:  
Energy Intake:  Unable to assess(TF dependent since January 2021) Weight Loss:  7 - Greater than 5% over 1 month Body Fat Loss:  7 - Severe body fat loss, Fat overlying ribs, Orbital  
Muscle Mass Loss:  7 - Severe muscle mass loss, Calf (gastrocnemius), Clavicles (pectoralis &deltoids), Hand (interosseous), Scapula (trapezius), Thigh (quadraceps), Temples (temporalis) Fluid Accumulation:  Unable to assess,   
 Strength:  Not performed Nutrition Assessment:  
Nutrition History: Pt's daughter reports she has been receiving TF since PEG-J placed 1/15. Previously on Osmolite 1.5, until 2/9. Formula was switched to Peptamen 1.5 due to ongoing diarrhea. She reports better tolerance with formula switch. Home regimen is Peptide 1.5 @ 35ml/hr with 30ml Q4H water flush. Cultural/Spiritism/Ethnic Food Preference(s): None Nutrition Background: Pt admitted with concerns for STEMI. Pt declined [facility] and requesting to be comfortable. PMH notable for gastroparesis (10/1/20), dysphagia, s/p PEG-J 1/15/21, CAD s/p CABG, scleroderma, CHF, COPD, GERD, HLD, HTN, IBS, MI, OA, and osteoporosis. Daily Update: 
Observed Peptamen 1.5 at 35ml/hr with free water flush of 30ml Q4H. Pt's daughter have home TF pump provided and requesting to have hospital formula equivalent to save pt's current formula. Pt has had an 8% wt loss noted in the past month. Per daughter, pt typically eats some food a breakfast and lunch, but is full the rest of the day. Pt did not eat anything for lunch today. Abdominal Status (last documented): Flat, Soft, Other (comment)(PEG tube ) abdomen with Active  bowel sounds. Last BM 02/14/21. Pertinent Medications: Vesta Gaucher Pertinent Labs: Sodium 136, Potassium 5.3 Nutrition Related Findings: PEG-J   
 
Current Nutrition Therapies: DIET REGULAR 
DIET TUBE FEEDING Patient takes Peptamen 1.5 at home. Continuous feeds at 35ml/hr DIET NUTRITIONAL SUPPLEMENTS Breakfast; Magic Cups Current Tube Feeding (TF) Orders: · Feeding Route: PEG/J 
· Formula: Peptamen 1.5 · Schedule:Continuous · Regimen: 35ml/hr · Additives/Modulars: (None ordered) · Water Flushes: 30ml Q4H 
· Current TF & Flush Orders Provides: 1260kcal, 57gm protein, 828ml free fluid Current Intake: Average Meal Intake: 1-25%(po for pleasure) Average Supplement Intake: 0% Anthropometric Measures: 
Height: 5' 4\" (162.6 cm) Current Body Wt: 38 kg (83 lb 12.4 oz)(2/15), Weight source: Not specified BMI: 14.4, Underweight (BMI less than 22) age over 72 Admission Body Weight: 83 lb 12.4 oz(2/15; Not specified) Ideal Body Weight (lbs) (Calculated): 120 lbs (55 kg), 69.8 % Usual Body Wt: 41.3 kg (91 lb 0.8 oz)(1/10; hospital admission), Percent weight change: -8 
       
Estimated Daily Nutrient Needs: Energy (kcal/day): 5588-6598 (Kcal/kg(33-38), Weight Used: Current(38kg)) Protein (g/day): 46-57(1.2-1.5) Weight Used: (Current(38kg)) Fluid (ml/day):   (1 ml/kcal) Nutrition Diagnosis:  
· Inadequate oral intake related to altered GI function as evidenced by (hx of gastroparesis; TF dependent) · Severe malnutrition, In context of chronic illness related to inadequate protein-energy intake as evidenced by (criteria provided in malnutrition assessment) Nutrition Interventions:  
Food and/or Nutrient Delivery: Continue current diet, Continue oral nutrition supplement, Modify tube feeding Coordination of Nutrition Care: Continue to monitor while inpatient Plan of Care discussed with Jessa Rodriguez RN and Dr. Jillian Taylor via [address] [place] [address] Goals: Active Goal: Meet >75% estimated nutrition needs via EN within 3 days Nutrition Monitoring and Evaluation:  
  
Food/Nutrient Intake Outcomes: Enteral nutrition intake/tolerance, Food and nutrient intake Discharge Planning: Too soon to determine Electronically signed by Maria Luisa Yang MS, RDN, LD 2/15/2021 at 5:05 PM 
Contact: [phone] Disaster Mode active

## 2021-02-15 NOTE — PROGRESS NOTES
Problem: Falls - Risk of 
Goal: *Absence of Falls Description: Document Rasta Bates Fall Risk and appropriate interventions in the flowsheet. Outcome: Progressing Towards Goal 
Note: Fall Risk Interventions: 
Mobility Interventions: Bed/chair exit alarm, Communicate number of staff needed for ambulation/transfer, Patient to call before getting OOB Medication Interventions: Bed/chair exit alarm, Patient to call before getting OOB, Teach patient to arise slowly Elimination Interventions: Bed/chair exit alarm, Call light in reach, Patient to call for help with toileting needs History of Falls Interventions: Bed/chair exit alarm, Investigate reason for fall, Room close to nurse's station, Vital signs minimum Q4HRs X 24 hrs (comment for end date)(pt daughter at bedside ) Problem: Pressure Injury - Risk of 
Goal: *Prevention of pressure injury Description: Document Eliel Scale and appropriate interventions in the flowsheet. Outcome: Progressing Towards Goal 
Note: Pressure Injury Interventions: 
Sensory Interventions: Keep linens dry and wrinkle-free, Minimize linen layers Moisture Interventions: Minimize layers Activity Interventions: Pressure redistribution bed/mattress(bed type), Increase time out of bed Mobility Interventions: HOB 30 degrees or less, Pressure redistribution bed/mattress (bed type) Nutrition Interventions: Document food/fluid/supplement intake, Offer support with meals,snacks and hydration Friction and Shear Interventions: Foam dressings/transparent film/skin sealants, Minimize layers, HOB 30 degrees or less, Apply protective barrier, creams and emollients, Transferring/repositioning devices

## 2021-02-15 NOTE — CONSULTS
Palliative Care Patient: Gila John MRN: [id number]  SSN: [id number] YOB: 1945  Age: 66 y.o. Sex: female Date of Request: 2/14/2021 Date of Consult:  2/15/2021 Reason for Consult:  goals of care Requesting Physician: Dr Tray Calvillo Assessment/Plan:  
 
Principal Diagnosis:   
Debility, Unspecified  R53.81 Additional Diagnoses: · Anorexia  R63.0 
· Cachexia  R64 · Failure to Thrive  R62.7 · Fatigue, Lethargy  R53.83 
· Frailty  R54 
· Encounter for Palliative Care  Z51.5 Palliative Performance Scale (PPS): 
  
 
Medical Decision Making:  
Reviewed and summarized notes from admission to present Discussed case with appropriate providers Reviewed laboratory and x-ray data from admission to present Pt resting in bed, no distress noted. She reports she feels good, and denies symptoms at present. She has had a few doses of Norco and Tylenol since admission. Pt report she lives with her daughter, José Miguel Burgos. Will attempt to speak with José Miguel Burgos regarding goals of care. 1400-  Attempted to speak with pt's daughter, José Miguel Burgos, however she was not available. VM left asking for return call. Of note, CM has spoken with José Miguel Burgos, and home health is being arranged. Hospice is an option if José Miguel Burgos is interested . Will continue to follow. Will discuss findings with members of the interdisciplinary team.   
 
Thank you for this referral.    
 
  
. 
 
Subjective:  
 
History obtained from:  Patient and Chart Chief Complaint: MI 
 History of Present Illness:  Ms Pepe Loza  Is a 65 yo female with PMH of CHF, CAD s/p CABG, COPD, GERD, HTN, HLD, and other conditions listed below, who presented to the ER on 2/13/2021 with c/o chest pain for 3 days with associated vomiting. Work up revealed concern for STEMI, however, pt declined LHC. Pt was admitted for further management. Pt had recent admission at [facility] for a GI bleed, therefore, is not a candidate for anticoagulation. Today, she reports she is symptom free. Advance Directive: No      
Code Status:  DNR Health Care Power of : No - Patient does not have a [address] Past Medical History:  
Diagnosis Date  Abnormal EKG 6/17/2016  Acute systolic (congestive) heart failure ([address] 6/17/2016  Anterior myocardial infarction ([address] 11/20/1997  Avascular necrosis ([address] R hip  CAD (coronary artery disease) 1990  
 s/p CABG  
 Cellulitis of right lower extremity 6/17/2016  Chest pain, precordial 3/28/2016  COPD (chronic obstructive pulmonary disease) ([address]   
 pt denies  Coronary atherosclerosis of native coronary vessel 03/28/2016  
 per heart cath (6/2016)- \"pt with diffusely diseased LAD and small caliber vessels. At present. .. best option would be medical management. .. do not see any obvious good options for redo CABG. ..  Fracture of femoral neck, right ([address] 02/27/2016  
 s/p repair with hardware- Dr Odette Ruggiero  GERD (gastroesophageal reflux disease) 02/27/2016  
 managed with medication  HLD (hyperlipidemia) 03/28/2016  
 managed with medication  Hx of echocardiogram 02/27/2016 EF 45-50%, hypokinesis of the apical anterior and basal-mid anteroseptal wall. Mild aortic valve regurgitation. Mild tricuspid regurgitation.  Hypertension   
 managed with medication  Hypokalemia 02/27/2016  
 hx of- WNL since 6/2016- pt on K+ supplement  IBS (irritable bowel syndrome)  Ischemic cardiomyopathy 5/15/2018  MI (myocardial infarction) ([facility]  and spring 2016  
 x2  Osteoarthritis  Osteoporosis  Rectal prolapse  S/P CABG (coronary artery bypass graft)   
 S/P total hip arthroplasty 2016  Scleroderma ([facility] 3/28/2016  Shortness of breath dyspnea 3/28/2016  Tracheal stenosis   
 s/p repair  Unstable angina ([facility] 1997 Past Surgical History:  
Procedure Laterality Date  HX ANKLE FRACTURE TX Right   
 hardware placed  HX CORONARY ARTERY BYPASS GRAFT    
 cabg x 3  
 HX FRACTURE TX    
 cervical spine- C2  
 HX HEART CATHETERIZATION    
 HX HEENT    
 tracheal stricture/stenosis Robyn Norman HIP FRACTURE TX  2016  
 repaired with hardware- Dr Al Jackson  HX HYSTERECTOMY  1970s  HX OTHER SURGICAL Peg tube placement  HX PTCA  HX TRACHEOSTOMY Family History Problem Relation Age of Onset  Breast Cancer Sister Social History Tobacco Use  Smoking status: Former Smoker Packs/day: 1.00 Years: 20.00 Pack years: 20.00 Quit date: 1995 Years since quittin.1  Smokeless tobacco: Never Used Substance Use Topics  Alcohol use: No  
 
Prior to Admission medications Medication Sig Start Date End Date Taking? Authorizing Provider  
acetaminophen (TYLENOL) 100 mg/mL suspension 650 mg by Per G Tube route every six (6) hours as needed for Pain. Yes Provider, Historical  
amiodarone (CORDARONE) 200 mg tablet Take 200 mg by mouth daily. Yes Provider, Historical  
lansoprazole (PREVACID SOLUTAB) 30 mg disintegrating tablet 30 mg by Per G Tube route Before breakfast and dinner. Yes Provider, Historical  
metoclopramide HCl (REGLAN) 5 mg tablet 5 mg by Per G Tube route Before breakfast, lunch, and dinner.    Yes Provider, Historical  
 lidocaine (LIDODERM) 5 % 1 Patch by TransDERmal route every twenty-four (24) hours. Apply patch to the affected area for 12 hours a day and remove for 12 hours a day. Yes Provider, Historical  
fluticasone propionate (FLOVENT DISKUS) 50 mcg/actuation inhaler Take  by inhalation. Yes Provider, Historical  
aspirin 81 mg chewable tablet Take 81 mg by mouth nightly. Per G tube route   Yes Provider, Historical  
atorvastatin (LIPITOR) 40 mg tablet Take 1 Tab by mouth daily. Indications: HYPERCHOLESTEROLEMIA Patient taking differently: 40 mg by Per G Tube route nightly. Indications: high cholesterol 3/31/16  Yes Gigi Perea MD  
gabapentin (NEURONTIN) 250 mg/5 mL solution 100 mg by Per G Tube route two (2) times a day. 2 ml (100 mg) by G-tube route 2 times a day  Indications: weakness, numbness or pain from nerve damage    Provider, Historical  
nitroglycerin (NITROSTAT) 0.4 mg SL tablet 1 Tab by SubLINGual route every five (5) minutes as needed for Chest Pain. Up to 3 doses. 5/16/19   Fabiola Lopez MD  
ondansetron (ZOFRAN ODT) 8 mg disintegrating tablet Take 1 Tab by mouth every eight (8) hours as needed for Nausea. 1/16/19   Pierre Mcgee MD  
levothyroxine (SYNTHROID) 50 mcg tablet Take  by mouth Daily (before breakfast). Provider, Historical  
loperamide (IMODIUM) 2 mg capsule Take 2 mg by mouth as needed for Diarrhea. Provider, Historical  
 
 
Allergies Allergen Reactions  Corticosteroids (Glucocorticoids) Anaphylaxis  Iodine Hives and Other (comments)  
  hypertension Review of Systems: A comprehensive review of systems was negative. Objective:  
 
Visit Vitals BP (!) 105/51 (BP Patient Position: At rest) Pulse 76 Temp 98.1 °F (36.7 °C) Resp 14 Ht 5' 4\" (1.626 m) Wt 83 lb 11.2 oz (38 kg) SpO2 98% Breastfeeding No  
BMI 14.37 kg/m² Physical Exam: 
 
General:  Cooperative. Debilitated and frail. No acute distress. Eyes:  Conjunctivae/corneas clear Nose: Nares normal. Septum midline. Neck: Supple, symmetrical, trachea midline Lungs:   Clear to auscultation bilaterally, unlabored Heart:  Regular rate and rhythm Abdomen:   Soft, non-tender, non-distended Extremities: Normal, atraumatic, no cyanosis or edema Skin: Skin color, texture, turgor normal  
Neurologic: Nonfocal  
Psych: Alert and oriented Assessment:  
 
Hospital Problems  Date Reviewed: 1/6/2021 Codes Class Noted POA Acquired hypothyroidism (Chronic) ICD-10-CM: E03.9 ICD-9-CM: 244.9  2/13/2021 Yes History of GI bleed (Chronic) ICD-10-CM: Z87.19 ICD-9-CM: V12.79  2/13/2021 Yes * (Principal) Elevated troponin ICD-10-CM: R77.8 ICD-9-CM: 790.6  2/13/2021 Weight loss ICD-10-CM: R63.4 ICD-9-CM: 783.21  2/24/2020 Yes Hypomagnesemia ICD-10-CM: W32.54 
ICD-9-CM: 275.2  12/12/2019 Unknown PAF (paroxysmal atrial fibrillation) (HCC) (Chronic) ICD-10-CM: I48.0 ICD-9-CM: 427.31  12/12/2019 Yes Chest pain ICD-10-CM: R07.9 ICD-9-CM: 786.50  5/14/2019 Yes CAD (coronary artery disease) ICD-10-CM: I25.10 ICD-9-CM: 414.00  6/16/2016 Yes Signed By: Mone Singh NP February 15, 2021

## 2021-02-15 NOTE — PROGRESS NOTES
Care Management Interventions PCP Verified by CM: Garfield Epley) Mode of Transport at Discharge: Other (see comment)(family) Transition of Care Consult (CM Consult): Discharge Planning, Home Health [address]: No 
Reason Outside [place]: Patient already serviced by other home care/hospice agency Discharge Durable Medical Equipment: No 
Physical Therapy Consult: No 
Occupational Therapy Consult: No 
Speech Therapy Consult: No 
Current Support Network: Own Home, Other, Relative's Home(See case management note) Confirm Follow Up Transport: Family The Plan for Transition of Care is Related to the Following Treatment Goals : daughters home with resumption of home health & [place] Tube Feeding The Patient and/or Patient Representative was Provided with a Choice of Provider and Agrees with the Discharge Plan?: Yes Name of the Patient Representative Who was Provided with a Choice of Provider and Agrees with the Discharge Plan: Mrs. George Hickman & daughter Freedom of Choice List was Provided with Basic Dialogue that Supports the Patient's Individualized Plan of Care/Goals, Treatment Preferences and Shares the Quality Data Associated with the Providers?: Yes The [employer] Information Provided?: No 
Discharge Location Discharge Placement: Home with home health This CM met with pt and daughter to complete assessment this day. Verified pt's PCP, insurance, emergency contact, and home address. Pt's daughter reports pt with no difficulty obtaining her medications in the community. Pt has own home but has been staying with her daughter. There is 1 step to enter home and her home DME includes a wheelchair, walker, hospital bed, and BSC. Pt has home tube feedings through [place].  Prior to admission at baseline pt does need some assistance with her ADLs. Pt with recent hospital admission to [facility] from 1/10/21-1/27/21. At discharge, home health arranged via [facility]. We discussed discharge planning this day. Plan is for pt to return home with daughter at discharge. They would like [facility] resumed at discharge. This CM called [facility] and alerted them to pt's admission to the hospital.  Updated referral to be made at discharge. No additional CM needs at this time. Will continue to follow and update as needed.

## 2021-02-15 NOTE — PROGRESS NOTES
Hospitalist Progress Note Admit Date:  2021  8:24 PM  
Name:  Мария Rojo Age:  66 y.o. 
:  1945 MRN:  [id number] PCP:  Alise Nguyễn MD 
Treatment Team: Attending Provider: Lamin Young MD; Utilization Review: Tevin Olvera; Primary Nurse: Alex Sharma RN; Care Manager: Sula Cockayne; Utilization Review: Carolyn Ordaz RN Subjective:  
Patient is 72yoF with complex medical history including CAD s/p CABG, scleroderma (requiring feeding tube), recent GI bleed (hospitalized at [facility]) who presents with chest pain. On ER evaluation, patient was found to have evidence of STEMI with EKG changes and elevated troponin. Cardiology evaluated patient. Patient declines cardiac cath and reported that she wanted to be comfortable, but did not wish to have additional medical interventions. Patient is not a candidate for anticoagulation, given recent severe GI bleed. Hospitalist consulted for admission for pain control and potentially comfort care. 2021 Says no chest pain now but on and off 
Cardiology no intervention like cath Low normal bp 
 
2/15/2021 C/o on and off chest pain 
bp improving Objective:  
 
Patient Vitals for the past 24 hrs: 
 Temp Pulse Resp BP SpO2  
02/15/21 0708 98.1 °F (36.7 °C) 76 14 (!) 105/51 98 % 02/15/21 0335 98 °F (36.7 °C) 78 18 (!) 98/50   
21 2216 98.6 °F (37 °C) 77 18 (!) 94/55 93 % 21 1927 97.2 °F (36.2 °C) 77 18 (!) 96/53 93 % 21 1815  82  (!) 97/56   
21 1559 98.3 °F (36.8 °C) 74 14 (!) 86/49 95 % 21 1447  76  (!) 97/51   
21 1249  74  (!) 90/54   
21 1113 98.5 °F (36.9 °C) 72 20 (!) 86/52 95 % Oxygen Therapy O2 Sat (%): 98 % (02/15/21 0708) Pulse via Oximetry: 54 beats per minute (21) O2 Device: Room air (02/15/21 0708) Intake/Output Summary (Last 24 hours) at 2/15/2021 5619 Last data filed at 2/15/2021 8241 Gross per 24 hour Intake 300 ml Output  Net 300 ml General:    Well nourished. Alert.   
heent- normal occular movements, atraumatic, normocephalic, pupils equal reacting to light CV:   RRR. No murmur, rub, or gallop. Lungs:   Clear to auscultation bilaterally. No wheezing, rhonchi, or rales. Abdomen:   Soft, nontender, nondistended. Cns- no focal neurological deficits Extremities: Warm and dry. No cyanosis or edema. Skin:     No rashes or jaundice. Data Review: 
I have reviewed all labs, meds, telemetry events, and studies from the last 24 hours. Recent Results (from the past 24 hour(s)) METABOLIC PANEL, BASIC Collection Time: 02/15/21  3:52 AM  
Result Value Ref Range Sodium 136 136 - 145 mmol/L Potassium 5.3 (H) 3.5 - 5.1 mmol/L Chloride 104 98 - 107 mmol/L  
 CO2 30 21 - 32 mmol/L Anion gap 2 (L) 7 - 16 mmol/L Glucose 93 65 - 100 mg/dL BUN 38 (H) 8 - 23 MG/DL Creatinine 0.67 0.6 - 1.0 MG/DL  
 GFR est AA >60 >60 ml/min/1.73m2 GFR est non-AA >60 >60 ml/min/1.73m2 Calcium 7.5 (L) 8.3 - 10.4 MG/DL All Micro Results Procedure Component Value Units Date/Time CULTURE, BLOOD [[id number]] Collected: 02/14/21 0735 Order Status: Completed Specimen: Blood Updated: 02/14/21 6421 CULTURE, BLOOD [[id number]] Collected: 02/14/21 0735 Order Status: Completed Specimen: Blood Updated: 02/14/21 5424 Current Meds: 
Current Facility-Administered Medications Medication Dose Route Frequency  [START ON 2/16/2021] levothyroxine (SYNTHROID) tablet 50 mcg  50 mcg Oral 6am  
 nitroglycerin (NITROSTAT) tablet 0.4 mg  0.4 mg SubLINGual Q5MIN PRN  
 [Held by provider] isosorbide mononitrate ER (IMDUR) tablet 30 mg  30 mg Oral DAILY  sodium chloride (NS) flush 5-40 mL  5-40 mL IntraVENous Q8H  
 sodium chloride (NS) flush 5-40 mL  5-40 mL IntraVENous PRN  
 acetaminophen (TYLENOL) tablet 650 mg  650 mg Oral Q6H PRN  Or  
  acetaminophen (TYLENOL) suppository 650 mg  650 mg Rectal Q6H PRN  promethazine (PHENERGAN) tablet 12.5 mg  12.5 mg Oral Q6H PRN Or  
 ondansetron (ZOFRAN) injection 4 mg  4 mg IntraVENous Q6H PRN  
 ranolazine ER (RANEXA) tablet 500 mg  500 mg Oral Q12H  
 HYDROcodone-acetaminophen (HYCET) 0.5-21.7 mg/mL oral solution 5 mg  5 mg Oral Q4H PRN Other Studies (last 24 hours): No results found. Assessment and Plan:  
 
Hospital Problems as of 2/15/2021 Date Reviewed: 1/6/2021 Codes Class Noted - Resolved POA Acquired hypothyroidism (Chronic) ICD-10-CM: E03.9 ICD-9-CM: 244.9  2/13/2021 - Present Yes History of GI bleed (Chronic) ICD-10-CM: Z87.19 ICD-9-CM: V12.79  2/13/2021 - Present Yes * (Principal) Elevated troponin ICD-10-CM: R77.8 ICD-9-CM: 790.6  2/13/2021 - Present Weight loss ICD-10-CM: R63.4 ICD-9-CM: 783.21  2/24/2020 - Present Yes Hypomagnesemia ICD-10-CM: R88.55 
ICD-9-CM: 275.2  12/12/2019 - Present Unknown PAF (paroxysmal atrial fibrillation) (HCC) (Chronic) ICD-10-CM: I48.0 ICD-9-CM: 427.31  12/12/2019 - Present Yes Chest pain ICD-10-CM: R07.9 ICD-9-CM: 786.50  5/14/2019 - Present Yes CAD (coronary artery disease) ICD-10-CM: I25.10 ICD-9-CM: 414.00  6/16/2016 - Present Yes PLAN:   
Chest pain- prob ischemic but not acs as per cardiology-not a cath candidate , no heparin or anticoagulation as recent gi bleed. PAF- off eliquis Low normal bp- held imdur and decreased the dose of renexa. Hypothyroid Palliative care consulted. DC planning/Dispo: DVT ppx:  scd Signed: 
Nathalie Hamilton MD

## 2021-02-16 PROBLEM — E43 SEVERE PROTEIN-CALORIE MALNUTRITION (HCC): Chronic | Status: ACTIVE | Noted: 2021-01-01

## 2021-02-16 NOTE — PROGRESS NOTES
Bedside and Verbal shift change report given to self (oncoming nurse) by Michael Reyes (offgoing nurse). Report included the following information SBAR and Kardex.

## 2021-02-16 NOTE — PROGRESS NOTES
Hospitalist Progress Note Admit Date:  2021  8:24 PM  
Name:  Kirt Lundborg Age:  66 y.o. 
:  1945 MRN:  [id number] PCP:  Landon Murphy MD 
Treatment Team: Attending Provider: Haily Mckeon MD; Utilization Review: Carlos Bo; Care Manager: Nando Toussaint; Utilization Review: Jesus Low RN; Consulting Provider: Kike Gutierrez NP; Primary Nurse: Carole Osorio Subjective:  
Patient is 72yoF with complex medical history including CAD s/p CABG, scleroderma (requiring feeding tube), recent GI bleed (hospitalized at [facility]) who presents with chest pain. On ER evaluation, patient was found to have evidence of STEMI with EKG changes and elevated troponin. Cardiology evaluated patient. Patient declines cardiac cath and reported that she wanted to be comfortable, but did not wish to have additional medical interventions. Patient is not a candidate for anticoagulation, given recent severe GI bleed. Hospitalist consulted for admission for pain control and potentially comfort care. 2021 Says no chest pain now but on and off 
Cardiology no intervention like cath Low normal bp 
 
2/15/2021 C/o on and off chest pain 
bp improving 2021 C/o chest pain During the stay Admitted for chest pain, probable STEMI, cardiology evaluated, does feel chest pain from ischemia but not ACS. No cardiac cath. Signed off. Continue medical management. Patient blood pressure low normal and son able to give Imdur, Ranexa dose was decreased. Palliative care following. You got bigger okay by 
 
Objective:  
 
Patient Vitals for the past 24 hrs: 
 Temp Pulse Resp BP SpO2  
21 0719 98.1 °F (36.7 °C) 78 18 (!) 102/53 93 % 21 0301 98.3 °F (36.8 °C) 73 18 (!) 105/54 98 % 02/15/21 2306 98.1 °F (36.7 °C) 71 18 (!) 89/54 96 % 02/15/21 1958 98 °F (36.7 °C) 76 18 (!) 110/57 96 % 02/15/21 1900 98 °F (36.7 °C) 74 18 (!) 103/57 96 % 02/15/21 1542 98 °F (36.7 °C) 76 20 (!) 101/55 96 % 02/15/21 1130 98.1 °F (36.7 °C) 81 24 (!) 87/38 95 % Oxygen Therapy O2 Sat (%): 93 % (02/16/21 0719) Pulse via Oximetry: 77 beats per minute (02/16/21 0719) O2 Device: Room air (02/16/21 0301) Intake/Output Summary (Last 24 hours) at 2/16/2021 3473 Last data filed at 2/16/2021 0835 Gross per 24 hour Intake 245 ml Output 0 ml Net 245 ml General:    Well nourished. Alert.   
heent- normal occular movements, atraumatic, normocephalic, pupils equal reacting to light CV:   RRR. No murmur, rub, or gallop. Lungs:   Clear to auscultation bilaterally. No wheezing, rhonchi, or rales. Abdomen:   Soft, nontender, nondistended. Cns- no focal neurological deficits Extremities: Warm and dry. No cyanosis or edema. Skin:     No rashes or jaundice. Data Review: 
I have reviewed all labs, meds, telemetry events, and studies from the last 24 hours. Recent Results (from the past 24 hour(s)) METABOLIC PANEL, BASIC Collection Time: 02/16/21  4:13 AM  
Result Value Ref Range Sodium 136 136 - 145 mmol/L Potassium 5.0 3.5 - 5.1 mmol/L Chloride 103 98 - 107 mmol/L  
 CO2 32 21 - 32 mmol/L Anion gap 1 (L) 7 - 16 mmol/L Glucose 84 65 - 100 mg/dL BUN 37 (H) 8 - 23 MG/DL Creatinine 0.72 0.6 - 1.0 MG/DL  
 GFR est AA >60 >60 ml/min/1.73m2 GFR est non-AA >60 >60 ml/min/1.73m2 Calcium 7.6 (L) 8.3 - 10.4 MG/DL All Micro Results Procedure Component Value Units Date/Time CULTURE, BLOOD [[id number]] Collected: 02/14/21 0721 Order Status: Completed Specimen: Blood Updated: 02/15/21 5725 Special Requests: --     
  LEFT 
FOREARM Culture result: NO GROWTH 1 DAY     
 CULTURE, BLOOD [[id number]] Collected: 02/14/21 0735 Order Status: Completed Specimen: Blood Updated: 02/15/21 1119 Special Requests: --     
  RIGHT 
FOREARM Culture result: NO GROWTH 1 DAY Current Meds: Current Facility-Administered Medications Medication Dose Route Frequency  NUTRITIONAL SUPPORT ELECTROLYTE PRN ORDERS   Does Not Apply PRN  
 levothyroxine (SYNTHROID) tablet 50 mcg  50 mcg Oral 6am  
 nitroglycerin (NITROSTAT) tablet 0.4 mg  0.4 mg SubLINGual Q5MIN PRN  
 [Held by provider] isosorbide mononitrate ER (IMDUR) tablet 30 mg  30 mg Oral DAILY  sodium chloride (NS) flush 5-40 mL  5-40 mL IntraVENous Q8H  
 sodium chloride (NS) flush 5-40 mL  5-40 mL IntraVENous PRN  
 acetaminophen (TYLENOL) tablet 650 mg  650 mg Oral Q6H PRN Or  
 acetaminophen (TYLENOL) suppository 650 mg  650 mg Rectal Q6H PRN  promethazine (PHENERGAN) tablet 12.5 mg  12.5 mg Oral Q6H PRN Or  
 ondansetron (ZOFRAN) injection 4 mg  4 mg IntraVENous Q6H PRN  
 ranolazine ER (RANEXA) tablet 500 mg  500 mg Oral Q12H  
 HYDROcodone-acetaminophen (HYCET) 0.5-21.7 mg/mL oral solution 5 mg  5 mg Oral Q4H PRN Other Studies (last 24 hours): No results found. Assessment and Plan:  
 
Hospital Problems as of 2/16/2021 Date Reviewed: 1/6/2021 Codes Class Noted - Resolved POA Severe protein-calorie malnutrition ([facility] (Chronic) ICD-10-CM: P34 ICD-9-CM: 240  2/16/2021 - Present Yes Acquired hypothyroidism (Chronic) ICD-10-CM: E03.9 ICD-9-CM: 244.9  2/13/2021 - Present Yes History of GI bleed (Chronic) ICD-10-CM: Z87.19 ICD-9-CM: V12.79  2/13/2021 - Present Yes * (Principal) Elevated troponin ICD-10-CM: R77.8 ICD-9-CM: 790.6  2/13/2021 - Present Weight loss ICD-10-CM: R63.4 ICD-9-CM: 783.21  2/24/2020 - Present Yes Hypomagnesemia ICD-10-CM: N88.49 
ICD-9-CM: 275.2  12/12/2019 - Present Unknown PAF (paroxysmal atrial fibrillation) (HCC) (Chronic) ICD-10-CM: I48.0 ICD-9-CM: 427.31  12/12/2019 - Present Yes Chest pain ICD-10-CM: R07.9 ICD-9-CM: 786.50  5/14/2019 - Present Yes CAD (coronary artery disease) ICD-10-CM: I25.10 ICD-9-CM: 414.00  6/16/2016 - Present Yes PLAN:   
Chest pain- prob ischemic but not acs as per cardiology-not a cath candidate , no heparin or anticoagulation as recent gi bleed. PAF- off eliquis Low normal bp- held imdur and decreased the dose of renexa. Hypothyroid Palliative care consulted. Probable discharge next few days if chest pain better controlled. DC planning/Dispo: DVT ppx:  scd Signed: 
Piyush Wilson MD

## 2021-02-16 NOTE — PROGRESS NOTES
Verbal bedside report given to oncoming RN, Yas Ayala. Patient's situation, background, assessment and recommendations provided. Opportunity for questions provided. Oncoming RN assumed care of patient.

## 2021-02-17 NOTE — PROGRESS NOTES
Hospitalist Progress Note Admit Date:  2021  8:24 PM  
Name:  James Cowden Age:  66 y.o. 
:  1945 MRN:  [id number] PCP:  Gabe Guan MD 
Treatment Team: Attending Provider: Benji Lopez MD; Utilization Review: Brian Simpson; Care Manager: Scottie Bettencourt; Utilization Review: Nneka Mix RN; Consulting Provider: Becky Rooney NP Subjective:  
Patient is 72yoF with complex medical history including CAD s/p CABG, scleroderma (requiring feeding tube), recent GI bleed (hospitalized at [place]) who presents with chest pain. On ER evaluation, patient was found to have evidence of STEMI with EKG changes and elevated troponin. Cardiology evaluated patient. Patient declines cardiac cath and reported that she wanted to be comfortable, but did not wish to have additional medical interventions. Patient is not a candidate for anticoagulation, given recent severe GI bleed. Hospitalist consulted for admission for pain control and potentially comfort care. 2021 Says no chest pain now but on and off 
Cardiology no intervention like cath Low normal bp 
 
2/15/2021 C/o on and off chest pain 
bp improving 2021 C/o chest pain  Hypotension, limiting antianginal agents Continues to have chest pain Echo was ordered During the stay Admitted for chest pain, probable STEMI, cardiology evaluated, does feel chest pain from ischemia but not ACS. No cardiac cath. Signed off. Continue medical management. Patient blood pressure low normal and son able to give Imdur, Ranexa dose was decreased. Palliative care following. You got bigger okay by 
 
Objective:  
 
Patient Vitals for the past 24 hrs: 
 Temp Pulse Resp BP SpO2  
21 0704 97.5 °F (36.4 °C) 81 16 (!) 98/56 95 % 21 0325 97.6 °F (36.4 °C) 87 18 (!) 97/52 97 % 21 0230   15    
21 2340 97.5 °F (36.4 °C) 90 16 (!) 91/50 95 % 21 2200   [address] 02/16/21 1902 97 °F (36.1 °C) 78 16 (!) 91/54 96 % 02/16/21 1512 97.9 °F (36.6 °C) 82 16 121/62  Oxygen Therapy O2 Sat (%): 95 % (02/17/21 0704) Pulse via Oximetry: 77 beats per minute (02/16/21 0719) O2 Device: Room air (02/17/21 0800) Intake/Output Summary (Last 24 hours) at 2/17/2021 1153 Last data filed at 2/17/2021 6039 Gross per 24 hour Intake 1110 ml Output 0 ml Net 1110 ml General:    Well nourished. Alert.   
heent- normal occular movements, atraumatic, normocephalic, pupils equal reacting to light CV:   RRR. No murmur, rub, or gallop. Lungs:   Clear to auscultation bilaterally. No wheezing, rhonchi, or rales. Abdomen:   Soft, nontender, nondistended. Cns- no focal neurological deficits Extremities: Warm and dry. No cyanosis or edema. Skin:     No rashes or jaundice. Data Review: 
I have reviewed all labs, meds, telemetry events, and studies from the last 24 hours. Recent Results (from the past 24 hour(s)) METABOLIC PANEL, BASIC Collection Time: 02/17/21  3:22 AM  
Result Value Ref Range Sodium 135 (L) 136 - 145 mmol/L Potassium 4.3 3.5 - 5.1 mmol/L Chloride 101 98 - 107 mmol/L  
 CO2 26 21 - 32 mmol/L Anion gap 8 7 - 16 mmol/L Glucose 163 (H) 65 - 100 mg/dL BUN 55 (H) 8 - 23 MG/DL Creatinine 0.79 0.6 - 1.0 MG/DL  
 GFR est AA >60 >60 ml/min/1.73m2 GFR est non-AA >60 >60 ml/min/1.73m2 Calcium 7.6 (L) 8.3 - 10.4 MG/DL MAGNESIUM Collection Time: 02/17/21  3:22 AM  
Result Value Ref Range Magnesium 2.0 1.8 - 2.4 mg/dL PHOSPHORUS Collection Time: 02/17/21  3:22 AM  
Result Value Ref Range Phosphorus 4.3 (H) 2.3 - 3.7 MG/DL All Micro Results Procedure Component Value Units Date/Time CULTURE, BLOOD [[id number]] Collected: 02/14/21 0735 Order Status: Completed Specimen: Blood Updated: 02/16/21 1026 Special Requests: --     
  RIGHT 
FOREARM Culture result: NO GROWTH 2 DAYS CULTURE, BLOOD [[id number]] Collected: 02/14/21 0735 Order Status: Completed Specimen: Blood Updated: 02/16/21 1026 Special Requests: --     
  LEFT 
FOREARM Culture result: NO GROWTH 2 DAYS Current Meds: 
Current Facility-Administered Medications Medication Dose Route Frequency  NUTRITIONAL SUPPORT ELECTROLYTE PRN ORDERS   Does Not Apply PRN  
 levothyroxine (SYNTHROID) tablet 50 mcg  50 mcg Oral 6am  
 nitroglycerin (NITROSTAT) tablet 0.4 mg  0.4 mg SubLINGual Q5MIN PRN  
 [Held by provider] isosorbide mononitrate ER (IMDUR) tablet 30 mg  30 mg Oral DAILY  sodium chloride (NS) flush 5-40 mL  5-40 mL IntraVENous Q8H  
 sodium chloride (NS) flush 5-40 mL  5-40 mL IntraVENous PRN  
 acetaminophen (TYLENOL) tablet 650 mg  650 mg Oral Q6H PRN Or  
 acetaminophen (TYLENOL) suppository 650 mg  650 mg Rectal Q6H PRN  promethazine (PHENERGAN) tablet 12.5 mg  12.5 mg Oral Q6H PRN Or  
 ondansetron (ZOFRAN) injection 4 mg  4 mg IntraVENous Q6H PRN  
 ranolazine ER (RANEXA) tablet 500 mg  500 mg Oral Q12H Other Studies (last 24 hours): No results found. Assessment and Plan:  
 
Hospital Problems as of 2/17/2021 Date Reviewed: 1/6/2021 Codes Class Noted - Resolved POA Severe protein-calorie malnutrition ([facility] [address] (Chronic) ICD-10-CM: O77 ICD-9-CM: 659  2/16/2021 - Present Yes Acquired hypothyroidism (Chronic) ICD-10-CM: E03.9 ICD-9-CM: 244.9  2/13/2021 - Present Yes History of GI bleed (Chronic) ICD-10-CM: Z87.19 ICD-9-CM: V12.79  2/13/2021 - Present Yes * (Principal) Elevated troponin ICD-10-CM: R77.8 ICD-9-CM: 790.6  2/13/2021 - Present Weight loss ICD-10-CM: R63.4 ICD-9-CM: 783.21  2/24/2020 - Present Yes Hypomagnesemia ICD-10-CM: W25.99 
ICD-9-CM: 275.2  12/12/2019 - Present Unknown PAF (paroxysmal atrial fibrillation) (HCC) (Chronic) ICD-10-CM: I48.0 ICD-9-CM: 427.31  12/12/2019 - Present Yes  Chest pain ICD-10-CM: R07.9 
ICD-9-CM: 786.50  5/14/2019 - Present Yes  
   
 CAD (coronary artery disease) ICD-10-CM: I25.10 
ICD-9-CM: 414.00  6/16/2016 - Present Yes  
   
  
 
 
PLAN:   
Chest pain- prob ischemic but not acs as per cardiology-not a cath candidate , no heparin or anticoagulation as recent gi bleed. 
-Trial of H2RA and GI cocktail; currenly dependent on norco for pain relief 
 
PAF- off eliquis 
 
Low normal bp- held imdur and renexa on hold 
 
Hypothyroid 
 
Palliative care consulted. 
 
Probable discharge next few days if chest pain better controlled. 
 
 
DC planning/Dispo:   
DVT ppx:  scd 
 
Signed: 
Harshad Aguilar MD

## 2021-02-17 NOTE — PROGRESS NOTES
Bedside and Verbal shift change report given to self (oncoming nurse) by Heidi Ryan (offgoing nurse). Report included the following information SBAR, Kardex and MAR.

## 2021-02-17 NOTE — PROGRESS NOTES
Problem: Falls - Risk of 
Goal: *Absence of Falls Description: Document Rahul Brunnerack Fall Risk and appropriate interventions in the flowsheet. Outcome: Progressing Towards Goal 
Note: Fall Risk Interventions: 
Mobility Interventions: Bed/chair exit alarm Medication Interventions: Bed/chair exit alarm, Teach patient to arise slowly Elimination Interventions: Bed/chair exit alarm, Call light in reach, Toilet paper/wipes in reach, Toileting schedule/hourly rounds History of Falls Interventions: Bed/chair exit alarm, Door open when patient unattended, Room close to nurse's station Problem: Patient Education: Go to Patient Education Activity Goal: Patient/Family Education Outcome: Progressing Towards Goal 
  
Problem: Pressure Injury - Risk of 
Goal: *Prevention of pressure injury Description: Document Eliel Scale and appropriate interventions in the flowsheet. Outcome: Progressing Towards Goal 
Note: Pressure Injury Interventions: 
Sensory Interventions: Avoid rigorous massage over bony prominences, Assess need for specialty bed, Assess changes in LOC, Check visual cues for pain, Maintain/enhance activity level, Minimize linen layers, Pad between skin to skin, Pressure redistribution bed/mattress (bed type) Moisture Interventions: Absorbent underpads, Apply protective barrier, creams and emollients, Check for incontinence Q2 hours and as needed, Limit adult briefs, Maintain skin hydration (lotion/cream), Minimize layers Activity Interventions: Increase time out of bed, Pressure redistribution bed/mattress(bed type), PT/OT evaluation Mobility Interventions: Float heels, HOB 30 degrees or less, Pressure redistribution bed/mattress (bed type), PT/OT evaluation Nutrition Interventions: Document food/fluid/supplement intake Friction and Shear Interventions: Apply protective barrier, creams and emollients, Foam dressings/transparent film/skin sealants, Lift sheet, Minimize layers Problem: Patient Education: Go to Patient Education Activity Goal: Patient/Family Education Outcome: Progressing Towards Goal

## 2021-02-17 NOTE — PROGRESS NOTES
CM continues to follow for discharge planning and/or CM needs. Plan remains that pt will return home with daughter when medically stable for discharge. Home health resumption referral made to [facility]. No additional CM needs at this time. Will continue to follow and update as needed.

## 2021-02-17 NOTE — PROGRESS NOTES
Comprehensive Nutrition Assessment Type and Reason for Visit: Huy Canary Tube Feeding Management (Hospitalist) Nutrition Recommendations/Plan:  
· Enteral Nutrition: · Continue Vial AF 1.2 via J-tube at goal rate of 45ml/hour. · Reduce water flush to 50ml Q4H. · At goal will provide 1296 kcal (100% estimated calorie needs), 81 grams protein (100% estimated protein needs) and 1176ml free fluid (<1ml/kcal). · Vitamin and Mineral Supplement Therapy: · Electrolyte management replacement protocol active. · Labs: · BMP daily, Mg and Phos MWF.  
  
· Oral Nutrition: Regular diet for pleasure and Magic Cup once daily. Malnutrition Assessment: 
Malnutrition Status: Severe malnutrition Context: Chronic illness Findings of clinical characteristics of malnutrition:  
Energy Intake:  Unable to assess(TF dependent since January 2021) Weight Loss:  7 - Greater than 5% over 1 month Body Fat Loss:  7 - Severe body fat loss, Fat overlying ribs, Orbital  
Muscle Mass Loss:  7 - Severe muscle mass loss, Calf (gastrocnemius), Clavicles (pectoralis &deltoids), Hand (interosseous), Scapula (trapezius), Thigh (quadraceps), Temples (temporalis) Fluid Accumulation:  Unable to assess,   
 Strength:  Not performed Nutrition Assessment:  
Nutrition History: Pt's daughter reports she has been receiving TF since PEG-J placed 1/15. Previously on Osmolite 1.5, until 2/9. Formula was switched to Peptamen 1.5 due to ongoing diarrhea. She reports better tolerance with formula switch. Home regimen is Peptide 1.5 @ 35ml/hr with 30ml Q4H water flush. Cultural/Latter-day/Ethnic Food Preference(s): None Nutrition Background: Pt admitted with concerns for STEMI. Pt declined C and requesting to be comfortable. PMH notable for gastroparesis (10/1/20), dysphagia, s/p PEG-J 1/15/21, CAD s/p CABG, scleroderma, CHF, COPD, GERD, HLD, HTN, IBS, MI, OA, and osteoporosis. Daily Update: Pt had multiple BM yesterday per RN. Noted 7 documented per I&O's. Per med rec hx, pt takes Imodium as needed for diarrhea. No family at bedside, but pt states she has had more BM currently than at home. Imodium added to pt's med list today. Current peptide-based formula remains appropriate due to pt's diarrhea with Osmolite PTA. Abdominal Status (last documented): Other (comment)(PEG tube) abdomen with Active  bowel sounds. Last BM 02/16/21. Pertinent Medications: Pepcid, Imodium Pertinent Labs: Sodium 135, Potassium 4.3, Phos 4.3, Mg 2.0 Nutrition Related Findings: PEG-J   
 
Current Nutrition Therapies: DIET REGULAR 
DIET NUTRITIONAL SUPPLEMENTS Breakfast; Magic Cups DIET TUBE FEEDING Feeds via J-port Please open for rate and flush Current Tube Feeding (TF) Orders: · Feeding Route: PEG/J 
· Formula: Vital AF 1.2 · Schedule:Continuous · Regimen: 45ml/hr · Additives/Modulars: (None ordered) · Water Flushes: 70ml Q4H 
· Current TF & Flush Orders Provides: at goal 
· Goal TF & Flush Orders Provides: 1296 kcal (100% estimated calorie needs), 81 grams protein (100% estimated protein needs) and 1296ml free fluid (1ml/kcal). Current Intake: Average Meal Intake: 1-25% Average Supplement Intake: 0% Anthropometric Measures: 
Height: 5' 4\" (162.6 cm) Current Body Wt: 37.3 kg (82 lb 3.7 oz)(2/17), Weight source: Bed scale BMI: 14.1, Underweight (BMI less than 22) age over 72 Admission Body Weight: 83 lb 12.4 oz(2/15; Not specified) Ideal Body Weight (lbs) (Calculated): 120 lbs (55 kg), 69.8 % Usual Body Wt: 41.3 kg (91 lb 0.8 oz)(1/10; hospital admission), Percent weight change: -8 
       
Estimated Daily Nutrient Needs: 
Energy (kcal/day): 0636-8705 (Kcal/kg(33-38), Weight Used: Current(38kg)) Protein (g/day): 46-57(1.2-1.5) Weight Used: (Current(38kg)) Fluid (ml/day):   (1 ml/kcal) Nutrition Diagnosis: · Inadequate oral intake related to altered GI function as evidenced by (hx of gastroparesis; TF dependent) 
 
· Severe malnutrition, In context of chronic illness related to inadequate protein-energy intake as evidenced by (criteria provided in malnutrition assessment) 
 
Nutrition Interventions:  
Food and/or Nutrient Delivery: Continue current diet, Continue oral nutrition supplement, Modify tube feeding 
  
Coordination of Nutrition Care: Continue to monitor while inpatient 
Plan of Care discussed with JACQUELINE Colmenares 
 
Goals:  
Previous Goal Met: Goal(s) achieved 
Active Goal: Maintain estimated nutrition needs via EN during admission 
 
Nutrition Monitoring and Evaluation:  
  
Food/Nutrient Intake Outcomes: Enteral nutrition intake/tolerance 
  
 
Discharge Planning:   
Enteral nutrition 
 
Electronically signed by Jess Arce MS, RDN, LD 2/17/2021 at 1:58 PM 
Contact: [phone] 
 
Disaster Mode active

## 2021-02-18 NOTE — PROGRESS NOTES
Bedside and Verbal shift change report given to self (oncoming nurse) by Peggy French (offgoing nurse). Report included the following information SBAR and Kardex.

## 2021-02-18 NOTE — PROGRESS NOTES
Hospitalist Progress Note Admit Date:  2021  8:24 PM  
Name:  James Cowden Age:  66 y.o. 
:  1945 MRN:  [id number] PCP:  Gabe Guan MD 
Treatment Team: Attending Provider: Benji Lopez MD; Utilization Review: Brian Simpson; Care Manager: Scottie Bettencourt; Utilization Review: Nneka Mix RN; Consulting Provider: Becky Rooney NP; Consulting Provider: Ulisses Doan MD 
 
Subjective:  
Patient is 89QSY with complex medical history including CAD s/p CABG, scleroderma (requiring feeding tube), recent GI bleed (hospitalized at [address] [facility]) who presents with chest pain. On ER evaluation, patient was found to have evidence of STEMI with EKG changes and elevated troponin. Cardiology evaluated patient. Patient declines cardiac cath and reported that she wanted to be comfortable, but did not wish to have additional medical interventions. Patient is not a candidate for anticoagulation, given recent severe GI bleed. Hospitalist consulted for admission for pain control and potentially comfort care. 2021 Says no chest pain now but on and off 
Cardiology no intervention like cath Low normal bp 
 
2/15/2021 C/o on and off chest pain 
bp improving 2021 C/o chest pain  Hypotension, limiting antianginal agents Continues to have chest pain Echo was ordered  Echo without significant reduction in EF More anemic today, still hypotensive2 unit PRBC Occult blood was positive, GI consulted During the stay Admitted for chest pain, probable STEMI, cardiology evaluated, does feel chest pain from ischemia but not ACS. No cardiac cath. Signed off. Continue medical management. Patient blood pressure low normal and son able to give Imdur, Ranexa dose was decreased. Palliative care following. You got bigger okay by 
 
Objective:  
 
Patient Vitals for the past 24 hrs: 
 Temp Pulse Resp BP SpO2 02/18/21 1449 97.9 °F (36.6 °C) 73 16 (!) 107/54 100 % 02/18/21 1357 98.4 °F (36.9 °C) 75 16 (!) 93/55 98 % 02/18/21 1342 98.5 °F (36.9 °C) 78 18 (!) 87/48 93 % 02/18/21 1121 98.4 °F (36.9 °C) 82 18 (!) 94/50 94 % 02/18/21 0706 99.5 °F (37.5 °C) 85 16 (!) 114/56 94 % 02/18/21 0311 97.8 °F (36.6 °C) 84 20 (!) 113/56   
02/18/21 0005   16    
02/17/21 2311 97.4 °F (36.3 °C) 80 20 (!) 111/51 92 % 02/17/21 1926  81  (!) 103/52   
02/17/21 1852  79  (!) 103/52 91 % 02/17/21 1851 98.8 °F (37.1 °C) 81 18 (!) 103/52 91 % 02/17/21 1539 98.5 °F (36.9 °C) 80 24 (!) 89/52 98 % Oxygen Therapy O2 Sat (%): 100 % (02/18/21 1449) Pulse via Oximetry: 79 beats per minute (02/17/21 1852) O2 Device: Room air (02/18/21 1121) ETCO2 (mmHg): 93 mmHg (02/18/21 0706) Intake/Output Summary (Last 24 hours) at 2/18/2021 1459 Last data filed at 2/18/2021 0800 Gross per 24 hour Intake 270 ml Output  Net 270 ml General:    Well nourished. Alert.   
heent- normal occular movements, atraumatic, normocephalic, pupils equal reacting to light CV:   RRR. No murmur, rub, or gallop. Lungs:   Clear to auscultation bilaterally. No wheezing, rhonchi, or rales. Abdomen:   Soft, nontender, nondistended. Cns- no focal neurological deficits Extremities: Warm and dry. No cyanosis or edema. Skin:     No rashes or jaundice. Data Review: 
I have reviewed all labs, meds, telemetry events, and studies from the last 24 hours. Recent Results (from the past 24 hour(s)) METABOLIC PANEL, BASIC Collection Time: 02/18/21  3:57 AM  
Result Value Ref Range Sodium 134 (L) 136 - 145 mmol/L Potassium 4.4 3.5 - 5.1 mmol/L Chloride 107 98 - 107 mmol/L  
 CO2 19 (L) 21 - 32 mmol/L Anion gap 8 7 - 16 mmol/L Glucose 83 65 - 100 mg/dL BUN 54 (H) 8 - 23 MG/DL Creatinine 0.62 0.6 - 1.0 MG/DL  
 GFR est AA >60 >60 ml/min/1.73m2 GFR est non-AA >60 >60 ml/min/1.73m2 Calcium 7.5 (L) 8.3 - 10.4 MG/DL  
CBC WITH AUTOMATED DIFF Collection Time: 02/18/21  7:34 AM  
Result Value Ref Range WBC 10.5 4.3 - 11.1 K/uL  
 RBC 2.30 (L) 4.05 - 5.2 M/uL HGB 7.0 (L) 11.7 - 15.4 g/dL HCT 21.9 (L) 35.8 - 46.3 % MCV 95.2 79.6 - 97.8 FL  
 MCH 30.4 26.1 - 32.9 PG  
 MCHC 32.0 31.4 - 35.0 g/dL  
 RDW 16.7 (H) 11.9 - 14.6 % PLATELET 065 540 - 494 K/uL MPV 9.6 9.4 - 12.3 FL ABSOLUTE NRBC 0.03 0.0 - 0.2 K/uL  
 DF AUTOMATED NEUTROPHILS 73 43 - 78 % LYMPHOCYTES 11 (L) 13 - 44 % MONOCYTES 14 (H) 4.0 - 12.0 % EOSINOPHILS 1 0.5 - 7.8 % BASOPHILS 1 0.0 - 2.0 % IMMATURE GRANULOCYTES 1 0.0 - 5.0 %  
 ABS. NEUTROPHILS 7.6 1.7 - 8.2 K/UL  
 ABS. LYMPHOCYTES 1.1 0.5 - 4.6 K/UL  
 ABS. MONOCYTES 1.5 (H) 0.1 - 1.3 K/UL  
 ABS. EOSINOPHILS 0.1 0.0 - 0.8 K/UL  
 ABS. BASOPHILS 0.1 0.0 - 0.2 K/UL  
 ABS. IMM. GRANS. 0.1 0.0 - 0.5 K/UL  
RBC, ALLOCATE Collection Time: 02/18/21  8:30 AM  
Result Value Ref Range HISTORY CHECKED? Historical check performed OCCULT BLOOD, STOOL Collection Time: 02/18/21  9:10 AM  
Result Value Ref Range Occult blood, stool Positive (A) NEG    
TYPE & SCREEN Collection Time: 02/18/21 10:58 AM  
Result Value Ref Range Crossmatch Expiration 02/21/2021,2359 ABO/Rh(D) A POSITIVE Antibody screen NEG Unit number [id number] Blood component type [facility] Unit division 00 Status of unit ISSUED Crossmatch result Compatible Unit number [id number] Blood component type [facility] Unit division 00 Status of unit ALLOCATED Crossmatch result Compatible GLUCOSE, POC Collection Time: 02/18/21  2:37 PM  
Result Value Ref Range Glucose (POC) 101 (H) 65 - 100 mg/dL All Micro Results Procedure Component Value Units Date/Time CULTURE, BLOOD [[id number]] Collected: 02/14/21 0735 Order Status: Completed Specimen: Blood Updated: 02/18/21 1130 Special Requests: --     
  RIGHT FOREARM Culture result: NO GROWTH 4 DAYS     
 CULTURE, BLOOD [[id number]] Collected: 02/14/21 0735 Order Status: Completed Specimen: Blood Updated: 02/18/21 1135 Special Requests: --     
  LEFT 
FOREARM Culture result: NO GROWTH 4 DAYS Current Meds: 
Current Facility-Administered Medications Medication Dose Route Frequency  0.9% sodium chloride infusion 250 mL  250 mL IntraVENous PRN  
 diphenhydrAMINE (BENADRYL) capsule 25 mg  25 mg Oral Q6H PRN  pantoprazole (PROTONIX) 40 mg in 0.9% sodium chloride 10 mL injection  40 mg IntraVENous Q12H  
 midodrine (PROAMATINE) tablet 10 mg  10 mg Oral TID WITH MEALS  
 loperamide (IMODIUM) 1 mg/7.5 mL oral solution 2 mg  2 mg Per J Tube QID PRN  
 NUTRITIONAL SUPPORT ELECTROLYTE PRN ORDERS   Does Not Apply PRN  
 levothyroxine (SYNTHROID) tablet 50 mcg  50 mcg Oral 6am  
 nitroglycerin (NITROSTAT) tablet 0.4 mg  0.4 mg SubLINGual Q5MIN PRN  
 [Held by provider] isosorbide mononitrate ER (IMDUR) tablet 30 mg  30 mg Oral DAILY  sodium chloride (NS) flush 5-40 mL  5-40 mL IntraVENous Q8H  
 sodium chloride (NS) flush 5-40 mL  5-40 mL IntraVENous PRN  
 acetaminophen (TYLENOL) tablet 650 mg  650 mg Oral Q6H PRN Or  
 acetaminophen (TYLENOL) suppository 650 mg  650 mg Rectal Q6H PRN  promethazine (PHENERGAN) tablet 12.5 mg  12.5 mg Oral Q6H PRN Or  
 ondansetron (ZOFRAN) injection 4 mg  4 mg IntraVENous Q6H PRN  
 ranolazine ER (RANEXA) tablet 500 mg  500 mg Oral Q12H Other Studies (last 24 hours): No results found. Assessment and Plan:  
 
Hospital Problems as of 2/18/2021 Date Reviewed: 1/6/2021 Codes Class Noted - Resolved POA Severe protein-calorie malnutrition ([facility] [address] (Chronic) ICD-10-CM: Y11 ICD-9-CM: 866  2/16/2021 - Present Yes Acquired hypothyroidism (Chronic) ICD-10-CM: E03.9 ICD-9-CM: 244.9  2/13/2021 - Present Yes History of GI bleed (Chronic) ICD-10-CM: Z87.19 ICD-9-CM: V12.79  2/13/2021 - Present Yes * (Principal) Elevated troponin ICD-10-CM: R77.8 ICD-9-CM: 790.6  2/13/2021 - Present Weight loss ICD-10-CM: R63.4 ICD-9-CM: 783.21  2/24/2020 - Present Yes Hypomagnesemia ICD-10-CM: Y71.64 
ICD-9-CM: 275.2  12/12/2019 - Present Unknown PAF (paroxysmal atrial fibrillation) (HCC) (Chronic) ICD-10-CM: I48.0 ICD-9-CM: 427.31  12/12/2019 - Present Yes Chest pain ICD-10-CM: R07.9 ICD-9-CM: 786.50  5/14/2019 - Present Yes CAD (coronary artery disease) ICD-10-CM: I25.10 ICD-9-CM: 414.00  6/16/2016 - Present Yes PLAN:   
Chest pain- prob esophagitis related, seems unusual for ischemic at this point;  not acs as per cardiology-not a cath candidate , no heparin or anticoagulation as recent gi bleed. -Recent EGD with ulcerative esophagitis and duodenal ulcers 
-carafate, PPI 
 
 
CADs/p CABG: unable to tolerate optimize man medications because of low BP 
 
PAF- off eliquis Acute anemiaGI bleed 
-GI consult, transfuse prn. Hypotension, difficult to optimize anti-ischemic agents 
-Add midodrine Low normal bp- held imdur and renexa on hold Hypothyroid Palliative care consulted. Probable discharge next few days if chest pain better controlled. DC planning/Dispo: DVT ppx:  scd Signed: 
Davey Lyon MD

## 2021-02-18 NOTE — PROGRESS NOTES
Patient refusing to wear prevalon boots and venous pump sleaves for VTE prophylaxis. I explained the importance and her daughter did to but pt still refusing. Heels floated with pillows and foam dressing in place

## 2021-02-18 NOTE — CONSULTS
[facility] Consult Note Primary GI Physician: Former patient of Dr. Harini Bridges Consulting GI Physician: Kate Garcia MD 
Referring Provider:  Odalis Hood MD 
 
Consult Date:  2/18/2021 Admit Date:  2/13/2021 Chief Complaint:  GI bleed, symptomatic anemia Subjective:  
 
History of Present Illness:  Patient is a 66 y.o. female with PMHx of including but not limited to CAD s/p CABG, chronic angina, Afib on Eliquis, gastroparesis with dysphagia, PEG J placed 1/15/21 & scleroderma (requiring feeding tube), who is seen in consultation at the request of Dr. Quentin Hagan for further evaluation of GI bleed/anemia. Patient presented to the ED 2/13 with chest pains. Cardiology evaluated,  does feel chest pain from ischemia but not ACS. No cardiac cath done as patient wanted to be comfortable & did not wish to have additional medical interventions. Patient is not a candidate for anticoagulation, given recent GI bleed, melena, and dark aspirate in G tube last month at [facility]. EGD by [facility] revealed grade D esophagitis and multiple duodenal ulcers. She was felt to be high risk for re-bleeding on [facility]. Last colonoscopy by Dr. Shari Brown 2014 - rectal prolapse, but otherwise normal. Presenting Hgb here on 2/13 was 11.1 and has trended down to 7.0. BUN increased and elevated at 54, up from 27 on admission. Heme +. Per nursing, there has been no melena, just brown stools with minor fresh blood with wiping, felt to be hemorrhoids, and no blood mixed in stools. G tube aspirate visualized by this provider noted bilious fluid, no dark aspirate or blood seen. She has had diarrhea after her tube feeds formula was changed, but did well and had no diarrhea with TF at home prior to admission. No N/V. Complains of mild mid abdominal discomfort. PMH: 
Past Medical History:  
Diagnosis Date  Abnormal EKG 6/17/2016  Acute systolic (congestive) heart failure ([facility] [address] 6/17/2016  Anterior myocardial infarction ([address] 11/20/1997  Avascular necrosis ([address] R hip  CAD (coronary artery disease) 1990  
 s/p CABG  
 Cellulitis of right lower extremity 6/17/2016  Chest pain, precordial 3/28/2016  COPD (chronic obstructive pulmonary disease) ([address]   
 pt denies  Coronary atherosclerosis of native coronary vessel 03/28/2016  
 per heart cath (6/2016)- \"pt with diffusely diseased LAD and small caliber vessels. At present. .. best option would be medical management. .. do not see any obvious good options for redo CABG. ..  Fracture of femoral neck, right ([address] 02/27/2016  
 s/p repair with hardware- Dr Davina Veliz  GERD (gastroesophageal reflux disease) 02/27/2016  
 managed with medication  HLD (hyperlipidemia) 03/28/2016  
 managed with medication  Hx of echocardiogram 02/27/2016 EF 45-50%, hypokinesis of the apical anterior and basal-mid anteroseptal wall. Mild aortic valve regurgitation. Mild tricuspid regurgitation.  Hypertension   
 managed with medication  Hypokalemia 02/27/2016  
 hx of- WNL since 6/2016- pt on K+ supplement  IBS (irritable bowel syndrome)  Ischemic cardiomyopathy 5/15/2018  MI (myocardial infarction) ([address] 1990 and spring 2016  
 x2  Osteoarthritis  Osteoporosis  Rectal prolapse  S/P CABG (coronary artery bypass graft) 1990  
 S/P total hip arthroplasty 12/14/2016  Scleroderma ([address] 3/28/2016  Shortness of breath dyspnea 3/28/2016  Tracheal stenosis 1995  
 s/p repair  Unstable angina ([address] 11/20/1997 PSH: 
Past Surgical History:  
Procedure Laterality Date  HX ANKLE FRACTURE TX Right 1992  
 hardware placed  HX CORONARY ARTERY BYPASS GRAFT  1990  
 cabg x 3  
 HX FRACTURE TX  1995  
 cervical spine- C2  
 HX HEART CATHETERIZATION    
 HX HEENT  1995  
 tracheal stricture/stenosis [employer] HIP FRACTURE TX  02/2016  
 repaired with hardware- Dr Davina Veliz  HX HYSTERECTOMY  1970s  HX OTHER SURGICAL Peg tube placement  HX PTCA  HX TRACHEOSTOMY Allergies: Allergies Allergen Reactions  Corticosteroids (Glucocorticoids) Anaphylaxis  Iodine Hives and Other (comments)  
  hypertension Home Medications: 
Prior to Admission medications Medication Sig Start Date End Date Taking? Authorizing Provider  
acetaminophen (TYLENOL) 100 mg/mL suspension 650 mg by Per G Tube route every six (6) hours as needed for Pain. Yes Provider, Historical  
amiodarone (CORDARONE) 200 mg tablet Take 200 mg by mouth daily. Yes Provider, Historical  
lansoprazole (PREVACID SOLUTAB) 30 mg disintegrating tablet 30 mg by Per G Tube route Before breakfast and dinner. Yes Provider, Historical  
metoclopramide HCl (REGLAN) 5 mg tablet 5 mg by Per G Tube route Before breakfast, lunch, and dinner. Yes Provider, Historical  
lidocaine (LIDODERM) 5 % 1 Patch by TransDERmal route every twenty-four (24) hours. Apply patch to the affected area for 12 hours a day and remove for 12 hours a day. Yes Provider, Historical  
fluticasone propionate (FLOVENT DISKUS) 50 mcg/actuation inhaler Take  by inhalation. Yes Provider, Historical  
aspirin 81 mg chewable tablet Take 81 mg by mouth nightly. Per G tube route   Yes Provider, Historical  
atorvastatin (LIPITOR) 40 mg tablet Take 1 Tab by mouth daily. Indications: HYPERCHOLESTEROLEMIA Patient taking differently: 40 mg by Per G Tube route nightly. Indications: high cholesterol 3/31/16  Yes Gigi Perea MD  
gabapentin (NEURONTIN) 250 mg/5 mL solution 100 mg by Per G Tube route two (2) times a day. 2 ml (100 mg) by G-tube route 2 times a day  Indications: weakness, numbness or pain from nerve damage    Provider, Historical  
nitroglycerin (NITROSTAT) 0.4 mg SL tablet 1 Tab by SubLINGual route every five (5) minutes as needed for Chest Pain. Up to 3 doses.  5/16/19   Michelle Vora MD  
 ondansetron (ZOFRAN ODT) 8 mg disintegrating tablet Take 1 Tab by mouth every eight (8) hours as needed for Nausea. 19   Stan Artis MD  
levothyroxine (SYNTHROID) 50 mcg tablet Take  by mouth Daily (before breakfast). Provider, Historical  
loperamide (IMODIUM) 2 mg capsule Take 2 mg by mouth as needed for Diarrhea. Provider, Historical  
 
 
Hospital Medications: 
Current Facility-Administered Medications Medication Dose Route Frequency  0.9% sodium chloride infusion 250 mL  250 mL IntraVENous PRN  
 diphenhydrAMINE (BENADRYL) capsule 25 mg  25 mg Oral Q6H PRN  
 famotidine (PEPCID) tablet 20 mg  20 mg Oral Q24H  
 loperamide (IMODIUM) 1 mg/7.5 mL oral solution 2 mg  2 mg Per J Tube QID PRN  
 NUTRITIONAL SUPPORT ELECTROLYTE PRN ORDERS   Does Not Apply PRN  
 levothyroxine (SYNTHROID) tablet 50 mcg  50 mcg Oral 6am  
 nitroglycerin (NITROSTAT) tablet 0.4 mg  0.4 mg SubLINGual Q5MIN PRN  
 [Held by provider] isosorbide mononitrate ER (IMDUR) tablet 30 mg  30 mg Oral DAILY  sodium chloride (NS) flush 5-40 mL  5-40 mL IntraVENous Q8H  
 sodium chloride (NS) flush 5-40 mL  5-40 mL IntraVENous PRN  
 acetaminophen (TYLENOL) tablet 650 mg  650 mg Oral Q6H PRN Or  
 acetaminophen (TYLENOL) suppository 650 mg  650 mg Rectal Q6H PRN  promethazine (PHENERGAN) tablet 12.5 mg  12.5 mg Oral Q6H PRN Or  
 ondansetron (ZOFRAN) injection 4 mg  4 mg IntraVENous Q6H PRN  
 ranolazine ER (RANEXA) tablet 500 mg  500 mg Oral Q12H Social History: 
Social History Tobacco Use  Smoking status: Former Smoker Packs/day: 1.00 Years: 20.00 Pack years: 20.00 Quit date: 1995 Years since quittin.1  Smokeless tobacco: Never Used Substance Use Topics  Alcohol use: No  
 
 
Pt denies any history of drug use, blood transfusions, or tattoos. Family History: 
Family History Problem Relation Age of Onset  Breast Cancer Sister Review of Systems: A detailed 10 system ROS is obtained, with pertinent positives as listed above. All others are negative. Diet:  Regular and G tube feed Objective:  
 
Physical Exam: 
Vitals: 
Visit Vitals BP (!) 94/50 Pulse 82 Temp 98.4 °F (36.9 °C) Resp 18 Ht 5' 4\" (1.626 m) Wt 37.5 kg (82 lb 11.2 oz) SpO2 94% Breastfeeding No  
BMI 14.20 kg/m² Gen:  Pt is alert, cooperative, thin appearing elderly female. Skin:  Extremities and face reveal no rashes. HEENT: Sclerae anicteric. Extra-occular muscles are intact. No oral ulcers. No abnormal pigmentation of the lips. The neck is supple. Cardiovascular: Regular rate and rhythm. No murmurs, gallops, or rubs. Respiratory:  Comfortable breathing with no accessory muscle use. Clear breath sounds anteriorly with no wheezes, rales, or rhonchi. GI:  Abdomen nondistended, soft, and nontender. Normal active bowel sounds. G tube present, bile aspirated from G tube, no blood or coffee grounds noted. Rectal:  Deferred Musculoskeletal:  No pitting edema of the lower legs. Neurological:  Gross memory appears intact. Patient is alert and oriented. Psychiatric:  Mood appears appropriate with judgement intact. Lymphatic:  No cervical or supraclavicular adenopathy. Laboratory:   
Recent Labs  
  02/18/21 
0734 02/18/21 
0357 02/17/21 
0322 02/16/21 
0413 WBC 10.5  --   --   --   
HGB 7.0*  --   --   --   
HCT 21.9*  --   --   --   
  --   --   -- MCV 95.2  --   --   --   
NA  --  134* 135* 136 K  --  4.4 4.3 5.0  
CL  --  107 101 103 CO2  --  19* 26 32 BUN  --  54* 55* 37* CREA  --  0.62 0.79 0.72 CA  --  7.5* 7.6* 7.6*  
MG  --   --  2.0 2.2 GLU  --  83 163* 84 Assessment:  
 
Principal Problem: 
Elevated troponin (2/13/2021) Active Problems: 
CAD (coronary artery disease) (6/16/2016) Chest pain (5/14/2019) Hypomagnesemia (12/12/2019) PAF [facility]oxysmal [facility] fibrillation) ([facility] (12/12/2019) Weight loss (2/24/2020) Acquired hypothyroidism (2/13/2021) History of GI bleed (2/13/2021) Severe protein-calorie malnutrition ([facility] [address] [address] (2/16/2021) 66 y.o. female with PMHx of including but not limited to CAD s/p CABG, chronic angina, Afib on Eliquis, gastroparesis with dysphagia, PEG J placed 1/15/21 & scleroderma (requiring feeding tube), who presents with chest pain. Cardiology evaluated, does feel chest pain from ischemia but not ACS. No cardiac cath done as patient wanted to be comfortable & did not wish to have additional medical interventions. She was admitted to [facility] last month with melena and ABLA. EGD done by [facility] revealed severe esophagitis and multiple duodenal ulcers. Anticoagulation was stopped due to high risk re-bleed. Her Hgb continues to drift down to current levels of 7.0. Heme +, but no overt or active bleeding seen per nursing. G tube aspirate revealed thin green bilious fluid, no blood or coffee-grounds seen. Nursing reports brown stools with minor fresh blood with wiping, felt to be hemorrhoidal related, and no sylvia hematochezia seen or blood mixed in stools. She has had diarrhea since her G tube feeding formula was changes, but did not have any issues with her home tube feeds prior to admission. Plan:  
 
Patient does not wish to undergo any invasive procedures including EGD or colonoscopy nor desires any further work-up for GI bleeding. Spoke to patient's daughter on the phone, [facility] who is a physical therapist here and wants what her mother wishes. Recommended transfusions as needed. May benefit from IV iron infusion while inpatient. Daughter to bring home tube feeds to see if diarrhea improves. Stop Pepcid and restart Protonix 40mg po bid for one more month, then once daily thereafter to ensure ulcer healing. Anusol supp 1 ID qhs for suspected minor hemorrhoidal bleeding. Pls call if we can be of any further assistance. Signing-off. Patient is seen and examined in collaboration with Dr. Nori Thorpe. Assessment and plan as per Dr. Cris Rausch. Colleen Miranda PA-C [facility]

## 2021-02-18 NOTE — PROGRESS NOTES
Patient has had multiple loose stool since start of pm shift. Skin is inflamed and raw between gluteal folds. Zinc paste and incontinence care is being provided as needed. PRN Imodium admin. Sera Meade MD the following message: Ms. Avery Esparza is currently on continuous feeding. There has been a change in her formula. Since change of formula there has been continuous loose stools. I been changing her constantly. Her bottom is starting to become raw from the loose stools and frequent changing. I was now just able to give her PRN Imodium. Could we pause the feedings for now ? Let the oncoming shift consult dietary for a change in formula? Orders to pause the feeding.

## 2021-02-18 NOTE — WOUND CARE
Patient seen for reported red heels. Noted dark red to plantar heels, but both are currently blanchable. Heel suspension boots in use now. Red incontinence damage to perineal skin, zinc paste in use and appropriate. Discussed with nurse at bedside. Answered all questions. Will sign off. Please all if needed.

## 2021-02-18 NOTE — PROGRESS NOTES
Problem: Falls - Risk of 
Goal: *Absence of Falls Description: Document Denisa Blood Fall Risk and appropriate interventions in the flowsheet. Outcome: Progressing Towards Goal 
Note: Fall Risk Interventions: 
Mobility Interventions: Bed/chair exit alarm, Patient to call before getting OOB Medication Interventions: Bed/chair exit alarm, Patient to call before getting OOB, Teach patient to arise slowly Elimination Interventions: Call light in reach, Bed/chair exit alarm, Toileting schedule/hourly rounds History of Falls Interventions: Bed/chair exit alarm, Door open when patient unattended, Room close to nurse's station Problem: Patient Education: Go to Patient Education Activity Goal: Patient/Family Education Outcome: Progressing Towards Goal 
  
Problem: Pressure Injury - Risk of 
Goal: *Prevention of pressure injury Description: Document Eliel Scale and appropriate interventions in the flowsheet. Outcome: Progressing Towards Goal 
Note: Pressure Injury Interventions: 
Sensory Interventions: Avoid rigorous massage over bony prominences, Assess need for specialty bed, Assess changes in LOC, Check visual cues for pain, Float heels, Keep linens dry and wrinkle-free, Maintain/enhance activity level, Minimize linen layers, Monitor skin under medical devices, Pad between skin to skin, Pressure redistribution bed/mattress (bed type) Moisture Interventions: Absorbent underpads, Apply protective barrier, creams and emollients, Check for incontinence Q2 hours and as needed, Limit adult briefs, Maintain skin hydration (lotion/cream), Minimize layers Activity Interventions: Pressure redistribution bed/mattress(bed type), PT/OT evaluation Mobility Interventions: Assess need for specialty bed, HOB 30 degrees or less, Pressure redistribution bed/mattress (bed type), PT/OT evaluation Nutrition Interventions: Document food/fluid/supplement intake Friction and Shear Interventions: Apply protective barrier, creams and emollients, Foam dressings/transparent film/skin sealants, Minimize layers, Transferring/repositioning devices Problem: Patient Education: Go to Patient Education Activity Goal: Patient/Family Education Outcome: Progressing Towards Goal

## 2021-02-18 NOTE — PROGRESS NOTES
Verbal bedside report given to oncoming RN, Keri Munoz. Patient's situation, background, assessment and recommendations provided. Opportunity for questions provided. Oncoming RN assumed care of patient.

## 2021-02-19 NOTE — PROGRESS NOTES
#18g 8cm PIV endurance cath lot #[id number] inserted into Lbrachial using aseptic technique under US guidance. Pt tolerated well. Hanna Tipton RN VAT

## 2021-02-19 NOTE — PROGRESS NOTES
Patient daughter, Ophelia Arechiga updated on patient status and patient refusing care at this time. Daughter states she will be up to see patient shortly.

## 2021-02-19 NOTE — PROGRESS NOTES
Pt daughter, requested to restart home dose of gabapentin, mary jane to order per Dr. Pacheco Meza. Also asked if telemetry monitoring could be d/c per patient request, MD hinton to d/c.

## 2021-02-19 NOTE — PROGRESS NOTES
Spiritual Care Visit, initial visit. Visited with patient at bedside. Patient is DNR; daughter was present when I visited. Prayed for patient's healing and health. Visit by Louie Hanley, Staff .  Goldie., Sydnee.B., B.A.

## 2021-02-19 NOTE — PROGRESS NOTES
Pt became increasingly combative throughout the night. She tried to strike the [place] during her morning draw. While I was attempting to change her brief she struck me several times and said, \"I am going to slap your face\". I educated pt on the risks associated with prolonged moisture exposure. Eventually she relented and let me finish changing her brief. I managed to get a foam pad onto her sacrum but she refused the zinc paste.

## 2021-02-19 NOTE — PROGRESS NOTES
Verbal bedside report given to oncoming RN, Fannie España. Patient's situation, background, assessment and recommendations provided. Opportunity for questions provided. Oncoming RN assumed care of patient.

## 2021-02-19 NOTE — PROGRESS NOTES
Pt initially refused labs after having received 2 units of blood. After educating pt on need for said labs she agreed. Additionally, pt refuses boots, heel floating and turning.

## 2021-02-19 NOTE — PROGRESS NOTES
Patient refusing lab work this am and  IV Protonix. Pt states she wants to be left alone and wants to go home. MD notified via Wedit

## 2021-02-19 NOTE — PROGRESS NOTES
Comprehensive Nutrition Assessment Type and Reason for Visit: Xiomara Joy Tube Feeding Management (Hospitalist) Nutrition Recommendations/Plan:  
Enteral Nutrition:  
Peptamen 1.5 via PEG/J via J port at goal rate of 35ml/hour. Water flush 40ml/ hours. At goal will provide 1260 kcal (100% estimated calorie needs), 57 grams protein (100% estimated protein needs) and 888ml free fluid (<1ml/kcal) for 24HRS infusion. Vitamin and Mineral Supplement Therapy: 
Electrolyte management replacement protocol active. Labs: 
? BMP daily, Mg and Phos MWF. · Oral Nutrition: Continue regular diet and Magic Cup 1/day for comfort Malnutrition Assessment: 
Malnutrition Status: Severe malnutrition Context: Chronic illness Findings of clinical characteristics of malnutrition:  
Energy Intake:  Unable to assess(TF dependent since January 2021) Weight Loss:  7 - Greater than 5% over 1 month Body Fat Loss:  7 - Severe body fat loss, Fat overlying ribs, Orbital  
Muscle Mass Loss:  7 - Severe muscle mass loss, Calf (gastrocnemius), Clavicles (pectoralis &deltoids), Hand (interosseous), Scapula (trapezius), Thigh (quadraceps), Temples (temporalis) Fluid Accumulation:  Unable to assess,   
 Strength:  Not performed Nutrition Assessment:  
Nutrition History: Pt's daughter reports she has been receiving TF since PEG-J placed 1/15. Previously on Osmolite 1.5, until 2/9. Formula was switched to Peptamen 1.5 due to ongoing diarrhea. She reports better tolerance with formula switch. Home regimen is Peptide 1.5 @ 35ml/hr with 30ml Q4H water flush. Cultural/Quaker/Ethnic Food Preference(s): None Nutrition Background: Pt admitted with concerns for STEMI. Pt declined C and requesting to be comfortable. PMH notable for gastroparesis (10/1/20), dysphagia, s/p PEG-J 1/15/21, CAD s/p CABG, scleroderma, CHF, COPD, GERD, HLD, HTN, IBS, MI, OA, and osteoporosis. Daily Update: Daughter provided Peptamen 1.5 due to ongoing loose stools with Abbott equivalent formula (Vital AF). TF held 2/18 during night shift until daughter brought home TF. Pt had 3 BM recorded yesterday. Continuing current TF as ordered. Abdominal Status (last documented): Soft, Flat abdomen with Active  bowel sounds. Last BM 02/18/21. Pertinent Medications: Protonix, Carafate Pertinent Labs: Sodium 138, Potassium 4.2 Nutrition Related Findings: PEG-J   
 
Current Nutrition Therapies: DIET REGULAR 
DIET NUTRITIONAL SUPPLEMENTS Breakfast; Magic Cups DIET TUBE FEEDING Feeds via J-port Please open for rate and flush Peptamen 1.5 (Formula Patient supplied) Current Tube Feeding (TF) Orders: · Feeding Route: PEG/J 
· Formula: Peptamen 1.5 · Schedule:Continuous · Regimen: 35ml/hr · Additives/Modulars: (None ordered) · Water Flushes: 40ml Q4H 
· Current TF & Flush Orders Provides: 1260kcal, 57gm protein, 888ml H2O Current Intake: Average Meal Intake: (po for comfort) Average Supplement Intake: (po for comfort) Anthropometric Measures: 
Height: 5' 4\" (162.6 cm) Current Body Wt: 37.7 kg (83 lb 1.8 oz)(2/19), Weight source: Bed scale BMI: 14.3, Underweight (BMI less than 18.5) Admission Body Weight: 83 lb 12.4 oz(2/15; Not specified) Ideal Body Weight (lbs) (Calculated): 120 lbs (55 kg), 69.8 % Usual Body Wt: 41.3 kg (91 lb 0.8 oz)(1/10; hospital admission), Percent weight change: -8 
       
Estimated Daily Nutrient Needs: 
Energy (kcal/day): 3717-8671 (Kcal/kg(33-38), Weight Used: Current(38kg)) Protein (g/day): 46-57(1.2-1.5) Weight Used: (Current(38kg)) Fluid (ml/day):   (1 ml/kcal) Nutrition Diagnosis:  
· Inadequate oral intake related to altered GI function as evidenced by (hx of gastroparesis; TF dependent) · Severe malnutrition, In context of chronic illness related to inadequate protein-energy intake as evidenced by (criteria provided in malnutrition assessment) Nutrition Interventions:  
Food and/or Nutrient Delivery: Continue current diet, Continue oral nutrition supplement, Continue tube feeding Coordination of Nutrition Care: Continue to monitor while inpatient Plan of Care discussed with Renetta Mccoy Goals:  
Previous Goal Met: Goal(s) achieved Active Goal: Maintain estimated nutrition needs via EN during admission Nutrition Monitoring and Evaluation:  
  
Food/Nutrient Intake Outcomes: Enteral nutrition intake/tolerance, Food and nutrient intake, Supplement intake Discharge Planning:   
Enteral nutrition Electronically signed by Isaac Valenzuela MS, RDN, LD 2/19/2021 at 3:39 PM 
Contact: [phone] Dis[employer] active

## 2021-02-19 NOTE — PROGRESS NOTES
Per Dr. María Elena Mina urine sample to be obtained, patient refuses straight cath at this time. Patient oriented to self, place, and time (moth and year). Patient states that she does not want telemetry monitor on states she \"feels like it choking me, I don't want all these wire\". Monitor removed for the time being. Patient again reiterated that she wants to go home.

## 2021-02-19 NOTE — PROGRESS NOTES
Hospitalist Progress Note Admit Date:  2021  8:24 PM  
Name:  Gamal Amaral Age:  66 y.o. 
:  1945 MRN:  [id number] PCP:  Jose Antonio Barrios MD 
Treatment Team: Attending Provider: Eladia Foster MD; Utilization Review: Vicky Romano; Care Manager: Cesar Regalado; Utilization Review: Padmaja Grimaldo RN; Consulting Provider: Brandy Blankenship NP; Consulting Provider: Astrid Mosquera MD; Primary Nurse: Carlos Manning RN Subjective:  
Patient is 72yoF with complex medical history including CAD s/p CABG, scleroderma (requiring feeding tube), recent GI bleed (hospitalized at [facility]) who presents with chest pain. On ER evaluation, patient was found to have evidence of STEMI with EKG changes and elevated troponin. Cardiology evaluated patient. Patient declines cardiac cath and reported that she wanted to be comfortable, but did not wish to have additional medical interventions. Patient is not a candidate for anticoagulation, given recent severe GI bleed. Hospitalist consulted for admission for pain control and potentially comfort care. 2021 Says no chest pain now but on and off 
Cardiology no intervention like cath Low normal bp 
 
2/15/2021 C/o on and off chest pain 
bp improving 2021 C/o chest pain  Hypotension, limiting antianginal agents Continues to have chest pain Echo was ordered  
-Anemia improved after blood transfusions 
-Pt still dependent on midodrine 10md tid which she doesn't take at home 
-pt seems more lethargic and confused and less interactive than prior days 
-will give IVF/albumin now that had recovery of EF 
-pt declined endoscopy yesterday and was transfused 2 units blood yesterday for GI bleed 
-less anemic today, still hypotensive2 unit PRBC Objective:  
 
Patient Vitals for the past 24 hrs: 
 Temp Pulse Resp BP SpO2  
21 1632 97.4 °F (36.3 °C) 70 18 (!) 120/58  02/19/21 1106 97.8 °F (36.6 °C) 66 16 (!) 94/53 97 % 02/19/21 0729 97.4 °F (36.3 °C) 74 18 (!) 103/55 97 % 02/19/21 0320 98.1 °F (36.7 °C) 73 18 (!) 112/58 97 % 02/18/21 2320 98.1 °F (36.7 °C)  18 (!) 106/55 96 % 02/18/21 1907 97.7 °F (36.5 °C) 71 18 127/61 93 % 02/18/21 1905 97.7 °F (36.5 °C) 70 16 127/61   
02/18/21 1750 97.6 °F (36.4 °C) 72 16 (!) 116/56 93 % 02/18/21 1650 98 °F (36.7 °C) 72 16 (!) 102/56 93 % Oxygen Therapy O2 Sat (%): 97 % (02/19/21 1106) Pulse via Oximetry: 74 beats per minute (02/19/21 0320) O2 Device: Room air (02/19/21 0840) ETCO2 (mmHg): 93 mmHg (02/18/21 0706) Intake/Output Summary (Last 24 hours) at 2/19/2021 1638 Last data filed at 2/19/2021 1219 Gross per 24 hour Intake 855 ml Output 200 ml Net 655 ml General:    Well nourished. Alert.   
heent- normal occular movements, atraumatic, normocephalic, pupils equal reacting to light CV:   RRR. No murmur, rub, or gallop. Lungs:   Clear to auscultation bilaterally. No wheezing, rhonchi, or rales. Abdomen:   Soft, nontender, nondistended. Cns- no focal neurological deficits Extremities: Warm and dry. No cyanosis or edema. Skin:     No rashes or jaundice. Data Review: 
I have reviewed all labs, meds, telemetry events, and studies from the last 24 hours. Recent Results (from the past 24 hour(s)) HGB & HCT Collection Time: 02/18/21 10:45 PM  
Result Value Ref Range HGB 11.1 (L) 11.7 - 15.4 g/dL HCT 33.2 (L) 35.8 - 46.3 % TSH 3RD GENERATION Collection Time: 02/18/21 10:45 PM  
Result Value Ref Range TSH 6.240 (H) 0.358 - 3.740 uIU/mL CBC WITH AUTOMATED DIFF Collection Time: 02/19/21  3:19 AM  
Result Value Ref Range WBC 8.7 4.3 - 11.1 K/uL  
 RBC 3.96 (L) 4.05 - 5.2 M/uL  
 HGB 11.8 11.7 - 15.4 g/dL HCT 34.9 (L) 35.8 - 46.3 % MCV 88.1 79.6 - 97.8 FL  
 MCH 29.8 26.1 - 32.9 PG  
 MCHC 33.8 31.4 - 35.0 g/dL  
 RDW 17.5 (H) 11.9 - 14.6 % PLATELET 306 408 - 080 K/uL MPV 9.2 (L) 9.4 - 12.3 FL ABSOLUTE NRBC 0.03 0.0 - 0.2 K/uL  
 DF AUTOMATED NEUTROPHILS 69 43 - 78 % LYMPHOCYTES 15 13 - 44 % MONOCYTES 14 (H) 4.0 - 12.0 % EOSINOPHILS 1 0.5 - 7.8 % BASOPHILS 1 0.0 - 2.0 % IMMATURE GRANULOCYTES 1 0.0 - 5.0 %  
 ABS. NEUTROPHILS 6.0 1.7 - 8.2 K/UL  
 ABS. LYMPHOCYTES 1.3 0.5 - 4.6 K/UL  
 ABS. MONOCYTES 1.2 0.1 - 1.3 K/UL  
 ABS. EOSINOPHILS 0.1 0.0 - 0.8 K/UL  
 ABS. BASOPHILS 0.1 0.0 - 0.2 K/UL  
 ABS. IMM. GRANS. 0.1 0.0 - 0.5 K/UL METABOLIC PANEL, BASIC Collection Time: 02/19/21  3:19 AM  
Result Value Ref Range Sodium 138 136 - 145 mmol/L Potassium 4.2 3.5 - 5.1 mmol/L Chloride 109 (H) 98 - 107 mmol/L  
 CO2 21 21 - 32 mmol/L Anion gap 8 7 - 16 mmol/L Glucose 96 65 - 100 mg/dL BUN 30 (H) 8 - 23 MG/DL Creatinine 0.57 (L) 0.6 - 1.0 MG/DL  
 GFR est AA >60 >60 ml/min/1.73m2 GFR est non-AA >60 >60 ml/min/1.73m2 Calcium 7.7 (L) 8.3 - 10.4 MG/DL  
LACTIC ACID Collection Time: 02/19/21 10:48 AM  
Result Value Ref Range Lactic acid 1.2 0.4 - 2.0 MMOL/L  
URINALYSIS W/ RFLX MICROSCOPIC Collection Time: 02/19/21 12:13 PM  
Result Value Ref Range Color YELLOW Appearance CLEAR Specific gravity 1.016 1.001 - 1.023    
 pH (UA) 5.0 5.0 - 9.0 Protein Negative NEG mg/dL Glucose Negative mg/dL Ketone Negative NEG mg/dL Bilirubin Negative NEG Blood Negative NEG Urobilinogen 0.2 0.2 - 1.0 EU/dL Nitrites Negative NEG Leukocyte Esterase Negative NEG    
 WBC 0-3 0 /hpf  
 RBC 0-3 0 /hpf Epithelial cells 0 0 /hpf Bacteria 0 0 /hpf Casts 0 0 /lpf All Micro Results Procedure Component Value Units Date/Time CULTURE, BLOOD [[id number]] Collected: 02/14/21 0735 Order Status: Completed Specimen: Blood Updated: 02/19/21 4891 Special Requests: --     
  RIGHT 
FOREARM Culture result: NO GROWTH 5 DAYS CULTURE, BLOOD [[id number]] Collected: 02/14/21 0735 Order Status: Completed Specimen: Blood Updated: 02/19/21 8638 Special Requests: --     
  LEFT 
FOREARM Culture result: NO GROWTH 5 DAYS Current Meds: 
Current Facility-Administered Medications Medication Dose Route Frequency  gabapentin (NEURONTIN) capsule 100 mg  100 mg Oral BID  albumin human 25% (BUMINATE) solution 50 g  50 g IntraVENous ONCE  
 lactated ringers bolus infusion 1,000 mL  1,000 mL IntraVENous ONCE  
 lactated Ringers infusion  100 mL/hr IntraVENous CONTINUOUS  
 0.9% sodium chloride infusion 250 mL  250 mL IntraVENous PRN  
 diphenhydrAMINE (BENADRYL) capsule 25 mg  25 mg Oral Q6H PRN  pantoprazole (PROTONIX) 40 mg in 0.9% sodium chloride 10 mL injection  40 mg IntraVENous Q12H  
 midodrine (PROAMATINE) tablet 10 mg  10 mg Oral TID WITH MEALS  sucralfate (CARAFATE) tablet 1 g  1 g Oral AC&HS  loperamide (IMODIUM) 1 mg/7.5 mL oral solution 2 mg  2 mg Per J Tube QID PRN  
 NUTRITIONAL SUPPORT ELECTROLYTE PRN ORDERS   Does Not Apply PRN  
 levothyroxine (SYNTHROID) tablet 50 mcg  50 mcg Oral 6am  
 nitroglycerin (NITROSTAT) tablet 0.4 mg  0.4 mg SubLINGual Q5MIN PRN  
 [Held by provider] isosorbide mononitrate ER (IMDUR) tablet 30 mg  30 mg Oral DAILY  sodium chloride (NS) flush 5-40 mL  5-40 mL IntraVENous Q8H  
 sodium chloride (NS) flush 5-40 mL  5-40 mL IntraVENous PRN  
 acetaminophen (TYLENOL) tablet 650 mg  650 mg Oral Q6H PRN Or  
 acetaminophen (TYLENOL) suppository 650 mg  650 mg Rectal Q6H PRN  promethazine (PHENERGAN) tablet 12.5 mg  12.5 mg Oral Q6H PRN Or  
 ondansetron (ZOFRAN) injection 4 mg  4 mg IntraVENous Q6H PRN  
 ranolazine ER (RANEXA) tablet 500 mg  500 mg Oral Q12H Other Studies (last 24 hours): No results found. Assessment and Plan:  
 
Hospital Problems as of 2/19/2021 Date Reviewed: 1/6/2021 Codes Class Noted - Resolved POA Severe protein-calorie malnutrition ([facility] [address] (Chronic) ICD-10-CM: L52 ICD-9-CM: 432  2/16/2021 - Present Yes Acquired hypothyroidism (Chronic) ICD-10-CM: E03.9 ICD-9-CM: 244.9  2/13/2021 - Present Yes History of GI bleed (Chronic) ICD-10-CM: Z87.19 ICD-9-CM: V12.79  2/13/2021 - Present Yes * (Principal) Elevated troponin ICD-10-CM: R77.8 ICD-9-CM: 790.6  2/13/2021 - Present Weight loss ICD-10-CM: R63.4 ICD-9-CM: 783.21  2/24/2020 - Present Yes Hypomagnesemia ICD-10-CM: A50.75 
ICD-9-CM: 275.2  12/12/2019 - Present Unknown PAF (paroxysmal atrial fibrillation) (HCC) (Chronic) ICD-10-CM: I48.0 ICD-9-CM: 427.31  12/12/2019 - Present Yes Chest pain ICD-10-CM: R07.9 ICD-9-CM: 786.50  5/14/2019 - Present Yes CAD (coronary artery disease) ICD-10-CM: I25.10 ICD-9-CM: 414.00  6/16/2016 - Present Yes PLAN:   
Chest pain- prob esophagitis related, seems unusual for ischemic at this point;  not acs as per cardiology-not a cath candidate , no heparin or anticoagulation as recent gi bleed. -Recent EGD with ulcerative esophagitis and duodenal ulcers 
-carafate, PPI 
 
 
CADs/p CABG: unable to tolerate optimize medications because of low Bps. EF is recovered. PAF- off eliquis Acute anemiaGI bleed 
-GI consult, transfuse prn. Hypotension, difficult to optimize anti-ischemic agents 
-midodrine 
-IVF/albumin, compression stocking, check orthostatics Low normal bp- held imdur and renexa on hold Hypothyroid DC planning/Dispo:  Home soon DVT ppx:  scd Signed: 
Lisbet Tucker MD

## 2021-02-20 NOTE — PROGRESS NOTES
Hospitalist Progress Note Admit Date:  2021  8:24 PM  
Name:  Dickson Harrison Age:  66 y.o. 
:  1945 MRN:  [id number] PCP:  Abiola Norton MD 
Treatment Team: Attending Provider: Niharika Joya MD; Utilization Review: Veda Cagle; Care Manager: Landon Quispe; Utilization Review: Driss Kruse RN; Consulting Provider: Alirio Padilla NP; Consulting Provider: Rebecca Catherine MD; Primary Nurse: Neville Gautam RN; Consulting Provider: Halima Amos MD 
 
Subjective:  
Patient is 78RIV with complex medical history including CAD s/p CABG, scleroderma (requiring feeding tube), recent GI bleed (hospitalized at [facility]) who presents with chest pain. On ER evaluation, patient was found to have evidence of STEMI with EKG changes and elevated troponin. Cardiology evaluated patient. Patient declines cardiac cath and reported that she wanted to be comfortable, but did not wish to have additional medical interventions. Patient is not a candidate for anticoagulation, given recent severe GI bleed. Hospitalist consulted for admission for pain control and potentially comfort care. 2021 Says no chest pain now but on and off 
Cardiology no intervention like cath Low normal bp 
 
2/15/2021 C/o on and off chest pain 
bp improving 2021 C/o chest pain  Hypotension, limiting antianginal agents Continues to have chest pain Echo was ordered  
-Anemia improved after blood transfusions 
-Pt still dependent on midodrine 10md tid which she doesn't take at home 
-pt seems more lethargic and confused and less interactive than prior days 
-will give IVF/albumin now that had recovery of EF 
-pt declined endoscopy yesterday and was transfused 2 units blood yesterday for GI bleed 
-less anemic today, still hypotensive2 unit PRBC 
 
 Had vomitting, KUB with ileus RLE with extensive DVT, IVC filter placed D/w Onc: ok for eliquis 2.5mg bid V/Q scan: low probability of PE 
 
 
 
 
Objective:  
 
Patient Vitals for the past 24 hrs: 
 Temp Pulse Resp BP SpO2  
02/20/21 1254    (!) 90/45   
02/20/21 1252    (!) 85/48   
02/20/21 1250    (!) 99/47   
02/20/21 1041 97.8 °F (36.6 °C) 68 14 (!) 89/54 92 % 02/20/21 0716 98.9 °F (37.2 °C) 74 16 (!) 102/51   
02/20/21 0342     91 % 02/20/21 0337 98.1 °F (36.7 °C) 78 18 (!) 112/54 (!) 88 % 02/19/21 2310 98.6 °F (37 °C) 73 18 (!) 101/57 92 % 02/19/21 1947 98.6 °F (37 °C) 77 18 125/60 90 % Oxygen Therapy O2 Sat (%): 92 % (02/20/21 1041) Pulse via Oximetry: 78 beats per minute (02/20/21 0337) O2 Device: Room air (02/20/21 1704) O2 Flow Rate (L/min): 3 l/min (02/20/21 1454) ETCO2 (mmHg): 93 mmHg (02/18/21 0706) Intake/Output Summary (Last 24 hours) at 2/20/2021 1846 Last data filed at 2/20/2021 1250 Gross per 24 hour Intake 210 ml Output 960 ml Net -750 ml General:    Well nourished. Alert.   
heent- normal occular movements, atraumatic, normocephalic, pupils equal reacting to light CV:   RRR. No murmur, rub, or gallop. Lungs:   Clear to auscultation bilaterally. No wheezing, rhonchi, or rales. Abdomen:   Soft, nontender, nondistended. Cns- no focal neurological deficits Extremities: Warm and dry. No cyanosis or edema. Skin:     No rashes or jaundice. Data Review: 
I have reviewed all labs, meds, telemetry events, and studies from the last 24 hours. Recent Results (from the past 24 hour(s)) CBC WITH AUTOMATED DIFF Collection Time: 02/20/21  3:56 AM  
Result Value Ref Range WBC 11.8 (H) 4.3 - 11.1 K/uL  
 RBC 4.47 4.05 - 5.2 M/uL  
 HGB 13.1 11.7 - 15.4 g/dL HCT 40.2 35.8 - 46.3 % MCV 89.9 79.6 - 97.8 FL  
 MCH 29.3 26.1 - 32.9 PG  
 MCHC 32.6 31.4 - 35.0 g/dL  
 RDW 17.8 (H) 11.9 - 14.6 % PLATELET 747 242 - 066 K/uL MPV 9.2 (L) 9.4 - 12.3 FL ABSOLUTE NRBC 0.00 0.0 - 0.2 K/uL  
 DF AUTOMATED NEUTROPHILS 75 43 - 78 % LYMPHOCYTES 12 (L) 13 - 44 % MONOCYTES 11 4.0 - 12.0 % EOSINOPHILS 0 (L) 0.5 - 7.8 % BASOPHILS 0 0.0 - 2.0 % IMMATURE GRANULOCYTES 1 0.0 - 5.0 %  
 ABS. NEUTROPHILS 8.9 (H) 1.7 - 8.2 K/UL  
 ABS. LYMPHOCYTES 1.4 0.5 - 4.6 K/UL  
 ABS. MONOCYTES 1.3 0.1 - 1.3 K/UL  
 ABS. EOSINOPHILS 0.1 0.0 - 0.8 K/UL  
 ABS. BASOPHILS 0.1 0.0 - 0.2 K/UL  
 ABS. IMM. GRANS. 0.1 0.0 - 0.5 K/UL METABOLIC PANEL, BASIC Collection Time: 02/20/21  3:56 AM  
Result Value Ref Range Sodium 137 136 - 145 mmol/L Potassium 4.3 3.5 - 5.1 mmol/L Chloride 109 (H) 98 - 107 mmol/L  
 CO2 19 (L) 21 - 32 mmol/L Anion gap 9 7 - 16 mmol/L Glucose 98 65 - 100 mg/dL BUN 32 (H) 8 - 23 MG/DL Creatinine 0.77 0.6 - 1.0 MG/DL  
 GFR est AA >60 >60 ml/min/1.73m2 GFR est non-AA >60 >60 ml/min/1.73m2 Calcium 8.3 8.3 - 10.4 MG/DL CORTISOL, BASELINE Collection Time: 02/20/21  3:56 AM  
Result Value Ref Range Cortisol, baseline 27.4 ug/dL TSH 3RD GENERATION Collection Time: 02/20/21  3:56 AM  
Result Value Ref Range TSH 8.020 (H) 0.358 - 3.740 uIU/mL PTT Collection Time: 02/20/21  6:04 PM  
Result Value Ref Range aPTT 38.6 (H) 24.1 - 35.1 SEC All Micro Results Procedure Component Value Units Date/Time CULTURE, BLOOD [[id number]] Collected: 02/14/21 0735 Order Status: Completed Specimen: Blood Updated: 02/19/21 0274 Special Requests: --     
  RIGHT 
FOREARM Culture result: NO GROWTH 5 DAYS     
 CULTURE, BLOOD [[id number]] Collected: 02/14/21 0735 Order Status: Completed Specimen: Blood Updated: 02/19/21 0273 Special Requests: --     
  LEFT 
FOREARM Culture result: NO GROWTH 5 DAYS Current Meds: 
Current Facility-Administered Medications Medication Dose Route Frequency  gabapentin (NEURONTIN) capsule 100 mg  100 mg Per G Tube BID  midodrine (PROAMATINE) tablet 10 mg  10 mg Per G Tube TID WITH MEALS  
  [START ON 2/21/2021] levothyroxine (SYNTHROID) tablet 75 mcg  75 mcg Per G Tube 6am  
 heparin (PF) 2 units/ml in NS infusion 2,000 Units  1,000 mL Irrigation Multiple  pantoprazole (PROTONIX) 40 mg in 0.9% sodium chloride 10 mL injection  40 mg IntraVENous Q12H  
 sodium chloride (NS) flush 5-10 mL  5-10 mL IntraVENous Q8H  
 lactated Ringers infusion  100 mL/hr IntraVENous CONTINUOUS  
 metoclopramide (REGLAN) 5 mg/5 mL oral syrup 5 mg  5 mg Per G Tube TIDAC  
 0.9% sodium chloride infusion 250 mL  250 mL IntraVENous PRN  
 diphenhydrAMINE (BENADRYL) capsule 25 mg  25 mg Oral Q6H PRN  
 sucralfate (CARAFATE) tablet 1 g  1 g Oral AC&HS  loperamide (IMODIUM) 1 mg/7.5 mL oral solution 2 mg  2 mg Per J Tube QID PRN  
 NUTRITIONAL SUPPORT ELECTROLYTE PRN ORDERS   Does Not Apply PRN  
 nitroglycerin (NITROSTAT) tablet 0.4 mg  0.4 mg SubLINGual Q5MIN PRN  
 [Held by provider] isosorbide mononitrate ER (IMDUR) tablet 30 mg  30 mg Oral DAILY  sodium chloride (NS) flush 5-40 mL  5-40 mL IntraVENous Q8H  
 sodium chloride (NS) flush 5-40 mL  5-40 mL IntraVENous PRN  
 acetaminophen (TYLENOL) tablet 650 mg  650 mg Oral Q6H PRN Or  
 acetaminophen (TYLENOL) suppository 650 mg  650 mg Rectal Q6H PRN  promethazine (PHENERGAN) tablet 12.5 mg  12.5 mg Oral Q6H PRN Or  
 ondansetron (ZOFRAN) injection 4 mg  4 mg IntraVENous Q6H PRN  
 ranolazine ER (RANEXA) tablet 500 mg  500 mg Oral Q12H Other Studies (last 24 hours): 
Nm Lung Vent/perf Result Date: 2/20/2021 Procedure: Ventilation/perfusion lung scan. Indication: Shortness of breath. Comparison: Chest x-ray 2/20/2021 Radiopharmaceutical: 43.0 mCi DTPA; 6.3 mCi Tc-99m MAA administered intravenously. Technique: Standard dynamic anterior and posterior images were obtained for the ventilation study; anterior and posterior images in varying degrees of obliquity were obtained for the perfusion study; the ventilation and perfusion studies were compared with a recent chest x-ray. Findings: Ventilation: Normal wash-in, washout. Perfusion: No focal perfusion abnormality. Low probability for pulmonary embolus. Xr Chest Sngl V Result Date: 2/20/2021 History: Shortness of breath and vomiting COMPARISON: Chest radiograph 2/13/2021 AP PORTABLE CHEST: Median sternotomy wires are present. There is no consolidation or pleural effusions. ABDOMEN 2 VIEWS: A percutaneous feeding tube is present. Cholecystectomy clips are present. Gas is scattered throughout the large and small bowel. A right hip prosthesis is present. Ileus. Xr Abd (kub) Result Date: 2/20/2021 History: Shortness of breath and vomiting COMPARISON: Chest radiograph 2/13/2021 AP PORTABLE CHEST: Median sternotomy wires are present. There is no consolidation or pleural effusions. ABDOMEN 2 VIEWS: A percutaneous feeding tube is present. Cholecystectomy clips are present. Gas is scattered throughout the large and small bowel. A right hip prosthesis is present. Ileus. Ir Ivc Filter Result Date: 2/20/2021 Procedures performed: Fluoroscopic guidance Ultrasound-guided vascular access IVC venogram (cavogram) IVC filter placement Followup IVC venogram (cavogram) Placement of nontunneled central venous catheter History: 66years of age Female with extensive right lower extremity DVT and unable to be anticoagulated due to history of GI bleed. We are asked to place an IVC filter. Attending: Becki Pelletier M.D. Anesthesia: Local anesthetic with 1% lidocaine. Technique/findings: After the risks, benefits and alternatives were discussed, consent was obtained. A time out was performed to verify the patient's identity and procedure. Maximal sterile barrier technique (including:  Sterile Ultrasound probe cover, Sterile Ultrasound gel, cap, mask, sterile gown, sterile gloves, sterile drape, hand hygiene, and 2% chlorhexidine cutaneous antisepsis) was used for the procedure. The patient was placed supine on the angiographic table. The right neck was examined using real-time ultrasound and a gray scale image was obtained. This area was prepped and draped in the usual sterile fashion. Local anesthetic with 1% lidocaine was administered to the subcutaneous soft tissues. A small dermatotomy was performed with a #11 blade scalpel. Under direct ultrasound guidance, a micropuncture needle was inserted into the right internal jugular vein and a 0.018 inch wire was advanced through the needle into the vena cava. The needle was removed and replaced with a 3/4 coaxial catheter. Under fluoroscopic guidance, a 0.035 inch Bentson wire was inserted through the coaxial catheter into the inferior vena cava. The coaxial catheter was exchanged for a flush catheter which was advanced into the left common iliac vein. A cavogram was performed which demonstrated that the inferior vena cava was of normal caliber and without accessory veins or a duplicated IVC. An appropriate level for deployment of the IVC filter was determined.  Over a Bentson wire, the flush catheter was removed and replaced with the vena cava filter sheath. The filter was deployed through the sheath. A postplacement venogram was performed which demonstrated the IVC filter tip at the level immediately below the renal veins. The IVC filter sheath was exchanged over a Bentson wire for a triple lumen nontunneled central venous catheter was advanced over the wire into an appropriate position for central venous access with the tip overlying the right atrium. The catheter tip was checked with fluoroscopy. The catheter was tested and flushed with 100:1 heparin and secured to the skin with 2-0 monofilament. All catheters, wires, and sheaths were removed. Hemostasis was achieved by light manual compression. Fluoroscopic guidance was used for all catheter and wire manipulations. The patient tolerated the procedure well. There were no immediate complications. Estimated blood loss: 5 mL. Fluoro Time: 2 minutes 24 seconds. Fluoro Dose (mGy): 100 Dose Area Product (cGy-cm2): 1887.4 1. Successful placement of an infrarenal Optease inferior vena cava filter. 2. Successful placement of a 7 Fr triple lumen Right internal jugular central venous catheter. The line is ready for use. Duplex Lower Ext Venous Bilat Result Date: 2/20/2021 BILATERAL LOWER EXTREMITY DOPPLER ULTRASOUND  2/20/2021 HISTORY:  leg pain Technique: Grayscale, color Doppler and pulse wave Doppler images of the deep veins of lower extremities were obtained. FINDINGS: There is an extensive occlusive DVT in the right leg involving the common femoral vein, profunda, femoral vein, popliteal vein, posterior tibial and peroneal veins. The left leg the deep veins above the knee are patent. Below the knee, the posterior tibial and peroneal veins are not optimally characterized on this patient. Extensive right leg DVT. Findings were sent by secures a text to the ordering provider. Assessment and Plan: Hospital Problems as of 2/20/2021 Date Reviewed: 1/6/2021 Codes Class Noted - Resolved POA Severe protein-calorie malnutrition ([facility] [address] (Chronic) ICD-10-CM: B80 ICD-9-CM: 940  2/16/2021 - Present Yes Acquired hypothyroidism (Chronic) ICD-10-CM: E03.9 ICD-9-CM: 244.9  2/13/2021 - Present Yes History of GI bleed (Chronic) ICD-10-CM: Z87.19 ICD-9-CM: V12.79  2/13/2021 - Present Yes * (Principal) Elevated troponin ICD-10-CM: R77.8 ICD-9-CM: 790.6  2/13/2021 - Present Weight loss ICD-10-CM: R63.4 ICD-9-CM: 783.21  2/24/2020 - Present Yes Hypomagnesemia ICD-10-CM: F71.29 
ICD-9-CM: 275.2  12/12/2019 - Present Unknown PAF (paroxysmal atrial fibrillation) (HCC) (Chronic) ICD-10-CM: I48.0 ICD-9-CM: 427.31  12/12/2019 - Present Yes Chest pain ICD-10-CM: R07.9 ICD-9-CM: 786.50  5/14/2019 - Present Yes CAD (coronary artery disease) ICD-10-CM: I25.10 ICD-9-CM: 414.00  6/16/2016 - Present Yes PLAN:   
Chest pain- prob esophagitis related, seems unusual for ischemic at this point;  not acs as per cardiology-not a cath candidate , no heparin or anticoagulation as recent gi bleed. -Recent EGD with ulcerative esophagitis and duodenal ulcers 
-carafate, PPI 
 
 
CADs/p CABG: unable to tolerate optimize medications because of low Bps. EF is recovered. Acute anemiaGI bleed 
-GI consult, transfuse prn 
-Twice daily PPI Extensive RLE DVT 
-s/p IVC filter 
-Start Eliquis 2.5 mg twice daily per hematology recommendations Hypotension, difficult to optimize anti-ischemic agents 
-midodrine 
-IVF/albumin was given Low normal bp- tolerates renexa Ileushas gastroparesis, continue with Reglan, restart tube feeds as tolerated Hypothyroid-increase Synthroid from 50 mcg to 75 mcg DC planning/Dispo:  Home soon DVT ppx:  scd Signed: 
Lyndsey Lucas MD

## 2021-02-20 NOTE — PROGRESS NOTES
TRANSFER - OUT REPORT: 
 
Verbal report given to Ashley PHILLIPS (name) on Мария Rojo  being transferred to [facility](unit) for routine progression of care Report consisted of patients Situation, Background, Assessment and  
Recommendations(SBAR). Information from the following report(s) SBAR, Kardex, Procedure Summary, Intake/Output, MAR and Accordion was reviewed with the receiving nurse. Lines:  
Triple Lumen triple lumen catheter right neck 02/20/21 Anterior;Right Neck (Active) Peripheral IV 02/19/21 Left  (Active) Site Assessment Clean, dry, & intact 02/20/21 1041 Phlebitis Assessment 1 02/20/21 1041 Infiltration Assessment 2 02/20/21 1041 Dressing Status Clean, dry, & intact 02/20/21 1041 Dressing Type Transparent 02/20/21 1041 Hub Color/Line Status Patent; Other (comment) 02/20/21 1041 Alcohol Cap Used No 02/20/21 0716 Opportunity for questions and clarification was provided. Patient transported with: 
 [email or web address]

## 2021-02-20 NOTE — PROCEDURES
Department of Interventional Radiology 
[phone] Interventional Radiology Brief Procedure Note Patient: Tc Thomas MRN: [id number]  SSN: [id number] YOB: 1945  Age: 66 y.o. Sex: female Date of Procedure: 2/20/2021 Attending: Ivelisse Swift MD  
 
Assistants: None Pre-Procedure Diagnosis: DVT and lack of IV access. Post-Procedure Diagnosis: SAME Procedure(s): Temporary Central Venous Catheter and IVC filter placement. Brief Description of Procedure: Temporary Right IJ central line placement, IVC filter placement. Anesthesia:Lidocaine Specimens:  None Estimated Blood Loss: Less than 10ml Complications: None. Findings: 1. Successful IVC filter placement. 2. Successful placement of a right IJ approach temporary central line. Recommendations and Follow Up: PRN. See detailed description of Findings and Procedure in Procedure Dictation in PACS. Signed By: Ivelisse Swift MD   
 February 20, 2021

## 2021-02-20 NOTE — PROGRESS NOTES
Patient's daughter, Chapo Green updated on patient condition and plan of care. Farrah she has no additional questions at this time.

## 2021-02-20 NOTE — PROGRESS NOTES
Sent to Dr. Camelia Soni via perfect serve \"From: Christine Flanagan 1945 RM: 2232-01 FYI - Pt vomited approx. 900 ml around 2030. A few hours later had approx. 300 ml of residual. Held tube feeding until next residual check. Current residual is 175ml. Would you like me to continue holding tube feed or resume it at the same or a different rate? Need Callback: NO CALLBACK REQ 2ND FL STEPDN MARICARMEN Read 4:18 AM  
0'\" 2/20/21 4:19 AM  
just hold

## 2021-02-20 NOTE — PROGRESS NOTES
Daughter and Navid Brown updated on patient's status of care via phone ; verbalizing understanding of instruct.

## 2021-02-20 NOTE — PROGRESS NOTES
02/20/21 1250 02/20/21 1252 02/20/21 1254 Vitals BP (!) 99/47 (!) 85/48 (!) 90/45 MAP (Calculated) (!) 64 (!) 60 (!) 60 BP Patient Position Lying Sitting Standing Orthostatic vitals completed. See above.

## 2021-02-20 NOTE — PROGRESS NOTES
IV placed yesterday by PICC team appears reddened and swollen. Infusion stopped. VM left with PICC team. MD notified. Warm compress applied.

## 2021-02-20 NOTE — PROGRESS NOTES
Dr. Kristy Renee notified of vomiting last night and high residual volumes. Orders for KUB, continuing reglan.  Will hold tube feeds for now per MD.

## 2021-02-20 NOTE — PROGRESS NOTES
[address] called to [facility]. MD confirmed speaking with IR.   
 
9923 Incorrect consult placed by , repeat consult called. Vel BARRAZA with Hematology confirmed consult. States patent will be seen tomorrow.

## 2021-02-20 NOTE — CONSULTS
Department of Interventional Radiology 
[phone] Consult Note Patient: Willard Ryan MRN: [id number]  SSN: [id number] YOB: 1945  Age: 66 y.o. Sex: female Referring Physician: Tierney Barbour. Consult Date: 2/20/2021 Subjective: Chief Complaint: DVT. History of Present Illness: Willard Ryan is a 66 y.o. female with extensive RLE DVT. Patient unable to be anticoagulated due to h/o GI bleed. IR consulted for IVC filter placement. Past Medical History:  
Diagnosis Date  Abnormal EKG 6/17/2016  Acute systolic (congestive) heart failure ([address] 6/17/2016  Anterior myocardial infarction ([address] 11/20/1997  Avascular necrosis ([address] R hip  CAD (coronary artery disease) 1990  
 s/p CABG  
 Cellulitis of right lower extremity 6/17/2016  Chest pain, precordial 3/28/2016  COPD (chronic obstructive pulmonary disease) ([address]   
 pt denies  Coronary atherosclerosis of native coronary vessel 03/28/2016  
 per heart cath (6/2016)- \"pt with diffusely diseased LAD and small caliber vessels. At present. .. best option would be medical management. .. do not see any obvious good options for redo CABG. ..  Fracture of femoral neck, right ([address] 02/27/2016  
 s/p repair with hardware- Dr Mara Mchugh  GERD (gastroesophageal reflux disease) 02/27/2016  
 managed with medication  HLD (hyperlipidemia) 03/28/2016  
 managed with medication  Hx of echocardiogram 02/27/2016 EF 45-50%, hypokinesis of the apical anterior and basal-mid anteroseptal wall. Mild aortic valve regurgitation. Mild tricuspid regurgitation.  Hypertension   
 managed with medication  Hypokalemia 02/27/2016  
 hx of- WNL since 6/2016- pt on K+ supplement  IBS (irritable bowel syndrome)  Ischemic cardiomyopathy 5/15/2018  MI (myocardial infarction) ([address] 1990 and spring 2016  
 x2  Osteoarthritis  Osteoporosis  Rectal prolapse  S/P CABG (coronary artery bypass graft)   
 S/P total hip arthroplasty 2016  Scleroderma ([facility] 3/28/2016  Shortness of breath dyspnea 3/28/2016  Tracheal stenosis   
 s/p repair  Unstable angina ([facility] 1997 Past Surgical History:  
Procedure Laterality Date  HX ANKLE FRACTURE TX Right 1992  
 hardware placed  HX CORONARY ARTERY BYPASS GRAFT    
 cabg x 3  
 HX FRACTURE TX    
 cervical spine- C2  
 HX HEART CATHETERIZATION    
 HX HEENT    
 tracheal stricture/stenosis [facility] HIP FRACTURE TX  2016  
 repaired with hardware- Dr Odette Ruggiero  HX HYSTERECTOMY  1970s  HX OTHER SURGICAL Peg tube placement  HX PTCA  HX TRACHEOSTOMY Family History Problem Relation Age of Onset  Breast Cancer Sister Social History Tobacco Use  Smoking status: Former Smoker Packs/day: 1.00 Years: 20.00 Pack years: 20.00 Quit date: 1995 Years since quittin.1  Smokeless tobacco: Never Used Substance Use Topics  Alcohol use: No  
  
Allergies Allergen Reactions  Corticosteroids (Glucocorticoids) Anaphylaxis  Iodine Hives and Other (comments)  
  hypertension Current Facility-Administered Medications Medication Dose Route Frequency Provider Last Rate Last Admin  gabapentin (NEURONTIN) capsule 100 mg  100 mg Per G Tube BID Niharika Joya MD   100 mg at 21 0857  
 midodrine (PROAMATINE) tablet 10 mg  10 mg Per G Tube TID WITH MEALS Niharika Joya MD   10 mg at 21 1048  [START ON 2021] levothyroxine (SYNTHROID) tablet 75 mcg  75 mcg Per G Tube Michelet Hawk MD      
 heparin (PF) 2 units/ml in NS infusion 2,000 Units  1,000 mL Irrigation Multiple Ryan, Haydee Gosselin, MD      
 lidocaine (XYLOCAINE) 20 mg/mL (2 %) injection  mg   mg IntraDERMal ONCE Locke, Haydee Gosselin, MD      
  iopamidoL (ISOVUE 300) 61 % contrast injection 1-300 mL  1-300 mL IntraVENous RAD ONCE Sarah Davis MD      
 pantoprazole (PROTONIX) tablet 40 mg  40 mg Oral ACB&D Mireya Rodgers PA      
 lactated Ringers infusion  100 mL/hr IntraVENous CONTINUOUS Merrick Ricci  mL/hr at 02/20/21 0206 100 mL/hr at 02/20/21 0206  metoclopramide (REGLAN) 5 mg/5 mL oral syrup 5 mg  5 mg Per G Tube Helena Bettencourt MD   5 mg at 02/20/21 1048  
 0.9% sodium chloride infusion 250 mL  250 mL IntraVENous PRN Merrick Ricci MD      
 diphenhydrAMINE (BENADRYL) capsule 25 mg  25 mg Oral Q6H PRN Merrick Ricci MD      
 sucralfate (CARAFATE) tablet 1 g  1 g Oral AC&HS Merrick Ricci MD   1 g at 02/20/21 1048  loperamide (IMODIUM) 1 mg/7.5 mL oral solution 2 mg  2 mg Per J Tube QID PRN Merrick Ricci MD   2 mg at 02/18/21 1038  
 NUTRITIONAL SUPPORT ELECTROLYTE PRN ORDERS   Does Not Apply PRN Garett Ignacio MD      
 nitroglycerin (NITROSTAT) tablet 0.4 mg  0.4 mg SubLINGual Q5MIN PRN Burgess Camila MD      
 [Held by provider] isosorbide mononitrate ER (IMDUR) tablet 30 mg  30 mg Oral DAILY Burgess Camila MD   Stopped at 02/14/21 0900  
 sodium chloride (NS) flush 5-40 mL  5-40 mL IntraVENous Q8H Burgess Camila MD   10 mL at 02/19/21 2146  sodium chloride (NS) flush 5-40 mL  5-40 mL IntraVENous PRN Burgess Camila MD   10 mL at 02/17/21 2022  acetaminophen (TYLENOL) tablet 650 mg  650 mg Oral Q6H PRN Burgess Camila MD   650 mg at 02/20/21 1434 Or  acetaminophen (TYLENOL) suppository 650 mg  650 mg Rectal Q6H PRN Burgess Camila MD      
 promethazine [facility]) tablet 12.5 mg  12.5 mg Oral Q6H PRN Burgess Camila MD   12.5 mg at 02/19/21 2148 Or  
 ondansetron (ZOFRAN) injection 4 mg  4 mg IntraVENous Q6H PRN Burgess Camila MD   4 mg at 02/19/21 1622  ranolazine ER (RANEXA) tablet 500 mg  500 mg Oral Q12H Viet York MD   500 mg at 02/20/21 5312 Allergies Allergen Reactions  Corticosteroids (Glucocorticoids) Anaphylaxis  Iodine Hives and Other (comments)  
  hypertension Review of Systems: A detailed 10 organ review of systems is obtained with pertinent positives as listed in the History of Present Illness and Past Medical History. All others are negative. Objective:  
 
Physical Exam: 
Vitals:  
 02/20/21 1041 02/20/21 1250 02/20/21 1252 02/20/21 1254 BP: (!) 89/54 (!) 99/47 (!) 85/48 (!) 90/45 Pulse: 68 Resp: 14 Temp: 97.8 °F (36.6 °C) SpO2: 92% Weight:      
Height:      
  
Lab/Data Review: 
Pertinent labs and imaging reviewed. Assessment/Plan:  
 
67 yo female with extensive RLE DVT. Patient unable to be anticoagulated due to h/o GI bleed. After evaluation of the pertinent labs and imaging, it is deemed that a IVC filter placement is appropriate at this time. Discussion of the risks/benefits/alternatives is to be performed and consent is to be obtained if the patient/patient's consenting family member/guardian decides to proceed with the procedure. The case will be scheduled for 2/20/21. 
 
-Patient to be premedicated with Benadryl prior to procedure.  
-Plan for central line placement at time of procedure due to lack of sufficient IV access with redness swelling surrounding LUE PICC. Thank you for consulting Interventional Radiology.

## 2021-02-21 NOTE — PROGRESS NOTES
Patient's daughter called and updated on patient condition, plan of care, and transfer to . States she has no question at this time.

## 2021-02-21 NOTE — PROGRESS NOTES
TRANSFER - OUT REPORT: 
 
Verbal report given to JACQUELINE Hernandez (name) on Lorene Briones  being transferred to  Medical Surgical (unit) for routine progression of care    
 
Report consisted of patient’s Situation, Background, Assessment and  
Recommendations(SBAR).  
 
Information from the following report(s) SBAR, Kardex, Intake/Output, MAR, Recent Results was reviewed with the receiving nurse. 
 
Lines:  
Triple Lumen triple lumen catheter right neck 02/20/21 Anterior;Right Neck (Active)  
Central Line Being Utilized Yes 02/21/21 0714  
Criteria for Appropriate Use Limited/no vessel suitable for conventional peripheral access 02/21/21 0714  
Site Assessment Clean, dry, & intact 02/21/21 0714  
Infiltration Assessment 0 02/21/21 0714  
Affected Extremity/Extremities Pulses palpable;Color distal to insertion site pink (or appropriate for race);Range of motion performed 02/21/21 0714  
Date of Last Dressing Change 02/20/21 02/21/21 0714  
Dressing Status Clean, dry, & intact 02/21/21 0714  
Dressing Type Disk with Chlorhexadine gluconate (CHG);Transparent 02/21/21 0714  
Proximal Hub Color/Line Status Blue;Patent;Infusing 02/21/21 0714  
Positive Blood Return (Medial Site) Yes 02/21/21 0714  
Medial Hub Color/Line Status White;Patent 02/21/21 0714  
Positive Blood Return (Lateral Site) Yes 02/21/21 0714  
Distal Hub Color/Line Status Brown;Patent 02/21/21 0714  
Positive Blood Return (Site #3) Yes 02/21/21 0714  
  
 
Opportunity for questions and clarification was provided.   
 
Patient transported with: 
 Patient's tube feed from home, patient's home tube feed pump

## 2021-02-21 NOTE — PROGRESS NOTES
02/21/21 1113 Dual Skin Pressure Injury Assessment Dual Skin Pressure Injury Assessment WDL Second Care Provider (Based on [address] [place] [address]) Slipager 41 Skin Integumentary Skin Integumentary (WDL) X Skin Color Red Sacrum and spine are red and non blachable. LUE red and swollen weeping

## 2021-02-21 NOTE — CONSULTS
Inpatient Hematology / Oncology Consult Note Reason for Consult:  STEMI (ST elevation myocardial infarction) ([address] [I21.3]; Chest pain [R07.9] Referring Physician:  Abiola Desir MD 
 
History of Present Illness: Ms. Carmen Shepherd is a 66 y.o. female admitted on 2/13/2021 with a primary diagnosis of The primary encounter diagnosis was ST elevation myocardial infarction (STEMI), unspecified artery ([address] Diagnoses of Coronary artery disease involving native coronary artery of native heart with unstable angina pectoris ([address] History of GI bleed, Essential hypertension, Chest pain, unspecified type, Debility, Anorexia, Cachexia ([address] Failure to thrive (0-17), and Encounter for palliative care were also pertinent to this visit. Bernadette Bell 66 female history of CAD status post CABG 2018, A. Fib (on apixaban in past), some baseline memory issues, borderline PS, recent episode of GI bleeding after feeding tube placement at [facility] 1/21, now admitted w/ chest discomfort. Per review of outside hospitalization records, it appears EGD at the time had showed duodenal ulcers and esophagitis, her Eliquis at the time was held with recommendation to hold for at least 2 weeks following the GI bleed, and she received PRBC transfusion x 3. During her current hospitalization, she has refused cardiac catheterization, as well as repeat EGD. Per discussion with hospitalist, she has had a slow drop in her hemoglobin necessitating PRBC transfusion x2. No overt bleeding is noted. Recent bilateral LE Dopplers have revealed extensive above-knee RLE DVT, she is status post IVC filter placement yesterday. She remains on PPI twice daily. Review of Systems: As mentioned above. All other systems reviewed in full and are unremarkable. Allergies Allergen Reactions  Corticosteroids (Glucocorticoids) Anaphylaxis  Iodine Hives and Other (comments)  
  hypertension Past Medical History:  
Diagnosis Date  Abnormal EKG 6/17/2016  Acute systolic (congestive) heart failure ([address] 6/17/2016  Anterior myocardial infarction ([address] 11/20/1997  Avascular necrosis ([address] R hip  CAD (coronary artery disease) 1990  
 s/p CABG  
 Cellulitis of right lower extremity 6/17/2016  Chest pain, precordial 3/28/2016  COPD (chronic obstructive pulmonary disease) ([address]   
 pt denies  Coronary atherosclerosis of native coronary vessel 03/28/2016  
 per heart cath (6/2016)- \"pt with diffusely diseased LAD and small caliber vessels. At present. .. best option would be medical management. .. do not see any obvious good options for redo CABG. ..  Fracture of femoral neck, right ([address] 02/27/2016  
 s/p repair with hardware- Dr Chuy Harley  GERD (gastroesophageal reflux disease) 02/27/2016  
 managed with medication  HLD (hyperlipidemia) 03/28/2016  
 managed with medication  Hx of echocardiogram 02/27/2016 EF 45-50%, hypokinesis of the apical anterior and basal-mid anteroseptal wall. Mild aortic valve regurgitation. Mild tricuspid regurgitation.  Hypertension   
 managed with medication  Hypokalemia 02/27/2016  
 hx of- WNL since 6/2016- pt on K+ supplement  IBS (irritable bowel syndrome)  Ischemic cardiomyopathy 5/15/2018  MI (myocardial infarction) ([address] 1990 and spring 2016  
 x2  Osteoarthritis  Osteoporosis  Rectal prolapse  S/P CABG (coronary artery bypass graft) 1990  
 S/P total hip arthroplasty 12/14/2016  Scleroderma ([address] 3/28/2016  Shortness of breath dyspnea 3/28/2016  Tracheal stenosis 1995  
 s/p repair  Unstable angina ([address] 11/20/1997 Past Surgical History:  
Procedure Laterality Date  HX ANKLE FRACTURE TX Right 1992  
 hardware placed  HX CORONARY ARTERY BYPASS GRAFT  1990  
 cabg x 3  
 HX FRACTURE TX  1995  
 cervical spine- C2  
 HX HEART CATHETERIZATION    
 HX HEENT  1995  
 tracheal stricture/stenosis Tunde Boateng HIP FRACTURE TX  2016  
 repaired with hardware- Dr Harrison Lebron  HX HYSTERECTOMY  1970s  HX OTHER SURGICAL Peg tube placement  HX PTCA  HX TRACHEOSTOMY  IR IVC FILTER  2021 Family History Problem Relation Age of Onset  Breast Cancer Sister Social History Socioeconomic History  Marital status:  Spouse name: Not on file  Number of children: Not on file  Years of education: Not on file  Highest education level: Not on file Occupational History  Not on file Social Needs  Financial resource strain: Not on file  Food insecurity Worry: Not on file Inability: Not on file  Transportation needs Medical: Not on file Non-medical: Not on file Tobacco Use  Smoking status: Former Smoker Packs/day: 1.00 Years: 20.00 Pack years: 20.00 Quit date: 1995 Years since quittin.  Smokeless tobacco: Never Used Substance and Sexual Activity  Alcohol use: No  
 Drug use: No  
 Sexual activity: Not on file Lifestyle  Physical activity Days per week: Not on file Minutes per session: Not on file  Stress: Not on file Relationships  Social connections Talks on phone: Not on file Gets together: Not on file Attends [employer] service: Not on file Active member of club or organization: Not on file Attends meetings of clubs or organizations: Not on file Relationship status: Not on file  Intimate partner violence Fear of current or ex partner: Not on file Emotionally abused: Not on file Physically abused: Not on file Forced sexual activity: Not on file Other Topics Concern  Not on file Social History Narrative  Not on file Current Facility-Administered Medications Medication Dose Route Frequency Provider Last Rate Last Admin  gabapentin (NEURONTIN) capsule 100 mg  100 mg Per G Tube BID Melisa Yarbrough MD   100 mg at 02/21/21 0835  
 midodrine (PROAMATINE) tablet 10 mg  10 mg Per G Tube TID WITH MEALS Melisa Yarbrough MD   10 mg at 02/21/21 1121  levothyroxine (SYNTHROID) tablet 75 mcg  75 mcg Per G Tube Janny Wallace MD   75 mcg at 02/21/21 7342  heparin (PF) 2 units/ml in NS infusion 2,000 Units  1,000 mL Irrigation Keyon Argueta MD      
 pantoprazole (PROTONIX) 40 mg in 0.9% sodium chloride 10 mL injection  40 mg IntraVENous Q12H SHANA Leyva   40 mg at 02/21/21 9174  sodium chloride (NS) flush 5-10 mL  5-10 mL IntraVENous Q8H Phill Lawrence MD   10 mL at 02/21/21 6368  apixaban (ELIQUIS) tablet 2.5 mg  2.5 mg Oral Q12H Melisa Yarbrough MD   2.5 mg at 02/21/21 0835  
 metoclopramide HCl (REGLAN) injection 5 mg  5 mg IntraVENous Q6H Melisa Yarbrough MD   5 mg at 02/21/21 5088  
 lactated Ringers infusion  100 mL/hr IntraVENous CONTINUOUS Melisa Yarbrough  mL/hr at 02/21/21 0530 100 mL/hr at 02/21/21 0530  
 metoclopramide (REGLAN) 5 mg/5 mL oral syrup 5 mg  5 mg Per G Tube Royal Fina MD   Stopped at 02/21/21 0730  
 0.9% sodium chloride infusion 250 mL  250 mL IntraVENous PRN Melisa Yarbrough MD      
 diphenhydrAMINE (BENADRYL) capsule 25 mg  25 mg Oral Q6H PRN Melisa Yarbrough MD      
 sucralfate (CARAFATE) tablet 1 g  1 g Oral AC&HS Melisa Yarbrough MD   1 g at 02/21/21 1121  
 loperamide (IMODIUM) 1 mg/7.5 mL oral solution 2 mg  2 mg Per J Tube QID PRN Melisa Yarbrough MD   2 mg at 02/18/21 1038  
 NUTRITIONAL SUPPORT ELECTROLYTE PRN ORDERS   Does Not Apply PRN Maco Cochran MD      
 nitroglycerin (NITROSTAT) tablet 0.4 mg  0.4 mg SubLINGual Q5MIN PRN Abril Moe MD      
 [Held by provider] isosorbide mononitrate ER (IMDUR) tablet 30 mg  30 mg Oral DAILY Abril Moe MD   Stopped at 02/14/21 0900  sodium chloride (NS) flush 5-40 mL  5-40 mL IntraVENous Q8H Amy Blanco MD   10 mL at 21  sodium chloride (NS) flush 5-40 mL  5-40 mL IntraVENous PRN Amy Blanco MD   10 mL at 21  acetaminophen (TYLENOL) tablet 650 mg  650 mg Oral Q6H PRN Amy Blanco MD   650 mg at 21 112 Or  acetaminophen (TYLENOL) suppository 650 mg  650 mg Rectal Q6H PRN Amy Blanco MD      
 [facility]) tablet 12.5 mg  12.5 mg Oral Q6H PRN Amy Blanco MD   12.5 mg at 21 Or  
 ondansetron (ZOFRAN) injection 4 mg  4 mg IntraVENous Q6H PRN Amy Blanco MD   4 mg at 21 1622  
 ranolazine ER (RANEXA) tablet 500 mg  500 mg Oral Q12H Adriana Modi MD   500 mg at 21 3604 OBJECTIVE: 
Patient Vitals for the past 8 hrs: 
 BP Temp Pulse Resp SpO2  
21 1222 (!) 106/58 97.2 °F (36.2 °C) (!) 55 18 100 % 21 0714 110/63 97.4 °F (36.3 °C) 70 18 95 % Temp (24hrs), Av.4 °F (36.3 °C), Min:96.9 °F (36.1 °C), Max:97.9 °F (36.6 °C) 
 
 07 -  1900 In: 36 Out: - Physical Exam: 
Constitutional: Frail female in no acute distress, sitting comfortably in the hospital bed. HEENT: Normocephalic and atraumatic. Oropharynx is clear, mucous membranes are moist.  Pupils are equal, round, and reactive to light. Extraocular muscles are intact. Sclerae anicteric. Neck supple without JVD. No thyromegaly present. Lymph node No palpable submandibular, cervical, supraclavicular, axillary or inguinal lymph nodes. Skin Warm and dry. No bruising and no rash noted. No erythema. No pallor. Respiratory Lungs are clear to auscultation bilaterally without wheezes, rales or rhonchi, normal air exchange without accessory muscle use. CVS Normal rate, regular rhythm and normal S1 and S2. No murmurs, gallops, or rubs. Abdomen Soft, nontender and nondistended, normoactive bowel sounds. No palpable mass. No hepatosplenomegaly. Neuro Grossly nonfocal with no obvious sensory or motor deficits. MSK Normal range of motion in general.  No  Tenderness. RLE 2+ swelling Psych Appropriate mood and affect. Labs:   
Recent Results (from the past 24 hour(s)) PTT Collection Time: 02/20/21  6:04 PM  
Result Value Ref Range aPTT 38.6 (H) 24.1 - 35.1 SEC  
CBC WITH AUTOMATED DIFF Collection Time: 02/21/21  3:17 AM  
Result Value Ref Range WBC 8.7 4.3 - 11.1 K/uL  
 RBC 3.63 (L) 4.05 - 5.2 M/uL  
 HGB 10.8 (L) 11.7 - 15.4 g/dL HCT 33.0 (L) 35.8 - 46.3 % MCV 90.9 79.6 - 97.8 FL  
 MCH 29.8 26.1 - 32.9 PG  
 MCHC 32.7 31.4 - 35.0 g/dL  
 RDW 17.2 (H) 11.9 - 14.6 % PLATELET 209 934 - 291 K/uL MPV 9.3 (L) 9.4 - 12.3 FL ABSOLUTE NRBC 0.00 0.0 - 0.2 K/uL  
 DF AUTOMATED NEUTROPHILS 75 43 - 78 % LYMPHOCYTES 12 (L) 13 - 44 % MONOCYTES 11 4.0 - 12.0 % EOSINOPHILS 1 0.5 - 7.8 % BASOPHILS 1 0.0 - 2.0 % IMMATURE GRANULOCYTES 1 0.0 - 5.0 %  
 ABS. NEUTROPHILS 6.5 1.7 - 8.2 K/UL  
 ABS. LYMPHOCYTES 1.0 0.5 - 4.6 K/UL  
 ABS. MONOCYTES 1.0 0.1 - 1.3 K/UL  
 ABS. EOSINOPHILS 0.1 0.0 - 0.8 K/UL  
 ABS. BASOPHILS 0.1 0.0 - 0.2 K/UL  
 ABS. IMM. GRANS. 0.1 0.0 - 0.5 K/UL METABOLIC PANEL, BASIC Collection Time: 02/21/21  3:17 AM  
Result Value Ref Range Sodium 139 136 - 145 mmol/L Potassium 4.2 3.5 - 5.1 mmol/L Chloride 109 (H) 98 - 107 mmol/L  
 CO2 24 21 - 32 mmol/L Anion gap 6 (L) 7 - 16 mmol/L Glucose 77 65 - 100 mg/dL BUN 22 8 - 23 MG/DL Creatinine 0.52 (L) 0.6 - 1.0 MG/DL  
 GFR est AA >60 >60 ml/min/1.73m2 GFR est non-AA >60 >60 ml/min/1.73m2 Calcium 7.9 (L) 8.3 - 10.4 MG/DL Imaging: 
 
 
ASSESSMENT & PLAN: 
Problem List  Date Reviewed: 1/6/2021 Codes Class Noted  Severe protein-calorie malnutrition ([facility] (Chronic) ICD-10-CM: T62 
 ICD-9-CM: 993  2/16/2021 Acquired hypothyroidism (Chronic) ICD-10-CM: E03.9 ICD-9-CM: 244.9  2/13/2021 History of GI bleed (Chronic) ICD-10-CM: Z87.19 ICD-9-CM: V12.79  2/13/2021 * (Principal) Elevated troponin ICD-10-CM: R77.8 ICD-9-CM: 790.6  2/13/2021 Weight loss ICD-10-CM: R63.4 ICD-9-CM: 783.21  2/24/2020 Near syncope ICD-10-CM: R55 
ICD-9-CM: 780.2  2/12/2020 Hypotension ICD-10-CM: I95.9 ICD-9-CM: 458.9  2/12/2020 Frequent falls ICD-10-CM: R29.6 ICD-9-CM: V15.88  2/12/2020 [facility] obstruction [facility] ICD-10-CM: N15.790 ICD-9-CM: 560.9  12/13/2019 Elevated troponin I level ICD-10-CM: R77.8 ICD-9-CM: 790.6  12/13/2019 Hypomagnesemia ICD-10-CM: K27.38 
ICD-9-CM: 275.2  12/12/2019 PAF (paroxysmal atrial fibrillation) (HCC) (Chronic) ICD-10-CM: I48.0 ICD-9-CM: 427.31  12/12/2019 Partial small bowel obstruction ([facility] ICD-10-CM: K56.600 ICD-9-CM: 560.9  8/26/2019 Chest pain ICD-10-CM: R07.9 ICD-9-CM: 786.50  5/14/2019 CAD (coronary artery disease) ICD-10-CM: I25.10 ICD-9-CM: 414.00  6/16/2016 Hypertension (Chronic) ICD-10-CM: I10 
ICD-9-CM: 401.9  2/27/2016 66 female history of CAD status post CABG 2018, A. Fib (on apixaban in past), some baseline memory issues, borderline PS, recent episode of GI bleeding after feeding tube placement at [place] 1/21, now admitted w/ chest discomfort. Per review of outside hospitalization records, it appears EGD at the time had showed duodenal ulcers and esophagitis, her Eliquis at the time was held with recommendation to hold for at least 2 weeks following the GI bleed, and she received PRBC transfusion x 3. During her current hospitalization, she has refused cardiac catheterization, as well as repeat EGD. Per discussion with hospitalist, she has had a slow drop in her hemoglobin necessitating PRBC transfusion x2. No overt bleeding is noted. Recent bilateral LE Dopplers have revealed extensive above-knee RLE DVT, she is status post IVC filter placement yesterday. She remains on PPI twice daily. Given lack of overt bleeding, not unreasonable to restart low-dose apixaban at 2.5 mg twice daily with close monitoring of her hemoglobin as well as any evidence of bleeding. Would recommend keeping a low threshold to stopping her anticoagulation should she exhibit any concerning signs. If tolerates well over the next few days then reasonable for her to stay on low-dose apixaban on discharge with close outpatient following. She already now has an IVC filter. Will check iron stores, and replace iron if needed. Palliative care is also scheduled to see patient as she has expressed preference towards comfort management. Outpatient follow-up in hematology within 2 to 3 weeks of discharge, at which point a repeat doppler study can be undertaken. Lab studies and imaging studies were personally reviewed. Pertinent old records were reviewed. Thank you for allowing us to participate in the care of Ms. Leslie Garcia. Niharika Warren MD 
[facility] Hematology Oncology [address] [facility] [address] [facility] Office : [phone] Fax : [phone]

## 2021-02-21 NOTE — PROGRESS NOTES
TRANSFER - IN REPORT: 
 
Verbal report received from [place] RN(name) on Devan Oliver  being received from CV[facility](unit) for routine progression of care Report consisted of patients Situation, Background, Assessment and  
Recommendations(SBAR). Information from the following report(s) SBAR, Kardex, Intake/Output, MAR and Recent Results was reviewed with the receiving nurse. Opportunity for questions and clarification was provided. Assessment completed upon patients arrival to unit and care assumed.

## 2021-02-21 NOTE — PROGRESS NOTES
Hospitalist Progress Note Admit Date:  2021  8:24 PM  
Name:  Brittany Kline Age:  66 y.o. 
:  1945 MRN:  [id number] PCP:  Bennett Mcknight MD 
Treatment Team: Attending Provider: Tommy Quinn MD; Utilization Review: Zeke Prince; Care Manager: Chuck Jones; Utilization Review: Lana Guerrier, RN; Consulting Provider: Sameera Cuevas NP; Consulting Provider: Chiki Quintanilla MD; Consulting Provider: Val Patel MD; Student Nurse: Krysta Fonseca RN Subjective:  
Patient is 72yoF with complex medical history including CAD s/p CABG, scleroderma (requiring feeding tube), recent GI bleed (hospitalized at [facility]) who presents with chest pain. On ER evaluation, patient was found to have evidence of STEMI with EKG changes and elevated troponin. Cardiology evaluated patient. Patient declines cardiac cath and reported that she wanted to be comfortable, but did not wish to have additional medical interventions. Patient is not a candidate for anticoagulation, given recent severe GI bleed. Hospitalist consulted for admission for pain control and potentially comfort care. 2021 Says no chest pain now but on and off 
Cardiology no intervention like cath Low normal bp 
 
2/15/2021 C/o on and off chest pain 
bp improving 2021 C/o chest pain  Hypotension, limiting antianginal agents Continues to have chest pain Echo was ordered  
-Anemia improved after blood transfusions 
-Pt still dependent on midodrine 10md tid which she doesn't take at home 
-pt seems more lethargic and confused and less interactive than prior days 
-will give IVF/albumin now that had recovery of EF 
-pt declined endoscopy yesterday and was transfused 2 units blood yesterday for GI bleed 
-less anemic today, still hypotensive2 unit PRBC 
 
 Had vomitting, KUB with ileus RLE with extensive DVT, IVC filter placed D/w Onc: ok for eliquis 2.5mg bid V/Q scan: low probability of PE 
 
2/21 Tolerating IVC filter Left arm is more tender and erythematous today than yesterday-Bactrim started Left arm PICC line had infiltrated yesterday, given left upper extremity swelling will monitor for worsening over 24 hours after Eliquis started and deemed safe for discharge home Blood pressure improved after increasing Synthroid  
suggested salt tablets given low sodium content in tube feeds Plan to monitor for Objective:  
 
Patient Vitals for the past 24 hrs: 
 Temp Pulse Resp BP SpO2  
02/21/21 1623 97.3 °F (36.3 °C) (!) 59 18 (!) 125/52 100 % 02/21/21 1520 97.3 °F (36.3 °C) 60 18 (!) 113/53 100 % 02/21/21 1222 97.2 °F (36.2 °C) (!) 55 18 (!) 106/58 100 % 02/21/21 0714 97.4 °F (36.3 °C) 70 18 110/63 95 % 02/21/21 0319 97.2 °F (36.2 °C) 64 16 (!) 111/48 91 % 02/20/21 2250 96.9 °F (36.1 °C) (!) 59 16 (!) 104/46 98 % 02/20/21 1937 97.8 °F (36.6 °C) (!) 58 16 (!) 120/51 100 % 02/20/21 1815 97.9 °F (36.6 °C) 72 18 (!) 104/48 97 % Oxygen Therapy O2 Sat (%): 100 % (02/21/21 1623) Pulse via Oximetry: 78 beats per minute (02/20/21 0337) O2 Device: Room air (02/21/21 0714) O2 Flow Rate (L/min): 3 l/min (02/20/21 1454) ETCO2 (mmHg): 93 mmHg (02/18/21 0706) Intake/Output Summary (Last 24 hours) at 2/21/2021 1708 Last data filed at 2/21/2021 1113 Gross per 24 hour Intake 480 ml Output  Net 480 ml General:    Well nourished. Alert.   
heent- normal occular movements, atraumatic, normocephalic, pupils equal reacting to light CV:   RRR. No murmur, rub, or gallop. Lungs:   Clear to auscultation bilaterally. No wheezing, rhonchi, or rales. Abdomen:   Soft, nontender, nondistended. Cns- no focal neurological deficits Extremities: Warm and dry. No cyanosis or edema. Skin:     No rashes or jaundice. Data Review: 
I have reviewed all labs, meds, telemetry events, and studies from the last 24 hours. Recent Results (from the past 24 hour(s)) PTT Collection Time: 02/20/21  6:04 PM  
Result Value Ref Range aPTT 38.6 (H) 24.1 - 35.1 SEC  
CBC WITH AUTOMATED DIFF Collection Time: 02/21/21  3:17 AM  
Result Value Ref Range WBC 8.7 4.3 - 11.1 K/uL  
 RBC 3.63 (L) 4.05 - 5.2 M/uL  
 HGB 10.8 (L) 11.7 - 15.4 g/dL HCT 33.0 (L) 35.8 - 46.3 % MCV 90.9 79.6 - 97.8 FL  
 MCH 29.8 26.1 - 32.9 PG  
 MCHC 32.7 31.4 - 35.0 g/dL  
 RDW 17.2 (H) 11.9 - 14.6 % PLATELET 435 026 - 203 K/uL MPV 9.3 (L) 9.4 - 12.3 FL ABSOLUTE NRBC 0.00 0.0 - 0.2 K/uL  
 DF AUTOMATED NEUTROPHILS 75 43 - 78 % LYMPHOCYTES 12 (L) 13 - 44 % MONOCYTES 11 4.0 - 12.0 % EOSINOPHILS 1 0.5 - 7.8 % BASOPHILS 1 0.0 - 2.0 % IMMATURE GRANULOCYTES 1 0.0 - 5.0 %  
 ABS. NEUTROPHILS 6.5 1.7 - 8.2 K/UL  
 ABS. LYMPHOCYTES 1.0 0.5 - 4.6 K/UL  
 ABS. MONOCYTES 1.0 0.1 - 1.3 K/UL  
 ABS. EOSINOPHILS 0.1 0.0 - 0.8 K/UL  
 ABS. BASOPHILS 0.1 0.0 - 0.2 K/UL  
 ABS. IMM. GRANS. 0.1 0.0 - 0.5 K/UL METABOLIC PANEL, BASIC Collection Time: 02/21/21  3:17 AM  
Result Value Ref Range Sodium 139 136 - 145 mmol/L Potassium 4.2 3.5 - 5.1 mmol/L Chloride 109 (H) 98 - 107 mmol/L  
 CO2 24 21 - 32 mmol/L Anion gap 6 (L) 7 - 16 mmol/L Glucose 77 65 - 100 mg/dL BUN 22 8 - 23 MG/DL Creatinine 0.52 (L) 0.6 - 1.0 MG/DL  
 GFR est AA >60 >60 ml/min/1.73m2 GFR est non-AA >60 >60 ml/min/1.73m2 Calcium 7.9 (L) 8.3 - 10.4 MG/DL All Micro Results Procedure Component Value Units Date/Time CULTURE, BLOOD [[id number]] Collected: 02/14/21 0735 Order Status: Completed Specimen: Blood Updated: 02/19/21 0180 Special Requests: --     
  RIGHT 
FOREARM Culture result: NO GROWTH 5 DAYS     
 CULTURE, BLOOD [[id number]] Collected: 02/14/21 0735 Order Status: Completed Specimen: Blood Updated: 02/19/21 4526 Special Requests: --     
  LEFT 
FOREARM Culture result: NO GROWTH 5 DAYS Current Meds: 
Current Facility-Administered Medications Medication Dose Route Frequency  lactated ringers bolus infusion 1,000 mL  1,000 mL IntraVENous ONCE  
 metoclopramide HCl (REGLAN) injection 5 mg  5 mg IntraVENous Q6H PRN  
 trimethoprim-sulfamethoxazole (BACTRIM DS, SEPTRA DS) 160-800 mg per tablet 1 Tab  1 Tab Oral Q12H  
 gabapentin (NEURONTIN) capsule 100 mg  100 mg Per G Tube BID  midodrine (PROAMATINE) tablet 10 mg  10 mg Per G Tube TID WITH MEALS  
 levothyroxine (SYNTHROID) tablet 75 mcg  75 mcg Per G Tube 6am  
 heparin (PF) 2 units/ml in NS infusion 2,000 Units  1,000 mL Irrigation Multiple  pantoprazole (PROTONIX) 40 mg in 0.9% sodium chloride 10 mL injection  40 mg IntraVENous Q12H  
 sodium chloride (NS) flush 5-10 mL  5-10 mL IntraVENous Q8H  
 apixaban (ELIQUIS) tablet 2.5 mg  2.5 mg Oral Q12H  
 lactated Ringers infusion  100 mL/hr IntraVENous CONTINUOUS  
 metoclopramide (REGLAN) 5 mg/5 mL oral syrup 5 mg  5 mg Per G Tube TIDAC  
 0.9% sodium chloride infusion 250 mL  250 mL IntraVENous PRN  
 diphenhydrAMINE (BENADRYL) capsule 25 mg  25 mg Oral Q6H PRN  
 sucralfate (CARAFATE) tablet 1 g  1 g Oral AC&HS  loperamide (IMODIUM) 1 mg/7.5 mL oral solution 2 mg  2 mg Per J Tube QID PRN  
 NUTRITIONAL SUPPORT ELECTROLYTE PRN ORDERS   Does Not Apply PRN  
 nitroglycerin (NITROSTAT) tablet 0.4 mg  0.4 mg SubLINGual Q5MIN PRN  
 [Held by provider] isosorbide mononitrate ER (IMDUR) tablet 30 mg  30 mg Oral DAILY  sodium chloride (NS) flush 5-40 mL  5-40 mL IntraVENous Q8H  
 sodium chloride (NS) flush 5-40 mL  5-40 mL IntraVENous PRN  
 acetaminophen (TYLENOL) tablet 650 mg  650 mg Oral Q6H PRN Or  
 acetaminophen (TYLENOL) suppository 650 mg  650 mg Rectal Q6H PRN  promethazine (PHENERGAN) tablet 12.5 mg  12.5 mg Oral Q6H PRN Or  
 ondansetron (ZOFRAN) injection 4 mg  4 mg IntraVENous Q6H PRN  
 ranolazine ER (RANEXA) tablet 500 mg  500 mg Oral Q12H Other Studies (last 24 hours): 
Nm Lung Vent/perf Result Date: 2/20/2021 Procedure: Ventilation/perfusion lung scan. Indication: Shortness of breath. Comparison: Chest x-ray 2/20/2021 Radiopharmaceutical: 43.0 mCi DTPA; 6.3 mCi Tc-99m MAA administered intravenously. Technique: Standard dynamic anterior and posterior images were obtained for the ventilation study; anterior and posterior images in varying degrees of obliquity were obtained for the perfusion study; the ventilation and perfusion studies were compared with a recent chest x-ray. Findings: Ventilation: Normal wash-in, washout. Perfusion: No focal perfusion abnormality. Low probability for pulmonary embolus. Assessment and Plan:  
 
Hospital Problems as of 2/21/2021 Date Reviewed: 1/6/2021 Codes Class Noted - Resolved POA Severe protein-calorie malnutrition ([facility] [address] (Chronic) ICD-10-CM: Q21 ICD-9-CM: 509  2/16/2021 - Present Yes Acquired hypothyroidism (Chronic) ICD-10-CM: E03.9 ICD-9-CM: 244.9  2/13/2021 - Present Yes History of GI bleed (Chronic) ICD-10-CM: Z87.19 ICD-9-CM: V12.79  2/13/2021 - Present Yes * (Principal) Elevated troponin ICD-10-CM: R77.8 ICD-9-CM: 790.6  2/13/2021 - Present Weight loss ICD-10-CM: R63.4 ICD-9-CM: 783.21  2/24/2020 - Present Yes Hypomagnesemia ICD-10-CM: G80.99 
ICD-9-CM: 275.2  12/12/2019 - Present Unknown PAF (paroxysmal atrial fibrillation) (HCC) (Chronic) ICD-10-CM: I48.0 ICD-9-CM: 427.31  12/12/2019 - Present Yes Chest pain ICD-10-CM: R07.9 ICD-9-CM: 786.50  5/14/2019 - Present Yes CAD (coronary artery disease) ICD-10-CM: I25.10 ICD-9-CM: 414.00  6/16/2016 - Present Yes PLAN:   
Chest pain- prob esophagitis related, seems unusual for ischemic at this point;  not acs as per cardiology-not a cath candidate , no heparin or anticoagulation as recent gi bleed. -Recent EGD with ulcerative esophagitis and duodenal ulcers -carafate, PPI 
 
 
CADs/p CABG: unable to tolerate optimize medications because of low Bps. EF is recovered. Acute anemiaGI bleed 
-GI consult, transfuse prn 
-Twice daily PPI Extensive RLE DVT 
-s/p IVC filter 
-Start Eliquis 2.5 mg twice daily per hematology recommendations LUE redness after PICC 
-Start Bactrim given cellulitic appearance Hypotension, difficult to optimize anti-ischemic agents 
-midodrine 
-IVF/albumin was given Low normal bp- tolerates renexa Ileushas gastroparesis, continue with Reglan, restart tube feeds as tolerated Hypothyroid-increase Synthroid from 50 mcg to 75 mcg DC planning/Dispo:  Home soon DVT ppx:  eliquis Signed: 
Davey Lyon MD

## 2021-02-22 NOTE — PROGRESS NOTES
Problem: Falls - Risk of 
Goal: *Absence of Falls Description: Document Raya Kirkpatrick Fall Risk and appropriate interventions in the flowsheet. Outcome: Progressing Towards Goal 
Note: Fall Risk Interventions: 
Mobility Interventions: Bed/chair exit alarm, Communicate number of staff needed for ambulation/transfer, Patient to call before getting OOB Medication Interventions: Bed/chair exit alarm, Patient to call before getting OOB, Teach patient to arise slowly Elimination Interventions: Bed/chair exit alarm, Call light in reach, Patient to call for help with toileting needs, Stay With Me (per policy), Toilet paper/wipes in reach, Toileting schedule/hourly rounds History of Falls Interventions: Bed/chair exit alarm, Consult care management for discharge planning, Door open when patient unattended, Investigate reason for fall, Room close to nurse's station
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
## 2021-02-22 NOTE — DISCHARGE INSTRUCTIONS
Patient Education        Chest Pain: Care Instructions  Your Care Instructions     There are many things that can cause chest pain. Some are not serious and will get better on their own in a few days. But some kinds of chest pain need more testing and treatment. Your doctor may have recommended a follow-up visit in the next 8 to 12 hours. If you are not getting better, you may need more tests or treatment. Even though your doctor has released you, you still need to watch for any problems. The doctor carefully checked you, but sometimes problems can develop later. If you have new symptoms or if your symptoms do not get better, get medical care right away. If you have worse or different chest pain or pressure that lasts more than 5 minutes or you passed out (lost consciousness), call 911 or seek other emergency help right away. A medical visit is only one step in your treatment. Even if you feel better, you still need to do what your doctor recommends, such as going to all suggested follow-up appointments and taking medicines exactly as directed. This will help you recover and help prevent future problems. How can you care for yourself at home? · Rest until you feel better. · Take your medicine exactly as prescribed. Call your doctor if you think you are having a problem with your medicine. · Do not drive after taking a prescription pain medicine. When should you call for help? Call 911 if:     · You passed out (lost consciousness).     · You have severe difficulty breathing.     · You have symptoms of a heart attack. These may include:  ? Chest pain or pressure, or a strange feeling in your chest.  ? Sweating. ? Shortness of breath. ? Nausea or vomiting. ? Pain, pressure, or a strange feeling in your back, neck, jaw, or upper belly or in one or both shoulders or arms. ? Lightheadedness or sudden weakness. ? A fast or irregular heartbeat.   After you call 911, the  may tell you to chew 1 adult-strength or 2 to 4 low-dose aspirin. Wait for an ambulance. Do not try to drive yourself. Call your doctor today if:     · You have any trouble breathing.     · Your chest pain gets worse.     · You are dizzy or lightheaded, or you feel like you may faint.     · You are not getting better as expected.     · You are having new or different chest pain. Where can you learn more? Go to http://www.francois.com/  Enter A120 in the search box to learn more about \"Chest Pain: Care Instructions. \"  Current as of: June 26, 2019               Content Version: 12.6  © 8094-9574 [employer]. Care instructions adapted under license by [employer] (which disclaims liability or warranty for this information). If you have questions about a medical condition or this instruction, always ask your healthcare professional. [place] [address] any warranty or liability for your use of this information. Patient Education        Learning About Deep Vein Thrombosis  What is a deep vein thrombosis (DVT)? A deep vein thrombosis (DVT) is a blood clot (thrombus) in a deep vein, usually in the legs. A DVT can be dangerous because it can break loose and travel through the bloodstream to the lungs. There it can block blood flow in the lungs (pulmonary embolism). This can be life-threatening. What are the symptoms? Deep vein thrombosis often doesn't cause symptoms. Or it may cause only minor ones. When symptoms happen, they include:  · Swelling in the affected area. · Redness and warmth in the affected area. · Pain or tenderness. You may have pain only when you touch the affected area or when you stand or walk. Sometimes a pulmonary embolism is the first sign that you have DVT. If your doctor thinks you may have DVT, you will probably have an ultrasound test. You may have other tests as well. What causes deep vein thrombosis (DVT)?   Causes of a blood clot in a deep vein (DVT) include:  · Slowed blood flow. This can happen when you're not active for long periods of time. For example, clots can form if you are paralyzed, are confined to bed, or must sit while on a long flight or car trip. · Abnormal clotting problems that make the blood clot too easily or too quickly. For example, some people have blood that clots too easily, a problem that may run in families. · Surgery or an injury to the blood vessels. Blood is more likely to clot in veins shortly after they are injured. · Cancer. How can you prevent DVT? · Exercise your lower leg muscles to help blood flow in your legs. Point your toes up toward your head so the calves of your legs are stretched, then relax and repeat. This is a good exercise to do when you are sitting for long periods of time. · Get out of bed as soon as you can after an illness or surgery. If you need to stay in bed, do the leg exercise noted above every hour when you are awake. · Use special stockings called compression stockings. These stockings are tight at the feet with a gradually looser fit on the leg. Many doctors recommend that you wear compression stockings during a journey longer than 8 hours. · Take breaks when you are on long trips. Stop the car and walk around. On long airplane flights, walk up and down the aisle hourly, and flex and point your feet every 20 minutes while sitting. · Take blood-thinning medicines after some types of surgery if your doctor recommends it. Blood thinners also may be used if you are likely to develop clots. How is DVT treated? Treatment for DVT usually involves taking blood thinners. These medicines are given through a vein (intravenously, or IV) or as a pill. Talk with your doctor about which medicine is right for you. Your doctor also may suggest that you prop up or elevate your leg when possible, take walks, and wear compression stockings.  These measures may help reduce the pain and swelling that can happen with DVT. Follow-up care is a key part of your treatment and safety. Be sure to make and go to all appointments, and call your doctor if you are having problems. It's also a good idea to know your test results and keep a list of the medicines you take. Where can you learn more? Go to http://www.gray.com/  Enter X941 in the search box to learn more about \"Learning About Deep Vein Thrombosis. \"  Current as of: March 4, 2020               Content Version: 12.6  © 6760-8928 [employer]. Care instructions adapted under license by [employer] (which disclaims liability or warranty for this information). If you have questions about a medical condition or this instruction, always ask your healthcare professional. [address] any warranty or liability for your use of this information.

## 2021-02-22 NOTE — PROGRESS NOTES
Inpatient Hematology / Oncology Progress Note Reason for Consult:  STEMI (ST elevation myocardial infarction) ([address] [I21.3]; Chest pain [R07.9] Referring Physician:  Brittany Scott MD 
 
24 Hour Events: 
VSS, afebrile Confused, somnolent Daughter at bedside Hgb stable on Eliquis ROS: 
 
Unable to obtain due to patient condition (confused/altered mental status) 10 point review of systems is otherwise negative with the exception of the elements mentioned above in the HPI. Allergies Allergen Reactions  Corticosteroids (Glucocorticoids) Anaphylaxis  Iodine Hives and Other (comments)  
  hypertension Past Medical History:  
Diagnosis Date  Abnormal EKG 6/17/2016  Acute systolic (congestive) heart failure ([address] 6/17/2016  Anterior myocardial infarction ([address] 11/20/1997  Avascular necrosis ([address] R hip  CAD (coronary artery disease) 1990  
 s/p CABG  
 Cellulitis of right lower extremity 6/17/2016  Chest pain, precordial 3/28/2016  COPD (chronic obstructive pulmonary disease) ([address]   
 pt denies  Coronary atherosclerosis of native coronary vessel 03/28/2016  
 per heart cath (6/2016)- \"pt with diffusely diseased LAD and small caliber vessels. At present. .. best option would be medical management. .. do not see any obvious good options for redo CABG. ..  Fracture of femoral neck, right ([address] 02/27/2016  
 s/p repair with hardware- Dr Mara Mchugh  GERD (gastroesophageal reflux disease) 02/27/2016  
 managed with medication  HLD (hyperlipidemia) 03/28/2016  
 managed with medication  Hx of echocardiogram 02/27/2016 EF 45-50%, hypokinesis of the apical anterior and basal-mid anteroseptal wall. Mild aortic valve regurgitation. Mild tricuspid regurgitation.  Hypertension   
 managed with medication  Hypokalemia 02/27/2016  
 hx of- WNL since 6/2016- pt on K+ supplement  IBS (irritable bowel syndrome)  Ischemic cardiomyopathy 5/15/2018  MI (myocardial infarction) ([facility]  and spring 2016  
 x2  Osteoarthritis  Osteoporosis  Rectal prolapse  S/P CABG (coronary artery bypass graft)   
 S/P total hip arthroplasty 2016  Scleroderma ([facility] 3/28/2016  Shortness of breath dyspnea 3/28/2016  Tracheal stenosis   
 s/p repair  Unstable angina ([facility] 1997 Past Surgical History:  
Procedure Laterality Date  HX ANKLE FRACTURE TX Right 1992  
 hardware placed  HX CORONARY ARTERY BYPASS GRAFT    
 cabg x 3  
 HX FRACTURE TX    
 cervical spine- C2  
 HX HEART CATHETERIZATION    
 HX HEENT    
 tracheal stricture/stenosis Bren Santiago HIP FRACTURE TX  2016  
 repaired with hardware- Dr Krys Camp  HX HYSTERECTOMY  1970s  HX OTHER SURGICAL Peg tube placement  HX PTCA  HX TRACHEOSTOMY  IR IVC FILTER  2021 Family History Problem Relation Age of Onset  Breast Cancer Sister Social History Socioeconomic History  Marital status:  Spouse name: Not on file  Number of children: Not on file  Years of education: Not on file  Highest education level: Not on file Occupational History  Not on file Social Needs  Financial resource strain: Not on file  Food insecurity Worry: Not on file Inability: Not on file  Transportation needs Medical: Not on file Non-medical: Not on file Tobacco Use  Smoking status: Former Smoker Packs/day: 1.00 Years: 20.00 Pack years: 20.00 Quit date: 1995 Years since quittin.1  Smokeless tobacco: Never Used Substance and Sexual Activity  Alcohol use: No  
 Drug use: No  
 Sexual activity: Not on file Lifestyle  Physical activity Days per week: Not on file Minutes per session: Not on file  Stress: Not on file Relationships  Social connections Talks on phone: Not on file   Gets together: Not on file  
  Attends Gnosticist service: Not on file  
  Active member of club or organization: Not on file  
  Attends meetings of clubs or organizations: Not on file  
  Relationship status: Not on file  
• Intimate partner violence  
  Fear of current or ex partner: Not on file  
  Emotionally abused: Not on file  
  Physically abused: Not on file  
  Forced sexual activity: Not on file  
Other Topics Concern  
• Not on file  
Social History Narrative  
• Not on file  
 
Current Facility-Administered Medications  
Medication Dose Route Frequency Provider Last Rate Last Admin  
• metoclopramide HCl (REGLAN) injection 5 mg  5 mg IntraVENous Q6H PRN Harshad Aguilar MD      
• trimethoprim-sulfamethoxazole (BACTRIM DS, SEPTRA DS) 160-800 mg per tablet 1 Tab  1 Tab Oral Q12H Harshad Aguilar MD   1 Tab at 02/22/21 0842  
• sodium chloride tablet 1 g  1 g Per G Tube BID Harshad Aguilar MD   1 g at 02/22/21 0842  
• gabapentin (NEURONTIN) capsule 100 mg  100 mg Per G Tube BID Harshad Aguilar MD   100 mg at 02/22/21 0842  
• midodrine (PROAMATINE) tablet 10 mg  10 mg Per G Tube TID WITH MEALS Harshad Aguilar MD   10 mg at 02/22/21 0842  
• levothyroxine (SYNTHROID) tablet 75 mcg  75 mcg Per G Tube 6am Harshad Aguilar MD   75 mcg at 02/22/21 0553  
• heparin (PF) 2 units/ml in NS infusion 2,000 Units  1,000 mL Irrigation Multiple Chuy Davis MD      
• pantoprazole (PROTONIX) 40 mg in 0.9% sodium chloride 10 mL injection  40 mg IntraVENous Q12H Pablo Rodgers PA   40 mg at 02/22/21 0843  
• sodium chloride (NS) flush 5-10 mL  5-10 mL IntraVENous Q8H Chuy Davis MD   10 mL at 02/22/21 0555  
• apixaban (ELIQUIS) tablet 2.5 mg  2.5 mg Oral Q12H Harshad Aguilar MD   2.5 mg at 02/22/21 0842  
• lactated Ringers infusion  100 mL/hr IntraVENous CONTINUOUS Harshad Aguilar  mL/hr at 02/22/21 0428 100 mL/hr at 02/22/21 0428  
  metoclopramide (REGLAN) 5 mg/5 mL oral syrup 5 mg  5 mg Per G Tube Keith Tan MD   5 mg at 21 0600  
 0.9% sodium chloride infusion 250 mL  250 mL IntraVENous PRN Katerin Ramirez MD      
 diphenhydrAMINE (BENADRYL) capsule 25 mg  25 mg Oral Q6H PRN Katerin Ramirez MD      
 sucralfate (CARAFATE) tablet 1 g  1 g Oral AC&HS Katerin Ramirez MD   1 g at 21 8756  loperamide (IMODIUM) 1 mg/7.5 mL oral solution 2 mg  2 mg Per J Tube QID PRN Katerin Ramirez MD   2 mg at 21 1038  
 NUTRITIONAL SUPPORT ELECTROLYTE PRN ORDERS   Does Not Apply PRN Eyad Leyva MD      
 nitroglycerin (NITROSTAT) tablet 0.4 mg  0.4 mg SubLINGual Q5MIN PRN Genesis Rajan MD      
 [Held by provider] isosorbide mononitrate ER (IMDUR) tablet 30 mg  30 mg Oral DAILY Genesis Rajan MD   Stopped at 21 0900  
 sodium chloride (NS) flush 5-40 mL  5-40 mL IntraVENous Q8H Genesis Rajan MD   30 mL at 21 7515  sodium chloride (NS) flush 5-40 mL  5-40 mL IntraVENous PRN Genesis Rajan MD   10 mL at 21  acetaminophen (TYLENOL) tablet 650 mg  650 mg Oral Q6H PRN Genesis Rajan MD   650 mg at 213 Or  acetaminophen (TYLENOL) suppository 650 mg  650 mg Rectal Q6H PRN Genesis Rajan MD      
 promethazine [facility]) tablet 12.5 mg  12.5 mg Oral Q6H PRN Genesis Rajan MD   12.5 mg at 21 2146 Or  
 ondansetron (ZOFRAN) injection 4 mg  4 mg IntraVENous Q6H PRN Genesis Rajan MD   4 mg at 21 1622  
 ranolazine ER (RANEXA) tablet 500 mg  500 mg Oral Q12H Eyad Leyva MD   500 mg at 21 2237 OBJECTIVE: 
Patient Vitals for the past 8 hrs: 
 BP Temp Pulse Resp SpO2 Weight  
21 0804 118/65 97.4 °F (36.3 °C) 69 18 100 %   
21 0459      94 lb 8 oz (42.9 kg) 21 0312 124/64 97.9 °F (36.6 °C) 63 16 97 %  Temp (24hrs), Av.3 °F (36.3 °C), Min:96.3 °F (35.7 °C), Max:97.9 °F (36.6 °C) No intake/output data recorded. Physical Exam: 
Constitutional: Frail, weak, chronically ill-appearing female in no acute distress, sitting comfortably in the hospital bed. HEENT: Normocephalic and atraumatic. Oropharynx is clear, mucous membranes are moist.  Pupils are equal, round, and reactive to light. Extraocular muscles are intact. Sclerae anicteric. Neck supple without JVD. No thyromegaly present. Lymph node No palpable submandibular, cervical, supraclavicular, axillary or inguinal lymph nodes. Skin Warm and dry. No bruising and no rash noted. No erythema. No pallor. Respiratory Lungs are clear to auscultation bilaterally without wheezes, rales or rhonchi, normal air exchange without accessory muscle use. CVS Normal rate, regular rhythm and normal S1 and S2. No murmurs, gallops, or rubs. Abdomen PEG with TF running via pump. Soft, nontender and nondistended, normoactive bowel sounds. No palpable mass. No hepatosplenomegaly. Neuro +somnolent. Grossly nonfocal with no obvious sensory or motor deficits. MSK Normal range of motion in general.  No  Tenderness. RLE 2+ swelling Psych Appropriate mood and affect. Labs:   
Recent Results (from the past 24 hour(s)) IRON Collection Time: 02/21/21  5:51 PM  
Result Value Ref Range Iron 7 (L) 35 - 150 ug/dL FERRITIN Collection Time: 02/21/21  5:51 PM  
Result Value Ref Range Ferritin 363 8 - 388 NG/ML  
SED RATE, AUTOMATED Collection Time: 02/21/21  5:51 PM  
Result Value Ref Range Sed rate, automated 17 0 - 30 mm/hr CBC WITH AUTOMATED DIFF Collection Time: 02/22/21  4:32 AM  
Result Value Ref Range WBC 6.1 4.3 - 11.1 K/uL  
 RBC 3.37 (L) 4.05 - 5.2 M/uL  
 HGB 10.1 (L) 11.7 - 15.4 g/dL HCT 30.8 (L) 35.8 - 46.3 % MCV 91.4 79.6 - 97.8 FL  
 MCH 30.0 26.1 - 32.9 PG  
 MCHC 32.8 31.4 - 35.0 g/dL  
 RDW 16.7 (H) 11.9 - 14.6 % PLATELET 793 480 - 324 K/uL  MPV 9.3 (L) 9.4 - 12.3 FL  
 ABSOLUTE NRBC 0.00 0.0 - 0.2 K/uL  
 DF AUTOMATED NEUTROPHILS 69 43 - 78 % LYMPHOCYTES 16 13 - 44 % MONOCYTES 13 (H) 4.0 - 12.0 % EOSINOPHILS 1 0.5 - 7.8 % BASOPHILS 1 0.0 - 2.0 % IMMATURE GRANULOCYTES 1 0.0 - 5.0 %  
 ABS. NEUTROPHILS 4.2 1.7 - 8.2 K/UL  
 ABS. LYMPHOCYTES 0.9 0.5 - 4.6 K/UL  
 ABS. MONOCYTES 0.8 0.1 - 1.3 K/UL  
 ABS. EOSINOPHILS 0.1 0.0 - 0.8 K/UL  
 ABS. BASOPHILS 0.0 0.0 - 0.2 K/UL  
 ABS. IMM. GRANS. 0.1 0.0 - 0.5 K/UL METABOLIC PANEL, BASIC Collection Time: 02/22/21  4:32 AM  
Result Value Ref Range Sodium 141 136 - 145 mmol/L Potassium 3.9 3.5 - 5.1 mmol/L Chloride 111 (H) 98 - 107 mmol/L  
 CO2 24 21 - 32 mmol/L Anion gap 6 (L) 7 - 16 mmol/L Glucose 88 65 - 100 mg/dL BUN 16 8 - 23 MG/DL Creatinine 0.54 (L) 0.6 - 1.0 MG/DL  
 GFR est AA >60 >60 ml/min/1.73m2 GFR est non-AA >60 >60 ml/min/1.73m2 Calcium 8.1 (L) 8.3 - 10.4 MG/DL Imaging: 
 
 
ASSESSMENT & PLAN: 
Problem List  Date Reviewed: 1/6/2021 Codes Class Noted Severe protein-calorie malnutrition ([facility] (Chronic) ICD-10-CM: L63 ICD-9-CM: 532  2/16/2021 Acquired hypothyroidism (Chronic) ICD-10-CM: E03.9 ICD-9-CM: 244.9  2/13/2021 History of GI bleed (Chronic) ICD-10-CM: Z87.19 ICD-9-CM: V12.79  2/13/2021 * (Principal) Elevated troponin ICD-10-CM: R77.8 ICD-9-CM: 790.6  2/13/2021 Weight loss ICD-10-CM: R63.4 ICD-9-CM: 783.21  2/24/2020 Near syncope ICD-10-CM: R55 
ICD-9-CM: 780.2  2/12/2020 Hypotension ICD-10-CM: I95.9 ICD-9-CM: 458.9  2/12/2020 Frequent falls ICD-10-CM: R29.6 ICD-9-CM: V15.88  2/12/2020 Small bowel obstruction ([facility] [address] [address] ICD-10-CM: M47.537 ICD-9-CM: 560.9  12/13/2019 Elevated troponin I level ICD-10-CM: R77.8 ICD-9-CM: 790.6  12/13/2019 Hypomagnesemia ICD-10-CM: A98.73 
ICD-9-CM: 275.2  12/12/2019 PAF (paroxysmal atrial fibrillation) (HCC) (Chronic) ICD-10-CM: I48.0 ICD-9-CM: 427.31  12/12/2019 Partial small bowel obstruction ([facility] ICD-10-CM: K56.600 ICD-9-CM: 560.9  8/26/2019 Chest pain ICD-10-CM: R07.9 ICD-9-CM: 786.50  5/14/2019 CAD (coronary artery disease) ICD-10-CM: I25.10 ICD-9-CM: 414.00  6/16/2016 Hypertension (Chronic) ICD-10-CM: I10 
ICD-9-CM: 401.9  2/27/2016  
   
  
 
66 female history of CAD status post CABG 2018, A. Fib (on apixaban in past), some baseline memory issues, borderline PS, recent episode of GI bleeding after feeding tube placement at [facility] 1/21, now admitted w/ chest discomfort. Per review of outside hospitalization records, it appears EGD at the time had showed duodenal ulcers and esophagitis, her Eliquis at the time was held with recommendation to hold for at least 2 weeks following the GI bleed, and she received PRBC transfusion x 3. During her current hospitalization, she has refused cardiac catheterization, as well as repeat EGD. Per discussion with hospitalist, she has had a slow drop in her hemoglobin necessitating PRBC transfusion x2. No overt bleeding is noted. Recent bilateral LE Dopplers have revealed extensive above-knee RLE DVT, she is status post IVC filter placement yesterday. She remains on PPI twice daily. Given lack of overt bleeding, not unreasonable to restart low-dose apixaban at 2.5 mg twice daily with close monitoring of her hemoglobin as well as any evidence of bleeding. Would recommend keeping a low threshold to stopping her anticoagulation should she exhibit any concerning signs. If tolerates well over the next few days then reasonable for her to stay on low-dose apixaban on discharge with close outpatient following. She already now has an IVC filter. Will check iron stores, and replace iron if needed. Palliative care is also scheduled to see patient as she has expressed preference towards comfort management. Outpatient follow-up in hematology within 2 to 3 weeks of discharge, at which point a repeat doppler study can be undertaken. DVT with hx of GIB 
- Started on low dose Eliquis and tolerating well - s/p IVC filter placement 2/20/21 
- Repeat dopplers in 3 weeks as outpatient with Dr Candy Sorenson Anemia 
- No DAYANA 
- Declining endoscopy 
- Hgb stable at 10.1 Lab studies and imaging studies were personally reviewed. Pertinent old records were reviewed. Thank you for allowing us to participate in the care of Ms. Kourtney Bonilla. She will need to follow-up with [facility] in 3 weeks of discharge - referral placed. We will now sign off. Please do not hesitate to call with questions or concerns. GUILLAUME Buchanan [facility] Hematology & Oncology [address] [place] [address] Office : [phone] Fax : [phone] Attending Addendum: 
I have personally performed a face to face diagnostic evaluation on this patient. I have reviewed and agree with the care plan as documented by Cornell Vickers N.P. My findings are as follows: She has right leg DVT and is s/p IVC filter insertion, appears weak, heart rate regular without murmurs, abdomen is non-tender, bowel sounds are positive, we will arrange for her to follow-up with Dr. Candy Sorenson in clinic. Cornelius Howell MD 
 
 
[facility] Hematology/Oncology [address] [address] Office : [phone] Fax : [phone]

## 2021-02-22 NOTE — PROGRESS NOTES
Physical Therapy Note: 
 
Physical therapy evaluation orders received and chart reviewed. Attempted to see patient this PM to initiate assessment. Discussed consult with patient's daughter at bedside. Patient pending discharge home today. HHPT already in place and all DME current. No acute assessment indicated at this time. Thank you, Arianna Adrian, PT, DPT 
2/22/2021

## 2021-02-22 NOTE — PROGRESS NOTES
Patient and daughter given all paperwork. IVs removed. Patient leaving with [facility] to home, Daughter taking all personal belongings home with her.

## 2021-02-26 NOTE — DISCHARGE SUMMARY
Hospitalist Discharge Summary Admit Date:  2021  8:24 PM  
Name:  Reji Mcadams Age:  66 y.o. 
:  1945 MRN:  [id number] PCP:  Rhea Jacobs MD 
Treatment Team: Utilization Review: Kervin Padgett; Utilization Review: Mia Taylor RN; Consulting Provider: Nelly Reed NP; Consulting Provider: Eliane Morales MD; Charge Nurse: Salinas Dotson; Occupational Therapist: Darwin Nugent OT; Care Manager: Irina Renteria LMSW Problem List for this Hospitalization: 
Hospital Problems as of 2021 Date Reviewed: 2021 Codes Class Noted - Resolved POA Severe protein-calorie malnutrition ([facility] [address] (Chronic) ICD-10-CM: B26 ICD-9-CM: 456  2021 - Present Yes Acquired hypothyroidism (Chronic) ICD-10-CM: E03.9 ICD-9-CM: 244.9  2021 - Present Yes History of GI bleed (Chronic) ICD-10-CM: Z87.19 ICD-9-CM: V12.79  2021 - Present Yes * (Principal) Elevated troponin ICD-10-CM: R77.8 ICD-9-CM: 790.6  2021 - Present Weight loss ICD-10-CM: R63.4 ICD-9-CM: 783.21  2020 - Present Yes Hypomagnesemia ICD-10-CM: K48.41 
ICD-9-CM: 275.2  2019 - Present Unknown PAF (paroxysmal atrial fibrillation) (HCC) (Chronic) ICD-10-CM: I48.0 ICD-9-CM: 427.31  2019 - Present Yes Chest pain ICD-10-CM: R07.9 ICD-9-CM: 786.50  2019 - Present Yes CAD (coronary artery disease) ICD-10-CM: I25.10 ICD-9-CM: 414.00  2016 - Present Yes Admission HPI from 2021:   
 \"Patient is 72yoF with complex medical history including CAD s/p CABG, scleroderma (requiring feeding tube), recent GI bleed (hospitalized at [facility]) who presents with chest pain. On ER evaluation, patient was found to have evidence of STEMI with EKG changes and elevated troponin. Cardiology evaluated patient. Patient declines cardiac cath and reported that she wanted to be comfortable, but did not wish to have additional medical interventions. Patient is not a candidate for anticoagulation, given recent severe GI bleed. Hospitalist consulted for admission for pain control and potentially comfort care  \" Hospital Course: 
Pt admitted for chest pain and was a code STEMI. Cardiology felt patient was not a STEMI. She had continued chest pain, and occult blood stool. GI evaluated pt for occult +ve stool and stated pt has known ulcerative esophagitis and duodenal ulcers. GI recommended BID PPI and OP follow up. Pt declined EGD. Pt also found to have extensive RLE DVT, an IVC filter was placed. Hematology recommended low dose eliquis 2.5mg bid with stopping if any evidence of bleeding. Pt's TSH was elevated and synthroid dose was increased. Pt was deemed stable for OP follow up with home health, PMD, Onc, Cardiology. Return precautions given. Disposition: Home Health Care Svc Activity: as tolerated Diet: No diet orders on file Code Status: Prior Follow up instructions, discharge meds at bottom of this note. Plan was discussed with patient. All questions answered. Patient was stable at time of discharge. Patient will call a physician or return if any concerns. Discharge summary was sent to PCP electronically via \"Comm Mgt\" link in [facility], if possible. Diagnostic Imaging/Tests:  
Nm Lung Vent/perf Result Date: 2/20/2021 Procedure: Ventilation/perfusion lung scan. Indication: Shortness of breath. Comparison: Chest x-ray 2/20/2021 Radiopharmaceutical: 43.0 mCi DTPA; 6.3 mCi Tc-99m MAA administered intravenously. Technique: Standard dynamic anterior and posterior images were obtained for the ventilation study; anterior and posterior images in varying degrees of obliquity were obtained for the perfusion study; the ventilation and perfusion studies were compared with a recent chest x-ray. Findings: Ventilation: Normal wash-in, washout. Perfusion: No focal perfusion abnormality. Low probability for pulmonary embolus. Xr Chest Sngl V Result Date: 2/22/2021 Single View portable semierect chest x-ray dated   February 22, 2021 Reference Exam: February 20, 2021 Indication: Confusion Findings: The cardiac silhouette is normal in size and contour. Median sternotomy clips and wires are seen, right-sided internal jugular catheter is now present with the tip overlying the right atrium, tubing over the left upper quadrant of the abdomen is again seen, lung fields are underinflated with minimal strandiness seen with overlap of tubing shadows on the right. There is no pneumothorax seen. Lungs underinflated, with minimal stranding. No focal consolidation noted. Xr Chest Sngl V Result Date: 2/20/2021 History: Shortness of breath and vomiting COMPARISON: Chest radiograph 2/13/2021 AP PORTABLE CHEST: Median sternotomy wires are present. There is no consolidation or pleural effusions. ABDOMEN 2 VIEWS: A percutaneous feeding tube is present. Cholecystectomy clips are present. Gas is scattered throughout the large and small bowel. A right hip prosthesis is present. Ileus. Xr Abd (kub) Result Date: 2/20/2021 History: Shortness of breath and vomiting COMPARISON: Chest radiograph 2/13/2021 AP PORTABLE CHEST: Median sternotomy wires are present. There is no consolidation or pleural effusions. ABDOMEN 2 VIEWS: A percutaneous feeding tube is present. Cholecystectomy clips are present. Gas is scattered throughout the large and small bowel. A right hip prosthesis is present. Ileus. Ir Ivc Filter Result Date: 2/20/2021 Procedures performed: Fluoroscopic guidance Ultrasound-guided vascular access IVC venogram (cavogram) IVC filter placement Followup IVC venogram (cavogram) Placement of nontunneled central venous catheter History: 66years of age Female with extensive right lower extremity DVT and unable to be anticoagulated due to history of GI bleed. We are asked to place an IVC filter. Attending: Nate Appiah M.D. Anesthesia: Local anesthetic with 1% lidocaine. Technique/findings: After the risks, benefits and alternatives were discussed, consent was obtained. A time out was performed to verify the patient's identity and procedure. Maximal sterile barrier technique (including:  Sterile Ultrasound probe cover, Sterile Ultrasound gel, cap, mask, sterile gown, sterile gloves, sterile drape, hand hygiene, and 2% chlorhexidine cutaneous antisepsis) was used for the procedure. The patient was placed supine on the angiographic table. The right neck was examined using real-time ultrasound and a gray scale image was obtained. This area was prepped and draped in the usual sterile fashion. Local anesthetic with 1% lidocaine was administered to the subcutaneous soft tissues. A small dermatotomy was performed with a #11 blade scalpel. Under direct ultrasound guidance, a micropuncture needle was inserted into the right internal jugular vein and a 0.018 inch wire was advanced through the needle into the vena cava. The needle was removed and replaced with a 3/4 coaxial catheter. Under fluoroscopic guidance, a 0.035 inch Bentson wire was inserted through the coaxial catheter into the inferior vena cava. The coaxial catheter was exchanged for a flush catheter which was advanced into the left common iliac vein. A cavogram was performed which demonstrated that the inferior vena cava was of normal caliber and without accessory veins or a duplicated IVC. An appropriate level for deployment of the IVC filter was determined.  Over a Bentson wire, the flush catheter was removed and replaced with the vena cava filter sheath. The filter was deployed through the sheath. A postplacement venogram was performed which demonstrated the IVC filter tip at the level immediately below the renal veins. The IVC filter sheath was exchanged over a [employer]son wire for a triple lumen nontunneled central venous catheter was advanced over the wire into an appropriate position for central venous access with the tip overlying the right atrium. The catheter tip was checked with fluoroscopy. The catheter was tested and flushed with 100:1 heparin and secured to the skin with 2-0 monofilament. All catheters, wires, and sheaths were removed. Hemostasis was achieved by light manual compression. Fluoroscopic guidance was used for all catheter and wire manipulations. The patient tolerated the procedure well. There were no immediate complications. Estimated blood loss: 5 mL. Fluoro Time: 2 minutes 24 seconds. Fluoro Dose (mGy): 100 Dose Area Product (cGy-cm2): 1887.4 1. Successful placement of an infrarenal Optease inferior vena cava filter. 2. Successful placement of a 7 Fr triple lumen Right internal jugular central venous catheter. The line is ready for use. Ct Head Wo Cont Result Date: 2/22/2021 CT Brain Without Contrast Dated  February 22, 2021 Reference Exam: February 11, 2020 Indication: Altered mental status, GI bleed, scleroderma Technique: Radiation dose reduction techniques were used for this study using one or more of the following: automated exposure control; adjustment of mA and/or kV (according to patient size);  iterative reconstruction. Routine CT of the brain was performed using 5 mm axial images obtained. Images are presumed to have been obtained less than 24 hours from presentation. Findings:  The ventricular system, basilar cisterns, and cortical sulci all are normal in size and position for the patient's age. There are no abnormal intracranial masses, mass effect, nor extra-axial collections seen. There are no abnormal areas of attenuation that would indicate a recent intracranial hemorrhage or infarction. Bone windows show no fractures nor foreign bodies. Normal CT of the brain done without contrast.  
 
Xr Chest [facility] Result Date: 2/13/2021 EXAMINATION: CHEST RADIOGRAPH 2/13/2021 8:57 PM INDICATION: Chest pain COMPARISON: February 11, 2020 chest radiograph TECHNIQUE: A single view of the chest was obtained. FINDINGS: Support Devices: None Osseous : No acute abnormality. Cardiomediastinal Silhouette: Prior CABG. Normal in size Lungs: Clear lungs. Normal pulmonary vascularity. Pleura: No pleural fluid or pneumothorax. Upper Abdomen: Support tubes coiled over the upper abdomen. No acute abnormality. Duplex Lower Ext Venous Bilat Result Date: 2/20/2021 BILATERAL LOWER EXTREMITY DOPPLER ULTRASOUND  2021 HISTORY:  leg pain Technique: Grayscale, color Doppler and pulse wave Doppler images of the deep veins of lower extremities were obtained. FINDINGS: There is an extensive occlusive DVT in the right leg involving the common femoral vein, profunda, femoral vein, popliteal vein, posterior tibial and peroneal veins. The left leg the deep veins above the knee are patent. Below the knee, the posterior tibial and peroneal veins are not optimally characterized on this patient. Extensive right leg DVT. Findings were sent by secures a text to the ordering provider. Echocardiogram results: 
Results for orders placed or performed during the hospital encounter of 21  
2D ECHO LIMTED ADULT W OR WO CONTR Narrative [address] [facility] [address] [place], [address] [facility] 
[phone] Transthoracic Echocardiogram 
2D, M-mode, Doppler, and Color Doppler Patient: Sabina Bryant 
MR #: [id number] : 1945 Age: 66 years Gender: Female Study date: 2021 Account #: [de-identified] Height: 64 in 
Weight: 81.8 lb 
BSA: 1.34 mï¾² Status:Routine Location:  BP: 98/ 56 Allergies: CORTICOSTEROIDS (GLUCOCORTICOIDS), IODINE Sonographer:  Joe Alejandre RDCS Group:  [facility] Reading Physician:  Judith Yuen. Larissa Cochran MD [facility] INDICATIONS: Assess EF. PROCEDURE: This was a routine study. A transthoracic echocardiogram was 
performed. The study included limited 2D imaging, M-mode, limited spectral 
Doppler, and color Doppler. Intravenous contrast (Definity) was administered. Image quality was adequate. LEFT VENTRICLE: Size was normal. Systolic function was normal. Ejection 
fraction was estimated to be 60 %. There was hypokinesis of the apical septal 
wall(s). Wall thickness was normal. 
 
SUMMARY: 
 
-  Left ventricle: Systolic function was normal. Ejection fraction was estimated to be 60 %. There was hypokinesis of the apical septal wall(s). SYSTEM MEASUREMENT TABLES 
 
2D mode IVS/LVPW (2D): 0.9 IVSd (2D): 1.1 cm 
LVIDd (2D): 2.8 cm LVIDs (2D): 2.4 cm 
LVPWd (2D): 1.1 cm Prepared and signed by 
 
Genna José. Andrade Morgan MD [facility] Signed 17-Feb-2021 16:11:57 Procedures done this admission: * No surgery found * All Micro Results Procedure Component Value Units Date/Time CULTURE, BLOOD [[id number]] Collected: 02/14/21 0735 Order Status: Completed Specimen: Blood Updated: 02/19/21 1368 Special Requests: --     
  RIGHT 
FOREARM Culture result: NO GROWTH 5 DAYS     
 CULTURE, BLOOD [[id number]] Collected: 02/14/21 0735 Order Status: Completed Specimen: Blood Updated: 02/19/21 6074 Special Requests: --     
  LEFT 
FOREARM Culture result: NO GROWTH 5 DAYS Labs: Results:  
   
BMP, Mg, Phos No results for input(s): NA, K, CL, CO2, AGAP, BUN, CREA, CA, GLU, MG, PHOS in the last 72 hours. CBC No results for input(s): WBC, RBC, HGB, HCT, PLT, GRANS, LYMPH, EOS, MONOS, BASOS, IG, ANEU, ABL, BERNA, ABM, ABB, AIG, HGBEXT, HCTEXT, PLTEXT in the last 72 hours. LFT No results for input(s): ALT, TBIL, AP, TP, ALB, GLOB, AGRAT in the last 72 hours. No lab exists for component: SGOT, GPT Cardiac Testing Lab Results Component Value Date/Time  (H) 08/26/2019 07:16 PM  
  06/16/2016 11:50 AM  
  08/19/2013 08:18 AM  
 Troponin-I, Qt. 0.20 () 02/12/2020 03:54 AM  
 Troponin-I, Qt. 0.20 () 02/12/2020 01:08 AM  
 Troponin-I, Qt. 0.22 () 02/11/2020 08:12 PM  
  
Coagulation Tests Lab Results Component Value Date/Time  Prothrombin time 13.8 08/26/2019 07:16 PM  
 Prothrombin time 14.1 05/14/2019 12:24 PM  
 Prothrombin time 11.2 11/28/2016 12:05 PM  
 INR 1.0 08/26/2019 07:16 PM  
 INR 1.1 05/14/2019 12:24 PM  
 INR 1.1 11/28/2016 12:05 PM  
 aPTT 38.6 (H) 02/20/2021 06:04 PM  
 aPTT 37.0 (H) 01/07/2021 02:48 AM  
 aPTT 42.6 (H) 02/11/2020 10:40 PM  
  
A1c No results found for: HBA1C, HGBE8, [id number] Lipid Panel Lab Results Component Value Date/Time Cholesterol, total 93 01/07/2021 02:48 AM  
 HDL Cholesterol 42 01/07/2021 02:48 AM  
 LDL, calculated 33 01/07/2021 02:48 AM  
 VLDL, calculated 18 01/07/2021 02:48 AM  
 Triglyceride 90 01/07/2021 02:48 AM  
 CHOL/HDL Ratio 2.2 01/07/2021 02:48 AM  
  
Thyroid Panel Lab Results Component Value Date/Time TSH 8.020 (H) 02/20/2021 03:56 AM  
 TSH 6.240 (H) 02/18/2021 10:45 PM  
    
Most Recent UA Lab Results Component Value Date/Time Color YELLOW 02/19/2021 12:13 PM  
 Appearance CLEAR 02/19/2021 12:13 PM  
 Specific gravity 1.016 02/19/2021 12:13 PM  
 pH (UA) 5.0 02/19/2021 12:13 PM  
 Protein Negative 02/19/2021 12:13 PM  
 Glucose Negative 02/19/2021 12:13 PM  
 Ketone Negative 02/19/2021 12:13 PM  
 Bilirubin Negative 02/19/2021 12:13 PM  
 Blood Negative 02/19/2021 12:13 PM  
 Urobilinogen 0.2 02/19/2021 12:13 PM  
 Nitrites Negative 02/19/2021 12:13 PM  
 Leukocyte Esterase Negative 02/19/2021 12:13 PM  
 WBC 0-3 02/19/2021 12:13 PM  
 RBC 0-3 02/19/2021 12:13 PM  
 Epithelial cells 0 02/19/2021 12:13 PM  
 Bacteria 0 02/19/2021 12:13 PM  
 Casts 0 02/19/2021 12:13 PM  
 Crystals, urine 0 02/27/2016 08:00 PM  
 Mucus 0 02/27/2016 08:00 PM  
 Other observations RESULTS VERIFIED MANUALLY 02/27/2016 08:00 PM  
  
 
Allergies Allergen Reactions  Corticosteroids (Glucocorticoids) Anaphylaxis  Iodine Hives and Other (comments)  
  hypertension Immunization History Administered Date(s) Administered  Influenza Vaccine Tekmi) PF (>6 Mo Flulaval, Fluarix, and >3 Yrs [place], Fluzone [id number]) 03/01/2016, 01/08/2021  Pneumococcal Polysaccharide (PPSV-23) 03/01/2016  TB Skin Test (PPD) Intradermal 02/27/2016, 12/13/2016 All Labs from Last 24 Hrs: No results found for this or any previous visit (from the past 24 hour(s)). Current Med List in Hospital: No current facility-administered medications for this encounter. Current Outpatient Medications Medication Sig  
 lansoprazole (PREVACID SOLUTAB) 30 mg disintegrating tablet 1 Tab by Per G Tube route Before breakfast and dinner.  levothyroxine (synthroid) 50 mcg tablet Take 1 Tab by mouth Daily (before breakfast). Take 2 tablets every Sunday and 1 tablet for the rest of the week.  ranolazine ER (Ranexa) 500 mg SR tablet Take 2 Tabs by mouth every twelve (12) hours.  apixaban (ELIQUIS) 2.5 mg tablet Take 1 Tab by mouth every twelve (12) hours.  midodrine (PROAMATINE) 10 mg tablet 1 Tab by Per G Tube route three (3) times daily (with meals) for 30 days.  sodium chloride 1 gram tablet 1 Tab by Per G Tube route two (2) times a day.  sucralfate (CARAFATE) 1 gram tablet Take 1 Tab by mouth Before breakfast, lunch, dinner and at bedtime.  trimethoprim-sulfamethoxazole (BACTRIM DS, SEPTRA DS) 160-800 mg per tablet Take 1 Tab by mouth every twelve (12) hours.  ferrous sulfate 325 mg (65 mg iron) tablet Take 1 Tab by mouth Daily (before breakfast).  metoclopramide HCl (REGLAN) 5 mg tablet 5 mg by Per G Tube route Before breakfast, lunch, and dinner.  lidocaine (LIDODERM) 5 % 1 Patch by TransDERmal route every twenty-four (24) hours. Apply patch to the affected area for 12 hours a day and remove for 12 hours a day.  fluticasone propionate (FLOVENT DISKUS) 50 mcg/actuation inhaler Take  by inhalation.  gabapentin (NEURONTIN) 250 mg/5 mL solution 100 mg by Per G Tube route two (2) times a day. 2 ml (100 mg) by G-tube route 2 times a day  Indications: weakness, numbness or pain from nerve damage  nitroglycerin (NITROSTAT) 0.4 mg SL tablet 1 Tab by SubLINGual route every five (5) minutes as needed for Chest Pain. Up to 3 doses.  ondansetron (ZOFRAN ODT) 8 mg disintegrating tablet Take 1 Tab by mouth every eight (8) hours as needed for Nausea.  loperamide (IMODIUM) 2 mg capsule Take 2 mg by mouth as needed for Diarrhea. Discharge Exam: 
No data found. Oxygen Therapy O2 Sat (%): 100 % (02/22/21 1455) Pulse via Oximetry: 78 beats per minute (02/20/21 0337) O2 Device: Room air (02/22/21 1455) O2 Flow Rate (L/min): 3 l/min (02/20/21 1454) ETCO2 (mmHg): 93 mmHg (02/18/21 0706) Estimated body mass index is 16.22 kg/m² as calculated from the following: 
  Height as of this encounter: 5' 4\" (1.626 m). Weight as of this encounter: 42.9 kg (94 lb 8 oz). No intake or output data in the 24 hours ending 02/26/21 1542 *Note that automatically entered I/Os may not be accurate; dependent on patient compliance with collection and accurate  by assistants. General:          Well nourished. Alert.   
heent- normal occular movements, atraumatic, normocephalic, pupils equal reacting to light CV:                  RRR. No murmur, rub, or gallop. Lungs:             Clear to auscultation bilaterally. No wheezing, rhonchi, or rales. Abdomen:        Soft, nontender, nondistended. Cns- no focal neurological deficits Extremities:     Warm and dry. No cyanosis or edema. Skin:                No rashes or jaundicet. Discharge Info:  
Discharge Medication List as of 2/22/2021  3:40 PM  
  
START taking these medications Details  
trimethoprim-sulfamethoxazole (BACTRIM DS, SEPTRA DS) 160-800 mg per tablet Take 1 Tab by mouth every twelve (12) hours. , Normal, Disp-6 Tab, R-0  
  
sucralfate (CARAFATE) 1 gram tablet Take 1 Tab by mouth Before breakfast, lunch, dinner and at bedtime., Normal, Disp-30 Tab, R-0  
  
sodium chloride 1 gram tablet 1 Tab by Per G Tube route two (2) times a day., Normal, Disp-20 Tab, R-0  
  
 midodrine (PROAMATINE) 10 mg tablet 1 Tab by Per G Tube route three (3) times daily (with meals) for 30 days. , Normal, Disp-90 Tab, R-1  
  
apixaban (ELIQUIS) 2.5 mg tablet Take 1 Tab by mouth every twelve (12) hours. , Normal, Disp-60 Tab, R-2  
  
ferrous sulfate 325 mg (65 mg iron) tablet Take 1 Tab by mouth Daily (before breakfast). , Normal, Disp-30 Tab, R-1  
  
  
CONTINUE these medications which have CHANGED Details  
levothyroxine (synthroid) 50 mcg tablet Take 1 Tab by mouth Daily (before breakfast). Take 2 tablets every Sunday and 1 tablet for the rest of the week., Normal, Disp-30 Tab, R-1  
  
ranolazine ER (Ranexa) 500 mg SR tablet Take 2 Tabs by mouth every twelve (12) hours. , Normal, Disp-120 Tab, R-5  
  
lansoprazole (PREVACID SOLUTAB) 30 mg disintegrating tablet 1 Tab by Per G Tube route Before breakfast and dinner., Normal, Disp-60 Tab, R-1  
  
  
CONTINUE these medications which have NOT CHANGED Details  
metoclopramide HCl (REGLAN) 5 mg tablet 5 mg by Per G Tube route Before breakfast, lunch, and dinner., Historical Med  
  
lidocaine (LIDODERM) 5 % 1 Patch by TransDERmal route every twenty-four (24) hours. Apply patch to the affected area for 12 hours a day and remove for 12 hours a day., Historical Med  
  
fluticasone propionate (FLOVENT DISKUS) 50 mcg/actuation inhaler Take  by inhalation. , Historical Med  
  
gabapentin (NEURONTIN) 250 mg/5 mL solution 100 mg by Per G Tube route two (2) times a day. 2 ml (100 mg) by G-tube route 2 times a day  Indications: weakness, numbness or pain from nerve damage, Historical Med  
  
nitroglycerin (NITROSTAT) 0.4 mg SL tablet 1 Tab by SubLINGual route every five (5) minutes as needed for Chest Pain. Up to 3 doses. , Print, Disp-1 Bottle, R-3  
  
ondansetron (ZOFRAN ODT) 8 mg disintegrating tablet Take 1 Tab by mouth every eight (8) hours as needed for Nausea. , Print, Disp-12 Tab, R-0  
  
 loperamide (IMODIUM) 2 mg capsule Take 2 mg by mouth as needed for Diarrhea., Historical Med  
  
  
STOP taking these medications  
  
 acetaminophen (TYLENOL) 100 mg/mL suspension Comments:  
Reason for Stopping:   
   
 amiodarone (CORDARONE) 200 mg tablet Comments:  
Reason for Stopping:   
   
 isosorbide mononitrate ER (IMDUR) 30 mg tablet Comments:  
Reason for Stopping:   
   
 aspirin 81 mg chewable tablet Comments:  
Reason for Stopping:   
   
 atorvastatin (LIPITOR) 40 mg tablet Comments:  
Reason for Stopping: Follow Up Orders: Follow-up Appointments Procedures  FOLLOW UP VISIT Appointment in: Other (Specify) Follow up with PMD in 5 days (for post-hospitalization follow up), Cardiology in 7 days (for medication reconciliation), and Hematology in 4 weeks (for Extensive DVT) Follow up with PMD in 5 days (for post-hospitalization follow up), Cardiology in 7 days (for medication reconciliation), and Hematology in 4 weeks (for Extensive DVT) Standing Status:   Standing Number of Occurrences:   1 Order Specific Question:   Appointment in Answer: Other (Specify) Follow-up Information Follow up With Specialties Details Why Contact Info [address] [place] [address] services will resume at discharge. Someone from this office will contact you to schedule the next home visit. [address] [place] [address] Chirag Barrow [phone] 
[phone] Saint Solders, MD Internal Medicine On 3/1/2021 With Emilie Parra NP @ 3:00 P.M. [facility] [address] 
[phone] [facility] OFFICE Cardiology On 3/2/2021 With Dr. Radha Pope @ 8:45 a.m. [address] [place] [address] [phone] [facility] 
[phone] [address] [facility] Oncology On 3/9/2021 With Dr. Vanda Amos @ 1:30 p.m. [address]es [id number] 
[phone] Time spent in patient discharge planning and coordination 35 minutes. Signed: 
Miriam Martinez MD .

## 2021-03-03 PROBLEM — I82.409 DVT (DEEP VENOUS THROMBOSIS) (HCC): Status: ACTIVE | Noted: 2021-01-01
